# Patient Record
Sex: FEMALE | Race: WHITE | NOT HISPANIC OR LATINO | Employment: OTHER | URBAN - METROPOLITAN AREA
[De-identification: names, ages, dates, MRNs, and addresses within clinical notes are randomized per-mention and may not be internally consistent; named-entity substitution may affect disease eponyms.]

---

## 2017-04-03 ENCOUNTER — HOSPITAL ENCOUNTER (OUTPATIENT)
Dept: GENERAL RADIOLOGY | Facility: HOSPITAL | Age: 77
Discharge: HOME | End: 2017-04-03
Attending: INTERNAL MEDICINE | Admitting: INTERNAL MEDICINE

## 2017-05-19 ENCOUNTER — HOSPITAL ENCOUNTER (OUTPATIENT)
Dept: ORTHOPEDIC SURGERY | Facility: CLINIC | Age: 77
Discharge: HOME | End: 2017-05-19
Attending: ORTHOPAEDIC SURGERY | Admitting: ORTHOPAEDIC SURGERY

## 2017-06-29 ENCOUNTER — HOSPITAL ENCOUNTER (OUTPATIENT)
Dept: ORTHOPEDIC SURGERY | Facility: CLINIC | Age: 77
Discharge: HOME | End: 2017-06-29
Attending: PHYSICIAN ASSISTANT | Admitting: PHYSICIAN ASSISTANT

## 2017-07-10 ENCOUNTER — HOSPITAL ENCOUNTER (OUTPATIENT)
Dept: PHYSICAL THERAPY | Facility: HOSPITAL | Age: 77
Setting detail: RECURRING SERIES
Discharge: HOME | End: 2017-07-26
Attending: PHYSICIAN ASSISTANT | Admitting: PHYSICIAN ASSISTANT

## 2017-11-28 ENCOUNTER — HOSPITAL ENCOUNTER (OUTPATIENT)
Dept: INFUSION THERAPY | Facility: HOSPITAL | Age: 77
Discharge: HOME | End: 2017-11-28
Attending: FAMILY MEDICINE | Admitting: FAMILY MEDICINE

## 2017-11-28 LAB — CALCIUM SERPL-MCNC: 9 MG/DL (ref 8.9–10.3)

## 2017-12-19 ENCOUNTER — HOSPITAL ENCOUNTER (OUTPATIENT)
Dept: FAMILY MEDICINE CLINIC | Facility: CLINIC | Age: 77
Setting detail: SPECIMEN
Discharge: HOME | End: 2017-12-19
Attending: FAMILY MEDICINE | Admitting: FAMILY MEDICINE

## 2017-12-19 LAB
ALBUMIN SERPL-MCNC: 3.8 G/DL (ref 3.5–4.8)
ALBUMIN/GLOB SERPL: 1.6 {RATIO} (ref 1–1.7)
ALP SERPL-CCNC: 52 IU/L (ref 32–91)
ALT SERPL-CCNC: 22 IU/L (ref 14–54)
ANION GAP SERPL CALC-SCNC: 10.6 MMOL/L (ref 10–20)
AST SERPL-CCNC: 26 IU/L (ref 15–41)
BASOPHILS # BLD AUTO: 0 10*3/UL (ref 0–0.2)
BASOPHILS NFR BLD AUTO: 0 % (ref 0–2)
BILIRUB SERPL-MCNC: 0.5 MG/DL (ref 0.3–1.2)
BUN SERPL-MCNC: 10 MG/DL (ref 8–20)
BUN/CREAT SERPL: 14.3 (ref 5.4–26.2)
CALCIUM SERPL-MCNC: 8.7 MG/DL (ref 8.9–10.3)
CHLORIDE SERPL-SCNC: 107 MMOL/L (ref 101–111)
CHOLEST SERPL-MCNC: 159 MG/DL
CHOLEST/HDLC SERPL: 2.8 {RATIO}
CONV CO2: 26 MMOL/L (ref 22–32)
CONV LDL CHOLESTEROL DIRECT: 89 MG/DL (ref 0–100)
CONV TOTAL PROTEIN: 6.2 G/DL (ref 6.1–7.9)
CREAT UR-MCNC: 0.7 MG/DL (ref 0.4–1)
DIFFERENTIAL METHOD BLD: (no result)
EOSINOPHIL # BLD AUTO: 0 10*3/UL (ref 0–0.3)
EOSINOPHIL # BLD AUTO: 1 % (ref 0–3)
ERYTHROCYTE [DISTWIDTH] IN BLOOD BY AUTOMATED COUNT: 14.2 % (ref 11.5–14.5)
ERYTHROCYTE [SEDIMENTATION RATE] IN BLOOD BY WESTERGREN METHOD: 16 MM/HR (ref 0–30)
GLOBULIN UR ELPH-MCNC: 2.4 G/DL (ref 2.5–3.8)
GLUCOSE SERPL-MCNC: 101 MG/DL (ref 65–99)
HCT VFR BLD AUTO: 39.1 % (ref 35–49)
HDLC SERPL-MCNC: 57 MG/DL
HGB BLD-MCNC: 13.2 G/DL (ref 12–15)
LDLC/HDLC SERPL: 1.6 {RATIO}
LIPID INTERPRETATION: NORMAL
LYMPHOCYTES # BLD AUTO: 1.4 10*3/UL (ref 0.8–4.8)
LYMPHOCYTES NFR BLD AUTO: 29 % (ref 18–42)
MCH RBC QN AUTO: 31.3 PG (ref 26–32)
MCHC RBC AUTO-ENTMCNC: 33.7 G/DL (ref 32–36)
MCV RBC AUTO: 92.9 FL (ref 80–94)
MONOCYTES # BLD AUTO: 0.5 10*3/UL (ref 0.1–1.3)
MONOCYTES NFR BLD AUTO: 10 % (ref 2–11)
NEUTROPHILS # BLD AUTO: 2.7 10*3/UL (ref 2.3–8.6)
NEUTROPHILS NFR BLD AUTO: 60 % (ref 50–75)
NRBC BLD AUTO-RTO: 0 /100{WBCS}
NRBC/RBC NFR BLD MANUAL: 0 10*3/UL
PLATELET # BLD AUTO: 227 10*3/UL (ref 150–450)
PMV BLD AUTO: 7.9 FL (ref 7.4–10.4)
POTASSIUM SERPL-SCNC: 3.6 MMOL/L (ref 3.6–5.1)
RBC # BLD AUTO: 4.21 10*6/UL (ref 4–5.4)
SODIUM SERPL-SCNC: 140 MMOL/L (ref 136–144)
TRIGL SERPL-MCNC: 95 MG/DL
VLDLC SERPL CALC-MCNC: 13.1 MG/DL
WBC # BLD AUTO: 4.6 10*3/UL (ref 4.5–11.5)

## 2018-02-20 ENCOUNTER — HOSPITAL ENCOUNTER (OUTPATIENT)
Dept: MAMMOGRAPHY | Facility: HOSPITAL | Age: 78
Discharge: HOME | End: 2018-02-20
Attending: FAMILY MEDICINE | Admitting: FAMILY MEDICINE

## 2018-05-10 ENCOUNTER — HOSPITAL ENCOUNTER (OUTPATIENT)
Dept: FAMILY MEDICINE CLINIC | Facility: CLINIC | Age: 78
Setting detail: SPECIMEN
Discharge: HOME | End: 2018-05-10
Attending: FAMILY MEDICINE | Admitting: FAMILY MEDICINE

## 2018-05-10 LAB
ALBUMIN SERPL-MCNC: 4.1 G/DL (ref 3.5–4.8)
ALBUMIN/GLOB SERPL: 1.6 {RATIO} (ref 1–1.7)
ALP SERPL-CCNC: 53 IU/L (ref 32–91)
ALT SERPL-CCNC: 25 IU/L (ref 14–54)
ANION GAP SERPL CALC-SCNC: 10.8 MMOL/L (ref 10–20)
AST SERPL-CCNC: 27 IU/L (ref 15–41)
BASOPHILS # BLD AUTO: 0 10*3/UL (ref 0–0.2)
BASOPHILS NFR BLD AUTO: 0 % (ref 0–2)
BILIRUB SERPL-MCNC: 0.5 MG/DL (ref 0.3–1.2)
BUN SERPL-MCNC: 10 MG/DL (ref 8–20)
BUN/CREAT SERPL: 14.3 (ref 5.4–26.2)
CALCIUM SERPL-MCNC: 9.8 MG/DL (ref 8.9–10.3)
CHLORIDE SERPL-SCNC: 107 MMOL/L (ref 101–111)
CONV CO2: 26 MMOL/L (ref 22–32)
CONV TOTAL PROTEIN: 6.7 G/DL (ref 6.1–7.9)
CREAT UR-MCNC: 0.7 MG/DL (ref 0.4–1)
DIFFERENTIAL METHOD BLD: (no result)
EOSINOPHIL # BLD AUTO: 0 10*3/UL (ref 0–0.3)
EOSINOPHIL # BLD AUTO: 1 % (ref 0–3)
ERYTHROCYTE [DISTWIDTH] IN BLOOD BY AUTOMATED COUNT: 14 % (ref 11.5–14.5)
GLOBULIN UR ELPH-MCNC: 2.6 G/DL (ref 2.5–3.8)
GLUCOSE SERPL-MCNC: 101 MG/DL (ref 65–99)
HCT VFR BLD AUTO: 42 % (ref 35–49)
HGB BLD-MCNC: 13.9 G/DL (ref 12–15)
LYMPHOCYTES # BLD AUTO: 1.2 10*3/UL (ref 0.8–4.8)
LYMPHOCYTES NFR BLD AUTO: 18 % (ref 18–42)
MCH RBC QN AUTO: 30.8 PG (ref 26–32)
MCHC RBC AUTO-ENTMCNC: 33.1 G/DL (ref 32–36)
MCV RBC AUTO: 93.3 FL (ref 80–94)
MONOCYTES # BLD AUTO: 0.6 10*3/UL (ref 0.1–1.3)
MONOCYTES NFR BLD AUTO: 10 % (ref 2–11)
NEUTROPHILS # BLD AUTO: 4.6 10*3/UL (ref 2.3–8.6)
NEUTROPHILS NFR BLD AUTO: 71 % (ref 50–75)
NRBC BLD AUTO-RTO: 0 /100{WBCS}
NRBC/RBC NFR BLD MANUAL: 0 10*3/UL
PLATELET # BLD AUTO: 271 10*3/UL (ref 150–450)
PMV BLD AUTO: 8.7 FL (ref 7.4–10.4)
POTASSIUM SERPL-SCNC: 3.8 MMOL/L (ref 3.6–5.1)
RBC # BLD AUTO: 4.5 10*6/UL (ref 4–5.4)
SODIUM SERPL-SCNC: 140 MMOL/L (ref 136–144)
WBC # BLD AUTO: 6.4 10*3/UL (ref 4.5–11.5)

## 2018-05-29 ENCOUNTER — CONVERSION ENCOUNTER (OUTPATIENT)
Dept: SURGERY | Facility: CLINIC | Age: 78
End: 2018-05-29

## 2018-06-29 ENCOUNTER — HOSPITAL ENCOUNTER (OUTPATIENT)
Dept: CT IMAGING | Facility: HOSPITAL | Age: 78
Discharge: HOME | End: 2018-06-29
Attending: FAMILY MEDICINE | Admitting: FAMILY MEDICINE

## 2018-06-29 LAB — CREAT BLDA-MCNC: 0.6 MG/DL (ref 0.6–1.3)

## 2018-07-16 ENCOUNTER — HOSPITAL ENCOUNTER (OUTPATIENT)
Dept: OTHER | Facility: HOSPITAL | Age: 78
Discharge: HOME | End: 2018-07-16
Attending: THORACIC SURGERY (CARDIOTHORACIC VASCULAR SURGERY) | Admitting: THORACIC SURGERY (CARDIOTHORACIC VASCULAR SURGERY)

## 2018-07-17 ENCOUNTER — HOSPITAL ENCOUNTER (OUTPATIENT)
Dept: ONCOLOGY | Facility: HOSPITAL | Age: 78
Discharge: HOME | End: 2018-07-17
Attending: THORACIC SURGERY (CARDIOTHORACIC VASCULAR SURGERY) | Admitting: THORACIC SURGERY (CARDIOTHORACIC VASCULAR SURGERY)

## 2018-07-17 LAB — GLUCOSE BLD-MCNC: 87 MG/DL (ref 70–105)

## 2018-12-03 ENCOUNTER — CONVERSION ENCOUNTER (OUTPATIENT)
Dept: SURGERY | Facility: CLINIC | Age: 78
End: 2018-12-03

## 2018-12-07 ENCOUNTER — HOSPITAL ENCOUNTER (OUTPATIENT)
Dept: FAMILY MEDICINE CLINIC | Facility: CLINIC | Age: 78
Setting detail: SPECIMEN
Discharge: HOME | End: 2018-12-07
Attending: FAMILY MEDICINE | Admitting: FAMILY MEDICINE

## 2018-12-07 LAB
25(OH)D3 SERPL-MCNC: 61 NG/ML (ref 30–100)
ALBUMIN SERPL-MCNC: 3.9 G/DL (ref 3.5–4.8)
ALBUMIN/GLOB SERPL: 1.6 {RATIO} (ref 1–1.7)
ALP SERPL-CCNC: 57 IU/L (ref 32–91)
ALT SERPL-CCNC: 28 IU/L (ref 14–54)
ANION GAP SERPL CALC-SCNC: 11.8 MMOL/L (ref 10–20)
AST SERPL-CCNC: 27 IU/L (ref 15–41)
BASOPHILS # BLD AUTO: 0 10*3/UL (ref 0–0.2)
BASOPHILS NFR BLD AUTO: 0 % (ref 0–2)
BILIRUB SERPL-MCNC: 1 MG/DL (ref 0.3–1.2)
BUN SERPL-MCNC: 11 MG/DL (ref 8–20)
BUN/CREAT SERPL: 13.8 (ref 5.4–26.2)
CALCIUM SERPL-MCNC: 9.5 MG/DL (ref 8.9–10.3)
CHLORIDE SERPL-SCNC: 101 MMOL/L (ref 101–111)
CHOLEST SERPL-MCNC: 158 MG/DL
CHOLEST/HDLC SERPL: 2.7 {RATIO}
CONV CO2: 27 MMOL/L (ref 22–32)
CONV LDL CHOLESTEROL DIRECT: 91 MG/DL (ref 0–100)
CONV TOTAL PROTEIN: 6.4 G/DL (ref 6.1–7.9)
CREAT UR-MCNC: 0.8 MG/DL (ref 0.4–1)
DIFFERENTIAL METHOD BLD: (no result)
EOSINOPHIL # BLD AUTO: 0 10*3/UL (ref 0–0.3)
EOSINOPHIL # BLD AUTO: 1 % (ref 0–3)
ERYTHROCYTE [DISTWIDTH] IN BLOOD BY AUTOMATED COUNT: 13.9 % (ref 11.5–14.5)
ERYTHROCYTE [SEDIMENTATION RATE] IN BLOOD BY WESTERGREN METHOD: 25 MM/HR (ref 0–30)
GLOBULIN UR ELPH-MCNC: 2.5 G/DL (ref 2.5–3.8)
GLUCOSE SERPL-MCNC: 122 MG/DL (ref 65–99)
HCT VFR BLD AUTO: 40.8 % (ref 35–49)
HDLC SERPL-MCNC: 59 MG/DL
HGB BLD-MCNC: 13.8 G/DL (ref 12–15)
LDLC/HDLC SERPL: 1.5 {RATIO}
LIPID INTERPRETATION: NORMAL
LYMPHOCYTES # BLD AUTO: 1.2 10*3/UL (ref 0.8–4.8)
LYMPHOCYTES NFR BLD AUTO: 17 % (ref 18–42)
MCH RBC QN AUTO: 31.4 PG (ref 26–32)
MCHC RBC AUTO-ENTMCNC: 33.8 G/DL (ref 32–36)
MCV RBC AUTO: 92.9 FL (ref 80–94)
MONOCYTES # BLD AUTO: 0.6 10*3/UL (ref 0.1–1.3)
MONOCYTES NFR BLD AUTO: 8 % (ref 2–11)
NEUTROPHILS # BLD AUTO: 5.5 10*3/UL (ref 2.3–8.6)
NEUTROPHILS NFR BLD AUTO: 74 % (ref 50–75)
NRBC BLD AUTO-RTO: 0 /100{WBCS}
NRBC/RBC NFR BLD MANUAL: 0 10*3/UL
PLATELET # BLD AUTO: 262 10*3/UL (ref 150–450)
PMV BLD AUTO: 8 FL (ref 7.4–10.4)
POTASSIUM SERPL-SCNC: 3.8 MMOL/L (ref 3.6–5.1)
RBC # BLD AUTO: 4.39 10*6/UL (ref 4–5.4)
SODIUM SERPL-SCNC: 136 MMOL/L (ref 136–144)
T PALLIDUM IGG SER QL: NONREACTIVE
TRIGL SERPL-MCNC: 103 MG/DL
TSH SERPL-ACNC: 1.06 UIU/ML (ref 0.34–5.6)
VIT B12 SERPL-MCNC: 1436 PG/ML (ref 180–914)
VLDLC SERPL CALC-MCNC: 8.7 MG/DL
WBC # BLD AUTO: 7.4 10*3/UL (ref 4.5–11.5)

## 2018-12-10 LAB — ANA SER QL IA: NEGATIVE

## 2018-12-14 ENCOUNTER — HOSPITAL ENCOUNTER (OUTPATIENT)
Dept: MRI IMAGING | Facility: HOSPITAL | Age: 78
Discharge: HOME | End: 2018-12-14
Attending: FAMILY MEDICINE | Admitting: FAMILY MEDICINE

## 2019-01-04 ENCOUNTER — HOSPITAL ENCOUNTER (OUTPATIENT)
Dept: CT IMAGING | Facility: HOSPITAL | Age: 79
Discharge: HOME | End: 2019-01-04
Attending: THORACIC SURGERY (CARDIOTHORACIC VASCULAR SURGERY) | Admitting: THORACIC SURGERY (CARDIOTHORACIC VASCULAR SURGERY)

## 2019-01-04 LAB — CREAT BLDA-MCNC: 0.8 MG/DL (ref 0.6–1.3)

## 2019-05-06 ENCOUNTER — HOSPITAL ENCOUNTER (OUTPATIENT)
Dept: GENERAL RADIOLOGY | Facility: HOSPITAL | Age: 79
Discharge: HOME | End: 2019-05-06
Attending: FAMILY MEDICINE | Admitting: FAMILY MEDICINE

## 2019-05-06 ENCOUNTER — HOSPITAL ENCOUNTER (OUTPATIENT)
Dept: FAMILY MEDICINE CLINIC | Facility: CLINIC | Age: 79
Setting detail: SPECIMEN
Discharge: HOME | End: 2019-05-06
Attending: FAMILY MEDICINE | Admitting: FAMILY MEDICINE

## 2019-05-09 ENCOUNTER — HOSPITAL ENCOUNTER (OUTPATIENT)
Dept: CT IMAGING | Facility: HOSPITAL | Age: 79
Discharge: HOME | End: 2019-05-09
Attending: THORACIC SURGERY (CARDIOTHORACIC VASCULAR SURGERY) | Admitting: THORACIC SURGERY (CARDIOTHORACIC VASCULAR SURGERY)

## 2019-05-17 ENCOUNTER — HOSPITAL ENCOUNTER (OUTPATIENT)
Dept: ORTHOPEDIC SURGERY | Facility: CLINIC | Age: 79
Setting detail: SPECIMEN
Discharge: HOME | End: 2019-05-17
Attending: PHYSICIAN ASSISTANT | Admitting: PHYSICIAN ASSISTANT

## 2019-05-17 LAB — 25(OH)D3 SERPL-MCNC: 57 NG/ML (ref 30–100)

## 2019-05-22 ENCOUNTER — HOSPITAL ENCOUNTER (OUTPATIENT)
Dept: MAMMOGRAPHY | Facility: HOSPITAL | Age: 79
Discharge: HOME | End: 2019-05-22
Attending: PHYSICIAN ASSISTANT | Admitting: PHYSICIAN ASSISTANT

## 2019-06-01 ENCOUNTER — TRANSCRIBE ORDERS (OUTPATIENT)
Dept: SURGERY | Facility: CLINIC | Age: 79
End: 2019-06-01

## 2019-06-01 ENCOUNTER — TRANSCRIBE ORDERS (OUTPATIENT)
Dept: CT IMAGING | Facility: HOSPITAL | Age: 79
End: 2019-06-01

## 2019-06-01 DIAGNOSIS — R91.1 LUNG NODULE: Primary | ICD-10-CM

## 2019-06-04 VITALS
DIASTOLIC BLOOD PRESSURE: 78 MMHG | BODY MASS INDEX: 24.72 KG/M2 | WEIGHT: 148.4 LBS | SYSTOLIC BLOOD PRESSURE: 136 MMHG | DIASTOLIC BLOOD PRESSURE: 80 MMHG | HEART RATE: 70 BPM | SYSTOLIC BLOOD PRESSURE: 132 MMHG | HEART RATE: 80 BPM | HEIGHT: 65 IN

## 2019-06-06 ENCOUNTER — CONVERSION ENCOUNTER (OUTPATIENT)
Dept: FAMILY MEDICINE CLINIC | Facility: CLINIC | Age: 79
End: 2019-06-06

## 2019-06-11 ENCOUNTER — CONVERSION ENCOUNTER (OUTPATIENT)
Dept: ORTHOPEDIC SURGERY | Facility: CLINIC | Age: 79
End: 2019-06-11

## 2019-06-12 VITALS
HEART RATE: 64 BPM | DIASTOLIC BLOOD PRESSURE: 81 MMHG | BODY MASS INDEX: 27.46 KG/M2 | SYSTOLIC BLOOD PRESSURE: 134 MMHG | WEIGHT: 155 LBS | HEIGHT: 63 IN

## 2019-06-12 VITALS
HEIGHT: 63 IN | DIASTOLIC BLOOD PRESSURE: 73 MMHG | WEIGHT: 157 LBS | SYSTOLIC BLOOD PRESSURE: 116 MMHG | BODY MASS INDEX: 27.82 KG/M2 | HEART RATE: 69 BPM

## 2019-06-13 NOTE — PROGRESS NOTES
Visit Type:  Initial Consult  Referring Provider:  Lele Lucas MD  Primary Provider:  Shayy HERNANDEZ MD    CC:  left hip pain.    History of Present Illness:  Patient presents with main symptom of left hip pain.She states that she is getting progressively worse.  She is limping all the time. She states that she likes to walk a mi a day but is unable to do so because of the pain in her groin.  She denies any   history of trauma.  There are no risk factors for avascular necrosis.  Duration has been 3 months   And her symptoms are getting progressively worse.  She finds it difficult to turn over in bed at night.  She also states that he is unable to ambulate because of the hip pain and has a very significant limp.  Severity is described as 9/10   Also complains of left groin and leg pain   Which is related to internal and external rotation of the hip.  Denies surgeries or injury   To the hip.  Patient has tried steriod shot   To the hip along with anti-inflammatory medication.  Those modalities of treatment have really not helped her at all in managing her symptoms.  She states that she would like to proceed with hip replacement surgery ASAP.        Past Medical History:     Reviewed history from 2019 and no changes required:        Last prolia given 2017        Female Reproductive Hx: childbirth/conditions, ()        Health Maintenence: PAP (last done ); DEXA (last 2016)        Cardiovascular Hx: angina, (vasospastic)        Health Maintenence: stress myoview (normal)        Cardiovascular Hx: CAD        Musculoskeletal Hx: rheumatoid arthritis, osteoporosis; gets Prolia q6 months        GI Hx: irritable bowel syndrome        Musculoskeletal Hx: sciatica        Renal / Urinary Hx: constipation        Cardiovascular Hx: hyperlipidemia        Anxiety        Depression        Anal stenosis        Insomnia        Arrythmia        B12 deff        IBS        Osteoporosis with hx fragility fxs  (DEXA 2016)- fosamax [id number], on prolia(2016)        Microscopic hematuria, work-up (-)        Adenocarcinoma right lung         hyperglycemia        No Drug Allergies?         left hip pain    Past Surgical History:     Reviewed history from 2018 and no changes required:        tonsillectomy        appendectomy        cholecystectomy        hysterectomy        breast biopsy right        colonoscopy        cardiac catheterization()        Anal dilation        Bilateral wrist fractures        Cystoscopy        Right TKR 17 Dr. shaw        Right VATS wedge resection of Right middle lobe pulmonary nodule, completion right middle lobectomy, hilar and mediastinal lymph node dissection 18        RML lobectomy (Dr. Brantley)    Family History Summary:      Reviewed history Last on 2019 and no changes required:2019  Father - Has Family History of Alcoholism - Entered On: 2018  Other Family Member - Has FH Heart Disease - Entered On: 2017    General Comments - FH:  mother  age 86  Father  MI age 42- etoh,smoker  FH Heart Disease        Social History:     Reviewed history from 2019 and no changes required:        Patient is a former smoker.        Passive Smoke: N        Alcohol Use: Y        Drug Use: N        HIV/High Risk: N        Regular Exercise: Y        Hx Domestic Abuse: N        Adventist Affecting Care: N                Risk Factors:     Smoked Tobacco Use:  Former smoker  Smokeless Tobacco Use:  Never  Passive smoke exposure:  no  Drug use:  no  HIV high-risk behavior:  no  Caffeine use:  2 drinks per day  Alcohol use:  yes     Type:  1 -2 a month  Exercise:  yes     Times per week:  4     Type of Exercise:  swim therapy, walks  Seatbelt use:  100 %  Sun Exposure:  rarely    Dietary Counseling: yes    Previous Tobacco Use: Signed On - 2019  Smoked Tobacco Use:  Former smoker  Smokeless Tobacco Use:  Never  Passive smoke exposure:  no  Drug  use:  no  HIV high-risk behavior:  no  Caffeine use:  2 drinks per day    Previous Alcohol Use: Signed On - 05/16/2019  Alcohol use:  yes     Type:  1 -2 a month  Exercise:  yes     Times per week:  4     Type of Exercise:  swim therapy, walks  Seatbelt use:  100 %  Sun Exposure:  rarely    Dietary Counseling: yes    Colonoscopy History:     Date of Last Colonoscopy:  02/12/2018    Mammogram History:     Date of Last Mammogram:  05/07/2012    Active Medications (reviewed today):  NAMENDA 10 MG ORAL TABLET (MEMANTINE HCL) 1 po bid  CARTIA  MG ORAL CAPSULE EXTENDED RELEASE 24 HOUR (DILTIAZEM HCL COATED BEADS) take 1 tablet by mouth daily  SM VITAMIN D3 4000 UNIT ORAL CAPSULE (CHOLECALCIFEROL) take 2 capsule once daily  EFFEXOR  MG ORAL CAPSULE EXTENDED RELEASE 24 HOUR (VENLAFAXINE HCL) 1 po q am  PROLIA 60 MG/ML SUBCUTANEOUS SOLUTION (DENOSUMAB) take 60mg subq q 6 months  PREDNISONE 2.5 MG ORAL TABLET (PREDNISONE) 1 po q d  LEFLUNOMIDE 10 MG ORAL TABLET (LEFLUNOMIDE) take one tab po QD  METOPROLOL TARTRATE 25 MG ORAL TABLET (METOPROLOL TARTRATE) TAKE 1/2 TABLET TWICE DAILY  VITAMIN B-12 1000 MCG ORAL TABLET (CYANOCOBALAMIN) take 1 tablet by mouth once daily  CALCIUM 600+D PLUS MINERALS TABLET (CALCIUM CARBONATE-VIT D-MIN TABS) take 1 tablet by mouth twice daily  TRAZODONE HCL 50 MG ORAL TABLET (TRAZODONE HCL) 2 po q hs for insomnia  BENTYL 10 MG ORAL CAPSULE (DICYCLOMINE HCL) 1 po bid for irritable bowel  PRAVASTATIN SODIUM 40 MG ORAL TABLET (PRAVASTATIN SODIUM) take one daily at bedtime  PROTONIX 40 MG ORAL TABLET DELAYED RELEASE (PANTOPRAZOLE SODIUM) 1 po q d  POLYETHYLENE GLYCOL 3350 POWD (POLYETHYLENE GLYCOL 3350) DISSOLVE 17 GRAMS IN 8 OUNCES OF LIQUID AND DRINK TWICE A DAY    Current Allergies (reviewed today):  No known allergies    Current Medications (including medications started today):   NAMENDA 10 MG ORAL TABLET (MEMANTINE HCL) 1 po bid  CARTIA  MG ORAL CAPSULE EXTENDED RELEASE 24 HOUR  (DILTIAZEM HCL COATED BEADS) take 1 tablet by mouth daily  SM VITAMIN D3 4000 UNIT ORAL CAPSULE (CHOLECALCIFEROL) take 2 capsule once daily  EFFEXOR  MG ORAL CAPSULE EXTENDED RELEASE 24 HOUR (VENLAFAXINE HCL) 1 po q am  PROLIA 60 MG/ML SUBCUTANEOUS SOLUTION (DENOSUMAB) take 60mg subq q 6 months  PREDNISONE 2.5 MG ORAL TABLET (PREDNISONE) 1 po q d  LEFLUNOMIDE 10 MG ORAL TABLET (LEFLUNOMIDE) take one tab po QD  METOPROLOL TARTRATE 25 MG ORAL TABLET (METOPROLOL TARTRATE) TAKE 1/2 TABLET TWICE DAILY  VITAMIN B-12 1000 MCG ORAL TABLET (CYANOCOBALAMIN) take 1 tablet by mouth once daily  CALCIUM 600+D PLUS MINERALS TABLET (CALCIUM CARBONATE-VIT D-MIN TABS) take 1 tablet by mouth twice daily  TRAZODONE HCL 50 MG ORAL TABLET (TRAZODONE HCL) 2 po q hs for insomnia  BENTYL 10 MG ORAL CAPSULE (DICYCLOMINE HCL) 1 po bid for irritable bowel  PRAVASTATIN SODIUM 40 MG ORAL TABLET (PRAVASTATIN SODIUM) take one daily at bedtime  PROTONIX 40 MG ORAL TABLET DELAYED RELEASE (PANTOPRAZOLE SODIUM) 1 po q d  POLYETHYLENE GLYCOL 3350 POWD (POLYETHYLENE GLYCOL 3350) DISSOLVE 17 GRAMS IN 8 OUNCES OF LIQUID AND DRINK TWICE A DAY      Vital Signs:    Patient Profile:    78 Years Old Female  Height:     63 inches (165.10 cm)  Weight:     155 pounds  BMI:        27.45     Pulse rate: 64 / minute  BP Sittin / 81  (left arm)    Cuff size:  regular      Problems: Active problems were reviewed with the patient during this visit.  Medications: Medications were reviewed with the patient during this visit.  Allergies: Allergies were reviewed with the patient during this visit.  No Known Allergy.  No Known Drug Allergy.        Vitals Entered By: Ranjit Mcgee CMA (2019 10:24 AM)      Review of Systems     CV- Denies chest pain or discomfort, or palpitations.  Resp- Denies cough and shortness of breath.  GI- Denies indigestion, nausea and vomiting.  MS- See HPI.  Derm- Denies itching and rash.  Neuro- Denies headache or  numbness  All other symptoms reviewed and are normal.           Hip Exam    General:     Well-developed, well-nourished, in no acute distress; alert and oriented x 3.      Gait:     antalgic.      Skin:     Intact with no erythema; no scarring.      Inspection:     plantigrade foot with normal alignment of leg, ankle, hindfoot, and foot.     Palpation:       Patient has exquisite pain and discomfort on the lateral aspect of the left hip and over the anterior aspect of the groin.    Vascular:     dorsalis pedis and posterior tibial pulses 2+ and symmetric, capillary refill < 2 seconds, normal hair pattern, no evidence of ischemia.     Hip Exam:     Left:     Inspection/Palpation:    Left hip:  Positive Trendelenburg sign.  Positive Stinchfield test.  Straight leg raise exam is positive as well.  Internal and external rotation of the hip were associated with pain and discomfort.  She has got a very   significant limp on the left side.  There is 1/2 inch of shortening of the left lower extremity at the hip.          Impression & Recommendations:    Problem # 1:  HIP PAIN ON THE LEFT (ICD-719.45) (SJL81-L53.559)    Extensive discussion about different options for the patient.    She would like to proceed with hip replacement surgery.    Her granddaughter Brayden is in the office here today with the patient during the discussion.    Time spent in the office today with the patient was 45 minutes of which greater than 50% of my time was spent face to face going over the rationale for treatment options and the rationale for surgical intervention    Possibility of death, infection, myocardial infarction, DVT, pulmonary embolism, dislocation of the prosthesis, limb length discrepancy, possibility of revision surgery, possibility of iatrogenic fractures, and urinary tract infection etc all discussed   with the patient at length.    We will get a medical clearance for this patient and proceed with surgical intervention once she has  been cleared.  Plans are for a total hip arthroplasty with direct anterior approach.      ]      Electronically signed by Lenny Khan MD on 06/11/2019 at 11:25 AM  ________________________________________________________________________  Clinical Lists Changes    Orders:  Added new Service order of [facility] [address] ([id number]) - Signed                          Electronically signed by Ranjit Mcgee CMA on 06/11/2019 at 4:33 PM  ________________________________________________________________________       Disclaimer: Converted Note message may not contain all data elements that existed in the legacy source system. Please see [employer] [facility] for the original note details.

## 2019-06-18 ENCOUNTER — PREP FOR SURGERY (OUTPATIENT)
Dept: OTHER | Facility: HOSPITAL | Age: 79
End: 2019-06-18

## 2019-06-18 ENCOUNTER — TELEPHONE (OUTPATIENT)
Dept: ORTHOPEDIC SURGERY | Facility: CLINIC | Age: 79
End: 2019-06-18

## 2019-06-18 DIAGNOSIS — M16.12 PRIMARY OSTEOARTHRITIS OF LEFT HIP: Primary | ICD-10-CM

## 2019-06-18 RX ORDER — PREGABALIN 75 MG/1
150 CAPSULE ORAL ONCE
Status: CANCELLED | OUTPATIENT
Start: 2019-06-18 | End: 2019-06-18

## 2019-06-18 RX ORDER — ACETAMINOPHEN 500 MG
1000 TABLET ORAL ONCE
Status: CANCELLED | OUTPATIENT
Start: 2019-06-18 | End: 2019-06-18

## 2019-06-18 RX ORDER — MELOXICAM 15 MG/1
15 TABLET ORAL ONCE
Status: CANCELLED | OUTPATIENT
Start: 2019-06-18 | End: 2019-06-18

## 2019-06-20 ENCOUNTER — TELEPHONE (OUTPATIENT)
Dept: FAMILY MEDICINE CLINIC | Facility: CLINIC | Age: 79
End: 2019-06-20

## 2019-06-20 ENCOUNTER — TELEPHONE (OUTPATIENT)
Dept: CARDIOLOGY | Facility: CLINIC | Age: 79
End: 2019-06-20

## 2019-06-20 NOTE — TELEPHONE ENCOUNTER
PER SCK - Call this patient and tell her that she needs to see her cardiologist for clearance of surgery, if he gets clearance I will give her clearance     SPOKE WITH  PATIENT AND GAVE HER MESSAGE.  SHE WILL SEE HER CARDIOLOGIST AND NOTIFY US AFTERWARDS..

## 2019-07-05 ENCOUNTER — HOSPITAL ENCOUNTER (EMERGENCY)
Facility: HOSPITAL | Age: 79
Discharge: HOME | End: 2019-07-05
Attending: EMERGENCY MEDICINE | Admitting: EMERGENCY MEDICINE

## 2019-07-05 ENCOUNTER — APPOINTMENT (OUTPATIENT)
Dept: PREADMISSION TESTING | Facility: HOSPITAL | Age: 79
End: 2019-07-05

## 2019-07-05 ENCOUNTER — HOSPITAL ENCOUNTER (OUTPATIENT)
Dept: GENERAL RADIOLOGY | Facility: HOSPITAL | Age: 79
Discharge: HOME | End: 2019-07-05
Admitting: ORTHOPAEDIC SURGERY

## 2019-07-05 VITALS
TEMPERATURE: 97.3 F | HEART RATE: 73 BPM | HEIGHT: 64 IN | WEIGHT: 141.54 LBS | OXYGEN SATURATION: 99 % | DIASTOLIC BLOOD PRESSURE: 71 MMHG | SYSTOLIC BLOOD PRESSURE: 152 MMHG | RESPIRATION RATE: 18 BRPM | BODY MASS INDEX: 24.16 KG/M2

## 2019-07-05 VITALS
SYSTOLIC BLOOD PRESSURE: 149 MMHG | DIASTOLIC BLOOD PRESSURE: 81 MMHG | BODY MASS INDEX: 26.53 KG/M2 | OXYGEN SATURATION: 97 % | WEIGHT: 155.38 LBS | HEART RATE: 81 BPM | HEIGHT: 64 IN

## 2019-07-05 DIAGNOSIS — R20.2 PARESTHESIAS: ICD-10-CM

## 2019-07-05 DIAGNOSIS — E87.6 HYPOKALEMIA: ICD-10-CM

## 2019-07-05 DIAGNOSIS — F41.9 ACUTE ANXIETY: Primary | ICD-10-CM

## 2019-07-05 LAB
ABO GROUP BLD: NORMAL
ANION GAP SERPL CALCULATED.3IONS-SCNC: 14.2 MMOL/L (ref 10–20)
ANION GAP SERPL CALCULATED.3IONS-SCNC: 16.2 MMOL/L (ref 10–20)
APTT PPP: 24.9 SECONDS (ref 24–31)
BASOPHILS # BLD AUTO: 0 10*3/MM3 (ref 0–0.2)
BASOPHILS # BLD AUTO: 0 10*3/MM3 (ref 0–0.2)
BASOPHILS NFR BLD AUTO: 0.1 % (ref 0–1.5)
BASOPHILS NFR BLD AUTO: 0.2 % (ref 0–1.5)
BILIRUB UR QL STRIP: NEGATIVE
BLD GP AB SCN SERPL QL: NEGATIVE
BUN BLD-MCNC: 11 MG/DL (ref 8–20)
BUN BLD-MCNC: 15 MG/DL (ref 8–20)
BUN/CREAT SERPL: 11 (ref 5.4–26.2)
BUN/CREAT SERPL: 18.8 (ref 5.4–26.2)
CALCIUM SPEC-SCNC: 8.8 MG/DL (ref 8.9–10.3)
CALCIUM SPEC-SCNC: 9.1 MG/DL (ref 8.9–10.3)
CHLORIDE SERPL-SCNC: 105 MMOL/L (ref 101–111)
CHLORIDE SERPL-SCNC: 111 MMOL/L (ref 101–111)
CLARITY UR: CLEAR
CO2 SERPL-SCNC: 18 MMOL/L (ref 22–32)
CO2 SERPL-SCNC: 21 MMOL/L (ref 22–32)
COLOR UR: YELLOW
CREAT BLD-MCNC: 0.8 MG/DL (ref 0.4–1)
CREAT BLD-MCNC: 1 MG/DL (ref 0.4–1)
DEPRECATED RDW RBC AUTO: 45.1 FL (ref 37–54)
DEPRECATED RDW RBC AUTO: 47.7 FL (ref 37–54)
EOSINOPHIL # BLD AUTO: 0 10*3/MM3 (ref 0–0.4)
EOSINOPHIL # BLD AUTO: 0 10*3/MM3 (ref 0–0.4)
EOSINOPHIL NFR BLD AUTO: 0.3 % (ref 0.3–6.2)
EOSINOPHIL NFR BLD AUTO: 0.3 % (ref 0.3–6.2)
ERYTHROCYTE [DISTWIDTH] IN BLOOD BY AUTOMATED COUNT: 14 % (ref 12.3–15.4)
ERYTHROCYTE [DISTWIDTH] IN BLOOD BY AUTOMATED COUNT: 14.5 % (ref 12.3–15.4)
GFR SERPL CREATININE-BSD FRML MDRD: 53 ML/MIN/1.73
GFR SERPL CREATININE-BSD FRML MDRD: 69 ML/MIN/1.73
GLUCOSE BLD-MCNC: 105 MG/DL (ref 65–99)
GLUCOSE BLD-MCNC: 124 MG/DL (ref 65–99)
GLUCOSE BLDC GLUCOMTR-MCNC: 127 MG/DL (ref 70–105)
GLUCOSE UR STRIP-MCNC: NEGATIVE MG/DL
HCT VFR BLD AUTO: 41.9 % (ref 34–46.6)
HCT VFR BLD AUTO: 42.4 % (ref 34–46.6)
HGB BLD-MCNC: 14.1 G/DL (ref 12–15.9)
HGB BLD-MCNC: 14.3 G/DL (ref 12–15.9)
HGB UR QL STRIP.AUTO: NEGATIVE
HOLD SPECIMEN: NORMAL
HOLD SPECIMEN: NORMAL
INR PPP: 1.01 (ref 0.9–1.1)
KETONES UR QL STRIP: NEGATIVE
LEUKOCYTE ESTERASE UR QL STRIP.AUTO: NEGATIVE
LYMPHOCYTES # BLD AUTO: 1.2 10*3/MM3 (ref 0.7–3.1)
LYMPHOCYTES # BLD AUTO: 1.8 10*3/MM3 (ref 0.7–3.1)
LYMPHOCYTES NFR BLD AUTO: 18.7 % (ref 19.6–45.3)
LYMPHOCYTES NFR BLD AUTO: 23.7 % (ref 19.6–45.3)
MCH RBC QN AUTO: 31.1 PG (ref 26.6–33)
MCH RBC QN AUTO: 31.4 PG (ref 26.6–33)
MCHC RBC AUTO-ENTMCNC: 33.6 G/DL (ref 31.5–35.7)
MCHC RBC AUTO-ENTMCNC: 33.8 G/DL (ref 31.5–35.7)
MCV RBC AUTO: 92.5 FL (ref 79–97)
MCV RBC AUTO: 92.9 FL (ref 79–97)
MONOCYTES # BLD AUTO: 0.5 10*3/MM3 (ref 0.1–0.9)
MONOCYTES # BLD AUTO: 0.6 10*3/MM3 (ref 0.1–0.9)
MONOCYTES NFR BLD AUTO: 8.2 % (ref 5–12)
MONOCYTES NFR BLD AUTO: 8.7 % (ref 5–12)
NEUTROPHILS # BLD AUTO: 4.5 10*3/MM3 (ref 1.7–7)
NEUTROPHILS # BLD AUTO: 5 10*3/MM3 (ref 1.7–7)
NEUTROPHILS NFR BLD AUTO: 67.1 % (ref 42.7–76)
NEUTROPHILS NFR BLD AUTO: 72.7 % (ref 42.7–76)
NITRITE UR QL STRIP: NEGATIVE
NRBC BLD AUTO-RTO: 0 /100 WBC (ref 0–0.2)
NRBC BLD AUTO-RTO: 0.1 /100 WBC (ref 0–0.2)
PH UR STRIP.AUTO: 6 [PH] (ref 5–8)
PLATELET # BLD AUTO: 283 10*3/MM3 (ref 140–450)
PLATELET # BLD AUTO: 285 10*3/MM3 (ref 140–450)
PMV BLD AUTO: 8.2 FL (ref 6–12)
PMV BLD AUTO: 8.3 FL (ref 6–12)
POTASSIUM BLD-SCNC: 3.2 MMOL/L (ref 3.6–5.1)
POTASSIUM BLD-SCNC: 3.2 MMOL/L (ref 3.6–5.1)
PROT UR QL STRIP: NEGATIVE
PROTHROMBIN TIME: 10.4 SECONDS (ref 9.6–11.7)
RBC # BLD AUTO: 4.53 10*6/MM3 (ref 3.77–5.28)
RBC # BLD AUTO: 4.57 10*6/MM3 (ref 3.77–5.28)
RH BLD: POSITIVE
SODIUM BLD-SCNC: 139 MMOL/L (ref 136–144)
SODIUM BLD-SCNC: 140 MMOL/L (ref 136–144)
SP GR UR STRIP: 1.02 (ref 1–1.03)
T&S EXPIRATION DATE: NORMAL
UROBILINOGEN UR QL STRIP: NORMAL
WBC NRBC COR # BLD: 6.2 10*3/MM3 (ref 3.4–10.8)
WBC NRBC COR # BLD: 7.4 10*3/MM3 (ref 3.4–10.8)
WHOLE BLOOD HOLD SPECIMEN: NORMAL
WHOLE BLOOD HOLD SPECIMEN: NORMAL

## 2019-07-05 PROCEDURE — 36415 COLL VENOUS BLD VENIPUNCTURE: CPT

## 2019-07-05 PROCEDURE — 80048 BASIC METABOLIC PNL TOTAL CA: CPT | Performed by: EMERGENCY MEDICINE

## 2019-07-05 PROCEDURE — 25010000002 LORAZEPAM PER 2 MG: Performed by: EMERGENCY MEDICINE

## 2019-07-05 PROCEDURE — 86901 BLOOD TYPING SEROLOGIC RH(D): CPT | Performed by: ORTHOPAEDIC SURGERY

## 2019-07-05 PROCEDURE — 86900 BLOOD TYPING SEROLOGIC ABO: CPT

## 2019-07-05 PROCEDURE — 80048 BASIC METABOLIC PNL TOTAL CA: CPT | Performed by: ORTHOPAEDIC SURGERY

## 2019-07-05 PROCEDURE — 71046 X-RAY EXAM CHEST 2 VIEWS: CPT

## 2019-07-05 PROCEDURE — 85730 THROMBOPLASTIN TIME PARTIAL: CPT | Performed by: ORTHOPAEDIC SURGERY

## 2019-07-05 PROCEDURE — 86850 RBC ANTIBODY SCREEN: CPT | Performed by: ORTHOPAEDIC SURGERY

## 2019-07-05 PROCEDURE — 85025 COMPLETE CBC W/AUTO DIFF WBC: CPT | Performed by: EMERGENCY MEDICINE

## 2019-07-05 PROCEDURE — 85610 PROTHROMBIN TIME: CPT | Performed by: ORTHOPAEDIC SURGERY

## 2019-07-05 PROCEDURE — 82962 GLUCOSE BLOOD TEST: CPT

## 2019-07-05 PROCEDURE — 93005 ELECTROCARDIOGRAM TRACING: CPT | Performed by: EMERGENCY MEDICINE

## 2019-07-05 PROCEDURE — 96374 THER/PROPH/DIAG INJ IV PUSH: CPT

## 2019-07-05 PROCEDURE — 86900 BLOOD TYPING SEROLOGIC ABO: CPT | Performed by: ORTHOPAEDIC SURGERY

## 2019-07-05 PROCEDURE — 86901 BLOOD TYPING SEROLOGIC RH(D): CPT

## 2019-07-05 PROCEDURE — 81003 URINALYSIS AUTO W/O SCOPE: CPT | Performed by: ORTHOPAEDIC SURGERY

## 2019-07-05 PROCEDURE — 87081 CULTURE SCREEN ONLY: CPT | Performed by: ORTHOPAEDIC SURGERY

## 2019-07-05 PROCEDURE — 99284 EMERGENCY DEPT VISIT MOD MDM: CPT

## 2019-07-05 PROCEDURE — 93005 ELECTROCARDIOGRAM TRACING: CPT

## 2019-07-05 PROCEDURE — 83036 HEMOGLOBIN GLYCOSYLATED A1C: CPT | Performed by: ORTHOPAEDIC SURGERY

## 2019-07-05 PROCEDURE — 85025 COMPLETE CBC W/AUTO DIFF WBC: CPT | Performed by: ORTHOPAEDIC SURGERY

## 2019-07-05 RX ORDER — PRAVASTATIN SODIUM 40 MG
40 TABLET ORAL NIGHTLY
COMMUNITY
End: 2020-02-27

## 2019-07-05 RX ORDER — DILTIAZEM HYDROCHLORIDE 120 MG/1
120 CAPSULE, COATED, EXTENDED RELEASE ORAL DAILY
COMMUNITY
End: 2019-07-07 | Stop reason: SDUPTHER

## 2019-07-05 RX ORDER — PANTOPRAZOLE SODIUM 40 MG/1
40 TABLET, DELAYED RELEASE ORAL DAILY
COMMUNITY
End: 2019-12-13 | Stop reason: SDUPTHER

## 2019-07-05 RX ORDER — SODIUM CHLORIDE 0.9 % (FLUSH) 0.9 %
10 SYRINGE (ML) INJECTION AS NEEDED
Status: DISCONTINUED | OUTPATIENT
Start: 2019-07-05 | End: 2019-07-05 | Stop reason: HOSPADM

## 2019-07-05 RX ORDER — LORAZEPAM 2 MG/ML
0.25 INJECTION INTRAMUSCULAR ONCE
Status: COMPLETED | OUTPATIENT
Start: 2019-07-05 | End: 2019-07-05

## 2019-07-05 RX ORDER — LEFLUNOMIDE 10 MG/1
10 TABLET ORAL DAILY
COMMUNITY
End: 2019-10-15 | Stop reason: SDUPTHER

## 2019-07-05 RX ORDER — VENLAFAXINE HYDROCHLORIDE 150 MG/1
150 CAPSULE, EXTENDED RELEASE ORAL DAILY
COMMUNITY
End: 2019-08-22

## 2019-07-05 RX ORDER — IBUPROFEN 200 MG
600 CAPSULE ORAL DAILY
COMMUNITY

## 2019-07-05 RX ORDER — DICYCLOMINE HYDROCHLORIDE 10 MG/1
10 CAPSULE ORAL
COMMUNITY
End: 2019-11-22

## 2019-07-05 RX ORDER — TRAZODONE HYDROCHLORIDE 50 MG/1
100 TABLET ORAL NIGHTLY
COMMUNITY
End: 2019-12-13 | Stop reason: SDUPTHER

## 2019-07-05 RX ORDER — PREDNISONE 2.5 MG
2.5 TABLET ORAL DAILY
COMMUNITY
End: 2019-12-13 | Stop reason: SDUPTHER

## 2019-07-05 RX ORDER — POLYETHYLENE GLYCOL 3350 17 G/17G
17 POWDER, FOR SOLUTION ORAL DAILY
COMMUNITY
End: 2019-10-23 | Stop reason: SDUPTHER

## 2019-07-05 RX ORDER — MEMANTINE HYDROCHLORIDE 5 MG/1
5 TABLET ORAL 2 TIMES DAILY
COMMUNITY
End: 2019-08-14 | Stop reason: SDUPTHER

## 2019-07-05 RX ORDER — POTASSIUM CHLORIDE 750 MG/1
10 TABLET, FILM COATED, EXTENDED RELEASE ORAL 2 TIMES DAILY
Qty: 20 TABLET | Refills: 0 | Status: SHIPPED | OUTPATIENT
Start: 2019-07-05 | End: 2019-07-15

## 2019-07-05 RX ORDER — LANOLIN ALCOHOL/MO/W.PET/CERES
1000 CREAM (GRAM) TOPICAL DAILY
COMMUNITY

## 2019-07-05 RX ADMIN — LORAZEPAM 0.25 MG: 2 INJECTION, SOLUTION INTRAMUSCULAR; INTRAVENOUS at 17:45

## 2019-07-05 NOTE — DISCHARGE INSTRUCTIONS
Take potassium 2 times a day for the next 10 days.  Follow-up with Dr. Lucas if needed for medical problems.  Follow-up with your surgeon.

## 2019-07-05 NOTE — ED PROVIDER NOTES
Subjective   History of Present Illness  78-year-old female with a history of anxiety disorder as well as arthritis with chronic pain in her left hip.  The patient was at the hospital today to get preoperative lab for an upcoming left hip surgery and on the way home felt lightheaded.  She states that when she got home she was weak and felt an electric shock going throughout her body.  The patient then called EMS and when she arrived in the emergency department she complained of dizziness.  The patient denied to have any pain at the time that I saw her.  The family is present and states that this is not unusual for her to have these type of panic attacks.  Patient does have a history of depression and apparently had her medicine the increased steroid discontinued 2 days ago in anticipation of surgery.  Review of Systems   Unable to perform ROS: Psychiatric disorder       Past Medical History:   Diagnosis Date   • Arthritis    • Coronary artery disease    • Elevated cholesterol    • Lung cancer (CMS/HCC)    • Rheumatoid arthritis (CMS/HCC)        No Known Allergies    Past Surgical History:   Procedure Laterality Date   • APPENDECTOMY     • CATARACT EXTRACTION WITH INTRAOCULAR LENS IMPLANT Bilateral 2009   • CHOLECYSTECTOMY     • HYSTERECTOMY     • LUNG REMOVAL, PARTIAL Right 2018   • PARTIAL HYSTERECTOMY     • TOTAL KNEE ARTHROPLASTY Right 2017       No family history on file.    Social History     Socioeconomic History   • Marital status:      Spouse name: Not on file   • Number of children: Not on file   • Years of education: Not on file   • Highest education level: Not on file   Tobacco Use   • Smoking status: Former Smoker     Years: 15.00     Start date:      Last attempt to quit: 1980     Years since quittin.5   • Smokeless tobacco: Never Used   Substance and Sexual Activity   • Alcohol use: No     Frequency: Never   • Drug use: No           Objective   Physical Exam  The patient is awake and  alert she is oriented x3 the patient is lying on a stretcher and will only open her eyes on command.  The vital signs are stable the O2 sat was 100% the HEENT exam pupils equal round reactive to light EOMs are full ENT was unremarkable neck is supple chest is clear nontender cardiovascular exam reveals a regular rhythm without a gallop or murmur the abdomen was soft nontender the patient has tone in all extremities.  She does not have any slurred speech.  The patient has some pain in the left hip with range of motion but no other pain or signs are present.  Her neurologic exam does not demonstrate an acute focal deficit but she is very anxious.  Procedures           ED Course        Results for orders placed or performed during the hospital encounter of 07/05/19   Basic Metabolic Panel   Result Value Ref Range    Glucose 124 (H) 65 - 99 mg/dL    BUN 15 8 - 20 mg/dL    Creatinine 0.80 0.40 - 1.00 mg/dL    Sodium 140 136 - 144 mmol/L    Potassium 3.2 (L) 3.6 - 5.1 mmol/L    Chloride 111 101 - 111 mmol/L    CO2 18.0 (L) 22.0 - 32.0 mmol/L    Calcium 9.1 8.9 - 10.3 mg/dL    eGFR Non African Amer 69 >60 mL/min/1.73    BUN/Creatinine Ratio 18.8 5.4 - 26.2    Anion Gap 14.2 10.0 - 20.0 mmol/L   CBC Auto Differential   Result Value Ref Range    WBC 7.40 3.40 - 10.80 10*3/mm3    RBC 4.53 3.77 - 5.28 10*6/mm3    Hemoglobin 14.1 12.0 - 15.9 g/dL    Hematocrit 41.9 34.0 - 46.6 %    MCV 92.5 79.0 - 97.0 fL    MCH 31.1 26.6 - 33.0 pg    MCHC 33.6 31.5 - 35.7 g/dL    RDW 14.0 12.3 - 15.4 %    RDW-SD 45.1 37.0 - 54.0 fl    MPV 8.2 6.0 - 12.0 fL    Platelets 285 140 - 450 10*3/mm3    Neutrophil % 67.1 42.7 - 76.0 %    Lymphocyte % 23.7 19.6 - 45.3 %    Monocyte % 8.7 5.0 - 12.0 %    Eosinophil % 0.3 0.3 - 6.2 %    Basophil % 0.2 0.0 - 1.5 %    Neutrophils, Absolute 5.00 1.70 - 7.00 10*3/mm3    Lymphocytes, Absolute 1.80 0.70 - 3.10 10*3/mm3    Monocytes, Absolute 0.60 0.10 - 0.90 10*3/mm3    Eosinophils, Absolute 0.00 0.00 - 0.40  10*3/mm3    Basophils, Absolute 0.00 0.00 - 0.20 10*3/mm3    nRBC 0.0 0.0 - 0.2 /100 WBC   POC Glucose Once   Result Value Ref Range    Glucose 127 (H) 70 - 105 mg/dL   Light Blue Top   Result Value Ref Range    Extra Tube hold for add-on    Green Top (Gel)   Result Value Ref Range    Extra Tube Hold for add-ons.    Lavender Top   Result Value Ref Range    Extra Tube hold for add-on    Gold Top - SST   Result Value Ref Range    Extra Tube Hold for add-ons.      Medications   sodium chloride 0.9 % flush 10 mL (not administered)   LORazepam (ATIVAN) injection 0.25 mg (0.25 mg Intravenous Given 7/5/19 1745)     Xr Chest Pa & Lateral    Result Date: 7/5/2019  IMPRESSION : Mild right basilar scarring or atelectasis. Surgical changes of partial right lung resection.  Electronically Signed By-Dr. Diana Maldonado MD On:7/5/2019 3:41 PM This report was finalized on [id number] by Dr. Diana Maldonado MD.            MDM  Patient was given a low dose of Ativan in the emergency department and was doing much more comfortably.  The only abnormality is on the lab work with her potassium is slightly low at 3.2 and she will be given potassium supplementation.    Final diagnoses:   Acute anxiety   Hypokalemia   Paresthesias            Shola Canseco MD  07/05/19 1944

## 2019-07-07 LAB — MRSA SPEC QL CULT: NORMAL

## 2019-07-07 PROCEDURE — 93010 ELECTROCARDIOGRAM REPORT: CPT | Performed by: INTERNAL MEDICINE

## 2019-07-08 ENCOUNTER — TELEPHONE (OUTPATIENT)
Dept: FAMILY MEDICINE CLINIC | Facility: CLINIC | Age: 79
End: 2019-07-08

## 2019-07-08 LAB — HBA1C MFR BLD: 5.6 % (ref 3.5–5.6)

## 2019-07-08 RX ORDER — DILTIAZEM HYDROCHLORIDE 120 MG/1
CAPSULE, EXTENDED RELEASE ORAL
Qty: 30 CAPSULE | Refills: 4 | Status: SHIPPED | OUTPATIENT
Start: 2019-07-08 | End: 2021-03-03

## 2019-07-08 RX ORDER — LORAZEPAM 0.5 MG/1
TABLET ORAL
Qty: 30 TABLET | Refills: 0 | Status: SHIPPED | OUTPATIENT
Start: 2019-07-08 | End: 2021-08-31 | Stop reason: SDUPTHER

## 2019-07-09 ENCOUNTER — PATIENT OUTREACH (OUTPATIENT)
Dept: CASE MANAGEMENT | Facility: OTHER | Age: 79
End: 2019-07-09

## 2019-07-09 NOTE — OUTREACH NOTE
Patient Outreach Note    RN-CC outreach to patient.  Patient had ED visit at [facility] on 7/5/2019.  Patient presented with c/o anxiety. Discharge instructions reviewed.   Patient reports she has a history of anxiety.  She has contacted PCP Shayy since discharge and been prescribed LORazepam 0.5 MG Oral Tablet one tablet by mouth two times per day as needed for anxiety.  Patient stated her friend was coming to take her to the pharmacy to  the meds later today.  Patient stated she had taken this medication before but it had been some time.  Education provided, dizziness and falls risks reviewed.  Patient stated comfort in calling her PCP as needed and has a scheduled appointment on 8/22/2019.  Patient has a planned admission for total left hip arthroplasty on July 17, 2019.  Other needs/concerns denied.      Gaps In Care and Quality Measures Reviewed      Annual Wellness Visit: Patient is overdue for AWV but does not wish to schedule at this time.  MyChart: Patient has received information  Advance Care Planning: On file with [facility]    ACO-13 Falls Risk Screening: HM list as current, no screening tool noted.  ACO-14 Influenza Vaccine: Noted as current  ACO-17 Tobacco Use: Screened  ACO-18 PHQ: Not noted  ACO-19 Colonoscopy: Age exempt  ACO-20 Mammogram: Age exempt  ACO-27 Diabetic A1c: Within limits at 5.6 on 7/5/2019  ACO-28 Hypertension Controlled (<140/<90): Not controlled last visit 152/71  ACO-40 Depression Remission: Not noted  ACO-42 Statin Therapy: Patient is prescribed Pravachol 40mg             Crissy Cristobal RN    7/9/2019, 10:12 AM

## 2019-07-16 ENCOUNTER — ANESTHESIA EVENT (OUTPATIENT)
Dept: PERIOP | Facility: HOSPITAL | Age: 79
End: 2019-07-16

## 2019-07-17 ENCOUNTER — ANESTHESIA (OUTPATIENT)
Dept: PERIOP | Facility: HOSPITAL | Age: 79
End: 2019-07-17

## 2019-07-17 ENCOUNTER — APPOINTMENT (OUTPATIENT)
Dept: GENERAL RADIOLOGY | Facility: HOSPITAL | Age: 79
End: 2019-07-17

## 2019-07-17 ENCOUNTER — HOSPITAL ENCOUNTER (INPATIENT)
Facility: HOSPITAL | Age: 79
LOS: 1 days | Discharge: HOME HEALTH | End: 2019-07-18
Attending: ORTHOPAEDIC SURGERY | Admitting: ORTHOPAEDIC SURGERY

## 2019-07-17 DIAGNOSIS — M16.12 PRIMARY OSTEOARTHRITIS OF LEFT HIP: ICD-10-CM

## 2019-07-17 LAB — POTASSIUM BLD-SCNC: 3.5 MMOL/L (ref 3.6–5.1)

## 2019-07-17 PROCEDURE — 25010000002 FENTANYL CITRATE (PF) 100 MCG/2ML SOLUTION: Performed by: ANESTHESIOLOGY

## 2019-07-17 PROCEDURE — C1776 JOINT DEVICE (IMPLANTABLE): HCPCS | Performed by: ORTHOPAEDIC SURGERY

## 2019-07-17 PROCEDURE — C9290 INJ, BUPIVACAINE LIPOSOME: HCPCS | Performed by: ORTHOPAEDIC SURGERY

## 2019-07-17 PROCEDURE — 76000 FLUOROSCOPY <1 HR PHYS/QHP: CPT

## 2019-07-17 PROCEDURE — 84132 ASSAY OF SERUM POTASSIUM: CPT | Performed by: ORTHOPAEDIC SURGERY

## 2019-07-17 PROCEDURE — 25010000002 PROPOFOL 1000 MG/100ML EMULSION: Performed by: ANESTHESIOLOGY

## 2019-07-17 PROCEDURE — C1713 ANCHOR/SCREW BN/BN,TIS/BN: HCPCS | Performed by: ORTHOPAEDIC SURGERY

## 2019-07-17 PROCEDURE — 0SRB0JA REPLACEMENT OF LEFT HIP JOINT WITH SYNTHETIC SUBSTITUTE, UNCEMENTED, OPEN APPROACH: ICD-10-PCS | Performed by: ORTHOPAEDIC SURGERY

## 2019-07-17 PROCEDURE — 25010000002 ONDANSETRON PER 1 MG: Performed by: ANESTHESIOLOGY

## 2019-07-17 PROCEDURE — 25010000002 KETOROLAC TROMETHAMINE PER 15 MG: Performed by: ANESTHESIOLOGY

## 2019-07-17 PROCEDURE — S0260 H&P FOR SURGERY: HCPCS | Performed by: ORTHOPAEDIC SURGERY

## 2019-07-17 PROCEDURE — 99232 SBSQ HOSP IP/OBS MODERATE 35: CPT | Performed by: INTERNAL MEDICINE

## 2019-07-17 PROCEDURE — 97162 PT EVAL MOD COMPLEX 30 MIN: CPT

## 2019-07-17 PROCEDURE — 94799 UNLISTED PULMONARY SVC/PX: CPT

## 2019-07-17 PROCEDURE — 73501 X-RAY EXAM HIP UNI 1 VIEW: CPT

## 2019-07-17 PROCEDURE — 25010000003 BUPIVACAINE LIPOSOME 1.3 % SUSPENSION: Performed by: ORTHOPAEDIC SURGERY

## 2019-07-17 PROCEDURE — 25010000002 DEXAMETHASONE PER 1 MG

## 2019-07-17 PROCEDURE — 25010000002 PHENYLEPHRINE 10 MG/ML SOLUTION: Performed by: ANESTHESIOLOGY

## 2019-07-17 PROCEDURE — 72170 X-RAY EXAM OF PELVIS: CPT

## 2019-07-17 PROCEDURE — 25010000003 CEFAZOLIN PER 500 MG: Performed by: PHYSICIAN ASSISTANT

## 2019-07-17 PROCEDURE — 25010000002 ROPIVACAINE PER 1 MG: Performed by: ORTHOPAEDIC SURGERY

## 2019-07-17 PROCEDURE — 27130 TOTAL HIP ARTHROPLASTY: CPT | Performed by: ORTHOPAEDIC SURGERY

## 2019-07-17 PROCEDURE — 25010000002 PROPOFOL 200 MG/20ML EMULSION: Performed by: ANESTHESIOLOGY

## 2019-07-17 PROCEDURE — 25010000002 MORPHINE PER 10 MG: Performed by: ANESTHESIOLOGY

## 2019-07-17 PROCEDURE — 97116 GAIT TRAINING THERAPY: CPT

## 2019-07-17 DEVICE — IMPLANTABLE DEVICE: Type: IMPLANTABLE DEVICE | Status: FUNCTIONAL

## 2019-07-17 DEVICE — CAP HIP TM UPCHRG: Type: IMPLANTABLE DEVICE | Status: FUNCTIONAL

## 2019-07-17 DEVICE — TOTAL HIP PRIMARY: Type: IMPLANTABLE DEVICE | Status: FUNCTIONAL

## 2019-07-17 DEVICE — SCRW ACET CORT TRILOGY S/TAP 6.5X20: Type: IMPLANTABLE DEVICE | Site: HIP | Status: FUNCTIONAL

## 2019-07-17 DEVICE — BIOLOX® DELTA, CERAMIC FEMORAL HEAD, S, Ø 36/-3.5, TAPER 12/14
Type: IMPLANTABLE DEVICE | Site: HIP | Status: FUNCTIONAL
Brand: BIOLOX® DELTA

## 2019-07-17 DEVICE — CAP CERAM HD HIP UPCHRG: Type: IMPLANTABLE DEVICE | Status: FUNCTIONAL

## 2019-07-17 DEVICE — CAP HIP VE UPCHRG: Type: IMPLANTABLE DEVICE | Status: FUNCTIONAL

## 2019-07-17 DEVICE — STEM AVENIR MULLER STD SZ6: Type: IMPLANTABLE DEVICE | Site: HIP | Status: FUNCTIONAL

## 2019-07-17 DEVICE — SUT NONABS MAXBRAID/PE NMBR2 C7 38IN BLU 900335: Type: IMPLANTABLE DEVICE | Status: FUNCTIONAL

## 2019-07-17 RX ORDER — FLUMAZENIL 0.1 MG/ML
0.5 INJECTION INTRAVENOUS AS NEEDED
Status: DISCONTINUED | OUTPATIENT
Start: 2019-07-17 | End: 2019-07-17 | Stop reason: HOSPADM

## 2019-07-17 RX ORDER — LORAZEPAM 0.5 MG/1
0.5 TABLET ORAL 2 TIMES DAILY PRN
Status: CANCELLED | OUTPATIENT
Start: 2019-07-17

## 2019-07-17 RX ORDER — ONDANSETRON 2 MG/ML
4 INJECTION INTRAMUSCULAR; INTRAVENOUS EVERY 6 HOURS PRN
Status: DISCONTINUED | OUTPATIENT
Start: 2019-07-17 | End: 2019-07-18 | Stop reason: HOSPADM

## 2019-07-17 RX ORDER — VENLAFAXINE HYDROCHLORIDE 75 MG/1
150 CAPSULE, EXTENDED RELEASE ORAL DAILY
Status: DISCONTINUED | OUTPATIENT
Start: 2019-07-17 | End: 2019-07-18 | Stop reason: HOSPADM

## 2019-07-17 RX ORDER — OXYCODONE HYDROCHLORIDE 5 MG/1
10 TABLET ORAL EVERY 4 HOURS PRN
Status: DISCONTINUED | OUTPATIENT
Start: 2019-07-17 | End: 2019-07-18 | Stop reason: HOSPADM

## 2019-07-17 RX ORDER — PHENYLEPHRINE HYDROCHLORIDE 10 MG/ML
INJECTION INTRAVENOUS AS NEEDED
Status: DISCONTINUED | OUTPATIENT
Start: 2019-07-17 | End: 2019-07-17 | Stop reason: SURG

## 2019-07-17 RX ORDER — PREDNISONE 1 MG/1
2.5 TABLET ORAL DAILY
Status: DISCONTINUED | OUTPATIENT
Start: 2019-07-18 | End: 2019-07-18 | Stop reason: HOSPADM

## 2019-07-17 RX ORDER — MELOXICAM 15 MG/1
15 TABLET ORAL DAILY
Status: DISCONTINUED | OUTPATIENT
Start: 2019-07-18 | End: 2019-07-18 | Stop reason: HOSPADM

## 2019-07-17 RX ORDER — ATORVASTATIN CALCIUM 40 MG/1
40 TABLET, FILM COATED ORAL NIGHTLY
Status: CANCELLED | OUTPATIENT
Start: 2019-07-17

## 2019-07-17 RX ORDER — ONDANSETRON 2 MG/ML
INJECTION INTRAMUSCULAR; INTRAVENOUS AS NEEDED
Status: DISCONTINUED | OUTPATIENT
Start: 2019-07-17 | End: 2019-07-17 | Stop reason: SURG

## 2019-07-17 RX ORDER — GABAPENTIN 300 MG/1
300 CAPSULE ORAL
Status: COMPLETED | OUTPATIENT
Start: 2019-07-17 | End: 2019-07-17

## 2019-07-17 RX ORDER — PROPOFOL 10 MG/ML
INJECTION, EMULSION INTRAVENOUS AS NEEDED
Status: DISCONTINUED | OUTPATIENT
Start: 2019-07-17 | End: 2019-07-17 | Stop reason: SURG

## 2019-07-17 RX ORDER — MEPERIDINE HYDROCHLORIDE 25 MG/ML
12.5 INJECTION INTRAMUSCULAR; INTRAVENOUS; SUBCUTANEOUS
Status: DISCONTINUED | OUTPATIENT
Start: 2019-07-17 | End: 2019-07-17 | Stop reason: HOSPADM

## 2019-07-17 RX ORDER — DICYCLOMINE HYDROCHLORIDE 10 MG/1
10 CAPSULE ORAL 2 TIMES DAILY
Status: DISCONTINUED | OUTPATIENT
Start: 2019-07-17 | End: 2019-07-18 | Stop reason: HOSPADM

## 2019-07-17 RX ORDER — SODIUM CHLORIDE 9 MG/ML
9 INJECTION, SOLUTION INTRAVENOUS CONTINUOUS PRN
Status: DISCONTINUED | OUTPATIENT
Start: 2019-07-17 | End: 2019-07-17 | Stop reason: HOSPADM

## 2019-07-17 RX ORDER — HYDROCODONE BITARTRATE AND ACETAMINOPHEN 5; 325 MG/1; MG/1
1 TABLET ORAL ONCE AS NEEDED
Status: DISCONTINUED | OUTPATIENT
Start: 2019-07-17 | End: 2019-07-17 | Stop reason: HOSPADM

## 2019-07-17 RX ORDER — DOCUSATE SODIUM 100 MG/1
100 CAPSULE, LIQUID FILLED ORAL 2 TIMES DAILY
Status: DISCONTINUED | OUTPATIENT
Start: 2019-07-17 | End: 2019-07-18 | Stop reason: HOSPADM

## 2019-07-17 RX ORDER — LANOLIN ALCOHOL/MO/W.PET/CERES
1000 CREAM (GRAM) TOPICAL DAILY
Status: CANCELLED | OUTPATIENT
Start: 2019-07-17

## 2019-07-17 RX ORDER — ACETAMINOPHEN 500 MG
1000 TABLET ORAL ONCE
Status: COMPLETED | OUTPATIENT
Start: 2019-07-17 | End: 2019-07-17

## 2019-07-17 RX ORDER — LEFLUNOMIDE 20 MG/1
10 TABLET ORAL DAILY
Status: CANCELLED | OUTPATIENT
Start: 2019-07-17

## 2019-07-17 RX ORDER — PANTOPRAZOLE SODIUM 40 MG/1
40 TABLET, DELAYED RELEASE ORAL DAILY
Status: CANCELLED | OUTPATIENT
Start: 2019-07-17

## 2019-07-17 RX ORDER — ACETAMINOPHEN 650 MG/1
650 SUPPOSITORY RECTAL ONCE AS NEEDED
Status: DISCONTINUED | OUTPATIENT
Start: 2019-07-17 | End: 2019-07-17 | Stop reason: HOSPADM

## 2019-07-17 RX ORDER — LEFLUNOMIDE 20 MG/1
10 TABLET ORAL DAILY
Status: DISCONTINUED | OUTPATIENT
Start: 2019-07-18 | End: 2019-07-18 | Stop reason: HOSPADM

## 2019-07-17 RX ORDER — ROPIVACAINE HYDROCHLORIDE 5 MG/ML
INJECTION, SOLUTION EPIDURAL; INFILTRATION; PERINEURAL AS NEEDED
Status: DISCONTINUED | OUTPATIENT
Start: 2019-07-17 | End: 2019-07-17 | Stop reason: HOSPADM

## 2019-07-17 RX ORDER — ACETAMINOPHEN 500 MG
1000 TABLET ORAL EVERY 8 HOURS
Status: DISCONTINUED | OUTPATIENT
Start: 2019-07-17 | End: 2019-07-18 | Stop reason: HOSPADM

## 2019-07-17 RX ORDER — TRAZODONE HYDROCHLORIDE 100 MG/1
100 TABLET ORAL NIGHTLY
Status: DISCONTINUED | OUTPATIENT
Start: 2019-07-17 | End: 2019-07-18 | Stop reason: HOSPADM

## 2019-07-17 RX ORDER — ACETAMINOPHEN 650 MG
TABLET, EXTENDED RELEASE ORAL AS NEEDED
Status: DISCONTINUED | OUTPATIENT
Start: 2019-07-17 | End: 2019-07-17 | Stop reason: HOSPADM

## 2019-07-17 RX ORDER — POLYETHYLENE GLYCOL 3350 17 G/17G
17 POWDER, FOR SOLUTION ORAL DAILY
Status: CANCELLED | OUTPATIENT
Start: 2019-07-17

## 2019-07-17 RX ORDER — PROMETHAZINE HYDROCHLORIDE 25 MG/1
25 TABLET ORAL ONCE AS NEEDED
Status: DISCONTINUED | OUTPATIENT
Start: 2019-07-17 | End: 2019-07-17 | Stop reason: HOSPADM

## 2019-07-17 RX ORDER — LORAZEPAM 0.5 MG/1
0.5 TABLET ORAL 2 TIMES DAILY PRN
Status: DISCONTINUED | OUTPATIENT
Start: 2019-07-17 | End: 2019-07-18 | Stop reason: HOSPADM

## 2019-07-17 RX ORDER — NALOXONE HCL 0.4 MG/ML
0.4 VIAL (ML) INJECTION AS NEEDED
Status: DISCONTINUED | OUTPATIENT
Start: 2019-07-17 | End: 2019-07-17 | Stop reason: HOSPADM

## 2019-07-17 RX ORDER — TRAMADOL HYDROCHLORIDE 50 MG/1
50 TABLET ORAL EVERY 6 HOURS PRN
Status: DISCONTINUED | OUTPATIENT
Start: 2019-07-17 | End: 2019-07-18 | Stop reason: HOSPADM

## 2019-07-17 RX ORDER — SODIUM CHLORIDE 9 MG/ML
100 INJECTION, SOLUTION INTRAVENOUS CONTINUOUS
Status: DISCONTINUED | OUTPATIENT
Start: 2019-07-17 | End: 2019-07-18 | Stop reason: HOSPADM

## 2019-07-17 RX ORDER — SODIUM CHLORIDE, SODIUM LACTATE, POTASSIUM CHLORIDE, CALCIUM CHLORIDE 600; 310; 30; 20 MG/100ML; MG/100ML; MG/100ML; MG/100ML
INJECTION, SOLUTION INTRAVENOUS CONTINUOUS PRN
Status: DISCONTINUED | OUTPATIENT
Start: 2019-07-17 | End: 2019-07-17 | Stop reason: SURG

## 2019-07-17 RX ORDER — PROMETHAZINE HYDROCHLORIDE 25 MG/ML
12.5 INJECTION, SOLUTION INTRAMUSCULAR; INTRAVENOUS ONCE AS NEEDED
Status: DISCONTINUED | OUTPATIENT
Start: 2019-07-17 | End: 2019-07-17 | Stop reason: HOSPADM

## 2019-07-17 RX ORDER — MEMANTINE HYDROCHLORIDE 5 MG/1
5 TABLET ORAL EVERY 12 HOURS SCHEDULED
Status: CANCELLED | OUTPATIENT
Start: 2019-07-17

## 2019-07-17 RX ORDER — BISACODYL 10 MG
10 SUPPOSITORY, RECTAL RECTAL DAILY PRN
Status: DISCONTINUED | OUTPATIENT
Start: 2019-07-17 | End: 2019-07-18 | Stop reason: HOSPADM

## 2019-07-17 RX ORDER — PROMETHAZINE HYDROCHLORIDE 25 MG/1
25 SUPPOSITORY RECTAL ONCE AS NEEDED
Status: DISCONTINUED | OUTPATIENT
Start: 2019-07-17 | End: 2019-07-17 | Stop reason: HOSPADM

## 2019-07-17 RX ORDER — PREDNISONE 1 MG/1
2.5 TABLET ORAL DAILY
Status: CANCELLED | OUTPATIENT
Start: 2019-07-17

## 2019-07-17 RX ORDER — MORPHINE SULFATE 4 MG/ML
4 INJECTION, SOLUTION INTRAMUSCULAR; INTRAVENOUS
Status: DISCONTINUED | OUTPATIENT
Start: 2019-07-17 | End: 2019-07-18 | Stop reason: HOSPADM

## 2019-07-17 RX ORDER — ONDANSETRON 4 MG/1
4 TABLET, FILM COATED ORAL EVERY 6 HOURS PRN
Status: DISCONTINUED | OUTPATIENT
Start: 2019-07-17 | End: 2019-07-18 | Stop reason: HOSPADM

## 2019-07-17 RX ORDER — ATORVASTATIN CALCIUM 40 MG/1
40 TABLET, FILM COATED ORAL NIGHTLY
Status: DISCONTINUED | OUTPATIENT
Start: 2019-07-17 | End: 2019-07-18 | Stop reason: HOSPADM

## 2019-07-17 RX ORDER — SODIUM CHLORIDE 0.9 % (FLUSH) 0.9 %
3-10 SYRINGE (ML) INJECTION AS NEEDED
Status: DISCONTINUED | OUTPATIENT
Start: 2019-07-17 | End: 2019-07-17 | Stop reason: HOSPADM

## 2019-07-17 RX ORDER — DILTIAZEM HYDROCHLORIDE 120 MG/1
120 CAPSULE, COATED, EXTENDED RELEASE ORAL DAILY
Status: CANCELLED | OUTPATIENT
Start: 2019-07-17

## 2019-07-17 RX ORDER — ACETAMINOPHEN 325 MG/1
650 TABLET ORAL ONCE AS NEEDED
Status: DISCONTINUED | OUTPATIENT
Start: 2019-07-17 | End: 2019-07-17 | Stop reason: HOSPADM

## 2019-07-17 RX ORDER — OXYCODONE HYDROCHLORIDE 5 MG/1
5 TABLET ORAL EVERY 4 HOURS PRN
Status: DISCONTINUED | OUTPATIENT
Start: 2019-07-17 | End: 2019-07-18 | Stop reason: HOSPADM

## 2019-07-17 RX ORDER — KETOROLAC TROMETHAMINE 15 MG/ML
15 INJECTION, SOLUTION INTRAMUSCULAR; INTRAVENOUS EVERY 6 HOURS PRN
Status: COMPLETED | OUTPATIENT
Start: 2019-07-17 | End: 2019-07-17

## 2019-07-17 RX ORDER — FERROUS SULFATE TAB EC 324 MG (65 MG FE EQUIVALENT) 324 (65 FE) MG
324 TABLET DELAYED RESPONSE ORAL
Status: DISCONTINUED | OUTPATIENT
Start: 2019-07-18 | End: 2019-07-18 | Stop reason: HOSPADM

## 2019-07-17 RX ORDER — MORPHINE SULFATE 4 MG/ML
2 INJECTION, SOLUTION INTRAMUSCULAR; INTRAVENOUS
Status: DISCONTINUED | OUTPATIENT
Start: 2019-07-17 | End: 2019-07-17

## 2019-07-17 RX ORDER — PROPOFOL 10 MG/ML
INJECTION, EMULSION INTRAVENOUS CONTINUOUS PRN
Status: DISCONTINUED | OUTPATIENT
Start: 2019-07-17 | End: 2019-07-17 | Stop reason: SURG

## 2019-07-17 RX ORDER — PANTOPRAZOLE SODIUM 40 MG/1
40 TABLET, DELAYED RELEASE ORAL DAILY
Status: DISCONTINUED | OUTPATIENT
Start: 2019-07-18 | End: 2019-07-18 | Stop reason: HOSPADM

## 2019-07-17 RX ORDER — OXYCODONE HCL 10 MG/1
10 TABLET, FILM COATED, EXTENDED RELEASE ORAL EVERY 12 HOURS SCHEDULED
Status: DISCONTINUED | OUTPATIENT
Start: 2019-07-17 | End: 2019-07-17 | Stop reason: HOSPADM

## 2019-07-17 RX ORDER — FENTANYL CITRATE 50 UG/ML
INJECTION, SOLUTION INTRAMUSCULAR; INTRAVENOUS AS NEEDED
Status: DISCONTINUED | OUTPATIENT
Start: 2019-07-17 | End: 2019-07-17 | Stop reason: SURG

## 2019-07-17 RX ORDER — DILTIAZEM HYDROCHLORIDE 120 MG/1
120 CAPSULE, COATED, EXTENDED RELEASE ORAL DAILY
Status: DISCONTINUED | OUTPATIENT
Start: 2019-07-17 | End: 2019-07-18 | Stop reason: HOSPADM

## 2019-07-17 RX ORDER — DIPHENHYDRAMINE HYDROCHLORIDE 50 MG/ML
25 INJECTION INTRAMUSCULAR; INTRAVENOUS EVERY 6 HOURS PRN
Status: DISCONTINUED | OUTPATIENT
Start: 2019-07-17 | End: 2019-07-18 | Stop reason: HOSPADM

## 2019-07-17 RX ORDER — SODIUM CHLORIDE 0.9 % (FLUSH) 0.9 %
3 SYRINGE (ML) INJECTION EVERY 12 HOURS SCHEDULED
Status: DISCONTINUED | OUTPATIENT
Start: 2019-07-17 | End: 2019-07-17 | Stop reason: HOSPADM

## 2019-07-17 RX ORDER — DIPHENHYDRAMINE HCL 25 MG
25 TABLET ORAL EVERY 6 HOURS PRN
Status: DISCONTINUED | OUTPATIENT
Start: 2019-07-17 | End: 2019-07-18 | Stop reason: HOSPADM

## 2019-07-17 RX ORDER — VENLAFAXINE HYDROCHLORIDE 75 MG/1
150 CAPSULE, EXTENDED RELEASE ORAL DAILY
Status: CANCELLED | OUTPATIENT
Start: 2019-07-17

## 2019-07-17 RX ORDER — POLYETHYLENE GLYCOL 3350 17 G/17G
17 POWDER, FOR SOLUTION ORAL DAILY
Status: DISCONTINUED | OUTPATIENT
Start: 2019-07-17 | End: 2019-07-17 | Stop reason: SDUPTHER

## 2019-07-17 RX ORDER — TRAZODONE HYDROCHLORIDE 100 MG/1
100 TABLET ORAL NIGHTLY
Status: CANCELLED | OUTPATIENT
Start: 2019-07-17

## 2019-07-17 RX ORDER — LANOLIN ALCOHOL/MO/W.PET/CERES
1000 CREAM (GRAM) TOPICAL DAILY
Status: DISCONTINUED | OUTPATIENT
Start: 2019-07-18 | End: 2019-07-18 | Stop reason: HOSPADM

## 2019-07-17 RX ORDER — PROMETHAZINE HYDROCHLORIDE 25 MG/1
12.5 TABLET ORAL EVERY 6 HOURS PRN
Status: DISCONTINUED | OUTPATIENT
Start: 2019-07-17 | End: 2019-07-18 | Stop reason: HOSPADM

## 2019-07-17 RX ORDER — LORAZEPAM 2 MG/ML
0.5 INJECTION INTRAMUSCULAR
Status: DISCONTINUED | OUTPATIENT
Start: 2019-07-17 | End: 2019-07-17 | Stop reason: HOSPADM

## 2019-07-17 RX ORDER — ONDANSETRON 2 MG/ML
4 INJECTION INTRAMUSCULAR; INTRAVENOUS ONCE AS NEEDED
Status: DISCONTINUED | OUTPATIENT
Start: 2019-07-17 | End: 2019-07-17 | Stop reason: HOSPADM

## 2019-07-17 RX ORDER — DICYCLOMINE HYDROCHLORIDE 10 MG/1
10 CAPSULE ORAL
Status: CANCELLED | OUTPATIENT
Start: 2019-07-17

## 2019-07-17 RX ORDER — NALOXONE HCL 0.4 MG/ML
0.4 VIAL (ML) INJECTION
Status: DISCONTINUED | OUTPATIENT
Start: 2019-07-17 | End: 2019-07-18 | Stop reason: HOSPADM

## 2019-07-17 RX ORDER — MEMANTINE HYDROCHLORIDE 5 MG/1
5 TABLET ORAL EVERY 12 HOURS SCHEDULED
Status: DISCONTINUED | OUTPATIENT
Start: 2019-07-17 | End: 2019-07-18 | Stop reason: HOSPADM

## 2019-07-17 RX ORDER — MELOXICAM 15 MG/1
15 TABLET ORAL ONCE
Status: COMPLETED | OUTPATIENT
Start: 2019-07-17 | End: 2019-07-17

## 2019-07-17 RX ORDER — GABAPENTIN 100 MG/1
100 CAPSULE ORAL NIGHTLY
Status: DISCONTINUED | OUTPATIENT
Start: 2019-07-17 | End: 2019-07-18 | Stop reason: HOSPADM

## 2019-07-17 RX ADMIN — ONDANSETRON 4 MG: 2 INJECTION INTRAMUSCULAR; INTRAVENOUS at 08:30

## 2019-07-17 RX ADMIN — Medication 4 MG: at 09:15

## 2019-07-17 RX ADMIN — MEMANTINE 5 MG: 5 TABLET ORAL at 22:17

## 2019-07-17 RX ADMIN — POLYETHYLENE GLYCOL 3350 17 G: 17 POWDER, FOR SOLUTION ORAL at 18:04

## 2019-07-17 RX ADMIN — TRAZODONE HYDROCHLORIDE 100 MG: 100 TABLET ORAL at 22:17

## 2019-07-17 RX ADMIN — ACETAMINOPHEN 1000 MG: 500 TABLET, FILM COATED ORAL at 06:17

## 2019-07-17 RX ADMIN — FENTANYL CITRATE 100 MCG: 50 INJECTION, SOLUTION INTRAMUSCULAR; INTRAVENOUS at 07:28

## 2019-07-17 RX ADMIN — MELOXICAM 15 MG: 15 TABLET ORAL at 06:17

## 2019-07-17 RX ADMIN — SODIUM CHLORIDE, POTASSIUM CHLORIDE, SODIUM LACTATE AND CALCIUM CHLORIDE 500 ML: 600; 310; 30; 20 INJECTION, SOLUTION INTRAVENOUS at 06:18

## 2019-07-17 RX ADMIN — PROPOFOL 140 MCG/KG/MIN: 10 INJECTION, EMULSION INTRAVENOUS at 07:34

## 2019-07-17 RX ADMIN — PROPOFOL: 10 INJECTION, EMULSION INTRAVENOUS at 08:47

## 2019-07-17 RX ADMIN — GABAPENTIN 300 MG: 300 CAPSULE ORAL at 06:17

## 2019-07-17 RX ADMIN — DOCUSATE SODIUM 100 MG: 100 CAPSULE, LIQUID FILLED ORAL at 22:18

## 2019-07-17 RX ADMIN — SODIUM CHLORIDE 100 ML/HR: 900 INJECTION, SOLUTION INTRAVENOUS at 12:50

## 2019-07-17 RX ADMIN — DICYCLOMINE HYDROCHLORIDE 10 MG: 10 CAPSULE ORAL at 22:17

## 2019-07-17 RX ADMIN — MORPHINE SULFATE 2 MG: 4 INJECTION INTRAVENOUS at 09:58

## 2019-07-17 RX ADMIN — FENTANYL CITRATE 100 MCG: 50 INJECTION, SOLUTION INTRAMUSCULAR; INTRAVENOUS at 08:30

## 2019-07-17 RX ADMIN — ACETAMINOPHEN 1000 MG: 500 TABLET, FILM COATED ORAL at 13:04

## 2019-07-17 RX ADMIN — RIVAROXABAN 10 MG: 10 TABLET, FILM COATED ORAL at 22:18

## 2019-07-17 RX ADMIN — SODIUM CHLORIDE, SODIUM LACTATE, POTASSIUM CHLORIDE, AND CALCIUM CHLORIDE: .6; .31; .03; .02 INJECTION, SOLUTION INTRAVENOUS at 07:25

## 2019-07-17 RX ADMIN — MORPHINE SULFATE 2 MG: 4 INJECTION INTRAVENOUS at 10:06

## 2019-07-17 RX ADMIN — PHENYLEPHRINE HYDROCHLORIDE 100 MCG: 10 INJECTION INTRAVENOUS at 07:59

## 2019-07-17 RX ADMIN — OXYCODONE HYDROCHLORIDE 5 MG: 5 TABLET ORAL at 22:18

## 2019-07-17 RX ADMIN — METOPROLOL TARTRATE 12.5 MG: 25 TABLET, FILM COATED ORAL at 22:20

## 2019-07-17 RX ADMIN — CEFAZOLIN SODIUM 2 G: 1 INJECTION, POWDER, FOR SOLUTION INTRAMUSCULAR; INTRAVENOUS at 07:40

## 2019-07-17 RX ADMIN — KETOROLAC TROMETHAMINE 15 MG: 15 INJECTION, SOLUTION INTRAMUSCULAR; INTRAVENOUS at 10:21

## 2019-07-17 RX ADMIN — VENLAFAXINE HYDROCHLORIDE 150 MG: 75 CAPSULE, EXTENDED RELEASE ORAL at 13:04

## 2019-07-17 RX ADMIN — CEFAZOLIN 2 G: 1 INJECTION, POWDER, FOR SOLUTION INTRAMUSCULAR; INTRAVENOUS at 18:04

## 2019-07-17 RX ADMIN — SODIUM CHLORIDE 100 ML/HR: 900 INJECTION, SOLUTION INTRAVENOUS at 22:20

## 2019-07-17 RX ADMIN — ACETAMINOPHEN 1000 MG: 500 TABLET, FILM COATED ORAL at 22:18

## 2019-07-17 RX ADMIN — ATORVASTATIN CALCIUM 40 MG: 40 TABLET, FILM COATED ORAL at 22:17

## 2019-07-17 RX ADMIN — MEPERIDINE HYDROCHLORIDE 12.5 MG: 25 INJECTION INTRAMUSCULAR; INTRAVENOUS; SUBCUTANEOUS at 10:13

## 2019-07-17 RX ADMIN — TRANEXAMIC ACID 1000 MG: 1 INJECTION, SOLUTION INTRAVENOUS at 07:37

## 2019-07-17 RX ADMIN — GABAPENTIN 100 MG: 100 CAPSULE ORAL at 22:18

## 2019-07-17 RX ADMIN — SODIUM CHLORIDE, SODIUM LACTATE, POTASSIUM CHLORIDE, AND CALCIUM CHLORIDE: .6; .31; .03; .02 INJECTION, SOLUTION INTRAVENOUS at 08:46

## 2019-07-17 RX ADMIN — PHENYLEPHRINE HYDROCHLORIDE 100 MCG: 10 INJECTION INTRAVENOUS at 07:55

## 2019-07-17 RX ADMIN — OXYCODONE HYDROCHLORIDE 10 MG: 10 TABLET, FILM COATED, EXTENDED RELEASE ORAL at 06:18

## 2019-07-17 RX ADMIN — PROPOFOL 200 MG: 10 INJECTION, EMULSION INTRAVENOUS at 07:30

## 2019-07-17 NOTE — ANESTHESIA POSTPROCEDURE EVALUATION
Patient: Alia Cheney    Procedure Summary     Date:  07/17/19 Room / Location:  [facility] OR [address] [place] [address] MAIN OR    Anesthesia Start:  0725 Anesthesia Stop:  0947    Procedure:  TOTAL HIP ARTHROPLASTY ANTERIOR (Left Hip) Diagnosis:       Primary osteoarthritis of left hip      (Primary osteoarthritis of left hip [M16.12])    Surgeon:  Lenny Khan MD Provider:  Satnam Santos MD    Anesthesia Type:  general ASA Status:  3          Anesthesia Type: general  Last vitals  BP   119/61 (07/17/19 0946)   Temp   97.9 °F (36.6 °C) (07/17/19 0946)   Pulse   77 (07/17/19 0946)   Resp   11 (07/17/19 0946)     SpO2   100 % (07/17/19 0946)     Post Anesthesia Care and Evaluation    Patient location during evaluation: bedside  Patient participation: complete - patient participated  Level of consciousness: responsive to light touch  Pain score: 1  Pain management: adequate  Airway patency: patent  Anesthetic complications: No anesthetic complications  PONV Status: none  Cardiovascular status: acceptable  Respiratory status: acceptable  Hydration status: acceptable  Post Neuraxial Block status: Motor and sensory function returned to baseline

## 2019-07-17 NOTE — THERAPY EVALUATION
Acute Care - Physical Therapy Initial Evaluation   Neil     Patient Name: Alia Cheney  : 1940  MRN: [id number]  Today's Date: 2019                Admit Date: 2019    Visit Dx:     ICD-10-CM ICD-9-CM   1. Primary osteoarthritis of left hip M16.12 715.15     Patient Active Problem List   Diagnosis   • Primary osteoarthritis of left hip   • Osteoarthritis of left hip     Past Medical History:   Diagnosis Date   • Arthritis    • Coronary artery disease    • Elevated cholesterol    • Lung cancer (CMS/HCC)    • Rheumatoid arthritis (CMS/HCC)      Past Surgical History:   Procedure Laterality Date   • APPENDECTOMY     • CATARACT EXTRACTION WITH INTRAOCULAR LENS IMPLANT Bilateral    • CHOLECYSTECTOMY     • HYSTERECTOMY     • LUNG REMOVAL, PARTIAL Right 2018   • PARTIAL HYSTERECTOMY     • TOTAL KNEE ARTHROPLASTY Right 2017        PT ASSESSMENT (last 12 hours)      Physical Therapy Evaluation     Row Name 19 1449          PT Evaluation Time/Intention    Subjective Information  no complaints  -HD (r) AB (t) HD (c)     Document Type  evaluation  -HD (r) AB (t) HD (c)     Mode of Treatment  physical therapy  -HD (r) AB (t) HD (c)     Row Name 19 1448          General Information    Prior Level of Function  independent:  -HD (r) AB (t) HD (c)     Equipment Currently Used at Home  cane, straight;rollator  -HD (r) AB (t) HD (c)     Pertinent History of Current Functional Problem  79 yo female s/p L ATHA 19.   -HD (r) AB (t) HD (c)     Existing Precautions/Restrictions  hip, anterior  -HD (r) AB (t) HD (c)     Row Name 19 1448          Relationship/Environment    Primary Source of Support/Comfort  child(hair)  -HD (r) AB (t) HD (c)     Lives With  alone  -HD (r) AB (t) HD (c)     Row Name 19 1443          Resource/Environmental Concerns    Current Living Arrangements  home/apartment/condo  -HD (r) AB (t) HD (c)     Row Name 19 144          Cognitive  Assessment/Intervention- PT/OT    Orientation Status (Cognition)  oriented x 4  -HD (r) AB (t) HD (c)     Follows Commands (Cognition)  WNL  -HD (r) AB (t) HD (c)     Safety Deficit (Cognitive)  safety precautions awareness pt required multiple reminders of anterior precautions  -HD (r) AB (t) HD (c)     Row Name 07/17/19 1449          Safety Issues, Functional Mobility    Safety Issues Affecting Function (Mobility)  insight into deficits/self awareness;safety precaution awareness  -HD     Row Name 07/17/19 1449          Mobility Assessment/Treatment    Extremity Weight-bearing Status  left lower extremity  -HD (r) AB (t) HD (c)     Left Lower Extremity (Weight-bearing Status)  weight-bearing as tolerated (WBAT)  -HD (r) AB (t) HD (c)     Row Name 07/17/19 1446          Bed Mobility Assessment/Treatment    Bed Mobility Assessment/Treatment  rolling left;supine-sit;sit-supine  -HD (r) AB (t) HD (c)     Rolling Left [place] (Bed Mobility)  conditional independence  -HD (r) AB (t) HD (c)     Supine-Sit [place] (Bed Mobility)  conditional independence  -HD (r) AB (t) HD (c)     Sit-Supine [place] (Bed Mobility)  conditional independence  -HD (r) AB (t) HD (c)     Assistive Device (Bed Mobility)  bed rails  -HD (r) AB (t) HD (c)     Row Name 07/17/19 144          Transfer Assessment/Treatment    Transfer Assessment/Treatment  sit-stand transfer;stand-sit transfer  -HD (r) AB (t) HD (c)     Sit-Stand [place] (Transfers)  contact guard  -HD (r) AB (t) HD (c)     Stand-Sit [place] (Transfers)  contact guard  -HD (r) AB (t) HD (c)     Row Name 07/17/19 144          Sit-Stand Transfer    Assistive Device (Sit-Stand Transfers)  walker, front-wheeled  -HD (r) AB (t) HD (c)     Row Name 07/17/19 1449          Stand-Sit Transfer    Assistive Device (Stand-Sit Transfers)  walker, front-wheeled  -HD (r) AB (t) HD (c)     Row Name 07/17/19 1448          Gait/Stairs Assessment/Training    Gait/Stairs  Assessment/Training  gait/ambulation assistive device  -HD (r) AB (t) HD (c)     [place] (Gait)  contact guard  -HD (r) AB (t) HD (c)     Assistive Device (Gait)  walker, front-wheeled  -HD (r) AB (t) HD (c)     Distance in Feet (Gait)  100  -HD (r) AB (t) HD (c)     Pattern (Gait)  step-through  -HD (r) AB (t) HD (c)     Deviations/Abnormal Patterns (Gait)  susan decreased;gait speed decreased;stride length decreased  -HD (r) AB (t) HD (c)     Row Name 07/17/19 1449          General ROM    GENERAL ROM COMMENTS  BUE WFL, BLE WFL  -HD (r) AB (t) HD (c)     Row Name 07/17/19 1449          MMT (Manual Muscle Testing)    General MMT Comments  BUE 4-/5, BLE 4/5  -HD (r) AB (t) HD (c)     Row Name 07/17/19 1444          Motor Assessment/Intervention    Additional Documentation  Therapeutic Exercise (Group) DANIELA protocol exercises  -HD (r) AB (t) HD (c)     Row Name 07/17/19 1440          Pain Assessment    Additional Documentation  Pain Scale: Numbers Pre/Post-Treatment (Group)  -HD (r) AB (t) HD (c)     Row Name 07/17/19 1448          Pain Scale: Numbers Pre/Post-Treatment    Pain Scale: Numbers, Pretreatment  5/10  -HD (r) AB (t) HD (c)     Pain Scale: Numbers, Post-Treatment  5/10  -HD (r) AB (t) HD (c)     Pain Location - Side  Left  -HD (r) AB (t) HD (c)     Pain Location  hip  -HD (r) AB (t) HD (c)     Row Name             Wound 07/17/19 0827 Left hip incision    Wound - Properties Group Date first assessed: 07/17/19  -BS Time first assessed: 0827  -BS Side: Left  -BS Location: hip  -BS Type: incision  -BS    Row Name 07/17/19 1449          Plan of Care Review    Plan of Care Reviewed With  patient  -HD (r) AB (t) HD (c)     Row Name 07/17/19 1442          Physical Therapy Clinical Impression    Criteria for Skilled Interventions Met (PT Clinical Impression)  treatment indicated;yes  -HD (r) AB (t) HD (c)     Impairments Found (describe specific impairments)  ergonomics and body mechanics;gait,  locomotion, and balance;motor function;muscle performance;ROM  -HD (r) AB (t) HD (c)     Rehab Potential (PT Clinical Summary)  good, to achieve stated therapy goals  -HD (r) AB (t) HD (c)     Predicted Duration of Therapy (PT)  2 weeks  -HD (r) AB (t) HD (c)     Row Name 07/17/19 1449          Physical Therapy Goals    Transfer Goal Selection (PT)  transfer, PT goal 1  -HD (r) AB (t) HD (c)     Gait Training Goal Selection (PT)  gait training, PT goal 1  -HD (r) AB (t) HD (c)     Row Name 07/17/19 1449          Transfer Goal 1 (PT)    Activity/Assistive Device (Transfer Goal 1, PT)  sit-to-stand/stand-to-sit;bed-to-chair/chair-to-bed  -HD (r) AB (t) HD (c)     [place] Level/Cues Needed (Transfer Goal 1, PT)  conditional independence  -HD (r) AB (t) HD (c)     Time Frame (Transfer Goal 1, PT)  2 weeks  -HD (r) AB (t) HD (c)     Row Name 07/17/19 1449          Gait Training Goal 1 (PT)    Activity/Assistive Device (Gait Training Goal 1, PT)  gait (walking locomotion);assistive device use;walker, rolling  -HD (r) AB (t) HD (c)     [place] Level (Gait Training Goal 1, PT)  conditional independence  -HD (r) AB (t) HD (c)     Distance (Gait Goal 1, PT)  300  -HD (r) AB (t) HD (c)     Time Frame (Gait Training Goal 1, PT)  2 weeks  -HD (r) AB (t) HD (c)     Row Name 07/17/19 1449          Positioning and Restraints    Pre-Treatment Position  in bed pt just returned to bed after sitting in chair 2 hours  -HD (r) AB (t) HD (c)     Post Treatment Position  bed  -HD (r) AB (t) HD (c)     In Bed  notified nsg;supine;call light within reach;encouraged to call for assist;exit alarm on  -HD (r) AB (t) HD (c)       User Key  (r) = Recorded By, (t) = Taken By, (c) = Cosigned By    Initials Name Provider Type    Sarah Jovel, PT Physical Therapist    Kameron Cabrera, RN Registered Nurse    AB Cindy Khan, PT Student PT Student        Physical Therapy Education     Title: PT OT SLP Therapies (In Progress)      Topic: Physical Therapy (In Progress)     Point: Mobility training (Done)     Learning Progress Summary           Patient Acceptance, E, VU by AB at 7/17/2019  3:34 PM                   Point: Body mechanics (Done)     Learning Progress Summary           Patient Acceptance, E, VU by AB at 7/17/2019  3:34 PM                   Point: Precautions (Done)     Learning Progress Summary           Patient Acceptance, E, VU by AB at 7/17/2019  3:34 PM                               User Key     Initials Effective Dates Name Provider Type Discipline    AB 06/01/19 -  Cindy Khan, PT Student PT Student PT              PT Recommendation and Plan  Anticipated Discharge Disposition (PT): home with home health  Planned Therapy Interventions (PT Eval): balance training, gait training, home exercise program, patient/family education, ROM (range of motion), strengthening, transfer training  Therapy Frequency (PT Clinical Impression): 2 times/day  Outcome Summary/Treatment Plan (PT)  Anticipated Discharge Disposition (PT): home with home health  Plan of Care Reviewed With: patient  Outcome Summary: 79 yo female s/p L DANIELA 7/17/19. Pt ambulated 100' with RW and CGA with minimal increase in pain. Pt able to complete hip protocol exercises with minimal cueing and minimal pain. Pt had increase in pain with bed mobility but resovled with rest. PT recommends home with HHPT to increase mobility and strength following d/c.      Time Calculation:   PT Charges     Row Name 07/17/19 1536             Time Calculation    Start Time  1449  -HD (r) AB (t) HD (c)      Stop Time  1524  -HD (r) AB (t) HD (c)      Time Calculation (min)  35 min  -HD (r) AB (t)      PT Received On  07/17/19  -HD (r) AB (t) HD (c)      PT - Next Appointment  07/18/19  -HD (r) AB (t) HD (c)      PT Goal Re-Cert Due Date  07/31/19  -HD (r) AB (t) HD (c)         Time Calculation- PT    Total Timed Code Minutes- PT  25 minute(s)  -HD (r) AB (t) HD (c)        User Key  (r) =  Recorded By, (t) = Taken By, (c) = Cosigned By    Initials Name Provider Type    Sarah Jovel, PT Physical Therapist    AB Cindy Khan, PT Student PT Student        Therapy Charges for Today     Code Description Service Date Service Provider Modifiers Qty    2919387 HC PT EVAL MOD COMPLEXITY 4 7/17/2019 Cindy Khan, PT Student GP 1    [id number]  GAIT TRAINING EA 15 MIN 7/17/2019 Cindy Khan, PT Student GP 1                 Cindy Khan PT Student  7/17/2019

## 2019-07-17 NOTE — H&P
History & Physical       Patient: Alia Cheney    Date of Admission: 7/17/2019  5:33 AM    YOB: 1940    Medical Record Number: [id number]    Attending Physician: Lenny Khan MD        Chief Complaints: Primary osteoarthritis of left hip [M16.12]  Osteoarthritis of left hip [M16.12]      History of Present Illness: 78 y.o. female presents with Primary osteoarthritis of left hip [M16.12]  Osteoarthritis of left hip [M16.12]. Onset of symptoms was gradual and progressively worse over the past 2 years or so.  Symptoms are associated with pain in the left hip, groin and a limp.  Symptoms are aggravated by turning over in bed at night and going up and down the steps.   Symptoms improve with resting the hip and using a cane for assistance with ambulation. Patient is now being admitted to the services of Lenny Khan MD for further evaluation and treatment.      No Known Allergies      Home Medications:  Medications Prior to Admission   Medication Sig Dispense Refill Last Dose   • calcium carbonate (OS-DAHLIA) 1250 (500 Ca) MG tablet Take 600 mg by mouth Daily.      • calcium citrate-vitamin d (CITRACAL) 200-250 MG-UNIT tablet tablet Take  by mouth Daily.      • CARTIA  MG 24 hr capsule TAKE ONE CAPSULE BY MOUTH DAILY 30 capsule 4    • dicyclomine (BENTYL) 10 MG capsule Take 10 mg by mouth 2 (Two) Times a Day.      • leflunomide (ARAVA) 10 MG tablet Take 10 mg by mouth Daily.      • LORazepam (ATIVAN) 0.5 MG tablet 1 po bid prn anxiety 30 tablet 0    • memantine (NAMENDA) 5 MG tablet Take 5 mg by mouth 2 (Two) Times a Day.      • metoprolol tartrate (LOPRESSOR) 25 MG tablet Take 12.5 mg by mouth 2 (Two) Times a Day.      • pantoprazole (PROTONIX) 40 MG EC tablet Take 40 mg by mouth Daily.      • polyethylene glycol (MIRALAX) packet Take 17 g by mouth Daily.      • pravastatin (PRAVACHOL) 40 MG tablet Take 40 mg by mouth Every Night.      • predniSONE (DELTASONE) 2.5 MG tablet Take 2.5 mg by mouth  Daily.      • traZODone (DESYREL) 50 MG tablet Take 100 mg by mouth Every Night.      • venlafaxine XR (EFFEXOR-XR) 150 MG 24 hr capsule Take 150 mg by mouth Daily.      • vitamin B-12 (CYANOCOBALAMIN) 1000 MCG tablet Take 1,000 mcg by mouth Daily.          Current Medications:  Scheduled Meds:  ceFAZolin 2 g Intravenous Once   lactated ringers 500 mL Intravenous Once   oxyCODONE 10 mg Oral Q12H   sodium chloride 3 mL Intravenous Q12H     Continuous Infusions:  sodium chloride 9 mL/hr     PRN Meds:.sodium chloride  •  sodium chloride       Past Medical History:   Diagnosis Date   • Arthritis    • Coronary artery disease    • Elevated cholesterol    • Lung cancer (CMS/HCC)    • Rheumatoid arthritis (CMS/HCC)         Past Surgical History:   Procedure Laterality Date   • APPENDECTOMY     • CATARACT EXTRACTION WITH INTRAOCULAR LENS IMPLANT Bilateral    • CHOLECYSTECTOMY     • HYSTERECTOMY     • LUNG REMOVAL, PARTIAL Right 2018   • PARTIAL HYSTERECTOMY     • TOTAL KNEE ARTHROPLASTY Right 2017        Social History     Occupational History   • Not on file   Tobacco Use   • Smoking status: Former Smoker     Years: 15.00     Start date:      Last attempt to quit: 1980     Years since quittin.5   • Smokeless tobacco: Never Used   Substance and Sexual Activity   • Alcohol use: No     Frequency: Never   • Drug use: No   • Sexual activity: Not on file    Social History     Social History Narrative   • Not on file      No family history on file.      Review of Systems:   HEENT: Patient denies any headaches, vision changes, change in hearing, or tinnitus, Patient denies any rhinorrhea,epistaxis, sinus pain, mouth or dental problems, sore throat or hoarseness, or dysphagia  Pulmonary: Patient denies any cough, congestion, SOA, or wheezing  Cardiovascular: Patient denies any chest pain, dyspnea, palpitations, weakness, intolerance of exercise, varicosities, swelling of extremities, known murmur  Gastrointestinal:   "Patient denies nausea, vomiting, diarrhea, constipation, loss  of appetite, change in appetite, dysphagia, gas, heartburn, melena, change in bowel habits, use of laxatives or other drugs to alter the function of the gastrointestinal tract.  Genital/Urinary: Patient denies dysuria, change in color of urine, change in frequency of urination, pain with urgency, incontinence, retention, or nocturia.  Musculoskeletal: Patient denies increased warmth; redness; or swelling of joints; limitation of function; deformity; crepitation: pain in a joint or an extremity, the neck, or the back, especially with movement.  Neurological: Patient denies dizziness, tremor, ataxia, difficulty in speaking, change in speech, paresthesia, loss of sensation, seizures, syncope, changes in memory.  Endocrine system: Patient denies tremors, palpitations, intolerance of heat or cold, polyuria, polydipsia, polyphagia, diaphoresis, exophthalmos, or goiter.  Psychological: Patient denies thoughts/plans or harming self or other; depression,  insomnia, night terrors, jasiel, memory loss, disorientation.  Skin: Patient denies any bruising, rashes, discoloration, pruritus, wounds, ulcers, decubiti, changes in the hair or nails  Hematopoietic: Patient denies history of spontaneous or excessive bleeding, epistaxis, hematuria, melena, fatigue, enlarged or tender lymph nodes, pallor, history of anemia.    Physical Exam: 78 y.o. female  Vitals:    07/17/19 0604   BP: 129/56   Pulse: 61   Resp: 12   Temp: 97 °F (36.1 °C)   TempSrc: Tympanic   SpO2: 100%   Weight: 69.3 kg (152 lb 12.5 oz)   Height: 162.6 cm (64\")       General Appearance:          Alert, cooperative, in no acute distress                                                 Head:    Normocephalic, without obvious abnormality, atraumatic   Eyes:            Lids and lashes normal, conjunctivae and sclerae normal, no   icterus, no pallor, corneas clear, PERRLA   Ears:    Ears appear intact with no " abnormalities noted   Throat:   No oral lesions, no thrush, oral mucosa moist   Neck:   No adenopathy, supple, trachea midline, no thyromegaly, no   carotid bruit, no JVD   Back:     No kyphosis present, no scoliosis present, no skin lesions,      erythema or scars, no tenderness to percussion or                   palpation,   range of motion normal   Lungs:     Clear to auscultation,respirations regular, even and                  unlabored    Heart:    Regular rhythm and normal rate, normal S1 and S2, no            murmur, no gallop, no rub, no click   Chest Wall:    No abnormalities observed   Abdomen:     Normal bowel sounds, no masses, no organomegaly, soft        non-tender, non-distended, no guarding, no rebound                tenderness   Rectal:     Deferred   Extremities:   Tenderness over anterior and lateral aspect of the hip. Moves all extremities well, no edema,   no cyanosis, no redness   Pulses:   Pulses palpable and equal bilaterally   Skin:   No bleeding, bruising or rash   Lymph nodes:   No palpable adenopathy   Neurologic:   Cranial nerves 2 - 12 grossly intact, sensation intact, DTR       present and equal bilaterally      Left hip. Neurovascular status is intact. Patient has a distant limp on the affected side. Internal and external rotations are associated with pain and discomfort. Anterior joint line pain and tenderness is significant. Stinchfield sign is positive. Figure of 4 sign is positive. Patient is unable to perform an active straight leg raise exam. Greater trochanter is tender. Crossover adduction test is positive. Cross body adduction is limited and painful for the patient. Patient has very significant limp and joint line tenderness anteriorly, posteriorly and medially. Dorsalis pedis and posterior tibial artery pulses are palpable. Common peroneal nerve function is well preserved.     Diagnostic Tests:  No visits with results within 2 Day(s) from this visit.   Latest known visit  with results is:   Admission on 07/05/2019, Discharged on 07/05/2019   Component Date Value Ref Range Status   • Glucose 07/05/2019 127* 70 - 105 mg/dL Final    Serial Number: [id number]:  [id number]   • Extra Tube 07/05/2019 hold for add-on   Final    Auto resulted   • Extra Tube 07/05/2019 Hold for add-ons.   Final    Auto resulted.   • Extra Tube 07/05/2019 hold for add-on   Final    Auto resulted   • Extra Tube 07/05/2019 Hold for add-ons.   Final    Auto resulted.   • Glucose 07/05/2019 124* 65 - 99 mg/dL Final   • BUN 07/05/2019 15  8 - 20 mg/dL Final   • Creatinine 07/05/2019 0.80  0.40 - 1.00 mg/dL Final   • Sodium 07/05/2019 140  136 - 144 mmol/L Final   • Potassium 07/05/2019 3.2* 3.6 - 5.1 mmol/L Final   • Chloride 07/05/2019 111  101 - 111 mmol/L Final   • CO2 07/05/2019 18.0* 22.0 - 32.0 mmol/L Final   • Calcium 07/05/2019 9.1  8.9 - 10.3 mg/dL Final   • eGFR Non African Amer 07/05/2019 69  >60 mL/min/1.73 Final   • BUN/Creatinine Ratio 07/05/2019 18.8  5.4 - 26.2 Final   • Anion Gap 07/05/2019 14.2  10.0 - 20.0 mmol/L Final   • WBC 07/05/2019 7.40  3.40 - 10.80 10*3/mm3 Final   • RBC 07/05/2019 4.53  3.77 - 5.28 10*6/mm3 Final   • Hemoglobin 07/05/2019 14.1  12.0 - 15.9 g/dL Final   • Hematocrit 07/05/2019 41.9  34.0 - 46.6 % Final   • MCV 07/05/2019 92.5  79.0 - 97.0 fL Final   • MCH 07/05/2019 31.1  26.6 - 33.0 pg Final   • MCHC 07/05/2019 33.6  31.5 - 35.7 g/dL Final   • RDW 07/05/2019 14.0  12.3 - 15.4 % Final   • RDW-SD 07/05/2019 45.1  37.0 - 54.0 fl Final   • MPV 07/05/2019 8.2  6.0 - 12.0 fL Final   • Platelets 07/05/2019 285  140 - 450 10*3/mm3 Final   • Neutrophil % 07/05/2019 67.1  42.7 - 76.0 % Final   • Lymphocyte % 07/05/2019 23.7  19.6 - 45.3 % Final   • Monocyte % 07/05/2019 8.7  5.0 - 12.0 % Final   • Eosinophil % 07/05/2019 0.3  0.3 - 6.2 % Final   • Basophil % 07/05/2019 0.2  0.0 - 1.5 % Final   • Neutrophils, Absolute 07/05/2019 5.00  1.70 - 7.00 10*3/mm3 Final   •  Lymphocytes, Absolute 07/05/2019 1.80  0.70 - 3.10 10*3/mm3 Final   • Monocytes, Absolute 07/05/2019 0.60  0.10 - 0.90 10*3/mm3 Final   • Eosinophils, Absolute 07/05/2019 0.00  0.00 - 0.40 10*3/mm3 Final   • Basophils, Absolute 07/05/2019 0.00  0.00 - 0.20 10*3/mm3 Final   • nRBC 07/05/2019 0.0  0.0 - 0.2 /100 WBC Final     No results found.      Assessment:  Patient Active Problem List   Diagnosis   • Primary osteoarthritis of left hip   • Osteoarthritis of left hip         Plan:  The patient voiced understanding of the risks, benefits, and alternative forms of treatment that were discussed and the patient consents to proceed with left total hip arthroplasty.    The patient was seen today for preoperative discussion.  The patient has been tried on over-the-counter and prescription NSAID's despite the risks of anti-inflammatory bleeding, peptic ulcers and erosive gastritis with short term benefit only.  Braces have been prescribed for mechanical support.  Patient has been participating in an exercise program specifically targeting joint pain relief with limited benefit. Intraarticular injections have been used periodically with some but not complete relief of pain.  Ambulation aids have also been utilized.      The details of the surgical procedure were explained including the location of probable incisions and a description of the likely hardware/grafts to be used. The patient understands the likely convalescence after surgery as well as the rehabilitation required.  Also, we have thoroughly discussed with the patient the risks, benefits and alternatives to surgery.  Risks include but are not limited to the risk of infection, joint stiffness, limited range of motion, wound healing problems, scar tissue build up, myocardial infarction, stroke, blood clots (including DVT and/or pulmonary embolus along with the risk of death) neurologic and/or vascular injury, limb length discrepancy, fracture, dislocation, nonunion,  malunion, continued pain and need for further surgery including hardware failure requiring revision.        Discharge Plan: tomorrow to home and home health      Date: 7/17/2019    Lenny Khan MD      DICTATED UTILIZING DRAGON DICTATION

## 2019-07-17 NOTE — ANESTHESIA PREPROCEDURE EVALUATION
Anesthesia Evaluation     Patient summary reviewed and Nursing notes reviewed   NPO Solid Status: > 6 hours  NPO Liquid Status: > 6 hours           Airway   Mallampati: II  TM distance: >3 FB  Neck ROM: full  No difficulty expected  Dental - normal exam     Pulmonary - normal exam    breath sounds clear to auscultation  (+) lung cancer,   Cardiovascular - normal exam    ECG reviewed  Rhythm: regular  Rate: normal    (+) CAD, hyperlipidemia,       Neuro/Psych- negative ROS  GI/Hepatic/Renal/Endo - negative ROS     Musculoskeletal     Abdominal  - normal exam    Abdomen: soft.  Bowel sounds: normal.   Substance History - negative use     OB/GYN negative ob/gyn ROS         Other   (+) arthritis   history of cancer                    Anesthesia Plan    ASA 3     general     intravenous induction   Anesthetic plan, all risks, benefits, and alternatives have been provided, discussed and informed consent has been obtained with: patient.

## 2019-07-17 NOTE — OP NOTE
TOTAL HIP ARTHROPLASTY     PATIENT NAME:  Alia Cheney     YOB: 1940     ATTENDING PHYSICIAN: Lenny Khan MD     DATE OF PROCEDURE: 7/17/2019     PREOPERATIVE DIAGNOSIS: Primary osteoarthritis of left hip [M16.12]     Post-Op Diagnosis Codes:     * Primary osteoarthritis of left hip [M16.12]    PRINCIPAL DIAGNOSIS: Primary osteoarthritis left hip.    PROCEDURE: Left total hip arthroplasty, direct anterior.    SURGEON:  Lenny Khan M.D.    ASSISTANT: Adelso Vázquez, first assist.    ANESTHESIOLOGIST: Dr. Satnam Hernandez MD    ANESTHESIA: General with an LMA    POSITION: Supine on a Floyd table.    DRAINS: None    COMPLICATIONS: None    ESTIMATED BLOOD LOSS: 250 mL    SPECIMENS: * No orders in the log *    IMPLANT USED: Juan press-fit total hip replacement system, #6 Avenir standard neck length, standard offset, HA-coated femoral component, 36 mm diameter ceramic head, -3.5 mm neck length, 50 mm Juan G7, Ossoeti TM coated multi-hole cup with a single dome screw in the posterosuperior quadrant with a highly cross-linked, Vitamin E impregnated, polyethylene liner neutral.    INDICATION: Severe pain and discomfort in the left hip with a distinct limp.  All risks and benefits, potential complications, possibility of death, infection, myocardial infarction, DVT, pulmonary embolism, wound and soft tissue breakdown, dislocation of the prosthesis, limb length discrepancy, etc. were all discussed with the patient and an informed consent was signed.     DETAILS OF PROCEDURE: Surgical timeout was called. Operative extremity was correctly identified in the operating room suite. Patient was given antibiotics per the SCIP protocol.  Patient was placed under appropriate anesthetic. C-arm was used through the entire procedure to accurately evaluate the limb lengths and the stability of the components as well as appropriate positioning of the components in the proximal femur and the acetabulum.  Patient  was placed on the [place] table, prepped and draped in a standard fashion.  A direct anterior approach was carried out.      The fascia over the TFL was incised. The TFL was retracted laterally. A Cobra retractor was placed on the superior aspect of the femoral neck. A second Cobra was placed on the inferior aspect of the femoral neck. The leg was manipulated and the anterior capsule was carefully identified. The rectus femoris was retracted medially. Distally, the vastus lateralis was mobilized with a Chavez elevator and L-shaped capsulotomy was carried out and the flaps of the capsule were developed. FiberWire sutures were placed in the flaps of the capsule to allow for mobilization and subsequent closure of the soft tissue envelope. The Cobra retractors were placed subperiosteally, one inferior to the femoral neck and one superior to the femoral neck. The dissection was carried out up to the saddle of the greater trochanter laterally. The femoral osteotomy was carried out along the line of the broach. A napkin ring segment of femoral neck was removed to allow easy removal of the femoral head. A motorized corkscrew was used and the femoral head was removed from the acetabulum.    Retractors were placed around the acetabulum at the 4 o’clock to 7 o’clock and the 11 o’clock positions. Acetabular labrum was resected. The ligamentum teres was resected. Reaming was commenced under direct C-arm control.  Appropriate amounts of flexion, abduction and anteversion were built into the reaming process. Smaller sized reamer was used and the smaller sized reamer was used to medialize the acetabulum up to the teardrop, which was visualized on the C-arm monitors. The reaming size went up progressively up until a good bed of bleeding subchondral bone was exposed. The acetabulum was then lavaged with Bacitracin irrigating solution. Diluted Betadine was used in antibiotic solution and the 50 mm, G7 multi-hole cup was then impacted and  locked into position on the native acetabulum. Appropriate amounts of flexion, abduction and anteversion were built into the positioning of the cup. A screw was placed in the posterosuperior quadrant to augment the stability of the cup. The neutral polyethylene liner, Vitamin E impregnated, highly cross-linked was impacted and locked into position as well.    Attention was then directed towards the proximal femur.  The proximal femur was delivered into the wound with a combination of adduction, extension and external rotation.  The pigtail device was placed subperiosteally under the proximal femur and attached to the [place] table to stabilize the femur for instrumentation.  The piriformis fossa was cleared of all soft tissue.  The cancellus bone on the internal aspect of the lateral femur was removed with a curette to minimize the malpositioning of the broaches into varus.  The rat tailed device was then used to enter into the femoral canal.  We then commenced broaching with the smallest broach.  The broach was leaned up against the lateral cortex to minimize varus-valgus malposition.  We broached up to the point that the proximal metaphysis was well filled and there was good torsional stability to the broach.  A trial head and neck was placed and the femur was reduced into the acetabulum.  The limb lengths were checked and found to be anatomically accurate.  Intraoperative C-arm images were obtained to determine the limb lengths and the positioning of the components.  These were found to be anatomically accurate.  The femoral canal was then lavaged with bacitracin irrigating solution and dried.  The #6, standard neck length, standard offset Avenir femoral component was impacted into position and a good fit was obtained.  There was good torsional stability.  Some bone graft was packed around the femoral component to promote ingrowth.  The trunnion of the femoral component was dried and the 36 mm diameter, -3.5 mm neck  length, ceramic Biolox head ball was impacted and locked onto the femoral component.  Final reduction was carried out.  The stability and limb length were checked one more time.  There was no tendency towards dislocation of the components in any position of the lower extremity.  Limb lengths were checked on C-arm images found to be anatomical.  The capsule and the sub-periosteal structures were infiltrated with a analgesic for postoperative analgesia.  The anterior capsule was repaired with interrupted sutures.  3 g of Ramakrishna were dusted into the wound for postoperative hemostasis.  The fascia over the TFL was repaired with interrupted suture.  Subcuticular sutures applied.  Occlusive dressing was applied.  Sponge gauze and needle count was correct.  No complications were encountered.  Patient was reversed from the anesthetic and taken from the operating room to the recovery room in stable condition.  No complications encountered.  I discussed the satisfactory performance is procedure with the patient's family and answered all questions for them.     Lenny Khan MD  7/17/2019  9:47 AM

## 2019-07-17 NOTE — PLAN OF CARE
Problem: Patient Care Overview  Goal: Plan of Care Review  Outcome: Ongoing (interventions implemented as appropriate)   07/17/19 7708   Coping/Psychosocial   Plan of Care Reviewed With patient   OTHER   Outcome Summary 79 yo female s/p L ATHA 7/17/19. Pt ambulated 100' with RW and CGA with minimal increase in pain. Pt able to complete hip protocol exercises with minimal cueing and minimal pain. Pt had increase in pain with bed mobility but resovled with rest. PT recommends home with HHPT to increase mobility and strength following d/c.

## 2019-07-18 VITALS
TEMPERATURE: 97.6 F | HEART RATE: 65 BPM | RESPIRATION RATE: 13 BRPM | HEIGHT: 64 IN | SYSTOLIC BLOOD PRESSURE: 132 MMHG | OXYGEN SATURATION: 97 % | DIASTOLIC BLOOD PRESSURE: 73 MMHG | BODY MASS INDEX: 26.08 KG/M2 | WEIGHT: 152.78 LBS

## 2019-07-18 PROBLEM — M16.12 OSTEOARTHRITIS OF LEFT HIP: Status: RESOLVED | Noted: 2019-07-17 | Resolved: 2019-07-18

## 2019-07-18 PROBLEM — M16.12 PRIMARY OSTEOARTHRITIS OF LEFT HIP: Status: RESOLVED | Noted: 2019-06-18 | Resolved: 2019-07-18

## 2019-07-18 LAB
ANION GAP SERPL CALCULATED.3IONS-SCNC: 11.9 MMOL/L (ref 5–15)
BUN BLD-MCNC: 9 MG/DL (ref 8–20)
BUN/CREAT SERPL: 18 (ref 5.4–26.2)
CALCIUM SPEC-SCNC: 7.5 MG/DL (ref 8.9–10.3)
CHLORIDE SERPL-SCNC: 110 MMOL/L (ref 101–111)
CO2 SERPL-SCNC: 20 MMOL/L (ref 22–32)
CREAT BLD-MCNC: 0.5 MG/DL (ref 0.4–1)
GFR SERPL CREATININE-BSD FRML MDRD: 119 ML/MIN/1.73
GLUCOSE BLD-MCNC: 150 MG/DL (ref 65–99)
HCT VFR BLD AUTO: 34.7 % (ref 34–46.6)
HGB BLD-MCNC: 11.1 G/DL (ref 12–15.9)
POTASSIUM BLD-SCNC: 3.9 MMOL/L (ref 3.6–5.1)
SODIUM BLD-SCNC: 138 MMOL/L (ref 136–144)

## 2019-07-18 PROCEDURE — 85018 HEMOGLOBIN: CPT | Performed by: PHYSICIAN ASSISTANT

## 2019-07-18 PROCEDURE — 80048 BASIC METABOLIC PNL TOTAL CA: CPT | Performed by: PHYSICIAN ASSISTANT

## 2019-07-18 PROCEDURE — 97530 THERAPEUTIC ACTIVITIES: CPT

## 2019-07-18 PROCEDURE — 97116 GAIT TRAINING THERAPY: CPT

## 2019-07-18 PROCEDURE — 85014 HEMATOCRIT: CPT | Performed by: PHYSICIAN ASSISTANT

## 2019-07-18 PROCEDURE — 97150 GROUP THERAPEUTIC PROCEDURES: CPT

## 2019-07-18 PROCEDURE — 63710000001 PREDNISONE PER 5 MG: Performed by: PHYSICIAN ASSISTANT

## 2019-07-18 PROCEDURE — 25010000003 CEFAZOLIN PER 500 MG: Performed by: PHYSICIAN ASSISTANT

## 2019-07-18 PROCEDURE — 99024 POSTOP FOLLOW-UP VISIT: CPT | Performed by: ORTHOPAEDIC SURGERY

## 2019-07-18 RX ORDER — OXYCODONE HYDROCHLORIDE AND ACETAMINOPHEN 5; 325 MG/1; MG/1
TABLET ORAL
Qty: 40 TABLET | Refills: 0 | Status: SHIPPED | OUTPATIENT
Start: 2019-07-18 | End: 2019-08-22

## 2019-07-18 RX ORDER — GABAPENTIN 100 MG/1
100 CAPSULE ORAL NIGHTLY
Qty: 30 CAPSULE | Refills: 0 | Status: SHIPPED | OUTPATIENT
Start: 2019-07-18 | End: 2019-08-17

## 2019-07-18 RX ORDER — MELOXICAM 7.5 MG/1
7.5 TABLET ORAL DAILY
Qty: 30 TABLET | Refills: 0 | Status: SHIPPED | OUTPATIENT
Start: 2019-07-19 | End: 2019-08-18

## 2019-07-18 RX ADMIN — MELOXICAM 15 MG: 15 TABLET ORAL at 08:43

## 2019-07-18 RX ADMIN — CYANOCOBALAMIN TAB 1000 MCG 1000 MCG: 1000 TAB at 08:43

## 2019-07-18 RX ADMIN — OYSTER SHELL CALCIUM WITH VITAMIN D 1 TABLET: 500; 200 TABLET, FILM COATED ORAL at 08:44

## 2019-07-18 RX ADMIN — ACETAMINOPHEN 1000 MG: 500 TABLET, FILM COATED ORAL at 14:43

## 2019-07-18 RX ADMIN — MEMANTINE 5 MG: 5 TABLET ORAL at 08:50

## 2019-07-18 RX ADMIN — OXYCODONE HYDROCHLORIDE 10 MG: 5 TABLET ORAL at 16:01

## 2019-07-18 RX ADMIN — PANTOPRAZOLE SODIUM 40 MG: 40 TABLET, DELAYED RELEASE ORAL at 08:43

## 2019-07-18 RX ADMIN — LEFLUNOMIDE 10 MG: 20 TABLET ORAL at 08:45

## 2019-07-18 RX ADMIN — DILTIAZEM HYDROCHLORIDE 120 MG: 120 CAPSULE, COATED, EXTENDED RELEASE ORAL at 08:43

## 2019-07-18 RX ADMIN — DICYCLOMINE HYDROCHLORIDE 10 MG: 10 CAPSULE ORAL at 08:44

## 2019-07-18 RX ADMIN — OXYCODONE HYDROCHLORIDE 10 MG: 5 TABLET ORAL at 06:33

## 2019-07-18 RX ADMIN — VENLAFAXINE HYDROCHLORIDE 150 MG: 75 CAPSULE, EXTENDED RELEASE ORAL at 08:43

## 2019-07-18 RX ADMIN — DOCUSATE SODIUM 100 MG: 100 CAPSULE, LIQUID FILLED ORAL at 08:44

## 2019-07-18 RX ADMIN — OXYCODONE HYDROCHLORIDE 10 MG: 5 TABLET ORAL at 10:45

## 2019-07-18 RX ADMIN — FERROUS SULFATE TAB EC 324 MG (65 MG FE EQUIVALENT) 324 MG: 324 (65 FE) TABLET DELAYED RESPONSE at 08:43

## 2019-07-18 RX ADMIN — POLYETHYLENE GLYCOL 3350 17 G: 17 POWDER, FOR SOLUTION ORAL at 08:42

## 2019-07-18 RX ADMIN — ACETAMINOPHEN 1000 MG: 500 TABLET, FILM COATED ORAL at 05:35

## 2019-07-18 RX ADMIN — METOPROLOL TARTRATE 12.5 MG: 25 TABLET, FILM COATED ORAL at 08:45

## 2019-07-18 RX ADMIN — PREDNISONE 2.5 MG: 5 TABLET ORAL at 08:44

## 2019-07-18 RX ADMIN — CEFAZOLIN 2 G: 1 INJECTION, POWDER, FOR SOLUTION INTRAMUSCULAR; INTRAVENOUS at 00:08

## 2019-07-18 NOTE — PLAN OF CARE
Problem: Patient Care Overview  Goal: Interprofessional Rounds/Family Conf  Outcome: Outcome(s) achieved Date Met: 07/18/19

## 2019-07-18 NOTE — PLAN OF CARE
Problem: Patient Care Overview  Goal: Individualization and Mutuality  Outcome: Outcome(s) achieved Date Met: 07/18/19

## 2019-07-18 NOTE — PLAN OF CARE
Problem: Hip Arthroplasty (Total, Partial) (Adult)  Goal: Anesthesia/Sedation Recovery  Outcome: Outcome(s) achieved Date Met: 07/18/19      Problem: Fall Risk (Adult)  Goal: Identify Related Risk Factors and Signs and Symptoms  Outcome: Outcome(s) achieved Date Met: 07/18/19    Goal: Absence of Fall  Outcome: Outcome(s) achieved Date Met: 07/18/19

## 2019-07-18 NOTE — PLAN OF CARE
Problem: Patient Care Overview  Goal: Plan of Care Review  Outcome: Ongoing (interventions implemented as appropriate)   07/18/19 0520   Coping/Psychosocial   Plan of Care Reviewed With patient;grandchild(hair)   OTHER   Outcome Summary pt transfering and amb very well. no loss of balance noted while amb with walker. recommend home with family and HHPT at d/c

## 2019-07-18 NOTE — THERAPY TREATMENT NOTE
Acute Care - Physical Therapy Treatment Note   Neil     Patient Name: Alia Cheney  : 1940  MRN: [id number]  Today's Date: 2019             Admit Date: 2019    Visit Dx:    ICD-10-CM ICD-9-CM   1. Primary osteoarthritis of left hip M16.12 715.15     Patient Active Problem List   Diagnosis   (none) - all problems resolved or deleted       Therapy Treatment    Rehabilitation Treatment Summary     Row Name 19 0940             Treatment Time/Intention    Discipline  physical therapy assistant  -SC      Document Type  evaluation  -SC      Subjective Information  -- pt stated she is going to stay with her son at d/c  -SC      Mode of Treatment  physical therapy  -SC      Therapy Frequency (PT Clinical Impression)  2 times/day  -SC      Patient Effort  excellent  -SC      Existing Precautions/Restrictions  hip, anterior hip extension  -SC      Recorded by [SC] Wendy Mckeon, [facility] 19 1329      Row Name 19 0940             Cognitive Assessment/Intervention- PT/OT    Orientation Status (Cognition)  oriented x 4  -SC      Follows Commands (Cognition)  WNL  -SC      Safety Deficit (Cognitive)  safety precautions follow-through/compliance  -SC      Recorded by [SC] Wendy Mckeon, [facility] 19 1329      Row Name 19 0940             Mobility Assessment/Intervention    Extremity Weight-bearing Status  left lower extremity  -SC      Left Lower Extremity (Weight-bearing Status)  weight-bearing as tolerated (WBAT)  -SC      Recorded by [SC] Wendy Mckeon, [facility] 19 1329      Row Name 19 0940             Transfer Assessment/Treatment    Transfer Assessment/Treatment  sit-stand transfer;stand-sit transfer  -SC      Recorded by [SC] Wendy Mckeon, [facility] 19 1329      Row Name 19 0940             Sit-Stand Transfer    Sit-Stand [place] (Transfers)  supervision;verbal cues  -SC      Assistive Device (Sit-Stand Transfers)  walker, front-wheeled  -SC       Recorded by [SC] Wendy Mckeon, [facility] 07/18/19 1329      Row Name 07/18/19 0940             Stand-Sit Transfer    Stand-Sit [place] (Transfers)  contact guard;verbal cues  -SC      Assistive Device (Stand-Sit Transfers)  walker, front-wheeled  -SC      Recorded by [SC] Wendy Mckeon, [facility] 07/18/19 1329      Row Name 07/18/19 0940             Gait/Stairs Assessment/Training    Gait/Stairs Assessment/Training  gait/ambulation assistive device  -SC      [place] Level (Gait)  supervision  -SC      Assistive Device (Gait)  walker, front-wheeled  -SC      Pattern (Gait)  step-through  -SC      Deviations/Abnormal Patterns (Gait)  stride length decreased;gait speed decreased;antalgic  -SC      Recorded by [SC] Wendy Mckeon, [facility] 07/18/19 1329      Row Name 07/18/19 0940             Motor Skills Assessment/Interventions    Additional Documentation  Therapeutic Exercise (Group)  -SC      Recorded by [SC] Wendy Mckeon, [facility] 07/18/19 1329      Row Name 07/18/19 0940             Therapeutic Exercise    Core Strength (Therapeutic Exercise)  -- heel raises, marches, hip abduction and laq's  -SC      Position (Therapeutic Exercise)  seated;standing  -SC      Sets/Reps (Therapeutic Exercise)  1/10  -SC      Comment (Therapeutic Exercise)  standing with walker  -SC      Recorded by [SC] Wendy Mckeon, [facility] 07/18/19 1329      Row Name 07/18/19 0940             Positioning and Restraints    Pre-Treatment Position  sitting in chair/recliner  -SC      Post Treatment Position  chair  -SC      In Chair  notified nsg;reclined;exit alarm on;with family/caregiver;call light within reach  -SC      Recorded by [SC] Wendy Mckeon, [facility] 07/18/19 1329      Row Name 07/18/19 0940             Pain Scale: Numbers Pre/Post-Treatment    Pain Scale: Numbers, Pretreatment  6/10  -SC      Pain Scale: Numbers, Post-Treatment  5/10  -SC      Pain Location - Side  Left  -SC      Pain Location  hip anterior (surgery site)  -SC       Recorded by [SC] Wendy Mckeon, PTA 07/18/19 1329      Row Name 07/18/19 0940             Respiratory WDL    Respiratory WDL  --  -SC      Recorded by [SC] Saima Mckeonah DAVID, PTA 07/18/19 1329      Row Name 07/18/19 0940             Skin WDL    Skin WDL  --  -SC      Skin Integrity  --  -SC      Recorded by [SC] Wendy Mckeon, PTA 07/18/19 1329      Row Name 07/18/19 0940             Wound 07/17/19 0827 Left hip incision    Wound - Properties Group Date first assessed: 07/17/19 [BS] Time first assessed: 0827 [BS] Side: Left [BS] Location: hip [BS] Type: incision [BS] Recorded by:  [BS] Kameron Hamilton, RN 07/17/19 0827    Dressing Appearance  --  -SC      Closure  --  -SC      Drainage Amount  --  -SC      Recorded by [SC] Wendy Mckeon, PTA 07/18/19 1329      Row Name 07/18/19 0940             Outcome Summary/Treatment Plan (PT)    Anticipated Discharge Disposition (PT)  home with home health planning to stay with her son and daughter in law at d/c  -SC      Recorded by [SC] Saima Mckeonah DAVID, PTA 07/18/19 1329        User Key  (r) = Recorded By, (t) = Taken By, (c) = Cosigned By    Initials Name Effective Dates Discipline    SC Wendy Mckeon, PTA 03/01/19 -  PT    BS Kameron Hamilton RN 03/01/19 -  Nurse          Wound 07/17/19 0827 Left hip incision (Active)   Dressing Appearance dry;intact;no drainage 7/18/2019 10:45 AM   Closure NEMESIO 7/18/2019 10:45 AM   Drainage Amount none 7/18/2019 10:45 AM   Dressing Care, Wound other (see comments) 7/17/2019  7:55 PM           Physical Therapy Education     Title: PT OT SLP Therapies (Done)     Topic: Physical Therapy (Done)     Point: Mobility training (Done)     Learning Progress Summary           Patient Acceptance, E, VU by SC at 7/18/2019  1:31 PM    Acceptance, E, VU by AB at 7/17/2019  3:34 PM                   Point: Home exercise program (Done)     Learning Progress Summary           Patient Acceptance, E, VU by SC at 7/18/2019  1:31 PM                    Point: Body mechanics (Done)     Learning Progress Summary           Patient Acceptance, E, VU by SC at 7/18/2019  1:31 PM    Acceptance, E, VU by AB at 7/17/2019  3:34 PM                   Point: Precautions (Done)     Learning Progress Summary           Patient Acceptance, E, VU by SC at 7/18/2019  1:31 PM    Acceptance, E, VU by AB at 7/17/2019  3:34 PM                               User Key     Initials Effective Dates Name Provider Type Discipline    SC 03/01/19 -  Wendy Mckeon PTA Physical Therapy Assistant PT    AB 06/01/19 -  Cindy Khan, PT Student PT Student PT                PT Recommendation and Plan  Anticipated Discharge Disposition (PT): home with home health(planning to stay with her son and daughter in law at d/c)  Therapy Frequency (PT Clinical Impression): 2 times/day  Outcome Summary/Treatment Plan (PT)  Anticipated Discharge Disposition (PT): home with home health(planning to stay with her son and daughter in law at d/c)  Plan of Care Reviewed With: patient, grandchild(hair)  Outcome Summary: pt transfering and amb very well.  no loss of balance noted while amb with walker.  recommend home with family and HHPT at     Time Calculation:   PT Charges     Row Name 07/18/19 1332             Time Calculation    Start Time  0915  -SC      Stop Time  0940  -SC      Time Calculation (min)  25 min  -SC         Timed Charges    80885 - PT Therapeutic Exercise Minutes  10  -SC      84456 - Gait Training Minutes   10  -SC      25206 - PT Therapeutic Activity Minutes  10  -SC        User Key  (r) = Recorded By, (t) = Taken By, (c) = Cosigned By    Initials Name Provider Type    SC Wendy Mckeon PTA Physical Therapy Assistant        Therapy Charges for Today     Code Description Service Date Service Provider Modifiers Qty    71616948514 HC PT THERAPEUTIC ACT EA 15 MIN 7/18/2019 Wendy Mckeon PTA GP 1    96004939195 HC PT THER PROC GROUP 7/18/2019 Wendy Mckeon PTA GP 1     [id number]  GAIT TRAINING EA 15 MIN 7/18/2019 Wendy Mckeon, PTA GP 1               Wendy Mckeon, PTA  7/18/2019

## 2019-07-18 NOTE — THERAPY TREATMENT NOTE
Acute Care - Physical Therapy Treatment Note   Neil     Patient Name: Alia Cheney  : 1940  MRN: [id number]  Today's Date: 2019             Admit Date: 2019    Visit Dx:    ICD-10-CM ICD-9-CM   1. Primary osteoarthritis of left hip M16.12 715.15     Patient Active Problem List   Diagnosis   (none) - all problems resolved or deleted       Therapy Treatment    Rehabilitation Treatment Summary     Row Name 19 1525 19 0940          Treatment Time/Intention    Discipline  physical therapy assistant  -SC  physical therapy assistant  -SC     Document Type  therapy note (daily note)  -SC  therapy note (daily note)  (Pended)   -SC2     Subjective Information  --  -- pt stated she is going to stay with her son at d/c  -SC     Mode of Treatment  physical therapy  -SC  physical therapy  -SC     Therapy Frequency (PT Clinical Impression)  2 times/day  -SC  2 times/day  -SC     Patient Effort  excellent  -SC  excellent  -SC     Existing Precautions/Restrictions  hip, anterior hip extension  -SC  hip, anterior hip extension  -SC     Recorded by [SC] Wendy Mckeon, [facility] 19 1733 [SC] Wendy Mckeon, [facility] 19 1329  [SC2] Wendy Mckeon, [facility] 19 1730     Row Name 19 1525 19 0940          Cognitive Assessment/Intervention- PT/OT    Orientation Status (Cognition)  oriented x 4  -SC  oriented x 4  -SC     Follows Commands (Cognition)  WNL  -SC  WNL  -SC     Safety Deficit (Cognitive)  --  safety precautions follow-through/compliance  -SC     Recorded by [SC] Wendy Mckeon, [facility] 19 1733 [SC] Wnedy Mckeon, [facility] 19 1329     Row Name 19 1525 19 0940          Mobility Assessment/Intervention    Extremity Weight-bearing Status  left lower extremity  -SC  left lower extremity  -SC     Left Lower Extremity (Weight-bearing Status)  weight-bearing as tolerated (WBAT)  -SC  weight-bearing as tolerated (WBAT)  -SC     Recorded by [SC] Linda  Wendy HERNANDEZ, [facility] 07/18/19 1733 [SC] Wendy Mckeon, [facility] 07/18/19 1329     Row Name 07/18/19 1525             Bed Mobility Assessment/Treatment    Bed Mobility Assessment/Treatment  bed mobility (all) activities  -SC      [place] Level (Bed Mobility)  conditional independence  -SC      Comment (Bed Mobility)  no bedrail used and bed flat  -SC      Recorded by [SC] Wendy Mckeon, [facility] 07/18/19 1733      Row Name 07/18/19 1525 07/18/19 0940          Transfer Assessment/Treatment    Transfer Assessment/Treatment  sit-stand transfer;stand-sit transfer  -SC  sit-stand transfer;stand-sit transfer  -SC     Recorded by [SC] Wendy Mckeon, [facility] 07/18/19 1733 [SC] Wendy Mckeon, [facility] 07/18/19 1329     Row Name 07/18/19 1525 07/18/19 0940          Sit-Stand Transfer    Sit-Stand [place] (Transfers)  supervision;verbal cues  -SC  supervision;verbal cues  -SC     Assistive Device (Sit-Stand Transfers)  walker, front-wheeled  -SC  walker, front-wheeled  -SC     Recorded by [SC] Wendy Mckeon, [facility] 07/18/19 1733 [SC] Wendy Mckeon, [facility] 07/18/19 1329     Row Name 07/18/19 1525 07/18/19 0940          Stand-Sit Transfer    Stand-Sit [place] (Transfers)  supervision  -SC  contact guard;verbal cues  -SC     Assistive Device (Stand-Sit Transfers)  walker, front-wheeled  -SC  walker, front-wheeled  -SC     Recorded by [SC] Wendy Mckeon, [facility] 07/18/19 1733 [SC] Wendy Mckeon, [facility] 07/18/19 1329     Row Name 07/18/19 1525 07/18/19 0940          Gait/Stairs Assessment/Training    Gait/Stairs Assessment/Training  gait/ambulation assistive device  -SC  gait/ambulation assistive device  -SC     [place] Level (Gait)  supervision  -SC  supervision  -SC     Assistive Device (Gait)  walker, front-wheeled  -SC  walker, front-wheeled  -SC     Distance in Feet (Gait)  200'  -SC  --     Pattern (Gait)  step-through  -SC  step-through  -SC     Deviations/Abnormal Patterns (Gait)  antalgic  -SC  stride length  decreased;gait speed decreased;antalgic  -SC     Negotiation (Stairs)  stairs independence  -SC  --     [place] Level (Stairs)  supervision  -SC  --     Handrail Location (Stairs)  right side (ascending)  -SC  --     Number of Steps (Stairs)  4  -SC  --     Ascending Technique (Stairs)  step-to-step  -SC  --     Descending Technique (Stairs)  step-to-step  -SC  --     Comment (Gait/Stairs)  improved gait speed  -SC  --     Recorded by [SC] Wendy Mckeon, [facility] 07/18/19 1733 [SC] Wendy Mckeon, [facility] 07/18/19 1329     Row Name 07/18/19 0940             Motor Skills Assessment/Interventions    Additional Documentation  Therapeutic Exercise (Group)  -SC      Recorded by [SC] Wendy Mckeon, [facility] 07/18/19 1329      Row Name 07/18/19 0940             Therapeutic Exercise    Core Strength (Therapeutic Exercise)  -- heel raises, marches, hip abduction and laq's  -SC      Position (Therapeutic Exercise)  seated;standing  -SC      Sets/Reps (Therapeutic Exercise)  1/10  -SC      Comment (Therapeutic Exercise)  standing with walker  -SC      Recorded by [SC] Wendy Mckeon, [facility] 07/18/19 1329      Row Name 07/18/19 0940             Positioning and Restraints    Pre-Treatment Position  sitting in chair/recliner  -SC      Post Treatment Position  chair  -SC      In Chair  notified nsg;reclined;exit alarm on;with family/caregiver;call light within reach  -SC      Recorded by [SC] Wendy Mckeon, [facility] 07/18/19 1329      Row Name 07/18/19 1525 07/18/19 0940          Pain Scale: Numbers Pre/Post-Treatment    Pain Scale: Numbers, Pretreatment  3/10  -SC  6/10  -SC     Pain Scale: Numbers, Post-Treatment  5/10  -SC  5/10  -SC     Pain Location - Side  Left  -SC  Left  -SC     Pain Location  hip anterior (surgery site)  -SC  hip anterior (surgery site)  -SC     Recorded by [SC] Wendy Mckeon, [facility] 07/18/19 1733 [SC] Wendy Mckeon, [facility] 07/18/19 1329     Row Name 07/18/19 0940             Respiratory WDL     Respiratory WDL  --  -SC      Recorded by [SC] Wendy Mcekon, PTA 07/18/19 1329      Row Name 07/18/19 0940             Skin WDL    Skin WDL  --  -SC      Skin Integrity  --  -SC      Recorded by [SC] Wendy Mckeon, PTA 07/18/19 1329      Row Name 07/18/19 0940             Wound 07/17/19 0827 Left hip incision    Wound - Properties Group Date first assessed: 07/17/19 [BS] Time first assessed: 0827 [BS] Side: Left [BS] Location: hip [BS] Type: incision [BS] Recorded by:  [BS] Kameron Hamilton, RN 07/17/19 0827    Dressing Appearance  --  -SC      Closure  --  -SC      Drainage Amount  --  -SC      Recorded by [SC] Wendy Mckeon, PTA 07/18/19 1329      Row Name 07/18/19 1525 07/18/19 0940          Outcome Summary/Treatment Plan (PT)    Anticipated Discharge Disposition (PT)  home with home health planning to stay with her son and daughter in law at d/c  -SC  home with home health planning to stay with her son and daughter in law at d/c  -SC     Recorded by [SC] Wendy Mckeon, PTA 07/18/19 1733 [SC] Wendy Mckeon, [facility] 07/18/19 1329       User Key  (r) = Recorded By, (t) = Taken By, (c) = Cosigned By    Initials Name Effective Dates Discipline    SC LindaWendy DAVID, PTA 03/01/19 -  PT    BS Kameron Hamilton, RN 03/01/19 -  Nurse          Wound 07/17/19 0827 Left hip incision (Active)   Dressing Appearance dry;intact;no drainage 7/18/2019 10:45 AM   Closure NEMESIO 7/18/2019 10:45 AM   Drainage Amount none 7/18/2019 10:45 AM   Dressing Care, Wound other (see comments) 7/17/2019  7:55 PM           Physical Therapy Education     Title: PT OT SLP Therapies (Resolved)     Topic: Physical Therapy (Resolved)     Point: Mobility training (Resolved)     Learning Progress Summary           Patient Acceptance, E, VU,DU by SC at 7/18/2019  5:35 PM    Acceptance, E, VU by SC at 7/18/2019  1:31 PM    Acceptance, E, VU by AB at 7/17/2019  3:34 PM                   Point: Home exercise program (Resolved)     Learning  Progress Summary           Patient Acceptance, E, VU,DU by SC at 7/18/2019  5:35 PM    Acceptance, E, VU by SC at 7/18/2019  1:31 PM                   Point: Body mechanics (Resolved)     Learning Progress Summary           Patient Acceptance, E, VU,DU by SC at 7/18/2019  5:35 PM    Acceptance, E, VU by SC at 7/18/2019  1:31 PM    Acceptance, E, VU by AB at 7/17/2019  3:34 PM                   Point: Precautions (Resolved)     Learning Progress Summary           Patient Acceptance, E, VU,DU by SC at 7/18/2019  5:35 PM    Acceptance, E, VU by SC at 7/18/2019  1:31 PM    Acceptance, E, VU by AB at 7/17/2019  3:34 PM                               User Key     Initials Effective Dates Name Provider Type Discipline    SC 03/01/19 -  Wendy Mckeon, PTA Physical Therapy Assistant PT    AB 06/01/19 -  Cindy Khan, MAUREEN Student PT Student PT                PT Recommendation and Plan  Anticipated Discharge Disposition (PT): home with home health(planning to stay with her son and daughter in law at d/c)  Therapy Frequency (PT Clinical Impression): 2 times/day  Outcome Summary/Treatment Plan (PT)  Anticipated Discharge Disposition (PT): home with home health(planning to stay with her son and daughter in law at d/c)  Plan of Care Reviewed With: patient  Progress: improving  Outcome Summary: pt leonidas tx session very well.  increased gait distance improved and amb up and down 4 steps easily.  recommend home with family and HHPT to follow     Time Calculation:   PT Charges     Row Name 07/18/19 1736 07/18/19 1332          Time Calculation    Start Time  1510  -SC  0915  -SC     Stop Time  1530  -SC  0940  -SC     Time Calculation (min)  20 min  -SC  25 min  -SC        Timed Charges    52705 - PT Therapeutic Exercise Minutes  --  10  -SC     21264 - Gait Training Minutes   10  -SC  10  -SC     38543 - PT Therapeutic Activity Minutes  10  -SC  10  -SC       User Key  (r) = Recorded By, (t) = Taken By, (c) = Cosigned By    Initials  Name Provider Type    SC Wendy Mckeon, DANI Physical Therapy Assistant        Therapy Charges for Today     Code Description Service Date Service Provider Modifiers Qty    58651259094 HC PT THERAPEUTIC ACT EA 15 MIN 7/18/2019 Wendy Mckeon, PTA GP 1    67124261780 HC PT THER PROC GROUP 7/18/2019 Wendy Mckeon, PTA GP 1    51205080117 HC GAIT TRAINING EA 15 MIN 7/18/2019 Wendy Mckeon, PTA GP 1    67262714560 HC GAIT TRAINING EA 15 MIN 7/18/2019 Wendy Mckeon, PTA GP 1    95308849036 HC PT THERAPEUTIC ACT EA 15 MIN 7/18/2019 Wendy Mckeon, PTA GP 1               Wendy Mckeon PTA  7/18/2019

## 2019-07-18 NOTE — DISCHARGE SUMMARY
Date of Discharge:  7/18/2019    Discharge Diagnosis: Left hip DJD    Presenting Problem/History of Present Illness  Active Hospital Problems   No active problems to display.      Resolved Hospital Problems    Diagnosis Date Resolved POA   • **Primary osteoarthritis of left hip [M16.12] 07/18/2019 Yes   • Osteoarthritis of left hip [M16.12] 07/18/2019 Yes          Hospital Course  Alia Cheney is a 78 y.o. female who has a long-standing history of left hip pain.  Preoperative imaging did reveal changes of DJD in the area of the left hip.  After exhausting conservative measures and reviewing the risks and benefits of surgery, she elected to proceed with a left anterior total hip replacement.  She underwent that procedure on 7/17/2019 and was delivered to the recovery room in stable condition.  Upon meeting their transfer criteria, she was transferred to surgical inpatient floor in stable condition.  She did receive perioperative antibiotics and DVT prophylaxis.  She was evaluated by physical therapy team and did receive their services throughout her stay.  Her hospital course was unremarkable.  Upon discharge, she was ambulating with the assistance of a walker, tolerating an oral diet, and her pain was controlled with oral medication.  Prior to discharge, all her questions and concerns were addressed.    Procedures Performed    Procedure(s):  TOTAL HIP ARTHROPLASTY ANTERIOR  -------------------       Consults:   Consults     Date and Time Order Name Status Description    7/17/2019 1142 Inpatient Consult to Hospitalist            Pertinent Test Results: labs: Reviewed and radiology: X-Ray: Reviewed    Condition on Discharge: To home with home care in stable condition    Vital Signs  Temp:  [97.4 °F (36.3 °C)-97.7 °F (36.5 °C)] 97.4 °F (36.3 °C)  Heart Rate:  [59-80] 59  Resp:  [16] 16  BP: (109-133)/(64-72) 133/64    Physical Exam:   General Appearance: alert, pleasant, appears stated age, cooperative and Sitting  up in chair  Extremities: Left lower extremity is grossly well aligned and perfused, sensation intact, dressing clean, dry, and intact, plantar and dorsiflexion intact  Pulses: Pulses palpable and equal bilaterally  Skin: no bleeding, bruising or rash  Neurologic: Mental Status orientated to person, place, time and situation    Discharge Medications     Discharge Medications      New Medications      Instructions Start Date   gabapentin 100 MG capsule  Commonly known as:  NEURONTIN   100 mg, Oral, Nightly      meloxicam 7.5 MG tablet  Commonly known as:  MOBIC   7.5 mg, Oral, Daily   Start Date:  7/19/2019     oxyCODONE-acetaminophen 5-325 MG per tablet  Commonly known as:  PERCOCET   1-2 po q4 hours prn pain      rivaroxaban 10 MG tablet  Commonly known as:  XARELTO   10 mg, Oral, Daily With Dinner         Continue These Medications      Instructions Start Date   calcium carbonate 1250 (500 Ca) MG tablet  Commonly known as:  OS-DAHLIA   600 mg, Oral, Daily      calcium citrate-vitamin d 200-250 MG-UNIT tablet tablet  Commonly known as:  CITRACAL   Oral, Daily      CARTIA  MG 24 hr capsule  Generic drug:  diltiaZEM CD   TAKE ONE CAPSULE BY MOUTH DAILY      dicyclomine 10 MG capsule  Commonly known as:  BENTYL   10 mg, Oral, 2 Times Daily - RT      leflunomide 10 MG tablet  Commonly known as:  ARAVA   10 mg, Oral, Daily      LORazepam 0.5 MG tablet  Commonly known as:  ATIVAN   1 po bid prn anxiety      memantine 5 MG tablet  Commonly known as:  NAMENDA   5 mg, Oral, 2 Times Daily      metoprolol tartrate 25 MG tablet  Commonly known as:  LOPRESSOR   12.5 mg, Oral, 2 Times Daily      pantoprazole 40 MG EC tablet  Commonly known as:  PROTONIX   40 mg, Oral, Daily      polyethylene glycol packet  Commonly known as:  MIRALAX   17 g, Oral, Daily      pravastatin 40 MG tablet  Commonly known as:  PRAVACHOL   40 mg, Oral, Nightly      predniSONE 2.5 MG tablet  Commonly known as:  DELTASONE   2.5 mg, Oral, Daily       traZODone 50 MG tablet  Commonly known as:  DESYREL   100 mg, Oral, Nightly      venlafaxine  MG 24 hr capsule  Commonly known as:  EFFEXOR-XR   150 mg, Oral, Daily      vitamin B-12 1000 MCG tablet  Commonly known as:  CYANOCOBALAMIN   1,000 mcg, Oral, Daily             Activity at Discharge:   Activity Instructions     Discharge Activity      Anterior Total Hip Replacement Discharge Instructions:    I. ACTIVITIES:  1. Exercises:  Complete exercise program as taught by the hospital physical therapist 2 times per day  Exercise program will be advanced by the physical therapist  During the day be up ambulating every 2 hours (while awake) for short distances  Complete the ankle pump exercises at least 10 times per hour (while awake)  Elevate legs when in bed and for at least 30 minutes during the day.Use cold packs 20-30 minutes approximately 5 times per day. This should be done before and after completing your exercises and at any time you are experiencing pain/ stiffness in your operative extremity.      2. Activities of Daily Living:  No tub baths, hot tubs, or swimming pools for 4 weeks  May shower with waterproof dressing in place, as long as it is nice and tight around the edges.  Do not scrub or rub the incision. Let water run over dressing and pat dry.     II. Restrictions  Continue anterior hip precautions as taught at the hospital  Your surgeon will discuss with you when you will be able to drive again, typically when you are off pain medication and have enough strength in your leg to step hard on the brake.  Weight bearing is as tolerated  Do not lift more than 10 lbs for the first 2 weeks.  First week stay inside on even terrain. May go up and down stairs one stair at a time utilizing the hand rail once cleared by physical therapy to do so.  After one week, you may venture outside (if cleared to do so by physical therapist).    III. Precautions:  Everyone that comes near you should wash their  hands  No elective dental, genital-urinary, or colon procedures or surgical procedures for 12 weeks after surgery unless absolutely necessary.   If dental work or surgical procedure is deemed absolutely necessary, you will need to contact your surgeon as you will need to take antibiotics 1 hour prior to any dental work (including teeth cleanings).  Please discuss with your surgeon prophylactic antibiotics as the length of time this intervention will be necessary for you varies with each patient's health history and situation.  Avoid sick people. If you must be around someone who is ill, they should wear a mask.  Avoid visits to the Emergency Room or Urgent Care unless you are having a life threatening event.   If ordered stockings are to be placed on in the morning and removed at night. Monitor the stockings to ensure that any swelling is not causing the stockings to become too tight. In this case, remove stockings immediately.    IV. INCISION CARE:  Wash your hands often.  Notify office if dressing becomes dislodged or drainage noted. Change the dressing as directed by your physician.   No creams or ointments to the incision, unless instructed.  Do not touch or pick at the incision  Check dressing every day and notify surgeon immediately if any of the following signs or symptoms are noted:  Increase in redness  Increase in swelling around the incision and of the entire extremity  Increase in pain  Drainage oozing from the incision  Increase in overall body temperature (greater than 101 degrees)  Your surgeon will instruct you regarding suture or staple removal    V. Medications:   1. Anticoagulants: You will be discharged on an anticoagulant. This is a prophylactic medication that helps prevent blood clots during your post-operative period. The type and length of dosage varies based on your individual needs, procedure performed, and surgeon's preference.  While taking the anticoagulant, you should avoid taking any  additional aspirin, ibuprofen (Advil or Motrin), Aleve (Naprosyn) or other non-steroidal anti-inflammatory medications, unless prescribed.   Notify surgeon immediately if any asif bleeding is noted in the urine, stool, emesis, or from the nose or the incision. Blood in the stool will often appear as black rather than red. Blood in urine may appear as pink. Blood in emesis may appear as brown/black like coffee grounds.  You will need to apply pressure for longer periods of time to any cuts or abrasions to stop bleeding  Avoid alcohol while taking anticoagulants    2. Stool Softeners: You will be at greater risk of constipation after surgery due to being less mobile and the pain medications.   Take stool softeners as instructed by your surgeon while on pain medications. Over the counter Colace 100 mg 1-2 capsules twice daily.   If stools become too loose or too frequent, please decreases the dosage or stop the stool softener.  If constipation occurs despite use of stool softeners, you are to continue the stool softeners and add a laxative (Milk of Magnesia 1 ounce daily as needed)  Drink plenty of fluids, and eat fruits and vegetables during your recovery time    3. Pain Medications utilized after surgery are narcotics and the law requires that the following information be given to all patients that are prescribed narcotics:  CLASSIFICATION: Pain medications are called Opioids and are narcotics  LEGALITIES: It is illegal to share narcotics with others and to drive within 24 hours of taking narcotics  POTENTIAL SIDE EFFECTS: Potential side effects of opioids include: nausea, vomiting, itching, dizziness, drowsiness, dry mouth, constipation, and difficulty urinating.  POTENTIAL ADVERSE EFFECTS:   Opioid tolerance can develop with use of pain medications and this simply means that it requires more and more of the medication to control pain; however, this is seen more in patients that use opioids for longer periods of  time.  Opioid dependence can develop with use of Opioids and this simply means that to stop the medication can cause withdrawal symptoms; however, this is seen with patients that use Opioids for longer periods of time.  Opioid addiction can develop with use of Opioids and the incidence of this is very unlikely in patients who take the medications as ordered and stop the medications as instructed.  Opioid overdose can be dangerous, but is unlikely when the medication is taken as ordered and stopped when ordered. It is important not to mix opioids with alcohol or with and type of sedative such as Benadryl as this can lead to over sedation and respiratory difficulty.  DOSAGE:   Pain medications will need to be taken consistently for the first week to decrease pain and promote adequate pain relief and participation in physical therapy.  After the initial surgical pain begins to resolve, you may begin to decrease the pain medication. By the end of 6 weeks, you should be off of pain medications.  Refills will not be given by the office during evening hours, on weekends, or after 6 weeks post-op.  To seek refills on pain medications during the initial 6 week post-operative period, you must call the office 48 hours in advance to request the refill. The office will then notify you when to  the prescription. DO NOT wait until you are out of the medication to request a refill.    V. FOLLOW-UP VISITS:  You will need to follow up in the office with Dr. Lenny Khan/ Aletha Guaadlupe in 2 weeks. Please call this number [phone] to schedule this appointment.  You will need to follow up with your primary care physician in 4 weeks.  If you have any concerns or suspected complications prior to your follow up visit, please call your surgeons office. Do not wait until your appointment time if you suspect complications. These will need to be addressed in the office promptly.          Follow-up Appointments  Future Appointments    Date Time Provider Department [place]   8/1/2019  8:45 AM Bryce Romo PA [facility] None   8/22/2019  9:30 AM Lele Lucas MD [facility] None   9/5/2019  9:00 AM SURJIT CT 2 BH SURJIT CT SURJIT   9/12/2019  9:40 AM Garett Brantely MD [facility] None     Additional Instructions for the Follow-ups that You Need to Schedule     Ambulatory Referral to Home Health   As directed      Patient staying at son's house from hospital discharge.  Son is Guero Cheney.  Address is [address] [place] [id number].  [phone].    Face to Face Visit Date:  7/18/2019    Follow-up Provider for Plan of Care?:  I will be treating the patient on an ongoing basis.  Please send me the Plan of Care for signature.    Follow-up Provider:  BRYCE ROMO [[id number]]    Reason/Clinical Findings:  Left total hip replacement    Describe mobility limitations that make leaving home difficult:  left total hip replacement    Nursing/Therapeutic Services Requested:  Physical Therapy    PT orders:  Total joint pathway    Frequency:  1 Week 1         Discharge Follow-up with Specialty: Orthopedics; 2 Weeks   As directed      Specialty:  Orthopedics    Follow Up:  2 Weeks    Follow Up Details:  Return to the office to see Dr. Lenny Khan/ Bryce Romo               Test Results Pending at Discharge       MATI Crouch  07/18/19  12:36 PM    Lenny Khan MD  I certify that this patient is under my care and that I had a face to face encounter that meets the physician face to face encounter requirements.    The encounter occurred on: 7/18/2019 12:36 PM    Based on the findings of the above encounter, I certify that this patient is confined to the home and needs intermittent skilled nursing care, physical therapy and/or speech therapy.  The patient is under my care, and I have initiated the establishment of the plan of care.    This patient will be followed by a physician who will, periodically, review the plan of care. The findings  from this face to face encounter have been communicated with the patient's community based physician who will be assuming this patient's home health plan of care.    I also have provided the agency additional information to support the patient's homebound status and need for skilled care. (Examples of this information could include physician progress notes, discharge summaries, history and physical forms, operative reports, referral order, etc.)

## 2019-07-18 NOTE — PLAN OF CARE
Problem: Hip Arthroplasty (Total, Partial) (Adult)  Goal: Signs and Symptoms of Listed Potential Problems Will be Absent, Minimized or Managed (Hip Arthroplasty)  Outcome: Outcome(s) achieved Date Met: 07/18/19

## 2019-07-18 NOTE — PLAN OF CARE
Problem: Patient Care Overview  Goal: Discharge Needs Assessment  Outcome: Outcome(s) achieved Date Met: 07/18/19

## 2019-07-18 NOTE — CONSULTS
Referring Provider: Bill  Reason for Consultation: Medical management    Patient Care Team:  Lele Lucas MD as PCP - General  Lele Lucas MD as PCP - Claims Attributed  Lele Lucas MD as PCP - Family Medicine    Chief complaint:   S/p left hip replacement  Subjective     History of present illness:   The patient is an 78-year-old white female who has a history of rheumatoid arthritis.  The patient has been having difficulty with her left hip and presented for surgery on 19.  The patient is seen postoperatively.  The patient has good pain control at present.  She has already been doing exercises with physical therapy.  She seems to be tolerating everything fairly well.  Review of Systems:  Review of Systems   Her review of systems is positive for myalgias and arthralgias.  The patient otherwise has negative review of systems.    History:  Past Medical History:   Diagnosis Date   • Arthritis    • Coronary artery disease    • Elevated cholesterol    • Lung cancer (CMS/HCC)    • Rheumatoid arthritis (CMS/HCC)       Past Surgical History:   Procedure Laterality Date   • APPENDECTOMY     • CATARACT EXTRACTION WITH INTRAOCULAR LENS IMPLANT Bilateral    • CHOLECYSTECTOMY     • HYSTERECTOMY     • LUNG REMOVAL, PARTIAL Right 2018   • PARTIAL HYSTERECTOMY     • TOTAL KNEE ARTHROPLASTY Right 2017     History reviewed. No pertinent family history.  Social History     Tobacco Use   • Smoking status: Former Smoker     Years: 15.00     Start date:      Last attempt to quit: 1980     Years since quittin.5   • Smokeless tobacco: Never Used   Substance Use Topics   • Alcohol use: No     Frequency: Never   • Drug use: No     Medications Prior to Admission   Medication Sig Dispense Refill Last Dose   • calcium carbonate (OS-DAHLIA) 1250 (500 Ca) MG tablet Take 600 mg by mouth Daily.   2019   • calcium citrate-vitamin d (CITRACAL) 200-250 MG-UNIT tablet tablet Take  by mouth Daily.   2019   •  CARTIA  MG 24 hr capsule TAKE ONE CAPSULE BY MOUTH DAILY 30 capsule 4 7/17/2019   • dicyclomine (BENTYL) 10 MG capsule Take 10 mg by mouth 2 (Two) Times a Day.   Past Week at Unknown time   • leflunomide (ARAVA) 10 MG tablet Take 10 mg by mouth Daily.   7/10/2019   • LORazepam (ATIVAN) 0.5 MG tablet 1 po bid prn anxiety 30 tablet 0 7/13/2019   • memantine (NAMENDA) 5 MG tablet Take 5 mg by mouth 2 (Two) Times a Day.   7/16/2019   • metoprolol tartrate (LOPRESSOR) 25 MG tablet Take 12.5 mg by mouth 2 (Two) Times a Day.   7/17/2019   • pantoprazole (PROTONIX) 40 MG EC tablet Take 40 mg by mouth Daily.   7/17/2019   • polyethylene glycol (MIRALAX) packet Take 17 g by mouth Daily.   7/16/2019   • pravastatin (PRAVACHOL) 40 MG tablet Take 40 mg by mouth Every Night.   7/16/2019   • predniSONE (DELTASONE) 2.5 MG tablet Take 2.5 mg by mouth Daily.   7/17/2019   • traZODone (DESYREL) 50 MG tablet Take 100 mg by mouth Every Night.   7/16/2019   • venlafaxine XR (EFFEXOR-XR) 150 MG 24 hr capsule Take 150 mg by mouth Daily.   7/10/2019   • vitamin B-12 (CYANOCOBALAMIN) 1000 MCG tablet Take 1,000 mcg by mouth Daily.   7/10/2019     Allergies:  Patient has no known allergies.      Objective      Vital Signs:  Temp:  [97 °F (36.1 °C)-98.1 °F (36.7 °C)] 97.9 °F (36.6 °C)  Heart Rate:  [55-77] 60  Resp:  [11-23] 16  BP: (105-135)/(45-63) 105/63      Med Review:  Scheduled Meds:  acetaminophen 1,000 mg Oral Q8H   atorvastatin 40 mg Oral Nightly   [START ON 7/18/2019] calcium-vitamin D 1 tablet Oral Daily   ceFAZolin 2 g Intravenous Q8H   dicyclomine 10 mg Oral BID   diltiaZEM  mg Oral Daily   docusate sodium 100 mg Oral BID   [START ON 7/18/2019] ferrous sulfate 324 mg Oral Daily With Breakfast   gabapentin 100 mg Oral Nightly   [START ON 7/18/2019] leflunomide 10 mg Oral Daily   [START ON 7/18/2019] meloxicam 15 mg Oral Daily   memantine 5 mg Oral Q12H   metoprolol tartrate 12.5 mg Oral Q12H   [START ON 7/18/2019]  pantoprazole 40 mg Oral Daily   polyethylene glycol 17 g Oral Daily   [START ON 7/18/2019] predniSONE 2.5 mg Oral Daily   rivaroxaban 10 mg Oral Daily With Dinner   traZODone 100 mg Oral Nightly   venlafaxine  mg Oral Daily   [START ON 7/18/2019] vitamin B-12 1,000 mcg Oral Daily     Continuous Infusions:  sodium chloride 100 mL/hr Last Rate: 100 mL/hr (07/17/19 1250)     PRN Meds:.bisacodyl  •  diphenhydrAMINE **OR** diphenhydrAMINE  •  LORazepam  •  magnesium hydroxide  •  Morphine **AND** naloxone  •  ondansetron **OR** ondansetron  •  oxyCODONE  •  oxyCODONE  •  promethazine  •  traMADol      Physical Exam:  Physical Exam    General: NAD, resting in bed  Eyes: PERRL, nonicteric  HENT: normocephalic, atraumatic, MMM, pharynx clear without erythema or exudate  Card: S1, S2, no murmurs  Resp: CTA b/l, no r/r/w  GI: soft, nt, nd, +bs  Skin: warm, dry, intact, no rashes  Extremities: no c/c/e  Neuro: CN II-XII grossly intact, no focal deficits  Psych: appropriate mood and affect      Labs:  Lab Results (last 24 hours)     Procedure Component Value Units Date/Time    Potassium [[id number]]  (Abnormal) Collected:  07/17/19 0623    Specimen:  Blood Updated:  07/17/19 0704     Potassium 3.5 mmol/L           Imaging Results (last 24 hours)     Procedure Component Value Units Date/Time    FL C Arm During Surgery [[id number]] Resulted:  07/17/19 1222     Updated:  07/17/19 1222    Narrative:       This procedure was auto-finalized with no dictation required.    XR Pelvis 1 or 2 View [[id number]] Collected:  07/17/19 1048     Updated:  07/17/19 1053    Narrative:       DATE OF EXAM:  7/17/2019 10:28 AM     PROCEDURE:  XR PELVIS 1 OR 2 VW-     INDICATIONS:  ARTHRITIS, HIP     COMPARISON:  05/06/2019     TECHNIQUE:   An AP radiologic view of the pelvis was obtained.        FINDINGS:  There has been interval surgery with placement of a left hip prosthesis.  No acute bony abnormality. Anatomic alignment is present on this  single  view. There are postsurgical changes with soft tissue air around the  left hip as expected.        Impression:       Left hip prosthesis is intact. No acute bony abnormality. There are  expected postsurgical changes in the soft tissues.     Electronically Signed By-DR. Darwin Chavez MD On:7/17/2019 10:50 AM  This report was finalized on [id number] by DR. Darwin Chavez MD.    XR Hip 1 View Without Pelvis Left (Surgery Only) [[id number]] Updated:  07/17/19 0949                                   ECG/EMG Results (most recent)     None              Results Review:  I reviewed the patient's new clinical results.  I reviewed the patient's new imaging results and agree with the interpretation.        Assessment and Plan:  1.  Status post left hip total hip replacement-patient is doing well postoperatively.  She is Vikram working with physical therapy rather successfully.  2.  Rheumatoid arthritis-we will restart the patient's prednisone at 2.5 mg daily.  We will continue to follow.  3.  Hypertension-the patient has been restarted on her outpatient medic issue.  4.  Hyperlipidemia-continue statins  5.  Coronary artery disease-the patient did well and has not had any problems from this issue postoperatively.        I discussed the patients findings and my recommendations with patient and primary care team    Lisbet Alvarez MD  07/17/19  8:23 PM      Time: More than 50% of time spent in counseling and coordination of care:  Total face-to-face/floor time 25 min.  Time spent in counseling 10 min. Counseling included the following topics: steroid usage and rheumatoid and cardiac issues.

## 2019-07-18 NOTE — PLAN OF CARE
Problem: Patient Care Overview  Goal: Plan of Care Review   07/18/19 9314   Coping/Psychosocial   Plan of Care Reviewed With patient   OTHER   Outcome Summary pt leonidas tx session very well. increased gait distance improved and amb up and down 4 steps easily. recommend home with family and HHPT to follow   Plan of Care Review   Progress improving

## 2019-07-19 ENCOUNTER — READMISSION MANAGEMENT (OUTPATIENT)
Dept: CALL CENTER | Facility: HOSPITAL | Age: 79
End: 2019-07-19

## 2019-07-19 NOTE — PROGRESS NOTES
Case Management Discharge Note    Final Note: home with [facility]              Final Discharge Disposition Code: 06 - home with home health care

## 2019-07-20 NOTE — OUTREACH NOTE
Prep Survey      Responses   Facility patient discharged from?  [facility]   Is patient eligible?  No   What are the reasons patient is not eligible?  Other [scheduled hip replacement]   Does the patient have one of the following disease processes/diagnoses(primary or secondary)?  Total Joint Replacement   Prep survey completed?  Yes          Luiza Hamilton RN

## 2019-07-22 ENCOUNTER — EPISODE CHANGES (OUTPATIENT)
Dept: CASE MANAGEMENT | Facility: OTHER | Age: 79
End: 2019-07-22

## 2019-07-23 ENCOUNTER — TELEPHONE (OUTPATIENT)
Dept: SURGERY | Facility: HOSPITAL | Age: 79
End: 2019-07-23

## 2019-07-23 NOTE — TELEPHONE ENCOUNTER
7/23 Left message for Alia on son's voicemail.    Spoke with granddaughter on 7/22 and she said patient is doing very well.  Getting around well and feeling good.

## 2019-07-25 ENCOUNTER — EPISODE CHANGES (OUTPATIENT)
Dept: CASE MANAGEMENT | Facility: OTHER | Age: 79
End: 2019-07-25

## 2019-08-01 ENCOUNTER — OFFICE VISIT (OUTPATIENT)
Dept: ORTHOPEDIC SURGERY | Facility: CLINIC | Age: 79
End: 2019-08-01

## 2019-08-01 VITALS
WEIGHT: 155.8 LBS | BODY MASS INDEX: 26.6 KG/M2 | HEART RATE: 60 BPM | DIASTOLIC BLOOD PRESSURE: 79 MMHG | HEIGHT: 64 IN | SYSTOLIC BLOOD PRESSURE: 131 MMHG

## 2019-08-01 DIAGNOSIS — Z96.642 HISTORY OF TOTAL HIP REPLACEMENT, LEFT: Primary | ICD-10-CM

## 2019-08-01 DIAGNOSIS — E66.3 OVERWEIGHT (BMI 25.0-29.9): ICD-10-CM

## 2019-08-01 PROBLEM — Z51.81 ENCOUNTER FOR THERAPEUTIC DRUG MONITORING: Status: ACTIVE | Noted: 2019-05-17

## 2019-08-01 PROBLEM — M70.62 TROCHANTERIC BURSITIS OF LEFT HIP: Status: ACTIVE | Noted: 2019-05-06

## 2019-08-01 PROBLEM — E55.9 VITAMIN D DEFICIENCY: Status: ACTIVE | Noted: 2017-11-20

## 2019-08-01 PROBLEM — I25.10 CORONARY ARTERY DISEASE: Status: ACTIVE | Noted: 2018-08-28

## 2019-08-01 PROBLEM — M17.9 OSTEOARTHRITIS OF KNEE: Status: ACTIVE | Noted: 2017-05-19

## 2019-08-01 PROBLEM — R41.3 MEMORY LOSS: Status: ACTIVE | Noted: 2018-12-07

## 2019-08-01 PROBLEM — G44.209 TENSION-TYPE HEADACHE, NOT INTRACTABLE: Status: ACTIVE | Noted: 2017-12-26

## 2019-08-01 PROBLEM — Z87.311 HX OF PATHOLOGICAL FRACTURE: Status: ACTIVE | Noted: 2018-05-22

## 2019-08-01 PROBLEM — G56.03 CARPAL TUNNEL SYNDROME, BILATERAL: Status: ACTIVE | Noted: 2017-02-13

## 2019-08-01 PROBLEM — C34.2 PRIMARY MALIGNANT NEOPLASM OF RIGHT MIDDLE LOBE OF LUNG (HCC): Status: ACTIVE | Noted: 2018-08-21

## 2019-08-01 PROBLEM — Z96.651 HX OF TOTAL KNEE REPLACEMENT, RIGHT: Status: ACTIVE | Noted: 2017-06-29

## 2019-08-01 PROBLEM — M25.559 PAIN IN HIP: Status: ACTIVE | Noted: 2019-05-06

## 2019-08-01 PROCEDURE — 99024 POSTOP FOLLOW-UP VISIT: CPT | Performed by: PHYSICIAN ASSISTANT

## 2019-08-01 NOTE — PATIENT INSTRUCTIONS
Preventing Health Risks of Being Overweight  Maintaining a healthy body weight is an important part of your overall health. Your healthy body weight depends on your age, gender, and height. Being overweight puts you at risk for many health problems, including:  · Heart disease.  · Diabetes.  · Problems sleeping.  · Joint problems.  You can make changes to your diet and lifestyle to prevent these risks. Consider working with a health care provider or a dietitian to make these changes.  What nutrition changes can be made?    · Eat only as much as your body needs. In most cases, this is about 2,000 calories a day, but the amount varies depending on your height, gender, and activity level. Ask your health care provider how many calories you should have each day. Eating more than your body needs on a regular basis can cause you to become overweight or obese.  · Eat slowly, and stop eating when you feel full.  · Choose healthy foods, including:  ? Fruits and vegetables.  ? Lean meats.  ? Low-fat dairy products.  ? High-fiber foods, such as whole grains and beans.  ? Healthy snacks like vegetable sticks, a piece of fruit, or a small amount of yogurt or cheese.  · Avoid foods and drinks that are high in sugar, salt (sodium), saturated fat, or trans fat. This includes:  ? Many desserts such as candy, cookies, and ice cream.  ? Soda.  ? Fried foods.  ? Processed meats such as hot dogs or lunch meats.  ? Prepackaged snack foods.  What lifestyle changes can be made?    · Exercise for at least 150 minutes a week to prevent weight gain, or as often as recommended by your health care provider. Do moderate-intensity exercise, such as brisk walking.  ? Spread it out by exercising for 30 minutes 5 days a week, or in short 10-minute bursts several times a day.  · Find other ways to stay active and burn calories, such as yard work or a hobby that involves physical activity.  · Get at least 8 hours of sleep each night. When you are  well-rested, you are more likely to be active and make healthy choices during the day. To sleep better:  ? Try to go to bed and wake up at about the same time every day.  ? Keep your bedroom dark, quiet, and cool.  ? Make sure that your bed is comfortable.  ? Avoid stimulating activities, such as watching television or exercising, for at least one hour before bedtime.  Why are these changes important?  Eating healthy and being active helps you lose weight and prevent health problems caused by being overweight. Making these changes can also help you manage stress, feel better mentally, and connect with friends and family.  What can happen if changes are not made?  Being overweight can affect you for your entire life. You may develop joint or bone problems that make it painful or difficult for you to play sports or do activities you enjoy. Being overweight puts stress on your heart and lungs and can lead to medical problems like diabetes, heart disease, and sleeping problems.  Where to find support  You can get support for preventing health risks of being overweight from:  · Your health care provider or a dietitian. They can provide guidance about healthy eating and healthy lifestyle choices.  · Weight loss support groups, online or in-person.  Where to find more information  · MyPlate: www.choosemyplate.gov  ? This an online tool that provides personalized recommendations about foods to eat each day.  · The Centers for Disease Control and Prevention: www.cdc.gov/healthyweight  ? This resource gives tips for managing weight and having an active lifestyle.  Summary  · To prevent unhealthy weight gain, it is important to maintain a healthy diet high in vegetables and whole grains, exercise regularly, and get at least 8 hours of sleep each night.  · Making these changes helps prevent many long-term (chronic) health conditions that can shorten your life, such as diabetes, heart disease, and stroke.  This information is  not intended to replace advice given to you by your health care provider. Make sure you discuss any questions you have with your health care provider.  Document Released: 11/14/2018 Document Revised: 11/14/2018 Document Reviewed: 11/14/2018  [employer] Interactive Patient Education © 2019 [employer]              Total Hip Replacement, Anterior, Care After  This sheet gives you information about how to care for yourself after your procedure. Your doctor may also give you more specific instructions. If you have problems or questions, contact your doctor.  What can I expect after the procedure?  After the procedure, it is common to have:  · Pain.  · Stiffness.  · Discomfort.  Follow these instructions at home:  Medicines  · Take over-the-counter and prescription medicines only as told by your doctor.  · If you were prescribed a medicine to thin your blood (anticoagulant), take it as told by your doctor.  Surgery cut care    · Follow instructions from your doctor about how to take care of your cut (incision) from surgery. Make sure you:  ? Wash your hands with soap and water before you change your bandage (dressing). If you cannot use soap and water, use alcohol-based hand .  ? Change your bandage as told by your doctor.  ? Leave stitches (sutures), skin glue, or skin tape (adhesive) strips in place. They may need to stay in place for 2 weeks or longer. If tape strips get loose and curl up, you may trim the loose edges. Do not remove tape strips completely unless your doctor tells you to do that.  · Check your surgical cut every day for signs of infection. Check for:  ? Redness, swelling, or pain.  ? Fluid or blood.  ? Warmth.  ? Pus or a bad smell.  Bathing  · Do not take baths, swim, or use a hot tub until your doctor says it is okay.  · Keep the bandage dry until your doctor says it can be removed.  Managing pain, stiffness, and swelling    · If directed, put ice on the hip area.  ? Put ice in a plastic  bag.  ? Place a towel between your skin and the bag.  ? Leave the ice on for 20 minutes, 2-3 times a day.  · Move your toes often to avoid stiffness and to lessen swelling.  · Raise (elevate) your leg above the level of your heart while you are sitting or lying down.  Activity  · Rest as told by your doctor.  · Do not sit for a long time without moving. Get up to take short walks every 1-2 hours. This is important. Ask for help if you feel weak or unsteady.  · Do exercises as told by your doctor or physical therapist.  · Use a walker, crutches, or a cane as told by your doctor.  ? You may use your legs to support (bear) your body weight as told by your doctor. Follow instructions about how much weight you may safely support on your affected leg (weight-bearing restrictions).  ? A physical therapist may show you how to get out of a bed and chair and how to go up and down stairs. You will first do this with a walker, crutches, or a cane. Then you will do it without any of these devices.  ? Once you are able to walk without a limp, you may stop using a walker, crutches, or cane.  · Return to your normal activities as told by your doctor. Ask your doctor what activities are safe for you.  Safety  · To help prevent falls:  ? Keep floors clear of objects you may trip over.  ? Place items that you may need within easy reach.  · Wear an apron or tool belt with pockets for carrying objects. This leaves your hands free to help with your balance.  Driving  · Do not drive or use heavy machinery while taking prescription pain medicine.  · Ask your doctor when it is safe to drive.  General instructions  · Wear compression stockings as told by your doctor. These help to prevent blood clots and reduce swelling in your legs.  · Keep doing breathing exercises as told by your doctor. This helps prevent lung infection.  · If you are taking prescription pain medicine, take actions to prevent or treat constipation. Your doctor may  suggest that you:  ? Drink enough fluid to keep your pee (urine) pale yellow.  ? Eat foods that are high in fiber. These include fresh fruits and vegetables, whole grains, and beans.  ? Limit foods that are high in fat and sugar. These include fried or sweet foods.  ? Take an over-the-counter or prescription medicine for constipation.  · Do not use any products that have nicotine or tobacco in them, such as cigarettes and e-cigarettes. These can delay bone healing. If you need help quitting, ask your doctor.  · Keep all follow-up visits as told by your doctor. This is important.  Contact a doctor if:  · You have a fever or chills.  · You have a cough.  · You feel short of breath.  · Your medicine is not helping your pain.  · You have any of these at or near your cut from surgery:  ? Redness, swelling, or pain.  ? Fluid or blood.  ? An area that feels warm when you touch it.  ? Pus or a bad smell.  Get help right away if:  · You have very bad pain.  · You have trouble breathing.  · You have chest pain.  · You have redness, swelling, pain, and warmth in your calf or leg.  Summary  · Follow instructions from your doctor about how to take care of your surgery cut (incision).  · Do not take baths, swim, or use a hot tub until your doctor says it is okay.  · Use crutches, a walker, or a cane as told by your doctor.  · If you were prescribed a medicine to thin your blood (anticoagulant), take it as told by your doctor.  This information is not intended to replace advice given to you by your health care provider. Make sure you discuss any questions you have with your health care provider.  Document Released: 04/03/2019 Document Revised: 04/03/2019 Document Reviewed: 04/03/2019  [employer] Interactive Patient Education © 2019 [employer]    Total Hip Replacement, Anterior, Care After  This sheet gives you information about how to care for yourself after your procedure. Your doctor may also give you more specific  instructions. If you have problems or questions, contact your doctor.  What can I expect after the procedure?  After the procedure, it is common to have:  · Pain.  · Stiffness.  · Discomfort.  Follow these instructions at home:  Medicines  · Take over-the-counter and prescription medicines only as told by your doctor.  · If you were prescribed a medicine to thin your blood (anticoagulant), take it as told by your doctor.  Surgery cut care    · Follow instructions from your doctor about how to take care of your cut (incision) from surgery. Make sure you:  ? Wash your hands with soap and water before you change your bandage (dressing). If you cannot use soap and water, use alcohol-based hand .  ? Change your bandage as told by your doctor.  ? Leave stitches (sutures), skin glue, or skin tape (adhesive) strips in place. They may need to stay in place for 2 weeks or longer. If tape strips get loose and curl up, you may trim the loose edges. Do not remove tape strips completely unless your doctor tells you to do that.  · Check your surgical cut every day for signs of infection. Check for:  ? Redness, swelling, or pain.  ? Fluid or blood.  ? Warmth.  ? Pus or a bad smell.  Bathing  · Do not take baths, swim, or use a hot tub until your doctor says it is okay.  · Keep the bandage dry until your doctor says it can be removed.  Managing pain, stiffness, and swelling    · If directed, put ice on the hip area.  ? Put ice in a plastic bag.  ? Place a towel between your skin and the bag.  ? Leave the ice on for 20 minutes, 2-3 times a day.  · Move your toes often to avoid stiffness and to lessen swelling.  · Raise (elevate) your leg above the level of your heart while you are sitting or lying down.  Activity  · Rest as told by your doctor.  · Do not sit for a long time without moving. Get up to take short walks every 1-2 hours. This is important. Ask for help if you feel weak or unsteady.  · Do exercises as told by your  doctor or physical therapist.  · Use a walker, crutches, or a cane as told by your doctor.  ? You may use your legs to support (bear) your body weight as told by your doctor. Follow instructions about how much weight you may safely support on your affected leg (weight-bearing restrictions).  ? A physical therapist may show you how to get out of a bed and chair and how to go up and down stairs. You will first do this with a walker, crutches, or a cane. Then you will do it without any of these devices.  ? Once you are able to walk without a limp, you may stop using a walker, crutches, or cane.  · Return to your normal activities as told by your doctor. Ask your doctor what activities are safe for you.  Safety  · To help prevent falls:  ? Keep floors clear of objects you may trip over.  ? Place items that you may need within easy reach.  · Wear an apron or tool belt with pockets for carrying objects. This leaves your hands free to help with your balance.  Driving  · Do not drive or use heavy machinery while taking prescription pain medicine.  · Ask your doctor when it is safe to drive.  General instructions  · Wear compression stockings as told by your doctor. These help to prevent blood clots and reduce swelling in your legs.  · Keep doing breathing exercises as told by your doctor. This helps prevent lung infection.  · If you are taking prescription pain medicine, take actions to prevent or treat constipation. Your doctor may suggest that you:  ? Drink enough fluid to keep your pee (urine) pale yellow.  ? Eat foods that are high in fiber. These include fresh fruits and vegetables, whole grains, and beans.  ? Limit foods that are high in fat and sugar. These include fried or sweet foods.  ? Take an over-the-counter or prescription medicine for constipation.  · Do not use any products that have nicotine or tobacco in them, such as cigarettes and e-cigarettes. These can delay bone healing. If you need help quitting, ask  your doctor.  · Keep all follow-up visits as told by your doctor. This is important.  Contact a doctor if:  · You have a fever or chills.  · You have a cough.  · You feel short of breath.  · Your medicine is not helping your pain.  · You have any of these at or near your cut from surgery:  ? Redness, swelling, or pain.  ? Fluid or blood.  ? An area that feels warm when you touch it.  ? Pus or a bad smell.  Get help right away if:  · You have very bad pain.  · You have trouble breathing.  · You have chest pain.  · You have redness, swelling, pain, and warmth in your calf or leg.  Summary  · Follow instructions from your doctor about how to take care of your surgery cut (incision).  · Do not take baths, swim, or use a hot tub until your doctor says it is okay.  · Use crutches, a walker, or a cane as told by your doctor.  · If you were prescribed a medicine to thin your blood (anticoagulant), take it as told by your doctor.  This information is not intended to replace advice given to you by your health care provider. Make sure you discuss any questions you have with your health care provider.  Document Released: 04/03/2019 Document Revised: 04/03/2019 Document Reviewed: 04/03/2019  [employer] Interactive Patient Education © 2019 [employer]

## 2019-08-01 NOTE — PROGRESS NOTES
"     Subjective:    HPI:  Alia Cheney is a 79 y.o. female who presents about 2 weeks out from having a left anterior total hip replacement.  She reports that she is doing very well with mild intermittent discomfort, mainly at night.  She is very pleased with her results thus far.       Objective   Objective:    /79 (BP Location: Left arm, Patient Position: Sitting, Cuff Size: Adult)   Pulse 60   Ht 162.6 cm (64\")   Wt 70.7 kg (155 lb 12.8 oz)   BMI 26.74 kg/m²     Physical Examination:  Alert, oriented, well-nourished, well-developed individual in no acute distress, ambulating with the assistance of a walker  Left lower extremity shows a well-healing surgical incision with no erythema, drainage, or open skin lesions. There is a mild amount of swelling in the thigh area. It is grossly well aligned, and the patient is neurovascularly intact distally. Plantar and dorsiflexion is 5/5. There is no tenderness to palpation or with range of motion, which is smooth.         Imaging:  Imaging done today shows The prosthesis appears in good alignment with no bony or implant failure.            Assessment:  1. History of total hip replacement, left    2. Overweight (BMI 25.0-29.9)       About 2 weeks out from surgery          Plan:  At this time, we will plan to see the patient back in about 6 weeks. The patient should continue PT, WBAT, and call with any problems.               MATI Crouch  08/01/19  9:44 AM                      "

## 2019-08-02 ENCOUNTER — EPISODE CHANGES (OUTPATIENT)
Dept: CASE MANAGEMENT | Facility: OTHER | Age: 79
End: 2019-08-02

## 2019-08-07 ENCOUNTER — EPISODE CHANGES (OUTPATIENT)
Dept: CASE MANAGEMENT | Facility: OTHER | Age: 79
End: 2019-08-07

## 2019-08-14 RX ORDER — MEMANTINE HYDROCHLORIDE 5 MG/1
TABLET ORAL
Qty: 60 TABLET | Refills: 4 | Status: SHIPPED | OUTPATIENT
Start: 2019-08-14 | End: 2019-08-22

## 2019-08-22 ENCOUNTER — OFFICE VISIT (OUTPATIENT)
Dept: FAMILY MEDICINE CLINIC | Facility: CLINIC | Age: 79
End: 2019-08-22

## 2019-08-22 VITALS
RESPIRATION RATE: 12 BRPM | BODY MASS INDEX: 26.85 KG/M2 | HEART RATE: 68 BPM | SYSTOLIC BLOOD PRESSURE: 133 MMHG | WEIGHT: 156.4 LBS | DIASTOLIC BLOOD PRESSURE: 78 MMHG

## 2019-08-22 DIAGNOSIS — M05.79 RHEUMATOID ARTHRITIS INVOLVING MULTIPLE SITES WITH POSITIVE RHEUMATOID FACTOR (HCC): ICD-10-CM

## 2019-08-22 DIAGNOSIS — Z00.00 PHYSICAL EXAM: Primary | ICD-10-CM

## 2019-08-22 DIAGNOSIS — Z23 VACCINE FOR STREPTOCOCCUS PNEUMONIAE AND INFLUENZA: ICD-10-CM

## 2019-08-22 PROBLEM — J30.9 ALLERGIC RHINITIS: Status: ACTIVE | Noted: 2017-12-26

## 2019-08-22 PROBLEM — R91.1 LUNG NODULE: Status: ACTIVE | Noted: 2018-06-28

## 2019-08-22 PROBLEM — Z01.89 OTHER SPECIFIED EXAMINATION: Status: ACTIVE | Noted: 2018-02-09

## 2019-08-22 LAB
ALBUMIN SERPL-MCNC: 3.9 G/DL (ref 3.5–4.8)
ALBUMIN/GLOB SERPL: 1.4 G/DL (ref 1–1.7)
ALP SERPL-CCNC: 72 U/L (ref 32–91)
ALT SERPL W P-5'-P-CCNC: 22 U/L (ref 14–54)
ANION GAP SERPL CALCULATED.3IONS-SCNC: 16.3 MMOL/L (ref 5–15)
AST SERPL-CCNC: 27 U/L (ref 15–41)
BASOPHILS # BLD AUTO: 0 10*3/MM3 (ref 0–0.2)
BASOPHILS NFR BLD AUTO: 0.3 % (ref 0–1.5)
BILIRUB SERPL-MCNC: 0.6 MG/DL (ref 0.3–1.2)
BUN BLD-MCNC: 13 MG/DL (ref 8–20)
BUN/CREAT SERPL: 16.3 (ref 5.4–26.2)
CALCIUM SPEC-SCNC: 9.7 MG/DL (ref 8.9–10.3)
CHLORIDE SERPL-SCNC: 105 MMOL/L (ref 101–111)
CO2 SERPL-SCNC: 24 MMOL/L (ref 22–32)
CREAT BLD-MCNC: 0.8 MG/DL (ref 0.4–1)
DEPRECATED RDW RBC AUTO: 45.9 FL (ref 37–54)
EOSINOPHIL # BLD AUTO: 0.1 10*3/MM3 (ref 0–0.4)
EOSINOPHIL NFR BLD AUTO: 1.1 % (ref 0.3–6.2)
ERYTHROCYTE [DISTWIDTH] IN BLOOD BY AUTOMATED COUNT: 14 % (ref 12.3–15.4)
GFR SERPL CREATININE-BSD FRML MDRD: 69 ML/MIN/1.73
GLOBULIN UR ELPH-MCNC: 2.7 GM/DL (ref 2.5–3.8)
GLUCOSE BLD-MCNC: 117 MG/DL (ref 65–99)
HCT VFR BLD AUTO: 40.5 % (ref 34–46.6)
HGB BLD-MCNC: 13.4 G/DL (ref 12–15.9)
LYMPHOCYTES # BLD AUTO: 1.4 10*3/MM3 (ref 0.7–3.1)
LYMPHOCYTES NFR BLD AUTO: 14.7 % (ref 19.6–45.3)
MCH RBC QN AUTO: 31 PG (ref 26.6–33)
MCHC RBC AUTO-ENTMCNC: 33.2 G/DL (ref 31.5–35.7)
MCV RBC AUTO: 93.6 FL (ref 79–97)
MONOCYTES # BLD AUTO: 0.9 10*3/MM3 (ref 0.1–0.9)
MONOCYTES NFR BLD AUTO: 9.2 % (ref 5–12)
NEUTROPHILS # BLD AUTO: 6.9 10*3/MM3 (ref 1.7–7)
NEUTROPHILS NFR BLD AUTO: 74.7 % (ref 42.7–76)
NRBC BLD AUTO-RTO: 0.1 /100 WBC (ref 0–0.2)
PLATELET # BLD AUTO: 266 10*3/MM3 (ref 140–450)
PMV BLD AUTO: 8.3 FL (ref 6–12)
POTASSIUM BLD-SCNC: 4.3 MMOL/L (ref 3.6–5.1)
PROT SERPL-MCNC: 6.6 G/DL (ref 6.1–7.9)
RBC # BLD AUTO: 4.33 10*6/MM3 (ref 3.77–5.28)
SODIUM BLD-SCNC: 141 MMOL/L (ref 136–144)
WBC NRBC COR # BLD: 9.3 10*3/MM3 (ref 3.4–10.8)

## 2019-08-22 PROCEDURE — 85025 COMPLETE CBC W/AUTO DIFF WBC: CPT | Performed by: FAMILY MEDICINE

## 2019-08-22 PROCEDURE — 80053 COMPREHEN METABOLIC PANEL: CPT | Performed by: FAMILY MEDICINE

## 2019-08-22 PROCEDURE — G0439 PPPS, SUBSEQ VISIT: HCPCS | Performed by: FAMILY MEDICINE

## 2019-08-22 RX ORDER — VENLAFAXINE HYDROCHLORIDE 150 MG/1
150 CAPSULE, EXTENDED RELEASE ORAL DAILY
Qty: 30 CAPSULE | Refills: 11 | COMMUNITY
Start: 2019-08-22 | End: 2019-12-13 | Stop reason: SDUPTHER

## 2019-08-22 RX ORDER — MEMANTINE HYDROCHLORIDE 10 MG/1
10 TABLET ORAL 2 TIMES DAILY
Qty: 60 TABLET | Refills: 11 | COMMUNITY
Start: 2019-08-22 | End: 2020-01-14 | Stop reason: ALTCHOICE

## 2019-08-22 RX ORDER — VENLAFAXINE HYDROCHLORIDE 150 MG/1
150 CAPSULE, EXTENDED RELEASE ORAL DAILY
Qty: 30 CAPSULE | Refills: 11 | COMMUNITY
Start: 2019-08-22 | End: 2019-08-22

## 2019-08-22 RX ORDER — MEMANTINE HYDROCHLORIDE 5 MG/1
10 TABLET ORAL 2 TIMES DAILY
Qty: 60 TABLET | Refills: 11 | COMMUNITY
Start: 2019-08-22 | End: 2019-08-22

## 2019-08-22 NOTE — PROGRESS NOTES
The ABCs of the Annual Wellness Visit  Subsequent Medicare Wellness Visit    Chief Complaint   Patient presents with   • Medicare Wellness-subsequent       Subjective   History of Present Illness:  Alia Cheney is a 79 y.o. female who presents for a Subsequent Medicare Wellness Visit.  Patient is here for for her exam, she is not been doing a lot of exercise.  She is active however.  She denies any chest pain dizziness or syncope.  She denies any issues with her bowel or bladder function.  She eats nutritionally are the best she can and does not eat a lot of fast food.  She requires family members for transportation she does not drive on her own however she takes care of all of her own ADLs.  Her memory is okay and she is able to care for herself with the assistance of her family.  She has no complaints today and is feeling rather well.    HEALTH RISK ASSESSMENT    Recent Hospitalizations:  Recently treated at the following:  [facility]     Current Medical Providers:  Patient Care Team:  Lele Lucas MD as PCP - General  Lele Lucas MD as PCP - Claims Attributed  Lele Lucas MD as PCP - Family Medicine    Smoking Status:  Social History     Tobacco Use   Smoking Status Former Smoker   • Years: 15.00   • Start date:    • Last attempt to quit:    • Years since quittin.6   Smokeless Tobacco Never Used       Alcohol Consumption:  Social History     Substance and Sexual Activity   Alcohol Use No   • Frequency: Never       Depression Screen:   PHQ-2/PHQ-9 Depression Screening 2019   Little interest or pleasure in doing things 0   Feeling down, depressed, or hopeless 0   Total Score 0       Fall Risk Screen:  STEADI Fall Risk Assessment was completed, and patient is at LOW risk for falls.Assessment completed on:2019    Health Habits and Functional and Cognitive Screening:  Functional & Cognitive Status 2019   Do you have difficulty preparing food and eating? No   Do  you have difficulty bathing yourself, getting dressed or grooming yourself? No   Do you have difficulty using the toilet? No   Do you have difficulty moving around from place to place? No   Do you have trouble with steps or getting out of a bed or a chair? No   Current Diet Well Balanced Diet   Dental Exam Up to date   Eye Exam Up to date   Exercise (times per week) 7 times per week   Current Exercise Activities Include Walking   Do you need help using the phone?  No   Are you deaf or do you have serious difficulty hearing?  No   Do you need help with transportation? No   Do you need help shopping? No   Do you need help preparing meals?  No   Do you need help with housework?  No   Do you need help with laundry? No   Do you need help taking your medications? No   Do you need help managing money? No   Do you ever drive or ride in a car without wearing a seat belt? No   Have you felt unusual stress, anger or loneliness in the last month? No   Who do you live with? Alone   If you need help, do you have trouble finding someone available to you? No   Have you been bothered in the last four weeks by sexual problems? No   Do you have difficulty concentrating, remembering or making decisions? No         Does the patient have evidence of cognitive impairment? No    Asprin use counseling:Does not need ASA (and currently is not on it)    Age-appropriate Screening Schedule:  Refer to the list below for future screening recommendations based on patient's age, sex and/or medical conditions. Orders for these recommended tests are listed in the plan section. The patient has been provided with a written plan.    Health Maintenance   Topic Date Due   • TDAP/TD VACCINES (1 - Tdap) 07/25/1959   • PNEUMOCOCCAL VACCINES (65+ LOW/MEDIUM RISK) (2 of 2 - PPSV23) 03/08/2017   • INFLUENZA VACCINE  08/01/2019   • ZOSTER VACCINE (1 of 2) 08/22/2020 (Originally 7/25/1988)   • LIPID PANEL  12/07/2019   • DXA SCAN  05/25/2021          The following  portions of the patient's history were reviewed and updated as appropriate: allergies, current medications, past family history, past medical history, past social history, past surgical history and problem list.    Outpatient Medications Prior to Visit   Medication Sig Dispense Refill   • calcium carbonate (OS-DAHLIA) 1250 (500 Ca) MG tablet Take 600 mg by mouth Daily.     • calcium citrate-vitamin d (CITRACAL) 200-250 MG-UNIT tablet tablet Take  by mouth Daily.     • CARTIA  MG 24 hr capsule TAKE ONE CAPSULE BY MOUTH DAILY 30 capsule 4   • dicyclomine (BENTYL) 10 MG capsule Take 10 mg by mouth 2 (Two) Times a Day.     • leflunomide (ARAVA) 10 MG tablet Take 10 mg by mouth Daily.     • LORazepam (ATIVAN) 0.5 MG tablet 1 po bid prn anxiety 30 tablet 0   • metoprolol tartrate (LOPRESSOR) 25 MG tablet Take 12.5 mg by mouth 2 (Two) Times a Day.     • pantoprazole (PROTONIX) 40 MG EC tablet Take 40 mg by mouth Daily.     • polyethylene glycol (MIRALAX) packet Take 17 g by mouth Daily.     • pravastatin (PRAVACHOL) 40 MG tablet Take 40 mg by mouth Every Night.     • predniSONE (DELTASONE) 2.5 MG tablet Take 2.5 mg by mouth Daily.     • traZODone (DESYREL) 50 MG tablet Take 100 mg by mouth Every Night.     • vitamin B-12 (CYANOCOBALAMIN) 1000 MCG tablet Take 1,000 mcg by mouth Daily.     • memantine (NAMENDA) 5 MG tablet TAKE ONE TABLET BY MOUTH TWICE A DAY 60 tablet 4   • oxyCODONE-acetaminophen (PERCOCET) 5-325 MG per tablet 1-2 po q4 hours prn pain 40 tablet 0   • venlafaxine XR (EFFEXOR-XR) 150 MG 24 hr capsule Take 150 mg by mouth Daily.       No facility-administered medications prior to visit.        Patient Active Problem List   Diagnosis   • Absolute anemia   • Anal stenosis   • Anxiety   • Arrhythmia, ventricular   • Ataxia   • Carpal tunnel syndrome, bilateral   • Colles' fracture   • Coronary artery disease   • Depression   • Physical exam   • Gastroenteritis, acute   • Gastroesophageal reflux disease   • Hx  of total knee replacement, right   • Hx of pathological fracture   • Hyperlipidemia   • Insomnia   • Irritable bowel syndrome   • Keratoconjunctivitis sicca, in Sjogren's syndrome (CMS/HCC)   • Memory loss   • Osteoarthritis of hand   • Osteoarthritis of knee   • Osteoporosis   • Pain in hip   • Paroxysmal atrial fibrillation (CMS/HCC)   • 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    Respiratory: Negative for shortness of breath.    Cardiovascular: Negative for chest pain and palpitations.   Gastrointestinal: Negative for blood in stool.   Genitourinary: Negative for hematuria.   Neurological: Negative for syncope and light-headedness.   Psychiatric/Behavioral: Negative for confusion.       Compared to one year ago, the patient feels her physical health is better.  Compared to one year ago, the patient feels her mental health is the same.    Reviewed chart for potential of high risk medication in the elderly: yes  Reviewed chart for potential of harmful drug interactions in the elderly:yes    Objective         Vitals:    08/22/19 0958   BP: 133/78   BP Location: Right arm   Patient Position: Sitting   Cuff Size: Adult   Pulse: 68   Resp: 12   Weight: 70.9 kg (156 lb 6.4 oz)   PainSc: 0-No pain       Body mass index is 26.85 kg/m².  Discussed the patient's BMI with her. The BMI is in the acceptable range.    Physical Exam    Constitutional: She is oriented to person, place, and time. She appears well-developed and well-nourished. No distress.   HENT:   Head: Normocephalic and atraumatic.   Nose: Nose normal.   Mouth/Throat: Oropharynx is clear and moist.   Eyes: Conjunctivae, EOM and lids are normal. Pupils are equal, round, and reactive to light.   Neck: Normal range of motion. Neck supple. No JVD present. Carotid bruit is not present. No thyroid mass and no thyromegaly present.   Cardiovascular: Normal rate, regular rhythm, normal heart sounds and intact distal pulses.   Pulmonary/Chest: Effort normal and breath sounds normal.   Abdominal: Soft. Bowel sounds are normal. She exhibits no distension. There is no guarding.   Musculoskeletal: Normal range of motion.   No clubbing cyanosis or edema   Neurological: She is alert and oriented to person, place, and time. No cranial nerve deficit.   Skin: Skin is warm and dry.   Psychiatric: She has a normal mood and affect. Her speech is normal and behavior is normal. Judgment and thought content normal. She is attentive.   Nursing note and vitals reviewed.      Lab Results   Component Value Date    HGBA1C 5.6 07/05/2019        Assessment/Plan   Medicare Risks and Personalized Health Plan  CMS Preventative Services Quick Reference  Advance Directive Discussion  Breast Cancer/Mammogram Screening  Cardiovascular risk  Colon Cancer Screening  Dementia/Memory   Fall Risk  Glaucoma Risk  Immunizations Discussed/Encouraged (specific immunizations; adacel Tdap, Influenza, Pneumococcal 23, Prevnar and Shingrix )  Lung Cancer Risk  Osteoprorosis Risk    The above risks/problems have been discussed with the patient.  Pertinent information has been shared with the patient in the After Visit Summary.  Follow up plans and orders are seen below in the Assessment/Plan Section.    Diagnoses and all orders for this visit:    1. Physical exam (Primary)  Assessment & Plan:  Exercise more, needs regular  exercise  Nutritious diet discussed   Immunizations   Weight Loss      2. Vaccine for streptococcus pneumoniae and influenza  -     Discontinue: pneumococcal polysaccharide 23-valent (PNEUMOVAX-23) vaccine 0.5 mL    3. Rheumatoid arthritis involving multiple sites with positive rheumatoid factor (CMS/HCC)  -     CBC & Differential  -     Comprehensive Metabolic Panel  -     CBC Auto Differential    Other orders  -     Discontinue: memantine (NAMENDA) 5 MG tablet; Take 2 tablets by mouth 2 (Two) Times a Day.  Dispense: 60 tablet; Refill: 11  -     Discontinue: venlafaxine XR (EFFEXOR-XR) 150 MG 24 hr capsule; Take 1 capsule by mouth Daily.  Dispense: 30 capsule; Refill: 11  -     memantine (NAMENDA) 10 MG tablet; Take 1 tablet by mouth 2 (Two) Times a Day.  Dispense: 60 tablet; Refill: 11  -     venlafaxine XR (EFFEXOR-XR) 150 MG 24 hr capsule; Take 1 capsule by mouth Daily.  Dispense: 30 capsule; Refill: 11  -     Cancel: Cryotherapy, Skin Lesion  -     Cancel: Arthrocentesis  -     Cancel: Arthrocentesis  -     Cancel: Arthrocentesis  -     Cancel: ECG 12 Lead  -     Cancel: ECG 12 Lead  -     Cancel: Ear Cerumen Removal  -     Cancel: Nail Removal  -     Cancel: Incision & Drainage  -     Cancel: Suture Removal  -     Cancel: Cryotherapy, Skin Lesion  -     Cancel: Nail Removal    Follow Up:  Return in about 3 months (around 11/22/2019) for Recheck.     An After Visit Summary and PPPS were given to the patient.

## 2019-08-22 NOTE — PATIENT INSTRUCTIONS
Exercising to Stay Healthy  To become healthy and stay healthy, it is recommended that you do moderate-intensity and vigorous-intensity exercise. You can tell that you are exercising at a moderate intensity if your heart starts beating faster and you start breathing faster but can still hold a conversation. You can tell that you are exercising at a vigorous intensity if you are breathing much harder and faster and cannot hold a conversation while exercising.  Exercising regularly is important. It has many health benefits, such as:  · Improving overall fitness, flexibility, and endurance.  · Increasing bone density.  · Helping with weight control.  · Decreasing body fat.  · Increasing muscle strength.  · Reducing stress and tension.  · Improving overall health.  How often should I exercise?  Choose an activity that you enjoy, and set realistic goals. Your health care provider can help you make an activity plan that works for you.  Exercise regularly as told by your health care provider. This may include:  · Doing strength training two times a week, such as:  ? Lifting weights.  ? Using resistance bands.  ? Push-ups.  ? Sit-ups.  ? Yoga.  · Doing a certain intensity of exercise for a given amount of time. Choose from these options:  ? A total of 150 minutes of moderate-intensity exercise every week.  ? A total of 75 minutes of vigorous-intensity exercise every week.  ? A mix of moderate-intensity and vigorous-intensity exercise every week.  Children, pregnant women, people who have not exercised regularly, people who are overweight, and older adults may need to talk with a health care provider about what activities are safe to do. If you have a medical condition, be sure to talk with your health care provider before you start a new exercise program.  What are some exercise ideas?  Moderate-intensity exercise ideas include:  · Walking 1 mile (1.6 km) in about 15  minutes.  · Biking.  · Hiking.  · Golfing.  · Dancing.  · Water aerobics.  Vigorous-intensity exercise ideas include:  · Walking 4.5 miles (7.2 km) or more in about 1 hour.  · Jogging or running 5 miles (8 km) in about 1 hour.  · Biking 10 miles (16.1 km) or more in about 1 hour.  · Lap swimming.  · Roller-skating or in-line skating.  · Cross-country skiing.  · Vigorous competitive sports, such as football, basketball, and soccer.  · Jumping rope.  · Aerobic dancing.  What are some everyday activities that can help me to get exercise?  · Yard work, such as:  ? Pushing a .  ? Raking and bagging leaves.  · Washing your car.  · Pushing a stroller.  · Shoveling snow.  · Gardening.  · Washing windows or floors.  How can I be more active in my day-to-day activities?  · Use stairs instead of an elevator.  · Take a walk during your lunch break.  · If you drive, park your car farther away from your work or school.  · If you take public transportation, get off one stop early and walk the rest of the way.  · Stand up or walk around during all of your indoor phone calls.  · Get up, stretch, and walk around every 30 minutes throughout the day.  · Enjoy exercise with a friend. Support to continue exercising will help you keep a regular routine of activity.  What guidelines can I follow while exercising?  · Before you start a new exercise program, talk with your health care provider.  · Do not exercise so much that you hurt yourself, feel dizzy, or get very short of breath.  · Wear comfortable clothes and wear shoes with good support.  · Drink plenty of water while you exercise to prevent dehydration or heat stroke.  · Work out until your breathing and your heartbeat get faster.  Where to find more information  · [place] Department of Health and Human Services: www.hhs.gov  · Centers for Disease Control and Prevention (CDC): www.cdc.gov  Summary  · Exercising regularly is important. It will improve your overall fitness,  flexibility, and endurance.  · Regular exercise also will improve your overall health. It can help you control your weight, reduce stress, and improve your bone density.  · Do not exercise so much that you hurt yourself, feel dizzy, or get very short of breath.  · Before you start a new exercise program, talk with your health care provider.  This information is not intended to replace advice given to you by your health care provider. Make sure you discuss any questions you have with your health care provider.  Document Released: 01/20/2012 Document Revised: 11/08/2018 Document Reviewed: 11/08/2018  [employer] Patient Education © 2019 [employer]      Calorie Counting for Weight Loss  Calories are units of energy. Your body needs a certain amount of calories from food to keep you going throughout the day. When you eat more calories than your body needs, your body stores the extra calories as fat. When you eat fewer calories than your body needs, your body burns fat to get the energy it needs.  Calorie counting means keeping track of how many calories you eat and drink each day. Calorie counting can be helpful if you need to lose weight. If you make sure to eat fewer calories than your body needs, you should lose weight. Ask your health care provider what a healthy weight is for you.  For calorie counting to work, you will need to eat the right number of calories in a day in order to lose a healthy amount of weight per week. A dietitian can help you determine how many calories you need in a day and will give you suggestions on how to reach your calorie goal.  · A healthy amount of weight to lose per week is usually 1-2 lb (0.5-0.9 kg). This usually means that your daily calorie intake should be reduced by 500-750 calories.  · Eating 1,200 - 1,500 calories per day can help most women lose weight.  · Eating 1,500 - 1,800 calories per day can help most men lose weight.  What is my plan?  My goal is to have  __________ calories per day.  If I have this many calories per day, I should lose around __________ pounds per week.  What do I need to know about calorie counting?  In order to meet your daily calorie goal, you will need to:  · Find out how many calories are in each food you would like to eat. Try to do this before you eat.  · Decide how much of the food you plan to eat.  · Write down what you ate and how many calories it had. Doing this is called keeping a food log.  To successfully lose weight, it is important to balance calorie counting with a healthy lifestyle that includes regular activity. Aim for 150 minutes of moderate exercise (such as walking) or 75 minutes of vigorous exercise (such as running) each week.  Where do I find calorie information?    The number of calories in a food can be found on a Nutrition Facts label. If a food does not have a Nutrition Facts label, try to look up the calories online or ask your dietitian for help.  Remember that calories are listed per serving. If you choose to have more than one serving of a food, you will have to multiply the calories per serving by the amount of servings you plan to eat. For example, the label on a package of bread might say that a serving size is 1 slice and that there are 90 calories in a serving. If you eat 1 slice, you will have eaten 90 calories. If you eat 2 slices, you will have eaten 180 calories.  How do I keep a food log?  Immediately after each meal, record the following information in your food log:  · What you ate. Don't forget to include toppings, sauces, and other extras on the food.  · How much you ate. This can be measured in cups, ounces, or number of items.  · How many calories each food and drink had.  · The total number of calories in the meal.  Keep your food log near you, such as in a small notebook in your pocket, or use a mobile bhavna or website. Some programs will calculate calories for you and show you how many calories you  "have left for the day to meet your goal.  What are some calorie counting tips?    · Use your calories on foods and drinks that will fill you up and not leave you hungry:  ? Some examples of foods that fill you up are nuts and nut butters, vegetables, lean proteins, and high-fiber foods like whole grains. High-fiber foods are foods with more than 5 g fiber per serving.  ? Drinks such as sodas, specialty coffee drinks, alcohol, and juices have a lot of calories, yet do not fill you up.  · Eat nutritious foods and avoid empty calories. Empty calories are calories you get from foods or beverages that do not have many vitamins or protein, such as candy, sweets, and soda. It is better to have a nutritious high-calorie food (such as an avocado) than a food with few nutrients (such as a bag of chips).  · Know how many calories are in the foods you eat most often. This will help you calculate calorie counts faster.  · Pay attention to calories in drinks. Low-calorie drinks include water and unsweetened drinks.  · Pay attention to nutrition labels for \"low fat\" or \"fat free\" foods. These foods sometimes have the same amount of calories or more calories than the full fat versions. They also often have added sugar, starch, or salt, to make up for flavor that was removed with the fat.  · Find a way of tracking calories that works for you. Get creative. Try different apps or programs if writing down calories does not work for you.  What are some portion control tips?  · Know how many calories are in a serving. This will help you know how many servings of a certain food you can have.  · Use a measuring cup to measure serving sizes. You could also try weighing out portions on a kitchen scale. With time, you will be able to estimate serving sizes for some foods.  · Take some time to put servings of different foods on your favorite plates, bowls, and cups so you know what a serving looks like.  · Try not to eat straight from a bag or " box. Doing this can lead to overeating. Put the amount you would like to eat in a cup or on a plate to make sure you are eating the right portion.  · Use smaller plates, glasses, and bowls to prevent overeating.  · Try not to multitask (for example, watch TV or use your computer) while eating. If it is time to eat, sit down at a table and enjoy your food. This will help you to know when you are full. It will also help you to be aware of what you are eating and how much you are eating.  What are tips for following this plan?  Reading food labels  · Check the calorie count compared to the serving size. The serving size may be smaller than what you are used to eating.  · Check the source of the calories. Make sure the food you are eating is high in vitamins and protein and low in saturated and trans fats.  Shopping  · Read nutrition labels while you shop. This will help you make healthy decisions before you decide to purchase your food.  · Make a grocery list and stick to it.  Cooking  · Try to cook your favorite foods in a healthier way. For example, try baking instead of frying.  · Use low-fat dairy products.  Meal planning  · Use more fruits and vegetables. Half of your plate should be fruits and vegetables.  · Include lean proteins like poultry and fish.  How do I count calories when eating out?  · Ask for smaller portion sizes.  · Consider sharing an entree and sides instead of getting your own entree.  · If you get your own entree, eat only half. Ask for a box at the beginning of your meal and put the rest of your entree in it so you are not tempted to eat it.  · If calories are listed on the menu, choose the lower calorie options.  · Choose dishes that include vegetables, fruits, whole grains, low-fat dairy products, and lean protein.  · Choose items that are boiled, broiled, grilled, or steamed. Stay away from items that are buttered, battered, fried, or served with cream sauce. Items labeled “crispy” are  usually fried, unless stated otherwise.  · Choose water, low-fat milk, unsweetened iced tea, or other drinks without added sugar. If you want an alcoholic beverage, choose a lower calorie option such as a glass of wine or light beer.  · Ask for dressings, sauces, and syrups on the side. These are usually high in calories, so you should limit the amount you eat.  · If you want a salad, choose a garden salad and ask for grilled meats. Avoid extra toppings like schwarz, cheese, or fried items. Ask for the dressing on the side, or ask for olive oil and vinegar or lemon to use as dressing.  · Estimate how many servings of a food you are given. For example, a serving of cooked rice is ½ cup or about the size of half a baseball. Knowing serving sizes will help you be aware of how much food you are eating at restaurants. The list below tells you how big or small some common portion sizes are based on everyday objects:  ? 1 oz--4 stacked dice.  ? 3 oz--1 deck of cards.  ? 1 tsp--1 die.  ? 1 Tbsp--½ a ping-pong ball.  ? 2 Tbsp--1 ping-pong ball.  ? ½ cup--½ baseball.  ? 1 cup--1 baseball.  Summary  · Calorie counting means keeping track of how many calories you eat and drink each day. If you eat fewer calories than your body needs, you should lose weight.  · A healthy amount of weight to lose per week is usually 1-2 lb (0.5-0.9 kg). This usually means reducing your daily calorie intake by 500-750 calories.  · The number of calories in a food can be found on a Nutrition Facts label. If a food does not have a Nutrition Facts label, try to look up the calories online or ask your dietitian for help.  · Use your calories on foods and drinks that will fill you up, and not on foods and drinks that will leave you hungry.  · Use smaller plates, glasses, and bowls to prevent overeating.  This information is not intended to replace advice given to you by your health care provider. Make sure you discuss any questions you have with your  health care provider.  Document Released: 12/18/2006 Document Revised: 11/17/2017 Document Reviewed: 11/17/2017  [employer] Interactive Patient Education © 2019 [employer]    Please check with your insurance and get the new shingrix vaccine series to prevent shingles.

## 2019-09-05 ENCOUNTER — HOSPITAL ENCOUNTER (OUTPATIENT)
Dept: CT IMAGING | Facility: HOSPITAL | Age: 79
Discharge: HOME | End: 2019-09-05
Admitting: THORACIC SURGERY (CARDIOTHORACIC VASCULAR SURGERY)

## 2019-09-05 DIAGNOSIS — R91.1 LUNG NODULE: ICD-10-CM

## 2019-09-05 PROCEDURE — 0 IOPAMIDOL PER 1 ML: Performed by: THORACIC SURGERY (CARDIOTHORACIC VASCULAR SURGERY)

## 2019-09-05 PROCEDURE — 71260 CT THORAX DX C+: CPT

## 2019-09-05 RX ADMIN — IOPAMIDOL 100 ML: 755 INJECTION, SOLUTION INTRAVENOUS at 10:00

## 2019-09-12 ENCOUNTER — OFFICE VISIT (OUTPATIENT)
Dept: ORTHOPEDIC SURGERY | Facility: CLINIC | Age: 79
End: 2019-09-12

## 2019-09-12 ENCOUNTER — OFFICE VISIT (OUTPATIENT)
Dept: SURGERY | Facility: CLINIC | Age: 79
End: 2019-09-12

## 2019-09-12 VITALS
SYSTOLIC BLOOD PRESSURE: 129 MMHG | BODY MASS INDEX: 26.6 KG/M2 | HEIGHT: 64 IN | DIASTOLIC BLOOD PRESSURE: 81 MMHG | WEIGHT: 155.8 LBS | HEART RATE: 65 BPM

## 2019-09-12 VITALS
OXYGEN SATURATION: 98 % | TEMPERATURE: 97.2 F | HEART RATE: 65 BPM | SYSTOLIC BLOOD PRESSURE: 139 MMHG | WEIGHT: 155 LBS | DIASTOLIC BLOOD PRESSURE: 73 MMHG | BODY MASS INDEX: 26.61 KG/M2

## 2019-09-12 DIAGNOSIS — Z96.642 HISTORY OF TOTAL HIP REPLACEMENT, LEFT: ICD-10-CM

## 2019-09-12 DIAGNOSIS — Z96.642 HISTORY OF TOTAL HIP REPLACEMENT, LEFT: Primary | ICD-10-CM

## 2019-09-12 DIAGNOSIS — C34.2 PRIMARY MALIGNANT NEOPLASM OF RIGHT MIDDLE LOBE OF LUNG (HCC): Primary | ICD-10-CM

## 2019-09-12 PROCEDURE — 99024 POSTOP FOLLOW-UP VISIT: CPT | Performed by: PHYSICIAN ASSISTANT

## 2019-09-12 PROCEDURE — 99214 OFFICE O/P EST MOD 30 MIN: CPT | Performed by: THORACIC SURGERY (CARDIOTHORACIC VASCULAR SURGERY)

## 2019-09-12 NOTE — PROGRESS NOTES
Thoracic surgery progress note  Chief complaint follow-up 1 year right middle lobectomy lung cancer stage Ia  Patient returns with a follow-up CT scan dated September 5 that I personally examined.  She has changes of right middle lobectomy without any evidence of recurrent cancer.  She has a couple of stable subcentimeter pulmonary nodules.  Radiology report confirms this.  The patient does not describe any new concerning symptoms.  No fevers chills night sweats cough hemoptysis sputum production headaches or long bone pain.  Her appetite is good she is gained a little bit of weight.  She remains smoke-free.  On review of systems positive for easy bruising otherwise all systems reported are negative.  She does have rheumatoid arthritis and recently 1 underwent a left hip replacement from which she recovered well.    Physical exam sclera anicteric conjunctiva pink neck supple no adenopathy chest expansion symmetrical ribs nontender.  Cardiac regular rate and rhythm    Impression #1 history of lung cancer no evidence of recurrence #2 rheumatoid arthritis recent left hip replacement uneventful recovery #3 history of atrial fibrillation now in sinus rhythm no longer anticoagulated  Plan follow-up CT scan in 4 months.

## 2019-09-12 NOTE — PROGRESS NOTES
"     Subjective:    HPI:  Alia Cheney is a 79 y.o. female who presents about 8 weeks out from having a left anterior total hip replacement.  She reports that she is doing very well with mild intermittent discomfort, mainly because she is stopped doing her home exercises.  She reports that since she has resumed her home exercises the discomfort has improved.  She is very pleased with her results thus far.       Objective   Objective:    /81   Pulse 65   Ht 162.6 cm (64\")   Wt 70.7 kg (155 lb 12.8 oz)   BMI 26.74 kg/m²     Physical Examination:  Alert, oriented, well-nourished, well-developed individual in no acute distress, ambulating unassisted  Left lower extremity shows a well-healed surgical incision with no erythema, drainage, or open skin lesions. There is no appreciable swelling in the thigh area. It is grossly well aligned, and the patient is neurovascularly intact distally. Plantar and dorsiflexion is 5/5. There is no tenderness to palpation or with range of motion, which is smooth and WNL.         Imaging:              Assessment:  1. History of total hip replacement, left       About 8 weeks out from surgery          Plan:  She is doing well, and we will plan to see her back in about 10 months.  She should call with any questions or concerns.               MATI Crouch  09/12/19  10:32 AM                      "

## 2019-10-15 RX ORDER — LEFLUNOMIDE 10 MG/1
TABLET ORAL
Qty: 90 TABLET | Refills: 3 | Status: SHIPPED | OUTPATIENT
Start: 2019-10-15 | End: 2020-12-03

## 2019-10-24 RX ORDER — POLYETHYLENE GLYCOL 3350 17 G/17G
POWDER, FOR SOLUTION ORAL
Qty: 1020 G | Refills: 8 | Status: SHIPPED | OUTPATIENT
Start: 2019-10-24 | End: 2020-10-27

## 2019-11-22 ENCOUNTER — TELEPHONE (OUTPATIENT)
Dept: FAMILY MEDICINE CLINIC | Facility: CLINIC | Age: 79
End: 2019-11-22

## 2019-11-22 ENCOUNTER — OFFICE VISIT (OUTPATIENT)
Dept: FAMILY MEDICINE CLINIC | Facility: CLINIC | Age: 79
End: 2019-11-22

## 2019-11-22 VITALS
WEIGHT: 156 LBS | DIASTOLIC BLOOD PRESSURE: 68 MMHG | HEART RATE: 84 BPM | SYSTOLIC BLOOD PRESSURE: 140 MMHG | RESPIRATION RATE: 8 BRPM | BODY MASS INDEX: 26.78 KG/M2 | TEMPERATURE: 99.1 F

## 2019-11-22 DIAGNOSIS — K58.1 IRRITABLE BOWEL SYNDROME WITH CONSTIPATION: ICD-10-CM

## 2019-11-22 DIAGNOSIS — M05.79 RHEUMATOID ARTHRITIS INVOLVING MULTIPLE SITES WITH POSITIVE RHEUMATOID FACTOR (HCC): Primary | ICD-10-CM

## 2019-11-22 LAB
ALBUMIN SERPL-MCNC: 4.4 G/DL (ref 3.5–5.2)
ALBUMIN/GLOB SERPL: 1.7 G/DL
ALP SERPL-CCNC: 73 U/L (ref 39–117)
ALT SERPL W P-5'-P-CCNC: 19 U/L (ref 1–33)
ANION GAP SERPL CALCULATED.3IONS-SCNC: 11.2 MMOL/L (ref 5–15)
AST SERPL-CCNC: 20 U/L (ref 1–32)
BASOPHILS # BLD AUTO: 0 10*3/MM3 (ref 0–0.2)
BASOPHILS NFR BLD AUTO: 0 % (ref 0–1.5)
BILIRUB SERPL-MCNC: 0.2 MG/DL (ref 0.2–1.2)
BUN BLD-MCNC: 12 MG/DL (ref 8–23)
BUN/CREAT SERPL: 17.6 (ref 7–25)
CALCIUM SPEC-SCNC: 9.8 MG/DL (ref 8.6–10.5)
CHLORIDE SERPL-SCNC: 106 MMOL/L (ref 98–107)
CO2 SERPL-SCNC: 27.8 MMOL/L (ref 22–29)
CREAT BLD-MCNC: 0.68 MG/DL (ref 0.57–1)
DEPRECATED RDW RBC AUTO: 45.7 FL (ref 37–54)
EOSINOPHIL # BLD AUTO: 0 10*3/MM3 (ref 0–0.4)
EOSINOPHIL NFR BLD AUTO: 0 % (ref 0.3–6.2)
ERYTHROCYTE [DISTWIDTH] IN BLOOD BY AUTOMATED COUNT: 13.4 % (ref 12.3–15.4)
GFR SERPL CREATININE-BSD FRML MDRD: 83 ML/MIN/1.73
GLOBULIN UR ELPH-MCNC: 2.6 GM/DL
GLUCOSE BLD-MCNC: 101 MG/DL (ref 65–99)
HCT VFR BLD AUTO: 41.4 % (ref 34–46.6)
HGB BLD-MCNC: 13 G/DL (ref 12–15.9)
IMM GRANULOCYTES # BLD AUTO: 0.01 10*3/MM3 (ref 0–0.05)
IMM GRANULOCYTES NFR BLD AUTO: 0.2 % (ref 0–0.5)
LYMPHOCYTES # BLD AUTO: 1.2 10*3/MM3 (ref 0.7–3.1)
LYMPHOCYTES NFR BLD AUTO: 20 % (ref 19.6–45.3)
MCH RBC QN AUTO: 28.8 PG (ref 26.6–33)
MCHC RBC AUTO-ENTMCNC: 31.4 G/DL (ref 31.5–35.7)
MCV RBC AUTO: 91.6 FL (ref 79–97)
MONOCYTES # BLD AUTO: 0.52 10*3/MM3 (ref 0.1–0.9)
MONOCYTES NFR BLD AUTO: 8.7 % (ref 5–12)
NEUTROPHILS # BLD AUTO: 4.26 10*3/MM3 (ref 1.7–7)
NEUTROPHILS NFR BLD AUTO: 71.1 % (ref 42.7–76)
NRBC BLD AUTO-RTO: 0 /100 WBC (ref 0–0.2)
PLATELET # BLD AUTO: 279 10*3/MM3 (ref 140–450)
PMV BLD AUTO: 11.1 FL (ref 6–12)
POTASSIUM BLD-SCNC: 4.3 MMOL/L (ref 3.5–5.2)
PROT SERPL-MCNC: 7 G/DL (ref 6–8.5)
RBC # BLD AUTO: 4.52 10*6/MM3 (ref 3.77–5.28)
SODIUM BLD-SCNC: 145 MMOL/L (ref 136–145)
WBC NRBC COR # BLD: 5.99 10*3/MM3 (ref 3.4–10.8)

## 2019-11-22 PROCEDURE — 99213 OFFICE O/P EST LOW 20 MIN: CPT | Performed by: FAMILY MEDICINE

## 2019-11-22 PROCEDURE — 80053 COMPREHEN METABOLIC PANEL: CPT | Performed by: FAMILY MEDICINE

## 2019-11-22 PROCEDURE — 85025 COMPLETE CBC W/AUTO DIFF WBC: CPT | Performed by: FAMILY MEDICINE

## 2019-11-22 NOTE — ASSESSMENT & PLAN NOTE
Add senokot s  2 po q d    Dulcolax supp prn  Call if persist   We will find one her most recent colonoscopy was

## 2019-11-22 NOTE — PROGRESS NOTES
Rooming Tab(CC,VS,Pt Hx,Fall Screen)  Chief Complaint   Patient presents with   • Follow-up     med check   • Constipation       Subjective 79-year-old here for follow-up of her rheumatoid arthritis.  She is doing relatively well with her arthritis and has no worsening of her symptoms.  Tolerating medications and has no complaints.  Patient does complain of constipation that started a week ago.  She actually had to did get out her own stool manually.  She drinks a lot of water and takes MiraLAX daily.  She is unsure when her last colonoscopy was and I do not have any records of this.    I have reviewed and updated her medications, medical history and problem list during today's office visit.     Patient Care Team:  Lele Lucas MD as PCP - General  Lele Lucas MD as PCP - Claims Attributed  Lele Lucas MD as PCP - Family Medicine    Problem List Tab  Medications Tab  Synopsis Tab  Chart Review Tab  Care Everywhere Tab  Immunizations Tab  Patient History Tab    Social History     Tobacco Use   • Smoking status: Former Smoker     Years: 15.00     Start date:      Last attempt to quit:      Years since quittin.9   • Smokeless tobacco: Never Used   Substance Use Topics   • Alcohol use: No     Frequency: Never       Review of Systems   Constitutional: Negative for chills, fatigue and fever.   HENT: Negative for nosebleeds.    Eyes: Negative for double vision.   Respiratory: Negative for chest tightness and shortness of breath.    Cardiovascular: Negative for chest pain and palpitations.   Gastrointestinal: Positive for constipation. Negative for blood in stool.   Genitourinary: Negative for dysuria and hematuria.   Neurological: Negative for dizziness and syncope.   Psychiatric/Behavioral: Negative for depressed mood.       Objective     Rooming Tab(CC,VS,Pt Hx,Fall Screen)  /68 (BP Location: Left arm, Patient Position: Sitting, Cuff Size: Large Adult)   Pulse 84   Temp 99.1 °F (37.3  °C) (Oral)   Resp 8   Wt 70.8 kg (156 lb)   BMI 26.78 kg/m²     Body mass index is 26.78 kg/m².    Physical Exam   Constitutional: She is oriented to person, place, and time. She appears well-developed and well-nourished. No distress.   HENT:   Head: Normocephalic and atraumatic.   Nose: Nose normal.   Mouth/Throat: Oropharynx is clear and moist.   Eyes: Conjunctivae, EOM and lids are normal. Pupils are equal, round, and reactive to light.   Neck: Trachea normal and normal range of motion. Neck supple. No JVD present. Carotid bruit is not present. No thyroid mass and no thyromegaly present.   No carotid bruits   Cardiovascular: Normal rate, regular rhythm, normal heart sounds and intact distal pulses.   Pulmonary/Chest: Effort normal and breath sounds normal.   Abdominal: Soft. Bowel sounds are normal. She exhibits no distension and no mass. There is no tenderness.   Musculoskeletal:   No c/c/e   Neurological: She is alert and oriented to person, place, and time. No cranial nerve deficit.   Skin: Skin is warm and dry.   Psychiatric: She has a normal mood and affect. Her speech is normal and behavior is normal. She is attentive.   Nursing note and vitals reviewed.       Statin Choice Calculator  Data Reviewed:               Lab Results   Component Value Date    BUN 12 11/22/2019    CREATININE 0.68 11/22/2019    EGFRIFNONA 83 11/22/2019     11/22/2019    K 4.3 11/22/2019     11/22/2019    CALCIUM 9.8 11/22/2019    ALBUMIN 4.40 11/22/2019    BILITOT 0.2 11/22/2019    ALKPHOS 73 11/22/2019    AST 20 11/22/2019    ALT 19 11/22/2019    WBC 5.99 11/22/2019    RBC 4.52 11/22/2019    HCT 41.4 11/22/2019    MCV 91.6 11/22/2019    MCH 28.8 11/22/2019      Assessment/Plan   Order Review Tab  Health Maintenance Tab  Patient Plan/Order Tab  Diagnoses and all orders for this visit:    1. Rheumatoid arthritis involving multiple sites with positive rheumatoid factor (CMS/HCC) (Primary)  Assessment & Plan:  Stable,  continue current medications    Orders:  -     CBC & Differential  -     Comprehensive Metabolic Panel  -     CBC Auto Differential    2. Irritable bowel syndrome with constipation  Assessment & Plan:  Add senokot s  2 po q d    Dulcolax supp prn  Call if persist   We will find one her most recent colonoscopy was      Other orders  -     Cholecalciferol (VITAMIN D3 SUPER STRENGTH) 50 MCG (2000 UT) tablet; 2 po q d  Dispense: 60 each; Refill: 11      Wrapup Tab  Return in about 3 months (around 2/22/2020) for Annual physical, Medicare Wellness.

## 2019-12-10 ENCOUNTER — OFFICE VISIT (OUTPATIENT)
Dept: ORTHOPEDIC SURGERY | Facility: CLINIC | Age: 79
End: 2019-12-10

## 2019-12-10 DIAGNOSIS — M81.0 AGE-RELATED OSTEOPOROSIS WITHOUT CURRENT PATHOLOGICAL FRACTURE: Primary | ICD-10-CM

## 2019-12-10 PROCEDURE — 96372 THER/PROPH/DIAG INJ SC/IM: CPT | Performed by: PHYSICIAN ASSISTANT

## 2019-12-16 RX ORDER — PREDNISONE 2.5 MG
TABLET ORAL
Qty: 90 TABLET | Refills: 0 | Status: SHIPPED | OUTPATIENT
Start: 2019-12-16 | End: 2020-02-19

## 2019-12-16 RX ORDER — PANTOPRAZOLE SODIUM 40 MG/1
TABLET, DELAYED RELEASE ORAL
Qty: 90 TABLET | Refills: 0 | Status: SHIPPED | OUTPATIENT
Start: 2019-12-16 | End: 2020-02-19

## 2019-12-16 RX ORDER — TRAZODONE HYDROCHLORIDE 50 MG/1
TABLET ORAL
Qty: 180 TABLET | Refills: 0 | Status: SHIPPED | OUTPATIENT
Start: 2019-12-16 | End: 2020-02-19

## 2019-12-16 RX ORDER — VENLAFAXINE HYDROCHLORIDE 150 MG/1
CAPSULE, EXTENDED RELEASE ORAL
Qty: 90 CAPSULE | Refills: 0 | Status: SHIPPED | OUTPATIENT
Start: 2019-12-16 | End: 2020-02-19

## 2020-01-07 ENCOUNTER — HOSPITAL ENCOUNTER (OUTPATIENT)
Dept: CT IMAGING | Facility: HOSPITAL | Age: 80
Discharge: HOME | End: 2020-01-07
Admitting: THORACIC SURGERY (CARDIOTHORACIC VASCULAR SURGERY)

## 2020-01-07 DIAGNOSIS — C34.2 PRIMARY MALIGNANT NEOPLASM OF RIGHT MIDDLE LOBE OF LUNG (HCC): ICD-10-CM

## 2020-01-07 LAB — CREAT BLDA-MCNC: 0.7 MG/DL (ref 0.6–1.3)

## 2020-01-07 PROCEDURE — 71260 CT THORAX DX C+: CPT

## 2020-01-07 PROCEDURE — 0 IOPAMIDOL PER 1 ML: Performed by: THORACIC SURGERY (CARDIOTHORACIC VASCULAR SURGERY)

## 2020-01-07 PROCEDURE — 82565 ASSAY OF CREATININE: CPT

## 2020-01-07 RX ADMIN — IOPAMIDOL 100 ML: 755 INJECTION, SOLUTION INTRAVENOUS at 10:30

## 2020-01-14 ENCOUNTER — OFFICE VISIT (OUTPATIENT)
Dept: SURGERY | Facility: CLINIC | Age: 80
End: 2020-01-14

## 2020-01-14 VITALS
DIASTOLIC BLOOD PRESSURE: 76 MMHG | OXYGEN SATURATION: 99 % | BODY MASS INDEX: 26.26 KG/M2 | SYSTOLIC BLOOD PRESSURE: 120 MMHG | TEMPERATURE: 97 F | WEIGHT: 153 LBS | HEART RATE: 56 BPM

## 2020-01-14 DIAGNOSIS — Z96.642 HISTORY OF TOTAL HIP REPLACEMENT, LEFT: ICD-10-CM

## 2020-01-14 DIAGNOSIS — M05.79 RHEUMATOID ARTHRITIS INVOLVING MULTIPLE SITES WITH POSITIVE RHEUMATOID FACTOR (HCC): ICD-10-CM

## 2020-01-14 DIAGNOSIS — I48.0 PAROXYSMAL ATRIAL FIBRILLATION (HCC): ICD-10-CM

## 2020-01-14 DIAGNOSIS — C34.2 PRIMARY MALIGNANT NEOPLASM OF RIGHT MIDDLE LOBE OF LUNG (HCC): Primary | ICD-10-CM

## 2020-01-14 PROCEDURE — 99214 OFFICE O/P EST MOD 30 MIN: CPT | Performed by: THORACIC SURGERY (CARDIOTHORACIC VASCULAR SURGERY)

## 2020-01-14 RX ORDER — MEMANTINE HYDROCHLORIDE 5 MG/1
TABLET ORAL
COMMUNITY
Start: 2019-12-19 | End: 2020-01-20

## 2020-01-14 NOTE — PROGRESS NOTES
Thoracic surgery progress note    Chief complaint follow-up of right middle lobe lung cancer    The patient is 1 year and 5 months out from right middle lobectomy for stage Ia lung cancer.  She presents with a follow-up CT scan that I personally examined.  There is no evidence of recurrent cancer.  The lung fields are stable and clear.  There is no adenopathy.  I have confirmed the radiology report.    Interval history the patient has no fevers chills night sweats no cough of any significant production she does have a dry occasional cough no hemoptysis.  No headaches no long bone pain.  The patient does note some left hip discomfort.  Is present at rest is not present with walking.  She has had a left hip replacement.  She does not have any other long bone pain no rib pain no sternal pain no pelvic pain.    All systems have been reviewed.  She reports no system positive complaints.  No history of smoking in the past 45 years  His past medical history is also notable for history of atrial fibrillation currently she is in sinus rhythm.  She is not on anticoagulation.    On physical examination she is well-appearing in no distress no supraclavicular adenopathy trachea is midline chest expansion symmetrical ribs nontender.  Patient's gait is normal.    Impression #1 history of lung cancer no evidence of recurrence  2.  Left hip pain I do not think this is metastatic disease I think is more likely related to her hip place replacement  3.  History of atrial fibrillation currently in sinus rhythm.  Not anticoagulated.  Will need to keep an eye on this should she require repeat surgery for recurrent lung cancer or develop new nodule.

## 2020-01-20 RX ORDER — MEMANTINE HYDROCHLORIDE 5 MG/1
TABLET ORAL
Qty: 60 TABLET | Refills: 3 | Status: SHIPPED | OUTPATIENT
Start: 2020-01-20 | End: 2020-03-02

## 2020-02-19 RX ORDER — PREDNISONE 2.5 MG
TABLET ORAL
Qty: 90 TABLET | Refills: 0 | Status: SHIPPED | OUTPATIENT
Start: 2020-02-19 | End: 2020-04-27

## 2020-02-19 RX ORDER — VENLAFAXINE HYDROCHLORIDE 150 MG/1
CAPSULE, EXTENDED RELEASE ORAL
Qty: 90 CAPSULE | Refills: 0 | Status: SHIPPED | OUTPATIENT
Start: 2020-02-19 | End: 2020-04-27

## 2020-02-19 RX ORDER — TRAZODONE HYDROCHLORIDE 50 MG/1
TABLET ORAL
Qty: 180 TABLET | Refills: 0 | Status: SHIPPED | OUTPATIENT
Start: 2020-02-19 | End: 2020-04-27

## 2020-02-19 RX ORDER — PANTOPRAZOLE SODIUM 40 MG/1
TABLET, DELAYED RELEASE ORAL
Qty: 90 TABLET | Refills: 0 | Status: SHIPPED | OUTPATIENT
Start: 2020-02-19 | End: 2020-04-27

## 2020-02-27 RX ORDER — PRAVASTATIN SODIUM 40 MG
TABLET ORAL
Qty: 90 TABLET | Refills: 1 | Status: SHIPPED | OUTPATIENT
Start: 2020-02-27 | End: 2020-07-02

## 2020-03-02 ENCOUNTER — OFFICE VISIT (OUTPATIENT)
Dept: FAMILY MEDICINE CLINIC | Facility: CLINIC | Age: 80
End: 2020-03-02

## 2020-03-02 VITALS
DIASTOLIC BLOOD PRESSURE: 90 MMHG | SYSTOLIC BLOOD PRESSURE: 143 MMHG | BODY MASS INDEX: 26.67 KG/M2 | WEIGHT: 155.4 LBS | RESPIRATION RATE: 12 BRPM | HEART RATE: 74 BPM

## 2020-03-02 DIAGNOSIS — M81.0 AGE-RELATED OSTEOPOROSIS WITHOUT CURRENT PATHOLOGICAL FRACTURE: ICD-10-CM

## 2020-03-02 DIAGNOSIS — L84 FOOT CALLUS: ICD-10-CM

## 2020-03-02 DIAGNOSIS — R23.3 PETECHIAE: ICD-10-CM

## 2020-03-02 DIAGNOSIS — E78.00 PURE HYPERCHOLESTEROLEMIA: ICD-10-CM

## 2020-03-02 DIAGNOSIS — R41.3 MEMORY LOSS: ICD-10-CM

## 2020-03-02 DIAGNOSIS — E53.8 VITAMIN B12 DEFICIENCY: ICD-10-CM

## 2020-03-02 DIAGNOSIS — M05.79 RHEUMATOID ARTHRITIS INVOLVING MULTIPLE SITES WITH POSITIVE RHEUMATOID FACTOR (HCC): Primary | ICD-10-CM

## 2020-03-02 DIAGNOSIS — F33.42 RECURRENT MAJOR DEPRESSIVE DISORDER, IN FULL REMISSION (HCC): ICD-10-CM

## 2020-03-02 LAB
ALBUMIN SERPL-MCNC: 4.2 G/DL (ref 3.5–5.2)
ALBUMIN/GLOB SERPL: 1.8 G/DL
ALP SERPL-CCNC: 58 U/L (ref 39–117)
ALT SERPL W P-5'-P-CCNC: 22 U/L (ref 1–33)
ANION GAP SERPL CALCULATED.3IONS-SCNC: 12.9 MMOL/L (ref 5–15)
AST SERPL-CCNC: 24 U/L (ref 1–32)
BASOPHILS # BLD AUTO: 0.01 10*3/MM3 (ref 0–0.2)
BASOPHILS NFR BLD AUTO: 0.1 % (ref 0–1.5)
BILIRUB SERPL-MCNC: 0.4 MG/DL (ref 0.2–1.2)
BUN BLD-MCNC: 9 MG/DL (ref 8–23)
BUN/CREAT SERPL: 12.7 (ref 7–25)
CALCIUM SPEC-SCNC: 8.9 MG/DL (ref 8.6–10.5)
CHLORIDE SERPL-SCNC: 103 MMOL/L (ref 98–107)
CHOLEST SERPL-MCNC: 148 MG/DL (ref 0–200)
CO2 SERPL-SCNC: 26.1 MMOL/L (ref 22–29)
CREAT BLD-MCNC: 0.71 MG/DL (ref 0.57–1)
DEPRECATED RDW RBC AUTO: 41.3 FL (ref 37–54)
EOSINOPHIL # BLD AUTO: 0.01 10*3/MM3 (ref 0–0.4)
EOSINOPHIL NFR BLD AUTO: 0.1 % (ref 0.3–6.2)
ERYTHROCYTE [DISTWIDTH] IN BLOOD BY AUTOMATED COUNT: 12.8 % (ref 12.3–15.4)
GFR SERPL CREATININE-BSD FRML MDRD: 79 ML/MIN/1.73
GLOBULIN UR ELPH-MCNC: 2.4 GM/DL
GLUCOSE BLD-MCNC: 108 MG/DL (ref 65–99)
HCT VFR BLD AUTO: 40.4 % (ref 34–46.6)
HDLC SERPL-MCNC: 49 MG/DL (ref 40–60)
HGB BLD-MCNC: 13.4 G/DL (ref 12–15.9)
IMM GRANULOCYTES # BLD AUTO: 0.02 10*3/MM3 (ref 0–0.05)
IMM GRANULOCYTES NFR BLD AUTO: 0.3 % (ref 0–0.5)
LDLC SERPL CALC-MCNC: 78 MG/DL (ref 0–100)
LDLC/HDLC SERPL: 1.58 {RATIO}
LYMPHOCYTES # BLD AUTO: 1.33 10*3/MM3 (ref 0.7–3.1)
LYMPHOCYTES NFR BLD AUTO: 18.3 % (ref 19.6–45.3)
MCH RBC QN AUTO: 30.1 PG (ref 26.6–33)
MCHC RBC AUTO-ENTMCNC: 33.2 G/DL (ref 31.5–35.7)
MCV RBC AUTO: 90.8 FL (ref 79–97)
MONOCYTES # BLD AUTO: 0.75 10*3/MM3 (ref 0.1–0.9)
MONOCYTES NFR BLD AUTO: 10.3 % (ref 5–12)
NEUTROPHILS # BLD AUTO: 5.15 10*3/MM3 (ref 1.7–7)
NEUTROPHILS NFR BLD AUTO: 70.9 % (ref 42.7–76)
NRBC BLD AUTO-RTO: 0 /100 WBC (ref 0–0.2)
PLATELET # BLD AUTO: 267 10*3/MM3 (ref 140–450)
PMV BLD AUTO: 10.9 FL (ref 6–12)
POTASSIUM BLD-SCNC: 3.9 MMOL/L (ref 3.5–5.2)
PROT SERPL-MCNC: 6.6 G/DL (ref 6–8.5)
RBC # BLD AUTO: 4.45 10*6/MM3 (ref 3.77–5.28)
SODIUM BLD-SCNC: 142 MMOL/L (ref 136–145)
TRIGL SERPL-MCNC: 107 MG/DL (ref 0–150)
VIT B12 BLD-MCNC: >2000 PG/ML (ref 211–946)
VLDLC SERPL-MCNC: 21.4 MG/DL (ref 5–40)
WBC NRBC COR # BLD: 7.27 10*3/MM3 (ref 3.4–10.8)

## 2020-03-02 PROCEDURE — 85025 COMPLETE CBC W/AUTO DIFF WBC: CPT | Performed by: FAMILY MEDICINE

## 2020-03-02 PROCEDURE — 82607 VITAMIN B-12: CPT | Performed by: FAMILY MEDICINE

## 2020-03-02 PROCEDURE — 36415 COLL VENOUS BLD VENIPUNCTURE: CPT | Performed by: FAMILY MEDICINE

## 2020-03-02 PROCEDURE — 99214 OFFICE O/P EST MOD 30 MIN: CPT | Performed by: FAMILY MEDICINE

## 2020-03-02 PROCEDURE — 80061 LIPID PANEL: CPT | Performed by: FAMILY MEDICINE

## 2020-03-02 PROCEDURE — 80053 COMPREHEN METABOLIC PANEL: CPT | Performed by: FAMILY MEDICINE

## 2020-03-02 RX ORDER — MEMANTINE HYDROCHLORIDE 10 MG/1
10 TABLET ORAL 2 TIMES DAILY
Qty: 60 TABLET | Refills: 11 | Status: SHIPPED | OUTPATIENT
Start: 2020-03-02 | End: 2021-03-11 | Stop reason: SDUPTHER

## 2020-03-02 NOTE — PROGRESS NOTES
Rooming Tab(CC,VS,Pt Hx,Fall Screen)  Chief Complaint   Patient presents with   • Hyperlipidemia   • Arthritis       Subjective 79-year-old here for follow-up of her rheumatoid arthritis.  Her arthritis is stable, she stays on prednisone 2.5 mg daily as well as Arava 10 mg daily.  Her joints are not bothering her too bad, some days are better than others.  The patient has osteoporosis and was placed on Prolia 60 mg every 6 months.  She also takes calcium with vitamin D.  Patient has a history of depression and anxiety and is doing very well.  She is happy and has no stress and does not feel anxious and no suicidal ideation.  Medication is working without side effect.  The patient has hyperlipidemia and is on pravastatin and is due for cholesterol check.  Patient has some memory issues and feels like her memory is stable, is on Namenda 5 mg twice daily and tolerating without difficulty.  Patient has calluses on her feet and a rash on her feet although the rash does not itch it does not cause any type of symptoms she just wants it wonders why it is there and what to do about it      I have reviewed and updated her medications, medical history and problem list during today's office visit.     Patient Care Team:  Lele Lucas MD as PCP - General  Lele Lucas MD as PCP - Family Medicine  [facility] - [facility] Attributed    Problem List Tab  Medications Tab  Synopsis Tab  Chart Review Tab  Care Everywhere Tab  Immunizations Tab  Patient History Tab    Social History     Tobacco Use   • Smoking status: Former Smoker     Years: 15.00     Start date:      Last attempt to quit:      Years since quittin.1   • Smokeless tobacco: Never Used   Substance Use Topics   • Alcohol use: No     Frequency: Never       Review of Systems   Constitutional: Negative for chills, fatigue and fever.   HENT: Negative for nosebleeds.    Eyes: Negative for double vision.   Respiratory: Negative for chest  tightness and shortness of breath.    Cardiovascular: Negative for chest pain and palpitations.   Gastrointestinal: Negative for blood in stool.   Genitourinary: Negative for dysuria and hematuria.   Musculoskeletal: Positive for arthralgias.   Skin: Positive for rash.   Neurological: Negative for dizziness and syncope.   Psychiatric/Behavioral: Negative for suicidal ideas and depressed mood. The patient is not nervous/anxious.        Objective     Rooming Tab(CC,VS,Pt Hx,Fall Screen)  /90 (BP Location: Left arm, Patient Position: Sitting, Cuff Size: Adult)   Pulse 74   Resp 12   Wt 70.5 kg (155 lb 6.4 oz)   BMI 26.67 kg/m²     Body mass index is 26.67 kg/m².    Physical Exam   Constitutional: She is oriented to person, place, and time. She appears well-developed and well-nourished. No distress.   Pleasant female who is in no acute distress, she looks younger than her stated age   HENT:   Head: Normocephalic and atraumatic.   Nose: Nose normal.   Mouth/Throat: Oropharynx is clear and moist.   Eyes: Pupils are equal, round, and reactive to light. Conjunctivae, EOM and lids are normal.   Neck: Trachea normal and normal range of motion. Neck supple. No JVD present. Carotid bruit is not present. No thyroid mass and no thyromegaly present.   No carotid bruits   Cardiovascular: Normal rate, regular rhythm, normal heart sounds and intact distal pulses.   Pulmonary/Chest: Effort normal and breath sounds normal.   Musculoskeletal:   No c/c/e  No active synovitis.  Calluses on her feet, the outer great toes bilaterally with small blanching petechiae noted on her dorsum of her feet  DP pulses palpable   Neurological: She is alert and oriented to person, place, and time. No cranial nerve deficit.   Skin: Skin is warm and dry.   Psychiatric: She has a normal mood and affect. Her speech is normal and behavior is normal. She is attentive.   Nursing note and vitals reviewed.       Statin Choice Calculator  Data  Reviewed:               Lab Results   Component Value Date    BUN 9 03/02/2020    CREATININE 0.71 03/02/2020    CREATININE 0.70 01/07/2020    EGFRIFNONA 79 03/02/2020     03/02/2020    K 3.9 03/02/2020     03/02/2020    CALCIUM 8.9 03/02/2020    ALBUMIN 4.20 03/02/2020    BILITOT 0.4 03/02/2020    ALKPHOS 58 03/02/2020    AST 24 03/02/2020    ALT 22 03/02/2020    TRIG 107 03/02/2020    HDL 49 03/02/2020    VLDL 21.4 03/02/2020    LDL 78 03/02/2020    LDLHDL 1.58 03/02/2020    WBC 7.27 03/02/2020    RBC 4.45 03/02/2020    HCT 40.4 03/02/2020    MCV 90.8 03/02/2020    MCH 30.1 03/02/2020      Assessment/Plan   Order Review Tab  Health Maintenance Tab  Patient Plan/Order Tab  Diagnoses and all orders for this visit:    1. Rheumatoid arthritis involving multiple sites with positive rheumatoid factor (CMS/[facility]) (Primary)  Assessment & Plan:  Stable, continue medications and follow-up in 3 months    Orders:  -     CBC & Differential  -     Comprehensive Metabolic Panel  -     CBC Auto Differential    2. Pure hypercholesterolemia  Assessment & Plan:  Lipid abnormalities are improving with treatment.  Nutritional counseling was provided. and Pharmacotherapy as ordered.  Lipids will be reassessed in 3 months.    Orders:  -     Lipid Panel    3. Vitamin B12 deficiency  Assessment & Plan:  12 level is high.  She can decrease her B12 to twice weekly    Orders:  -     Vitamin B12    4. Petechiae  Assessment & Plan:  I am uncertain to the etiology of this.  We will check a CBC.  It seems very limited and not very impressive.  I offered her dermatology consult as I am uncertain as what else to do about it at this point.  We will watch it for now      5. Memory loss  Assessment & Plan:  Patient is stable continue medications and follow-up at next scheduled appointment      6. Recurrent major depressive disorder, in full remission (CMS/[facility])  Assessment & Plan:  Psychological condition is improving with  treatment.  Continue current treatment regimen.  Regular aerobic exercise.  Psychological condition  will be reassessed in 3 months.      7. Age-related osteoporosis without current pathological fracture  Assessment & Plan:  Weightbearing exercise.  Citracal with D6 100 twice daily.  Continue Prolia      8. Foot callus  Assessment & Plan:  New onset, recommend she get a pumice stone and gently remove the callus over time.  She can also consider seeing podiatry if it is persistent or if she is uncomfortable doing this.      Other orders  -     memantine (NAMENDA) 10 MG tablet; Take 1 tablet by mouth 2 (Two) Times a Day.  Dispense: 60 tablet; Refill: 11      Wrapup Tab  Return in about 3 months (around 6/2/2020) for Recheck.       Advance Care Planning   ACP discussion was held with the patient during this visit.

## 2020-03-03 NOTE — ASSESSMENT & PLAN NOTE
I am uncertain to the etiology of this.  We will check a CBC.  It seems very limited and not very impressive.  I offered her dermatology consult as I am uncertain as what else to do about it at this point.  We will watch it for now

## 2020-03-03 NOTE — ASSESSMENT & PLAN NOTE
New onset, recommend she get a pumice stone and gently remove the callus over time.  She can also consider seeing podiatry if it is persistent or if she is uncomfortable doing this.

## 2020-04-27 RX ORDER — PREDNISONE 2.5 MG
TABLET ORAL
Qty: 90 TABLET | Refills: 0 | Status: SHIPPED | OUTPATIENT
Start: 2020-04-27 | End: 2020-06-18

## 2020-04-27 RX ORDER — VENLAFAXINE HYDROCHLORIDE 150 MG/1
CAPSULE, EXTENDED RELEASE ORAL
Qty: 90 CAPSULE | Refills: 0 | Status: SHIPPED | OUTPATIENT
Start: 2020-04-27 | End: 2020-06-18

## 2020-04-27 RX ORDER — TRAZODONE HYDROCHLORIDE 50 MG/1
TABLET ORAL
Qty: 180 TABLET | Refills: 0 | Status: SHIPPED | OUTPATIENT
Start: 2020-04-27 | End: 2020-06-18

## 2020-04-27 RX ORDER — PANTOPRAZOLE SODIUM 40 MG/1
TABLET, DELAYED RELEASE ORAL
Qty: 90 TABLET | Refills: 0 | Status: SHIPPED | OUTPATIENT
Start: 2020-04-27 | End: 2020-06-18

## 2020-05-04 ENCOUNTER — HOSPITAL ENCOUNTER (OUTPATIENT)
Dept: CT IMAGING | Facility: HOSPITAL | Age: 80
Discharge: HOME | End: 2020-05-04
Admitting: THORACIC SURGERY (CARDIOTHORACIC VASCULAR SURGERY)

## 2020-05-04 DIAGNOSIS — C34.2 PRIMARY MALIGNANT NEOPLASM OF RIGHT MIDDLE LOBE OF LUNG (HCC): ICD-10-CM

## 2020-05-04 LAB — CREAT BLDA-MCNC: 0.7 MG/DL (ref 0.6–1.3)

## 2020-05-04 PROCEDURE — 71260 CT THORAX DX C+: CPT

## 2020-05-04 PROCEDURE — 82565 ASSAY OF CREATININE: CPT

## 2020-05-04 PROCEDURE — 0 IOPAMIDOL PER 1 ML: Performed by: THORACIC SURGERY (CARDIOTHORACIC VASCULAR SURGERY)

## 2020-05-04 RX ADMIN — IOPAMIDOL 75 ML: 755 INJECTION, SOLUTION INTRAVENOUS at 09:30

## 2020-05-19 ENCOUNTER — OFFICE VISIT (OUTPATIENT)
Dept: SURGERY | Facility: CLINIC | Age: 80
End: 2020-05-19

## 2020-05-19 VITALS
BODY MASS INDEX: 26.63 KG/M2 | TEMPERATURE: 98.4 F | OXYGEN SATURATION: 100 % | HEART RATE: 70 BPM | WEIGHT: 156 LBS | DIASTOLIC BLOOD PRESSURE: 78 MMHG | SYSTOLIC BLOOD PRESSURE: 139 MMHG | HEIGHT: 64 IN

## 2020-05-19 DIAGNOSIS — C34.2 PRIMARY MALIGNANT NEOPLASM OF RIGHT MIDDLE LOBE OF LUNG (HCC): Primary | ICD-10-CM

## 2020-05-19 PROCEDURE — 99214 OFFICE O/P EST MOD 30 MIN: CPT | Performed by: THORACIC SURGERY (CARDIOTHORACIC VASCULAR SURGERY)

## 2020-05-19 NOTE — PROGRESS NOTES
Thoracic surgery progress note    Chief complaint follow-up of lung cancer right middle lobectomy nearly 2 years ago    I personally examined the patient's chest CT scan dated May 4 and reviewed the radiology report.  I agree with the radiologist there is no evidence of recurrent cancer.  Patient has multiple small pulmonary nodules.  All 5 mm or less.  No evidence of recurrent cancer.    The patient is symptom-free no fevers chills night sweats cough or hemoptysis.  She is avoiding COVID-19.  No headaches no anosmia her taste is normal.  Her appetite is good.  No long bone pain no headaches no loss of appetite.    All systems reviewed all systems otherwise negative results scanned into the chart    Physical examination symmetrical chest expansion good peripheral perfusion    Impression #1 history of lung cancer no evidence of recurrence  2.  History of smoking staying smoke-free encouraged to remain so.    Plan follow-up 6 months with a repeat CT scan

## 2020-05-28 ENCOUNTER — TELEPHONE (OUTPATIENT)
Dept: ORTHOPEDIC SURGERY | Facility: CLINIC | Age: 80
End: 2020-05-28

## 2020-05-28 NOTE — TELEPHONE ENCOUNTER
[employer] SOB received, No PA required, No OOP cost for Prolia.    Last labs 3/11/2020 Calcium 8.9mg/dL. Pt Scheduled for Prolia 6/11/2020.

## 2020-06-11 ENCOUNTER — CLINICAL SUPPORT (OUTPATIENT)
Dept: ORTHOPEDIC SURGERY | Facility: CLINIC | Age: 80
End: 2020-06-11

## 2020-06-11 VITALS
HEART RATE: 70 BPM | BODY MASS INDEX: 26.56 KG/M2 | WEIGHT: 155.6 LBS | HEIGHT: 64 IN | DIASTOLIC BLOOD PRESSURE: 81 MMHG | SYSTOLIC BLOOD PRESSURE: 128 MMHG

## 2020-06-11 DIAGNOSIS — M81.0 AGE-RELATED OSTEOPOROSIS WITHOUT CURRENT PATHOLOGICAL FRACTURE: Primary | ICD-10-CM

## 2020-06-11 PROCEDURE — 96372 THER/PROPH/DIAG INJ SC/IM: CPT | Performed by: PHYSICIAN ASSISTANT

## 2020-06-11 NOTE — PROGRESS NOTES
Patient presents for Prolia injection. Patient had no issues with the last Prolia injection. Patient last Calcium was 8.9mg/dL. Injection administered and patient tolerated without complication.

## 2020-06-18 RX ORDER — VENLAFAXINE HYDROCHLORIDE 150 MG/1
CAPSULE, EXTENDED RELEASE ORAL
Qty: 90 CAPSULE | Refills: 0 | Status: SHIPPED | OUTPATIENT
Start: 2020-06-18 | End: 2020-10-08

## 2020-06-18 RX ORDER — PANTOPRAZOLE SODIUM 40 MG/1
TABLET, DELAYED RELEASE ORAL
Qty: 90 TABLET | Refills: 0 | Status: SHIPPED | OUTPATIENT
Start: 2020-06-18 | End: 2020-10-08

## 2020-06-18 RX ORDER — PREDNISONE 2.5 MG
TABLET ORAL
Qty: 90 TABLET | Refills: 0 | Status: SHIPPED | OUTPATIENT
Start: 2020-06-18 | End: 2020-10-08

## 2020-06-18 RX ORDER — TRAZODONE HYDROCHLORIDE 50 MG/1
TABLET ORAL
Qty: 180 TABLET | Refills: 0 | Status: SHIPPED | OUTPATIENT
Start: 2020-06-18 | End: 2020-10-08

## 2020-07-02 RX ORDER — PRAVASTATIN SODIUM 40 MG
TABLET ORAL
Qty: 90 TABLET | Refills: 1 | Status: SHIPPED | OUTPATIENT
Start: 2020-07-02 | End: 2020-12-17

## 2020-07-14 ENCOUNTER — OFFICE VISIT (OUTPATIENT)
Dept: ORTHOPEDIC SURGERY | Facility: CLINIC | Age: 80
End: 2020-07-14

## 2020-07-14 VITALS — WEIGHT: 158.4 LBS | HEIGHT: 64 IN | BODY MASS INDEX: 27.04 KG/M2

## 2020-07-14 DIAGNOSIS — Z96.642 HISTORY OF TOTAL HIP REPLACEMENT, LEFT: Primary | ICD-10-CM

## 2020-07-14 PROCEDURE — 99212 OFFICE O/P EST SF 10 MIN: CPT | Performed by: ORTHOPAEDIC SURGERY

## 2020-07-14 NOTE — PROGRESS NOTES
POST OP TOTAL hip replacement follow-up.      NAME: Alia Cheney           : 1940    MRN: [id number]    Chief Complaint   Patient presents with   • Left Hip - Follow-up     Total Left Hip Arthroplasty SX 19       Date of Surgery: 2019.  ?  HPI:   Patient returns today for 1 year follow up of left total hip arthroplasty Incision(s) healed nicely with no signs of infection. Patient reports doing well with no unusual complaints. No fevers, rigors or chills. Appears to be progressing appropriately. Patient is on appropriate anticoagulation.  She has no limb length discrepancy.  She is neurovascularly intact.  She is able to walk for exercise.  She states that she does not take any pain medication for control of her hip symptoms.  She states that she is doing absolutely great that her hip is been a great bone to her medical condition since she is able to walk for exercise.  She is very pleased with her postsurgical outcome.  She does have a history of rheumatoid disease and that is being managed medically at this point without any issues.      Review of Systems   Constitutional: Negative.    HENT: Negative.    Eyes: Negative.    Respiratory: Negative.    Cardiovascular: Negative.    Gastrointestinal: Negative.    Endocrine: Negative.    Genitourinary: Negative.    Musculoskeletal: Positive for arthralgias and gait problem.   Skin: Negative.    Allergic/Immunologic: Negative.    Hematological: Negative.    Psychiatric/Behavioral: Negative.    Physical exam:  Constitutional: Patient is oriented to person, place, and time. Appears well-developed and well-nourished.   HENT:   Head: Normocephalic and atraumatic.   Eyes: Conjunctivae and EOM are normal. Pupils are equal, round, and reactive to light.   Cardiovascular: Normal rate, regular rhythm, normal heart sounds and intact distal pulses.   Pulmonary/Chest: Effort normal and breath sounds normal.   Musculoskeletal:   See detailed exam below    Neurological: Alert and oriented to person, place, and time. No sensory deficit. Coordination normal.   Skin: Skin is warm and dry. Capillary refill takes less than 2 seconds. No rash noted. No erythema.   Psychiatric: Patient has a normal mood and affect. Her behavior is normal. Judgment and thought content normal.   Nursing note and vitals reviewed.    Ortho Exam:   Left hip. Patient is post op 1 year(s) from total hip arthoplasty, direct anterior. Incision is clean and healing well. Calf is soft and nontender. Homans sign is negative. Skin and soft tissues are appropriate for postoperative status of the patient. Hip flexion is 0-90 degrees, hip abduction is 0-30 degrees. No limb lenth discrepancy. Neurovascular status is intact. Patients gait is significantly improved. The pain relief is extremely dramatic for the patient. Dorsalis pedis and posterior tibial artery pulses palpable. Common peroneal function is well preserved. There is no evidence of cellulitis or erythema or deep seated joint infection.      Diagnostic Studies:  no diagnostic testing performed this visit        Assessment:  Alia was seen today for follow-up.    Diagnoses and all orders for this visit:    History of total hip replacement, left            Plan   · Incision care  · To use ACE wrap/MIRZA stocking  · Continue ice to joint   · Stretching and strengthening exercises  · Aggressive ROM  · Falls precautions and dislocation precautions.  · You may now resume any prescription medications you were taking prior to surgery  · Continue with lifelong antibiotic prophylaxis with dental procedures following total joint replacement.  · Follow up in 1 year(s)    Date of encounter: 07/14/2020   Lenny Khan MD

## 2020-09-15 ENCOUNTER — LAB (OUTPATIENT)
Dept: FAMILY MEDICINE CLINIC | Facility: CLINIC | Age: 80
End: 2020-09-15

## 2020-09-15 ENCOUNTER — OFFICE VISIT (OUTPATIENT)
Dept: FAMILY MEDICINE CLINIC | Facility: CLINIC | Age: 80
End: 2020-09-15

## 2020-09-15 VITALS
SYSTOLIC BLOOD PRESSURE: 125 MMHG | RESPIRATION RATE: 10 BRPM | BODY MASS INDEX: 27.53 KG/M2 | TEMPERATURE: 97.5 F | HEART RATE: 71 BPM | WEIGHT: 160.4 LBS | DIASTOLIC BLOOD PRESSURE: 71 MMHG

## 2020-09-15 DIAGNOSIS — Z00.00 MEDICARE ANNUAL WELLNESS VISIT, SUBSEQUENT: Primary | ICD-10-CM

## 2020-09-15 DIAGNOSIS — E53.8 VITAMIN B12 DEFICIENCY: ICD-10-CM

## 2020-09-15 DIAGNOSIS — M05.79 RHEUMATOID ARTHRITIS INVOLVING MULTIPLE SITES WITH POSITIVE RHEUMATOID FACTOR (HCC): ICD-10-CM

## 2020-09-15 DIAGNOSIS — E55.9 VITAMIN D DEFICIENCY: ICD-10-CM

## 2020-09-15 DIAGNOSIS — E78.00 PURE HYPERCHOLESTEROLEMIA: ICD-10-CM

## 2020-09-15 LAB
ALBUMIN SERPL-MCNC: 4.3 G/DL (ref 3.5–5.2)
ALBUMIN/GLOB SERPL: 1.9 G/DL
ALP SERPL-CCNC: 62 U/L (ref 39–117)
ALT SERPL W P-5'-P-CCNC: 22 U/L (ref 1–33)
ANION GAP SERPL CALCULATED.3IONS-SCNC: 8 MMOL/L (ref 5–15)
AST SERPL-CCNC: 22 U/L (ref 1–32)
BASOPHILS # BLD AUTO: 0.01 10*3/MM3 (ref 0–0.2)
BASOPHILS NFR BLD AUTO: 0.2 % (ref 0–1.5)
BILIRUB SERPL-MCNC: 0.3 MG/DL (ref 0–1.2)
BILIRUB UR QL STRIP: NEGATIVE
BUN SERPL-MCNC: 11 MG/DL (ref 8–23)
BUN/CREAT SERPL: 15.5 (ref 7–25)
CALCIUM SPEC-SCNC: 9.2 MG/DL (ref 8.6–10.5)
CHLORIDE SERPL-SCNC: 104 MMOL/L (ref 98–107)
CHOLEST SERPL-MCNC: 147 MG/DL (ref 0–200)
CLARITY UR: CLEAR
CO2 SERPL-SCNC: 28 MMOL/L (ref 22–29)
COLOR UR: YELLOW
CREAT SERPL-MCNC: 0.71 MG/DL (ref 0.57–1)
DEPRECATED RDW RBC AUTO: 41.8 FL (ref 37–54)
EOSINOPHIL # BLD AUTO: 0.03 10*3/MM3 (ref 0–0.4)
EOSINOPHIL NFR BLD AUTO: 0.5 % (ref 0.3–6.2)
ERYTHROCYTE [DISTWIDTH] IN BLOOD BY AUTOMATED COUNT: 12.3 % (ref 12.3–15.4)
ERYTHROCYTE [SEDIMENTATION RATE] IN BLOOD: 10 MM/HR (ref 0–30)
GFR SERPL CREATININE-BSD FRML MDRD: 79 ML/MIN/1.73
GLOBULIN UR ELPH-MCNC: 2.3 GM/DL
GLUCOSE SERPL-MCNC: 95 MG/DL (ref 65–99)
GLUCOSE UR STRIP-MCNC: NEGATIVE MG/DL
HCT VFR BLD AUTO: 40.8 % (ref 34–46.6)
HDLC SERPL-MCNC: 48 MG/DL (ref 40–60)
HGB BLD-MCNC: 13.4 G/DL (ref 12–15.9)
HGB UR QL STRIP.AUTO: NEGATIVE
IMM GRANULOCYTES # BLD AUTO: 0.01 10*3/MM3 (ref 0–0.05)
IMM GRANULOCYTES NFR BLD AUTO: 0.2 % (ref 0–0.5)
KETONES UR QL STRIP: NEGATIVE
LDLC SERPL CALC-MCNC: 77 MG/DL (ref 0–100)
LDLC/HDLC SERPL: 1.6 {RATIO}
LEUKOCYTE ESTERASE UR QL STRIP.AUTO: NEGATIVE
LYMPHOCYTES # BLD AUTO: 1.2 10*3/MM3 (ref 0.7–3.1)
LYMPHOCYTES NFR BLD AUTO: 20.6 % (ref 19.6–45.3)
MCH RBC QN AUTO: 30.6 PG (ref 26.6–33)
MCHC RBC AUTO-ENTMCNC: 32.8 G/DL (ref 31.5–35.7)
MCV RBC AUTO: 93.2 FL (ref 79–97)
MONOCYTES # BLD AUTO: 0.51 10*3/MM3 (ref 0.1–0.9)
MONOCYTES NFR BLD AUTO: 8.8 % (ref 5–12)
NEUTROPHILS NFR BLD AUTO: 4.06 10*3/MM3 (ref 1.7–7)
NEUTROPHILS NFR BLD AUTO: 69.7 % (ref 42.7–76)
NITRITE UR QL STRIP: NEGATIVE
NRBC BLD AUTO-RTO: 0 /100 WBC (ref 0–0.2)
PH UR STRIP.AUTO: 7.5 [PH] (ref 5–8)
PLATELET # BLD AUTO: 244 10*3/MM3 (ref 140–450)
PMV BLD AUTO: 10.7 FL (ref 6–12)
POTASSIUM SERPL-SCNC: 4.3 MMOL/L (ref 3.5–5.2)
PROT SERPL-MCNC: 6.6 G/DL (ref 6–8.5)
PROT UR QL STRIP: NEGATIVE
RBC # BLD AUTO: 4.38 10*6/MM3 (ref 3.77–5.28)
SODIUM SERPL-SCNC: 140 MMOL/L (ref 136–145)
SP GR UR STRIP: 1.01 (ref 1–1.03)
TRIGL SERPL-MCNC: 110 MG/DL (ref 0–150)
UROBILINOGEN UR QL STRIP: NORMAL
VLDLC SERPL-MCNC: 22 MG/DL (ref 5–40)
WBC # BLD AUTO: 5.82 10*3/MM3 (ref 3.4–10.8)

## 2020-09-15 PROCEDURE — 80061 LIPID PANEL: CPT | Performed by: FAMILY MEDICINE

## 2020-09-15 PROCEDURE — 85652 RBC SED RATE AUTOMATED: CPT | Performed by: FAMILY MEDICINE

## 2020-09-15 PROCEDURE — 90694 VACC AIIV4 NO PRSRV 0.5ML IM: CPT | Performed by: FAMILY MEDICINE

## 2020-09-15 PROCEDURE — G0008 ADMIN INFLUENZA VIRUS VAC: HCPCS | Performed by: FAMILY MEDICINE

## 2020-09-15 PROCEDURE — 80053 COMPREHEN METABOLIC PANEL: CPT | Performed by: FAMILY MEDICINE

## 2020-09-15 PROCEDURE — G0439 PPPS, SUBSEQ VISIT: HCPCS | Performed by: FAMILY MEDICINE

## 2020-09-15 PROCEDURE — 85025 COMPLETE CBC W/AUTO DIFF WBC: CPT | Performed by: FAMILY MEDICINE

## 2020-09-15 PROCEDURE — 81003 URINALYSIS AUTO W/O SCOPE: CPT | Performed by: FAMILY MEDICINE

## 2020-09-15 NOTE — ASSESSMENT & PLAN NOTE
Recommend increased aerobic exercise for cardiovascular health.  Weightbearing exercise would be beneficial for her bones.  She would be good or better off joining a place that has a scheduled and structured exercise program for elderly.  Nutritious diet high in vegetable and fiber, low in carbohydrate discussed with the patient as well, eating leaner cuts of meat.  Immunizations were discussed with the patient as well.

## 2020-09-15 NOTE — PROGRESS NOTES
The ABCs of the Annual Wellness Visit  Subsequent Medicare Wellness Visit    Chief Complaint   Patient presents with   • Medicare Wellness-subsequent       Subjective   History of Present Illness:  Alia Cheney is a 80 y.o. female who presents for a Subsequent Medicare Wellness Visit.  The patient has been doing some exercises but not as much as she wants with a COVID-19 virus.  Denies any chest pain or dizziness or syncope with exertion.  She states she has no issues with her bowel or bladder function.  She has no blood in her stool or urine.  Her memory is stable and is not gotten any worse.  She does not drive but she takes care of a lot of her own ADLs and her daughter helps her with financials.  She is very happy and has a home service that helps her when needed and takes her where she needs to go.  Patient has rheumatoid arthritis and is doing well with prednisone 2.5 mg daily as well as Arava 10 mg daily her arthritis is relatively stable overall.  She also has history of osteopenia noted on her last DEXA scan in 2019 and she is getting Prolia every 6 months because of her steroid use that is chronic.    HEALTH RISK ASSESSMENT    Recent Hospitalizations:  No hospitalization(s) within the last year.    Current Medical Providers:  Patient Care Team:  Lele Lucas MD as PCP - General  Lele Lucas MD as PCP - Family Medicine  Lele Lucas MD as PCP - Claims Attributed    Smoking Status:  Social History     Tobacco Use   Smoking Status Former Smoker   • Packs/day: 1.00   • Years: 15.00   • Pack years: 15.00   • Types: Cigarettes   • Start date:    • Quit date:    • Years since quittin.7   Smokeless Tobacco Never Used       Alcohol Consumption:  Social History     Substance and Sexual Activity   Alcohol Use Yes   • Frequency: Monthly or less   • Drinks per session: 1 or 2       Depression Screen:   PHQ-2/PHQ-9 Depression Screening 9/15/2020   Little interest or pleasure in doing  things 0   Feeling down, depressed, or hopeless 0   Total Score 0       Fall Risk Screen:  KRISS Fall Risk Assessment was completed, and patient is at LOW risk for falls.Assessment completed on:9/15/2020    Health Habits and Functional and Cognitive Screening:  Functional & Cognitive Status 9/15/2020   Do you have difficulty preparing food and eating? No   Do you have difficulty bathing yourself, getting dressed or grooming yourself? No   Do you have difficulty using the toilet? No   Do you have difficulty moving around from place to place? No   Do you have trouble with steps or getting out of a bed or a chair? No   Current Diet Well Balanced Diet   Dental Exam Up to date   Eye Exam Up to date   Exercise (times per week) 0 times per week   Current Exercise Activities Include None   Do you need help using the phone?  No   Are you deaf or do you have serious difficulty hearing?  No   Do you need help with transportation? No   Do you need help shopping? No   Do you need help preparing meals?  No   Do you need help with housework?  No   Do you need help with laundry? No   Do you need help taking your medications? No   Do you need help managing money? No   Do you ever drive or ride in a car without wearing a seat belt? No   Have you felt unusual stress, anger or loneliness in the last month? No   Who do you live with? Alone   If you need help, do you have trouble finding someone available to you? No   Have you been bothered in the last four weeks by sexual problems? -   Do you have difficulty concentrating, remembering or making decisions? Yes         Does the patient have evidence of cognitive impairment? No    Asprin use counseling:Does not need ASA (and currently is not on it)    Age-appropriate Screening Schedule:  Refer to the list below for future screening recommendations based on patient's age, sex and/or medical conditions. Orders for these recommended tests are listed in the plan section. The patient has been  provided with a written plan.    Health Maintenance   Topic Date Due   • TDAP/TD VACCINES (1 - Tdap) 03/10/2021 (Originally 7/25/1959)   • ZOSTER VACCINE (1 of 2) 09/24/2021 (Originally 7/25/1990)   • LIPID PANEL  03/02/2021   • DXA SCAN  05/25/2021   • INFLUENZA VACCINE  Completed          The following portions of the patient's history were reviewed and updated as appropriate: allergies, current medications, past family history, past medical history, past social history, past surgical history and problem list.    Outpatient Medications Prior to Visit   Medication Sig Dispense Refill   • calcium carbonate (OS-DAHLIA) 1250 (500 Ca) MG tablet Take 600 mg by mouth Daily.     • calcium citrate-vitamin d (CITRACAL) 200-250 MG-UNIT tablet tablet Take  by mouth Daily.     • CARTIA  MG 24 hr capsule TAKE ONE CAPSULE BY MOUTH DAILY 30 capsule 4   • Cholecalciferol (VITAMIN D3 SUPER STRENGTH) 50 MCG (2000 UT) tablet 2 po q d 60 each 11   • leflunomide (ARAVA) 10 MG tablet TAKE 1 TABLET EVERY DAY 90 tablet 3   • LORazepam (ATIVAN) 0.5 MG tablet 1 po bid prn anxiety 30 tablet 0   • memantine (NAMENDA) 10 MG tablet Take 1 tablet by mouth 2 (Two) Times a Day. 60 tablet 11   • metoprolol tartrate (LOPRESSOR) 25 MG tablet TAKE 1/2 TABLET TWICE DAILY 90 tablet 0   • pantoprazole (PROTONIX) 40 MG EC tablet TAKE 1 TABLET EVERY DAY 90 tablet 0   • polyethylene glycol (MIRALAX) powder DISSOLVE 17 GRAMS IN 8 OUNCES OF LIQUID AND DRINK TWICE A DAY 1020 g 8   • pravastatin (PRAVACHOL) 40 MG tablet TAKE 1 TABLET AT BEDTIME 90 tablet 1   • predniSONE (DELTASONE) 2.5 MG tablet TAKE 1 TABLET EVERY DAY 90 tablet 0   • traZODone (DESYREL) 50 MG tablet TAKE 2 TABLETS AT BEDTIME FOR INSOMNIA 180 tablet 0   • venlafaxine XR (EFFEXOR-XR) 150 MG 24 hr capsule TAKE 1 CAPSULE EVERY MORNING 90 capsule 0   • vitamin B-12 (CYANOCOBALAMIN) 1000 MCG tablet Take 1,000 mcg by mouth Daily.       Facility-Administered Medications Prior to Visit   Medication  Dose Route Frequency Provider Last Rate Last Dose   • denosumab (PROLIA) syringe 60 mg  60 mg Subcutaneous Q6 Months Aletha Guadalupe PA   60 mg at 06/11/20 0934       Patient Active Problem List   Diagnosis   • Absolute anemia   • Anal stenosis   • Anxiety   • Arrhythmia, ventricular   • Ataxia   • Carpal tunnel syndrome, bilateral   • Colles' fracture   • Coronary artery disease   • Depression   • Physical exam   • Gastroesophageal reflux disease   • Hx of total knee replacement, right   • Hyperlipidemia   • Insomnia   • Irritable bowel syndrome   • Keratoconjunctivitis sicca, in Sjogren's syndrome (CMS/HCC)   • Memory loss   • Osteoarthritis of knee   • Osteoporosis   • Paroxysmal atrial fibrillation (CMS/HCC)   • Primary malignant neoplasm of right middle lobe of lung (CMS/HCC)   • Rheumatoid arthritis (CMS/HCC)   • Trochanteric bursitis of left hip   • Vitamin B12 deficiency   • Vitamin D deficiency   • History of total hip replacement, left   • Overweight (BMI 25.0-29.9)   • Allergic rhinitis   • Lung nodule   • Other specified dorsopathies, site unspecified   • Other specified examination   • Primary osteoarthritis of both knees   • Foot callus   • Petechiae   • Medicare annual wellness visit, subsequent       Advanced Care Planning:  ACP discussion was held with the patient during this visit. Patient has an advance directive in EMR which is still valid.     Review of Systems   Constitutional: Negative for chills, fatigue and fever.   HENT: Negative for congestion and nosebleeds.    Cardiovascular: Negative for chest pain and palpitations.   Gastrointestinal: Negative for abdominal pain and diarrhea.   Genitourinary: Negative for dysuria and hematuria.   Musculoskeletal: Negative for arthralgias and myalgias.   Skin: Negative for rash.   Neurological: Negative for dizziness and syncope.   Psychiatric/Behavioral: Negative for confusion, dysphoric mood, hallucinations, self-injury and suicidal ideas.        Compared to one year ago, the patient feels her physical health is the same.  Compared to one year ago, the patient feels her mental health is the same.    Reviewed chart for potential of high risk medication in the elderly: yes  Reviewed chart for potential of harmful drug interactions in the elderly:yes    Objective         Vitals:    09/15/20 1332   BP: 125/71   Pulse: 71   Resp: 10   Temp: 97.5 °F (36.4 °C)   Weight: 72.8 kg (160 lb 6.4 oz)       Body mass index is 27.53 kg/m².  Discussed the patient's BMI with her. The BMI is above average; BMI management plan is completed.    Physical Exam  Vitals signs and nursing note reviewed.   Constitutional:       General: She is not in acute distress.     Appearance: Normal appearance. She is well-developed and normal weight.   HENT:      Head: Normocephalic and atraumatic.      Right Ear: Tympanic membrane normal.      Left Ear: Tympanic membrane normal.      Nose: Nose normal.      Mouth/Throat:      Pharynx: Oropharynx is clear.   Eyes:      General: Lids are normal.      Extraocular Movements: Extraocular movements intact.      Conjunctiva/sclera: Conjunctivae normal.      Pupils: Pupils are equal, round, and reactive to light.   Neck:      Musculoskeletal: Normal range of motion and neck supple.      Thyroid: No thyroid mass or thyromegaly.      Vascular: No carotid bruit or JVD.      Trachea: Trachea normal.      Comments: No carotid bruits  Cardiovascular:      Rate and Rhythm: Normal rate and regular rhythm.      Pulses: Normal pulses.      Heart sounds: Normal heart sounds.   Pulmonary:      Effort: Pulmonary effort is normal. No respiratory distress.      Breath sounds: Normal breath sounds.   Abdominal:      General: Abdomen is flat. Bowel sounds are normal. There is no distension.      Palpations: Abdomen is soft.      Comments: Overweight   Musculoskeletal:      Comments: No c/c/e   Skin:     General: Skin is warm and dry.   Neurological:       General: No focal deficit present.      Mental Status: She is alert and oriented to person, place, and time.      Cranial Nerves: No cranial nerve deficit.   Psychiatric:         Attention and Perception: She is attentive.         Mood and Affect: Mood normal.         Speech: Speech normal.         Behavior: Behavior normal.               Assessment/Plan   Medicare Risks and Personalized Health Plan  CMS Preventative Services Quick Reference  Advance Directive Discussion  Breast Cancer/Mammogram Screening  Cardiovascular risk  Chronic Pain   Colon Cancer Screening  Dementia/Memory   Depression/Dysphoria  Diabetic Lab Screening   Fall Risk  Glaucoma Risk  Hearing Problem  Immunizations Discussed/Encouraged (specific immunizations; adacel Tdap, Influenza, Pneumococcal 23, Prevnar and Shingrix )  Inadequate Social Support, Isolation, Loneliness, Lack of Transportation, Financial Difficulties, or Caregiver Stress   Inactivity/Sedentary  Osteoprorosis Risk    The above risks/problems have been discussed with the patient.  Pertinent information has been shared with the patient in the After Visit Summary.  Follow up plans and orders are seen below in the Assessment/Plan Section.    Diagnoses and all orders for this visit:    1. Medicare annual wellness visit, subsequent (Primary)  Assessment & Plan:  Recommend increased aerobic exercise for cardiovascular health.  Weightbearing exercise would be beneficial for her bones.  She would be good or better off joining a place that has a scheduled and structured exercise program for elderly.  Nutritious diet high in vegetable and fiber, low in carbohydrate discussed with the patient as well, eating leaner cuts of meat.  Immunizations were discussed with the patient as well.      2. Pure hypercholesterolemia  -     Comprehensive Metabolic Panel  -     Lipid Panel  -     Urinalysis With Culture If Indicated - Urine, Clean Catch    3. Vitamin D deficiency    4. Rheumatoid arthritis  involving multiple sites with positive rheumatoid factor (CMS/HCC)  -     Sedimentation Rate    5. Vitamin B12 deficiency  -     CBC & Differential  -     CBC Auto Differential    Other orders  -     Fluad Quad 65+ yrs ([id number])    Follow Up:  Return in about 3 months (around 12/15/2020), or if symptoms worsen or fail to improve, for Recheck.     An After Visit Summary and PPPS were given to the patient.

## 2020-10-08 RX ORDER — PREDNISONE 2.5 MG
TABLET ORAL
Qty: 90 TABLET | Refills: 0 | Status: SHIPPED | OUTPATIENT
Start: 2020-10-08 | End: 2020-12-07

## 2020-10-08 RX ORDER — VENLAFAXINE HYDROCHLORIDE 150 MG/1
CAPSULE, EXTENDED RELEASE ORAL
Qty: 90 CAPSULE | Refills: 0 | Status: SHIPPED | OUTPATIENT
Start: 2020-10-08 | End: 2020-12-07

## 2020-10-08 RX ORDER — TRAZODONE HYDROCHLORIDE 50 MG/1
TABLET ORAL
Qty: 180 TABLET | Refills: 0 | Status: SHIPPED | OUTPATIENT
Start: 2020-10-08 | End: 2020-12-07

## 2020-10-08 RX ORDER — PANTOPRAZOLE SODIUM 40 MG/1
TABLET, DELAYED RELEASE ORAL
Qty: 90 TABLET | Refills: 0 | Status: SHIPPED | OUTPATIENT
Start: 2020-10-08 | End: 2020-12-07

## 2020-10-27 RX ORDER — POLYETHYLENE GLYCOL 3350 17 G/17G
POWDER, FOR SOLUTION ORAL
Qty: 507 G | Refills: 7 | Status: SHIPPED | OUTPATIENT
Start: 2020-10-27 | End: 2021-02-16

## 2020-11-13 ENCOUNTER — HOSPITAL ENCOUNTER (OUTPATIENT)
Dept: CT IMAGING | Facility: HOSPITAL | Age: 80
Discharge: HOME | End: 2020-11-13
Admitting: THORACIC SURGERY (CARDIOTHORACIC VASCULAR SURGERY)

## 2020-11-13 DIAGNOSIS — C34.2 PRIMARY MALIGNANT NEOPLASM OF RIGHT MIDDLE LOBE OF LUNG (HCC): ICD-10-CM

## 2020-11-13 PROCEDURE — 71250 CT THORAX DX C-: CPT

## 2020-12-01 ENCOUNTER — OFFICE VISIT (OUTPATIENT)
Dept: SURGERY | Facility: CLINIC | Age: 80
End: 2020-12-01

## 2020-12-01 VITALS
TEMPERATURE: 97.3 F | SYSTOLIC BLOOD PRESSURE: 144 MMHG | DIASTOLIC BLOOD PRESSURE: 82 MMHG | HEIGHT: 64 IN | HEART RATE: 67 BPM | WEIGHT: 165 LBS | BODY MASS INDEX: 28.17 KG/M2 | OXYGEN SATURATION: 96 %

## 2020-12-01 DIAGNOSIS — M05.79 RHEUMATOID ARTHRITIS INVOLVING MULTIPLE SITES WITH POSITIVE RHEUMATOID FACTOR (HCC): ICD-10-CM

## 2020-12-01 DIAGNOSIS — C34.2 PRIMARY MALIGNANT NEOPLASM OF RIGHT MIDDLE LOBE OF LUNG (HCC): Primary | ICD-10-CM

## 2020-12-01 PROCEDURE — 99214 OFFICE O/P EST MOD 30 MIN: CPT | Performed by: THORACIC SURGERY (CARDIOTHORACIC VASCULAR SURGERY)

## 2020-12-01 NOTE — PROGRESS NOTES
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  The patient has no new symptoms.  No headaches no long bone pain no weight loss no fever chills or night sweats.  She continues to be smoke-free having only smoked 45 years ago.    I personally examined the CT scan reviewed the radiology report and I concur with it.  No evidence of recurrent cancer.    Review of systems positive only for frequent urination easy bruising in her hands and arms.  She has hypertension.  All other systems reviewed and negative results scanned in chart    Physical examination well-appearing no distress COVID-19 precautions practice.  Mask in place.  Sclera anicteric conjunctiva pink symmetrical chest expansion abdomen nondistended nontender no cyanosis clubbing or edema.    1.  Lung cancer no evidence of recurrence repeat CT scan in 6 months #2 elderly somewhat frail status underlying rheumatoid arthritis clinically stable #3 hypertension elevated blood pressure noted.  Repeat blood pressure testing with feet on the floor reduce blood pressure to 158/62 recommend routine follow-up with primary care.

## 2020-12-03 RX ORDER — LEFLUNOMIDE 10 MG/1
TABLET ORAL
Qty: 90 TABLET | Refills: 3 | Status: SHIPPED | OUTPATIENT
Start: 2020-12-03 | End: 2022-05-13

## 2020-12-07 RX ORDER — TRAZODONE HYDROCHLORIDE 50 MG/1
TABLET ORAL
Qty: 180 TABLET | Refills: 0 | Status: SHIPPED | OUTPATIENT
Start: 2020-12-07 | End: 2021-02-17

## 2020-12-07 RX ORDER — VENLAFAXINE HYDROCHLORIDE 150 MG/1
CAPSULE, EXTENDED RELEASE ORAL
Qty: 90 CAPSULE | Refills: 0 | Status: SHIPPED | OUTPATIENT
Start: 2020-12-07 | End: 2021-02-17

## 2020-12-07 RX ORDER — PANTOPRAZOLE SODIUM 40 MG/1
TABLET, DELAYED RELEASE ORAL
Qty: 90 TABLET | Refills: 0 | Status: SHIPPED | OUTPATIENT
Start: 2020-12-07 | End: 2021-02-17

## 2020-12-07 RX ORDER — PREDNISONE 2.5 MG
TABLET ORAL
Qty: 90 TABLET | Refills: 0 | Status: SHIPPED | OUTPATIENT
Start: 2020-12-07 | End: 2021-02-17

## 2020-12-14 ENCOUNTER — LAB (OUTPATIENT)
Dept: LAB | Facility: HOSPITAL | Age: 80
End: 2020-12-14

## 2020-12-14 ENCOUNTER — OFFICE VISIT (OUTPATIENT)
Dept: ORTHOPEDIC SURGERY | Facility: CLINIC | Age: 80
End: 2020-12-14

## 2020-12-14 VITALS
BODY MASS INDEX: 27.66 KG/M2 | WEIGHT: 162 LBS | SYSTOLIC BLOOD PRESSURE: 125 MMHG | HEART RATE: 67 BPM | DIASTOLIC BLOOD PRESSURE: 73 MMHG | HEIGHT: 64 IN

## 2020-12-14 DIAGNOSIS — Z51.81 MEDICATION MONITORING ENCOUNTER: ICD-10-CM

## 2020-12-14 DIAGNOSIS — M81.0 AGE-RELATED OSTEOPOROSIS WITHOUT CURRENT PATHOLOGICAL FRACTURE: Primary | ICD-10-CM

## 2020-12-14 DIAGNOSIS — Z87.310 HISTORY OF OSTEOPOROTIC PATHOLOGICAL FRACTURE: ICD-10-CM

## 2020-12-14 DIAGNOSIS — R41.3 MEMORY LOSS: ICD-10-CM

## 2020-12-14 DIAGNOSIS — E55.9 VITAMIN D DEFICIENCY: ICD-10-CM

## 2020-12-14 DIAGNOSIS — M05.79 RHEUMATOID ARTHRITIS INVOLVING MULTIPLE SITES WITH POSITIVE RHEUMATOID FACTOR (HCC): ICD-10-CM

## 2020-12-14 DIAGNOSIS — I48.0 PAROXYSMAL ATRIAL FIBRILLATION (HCC): ICD-10-CM

## 2020-12-14 DIAGNOSIS — M81.0 AGE-RELATED OSTEOPOROSIS WITHOUT CURRENT PATHOLOGICAL FRACTURE: ICD-10-CM

## 2020-12-14 DIAGNOSIS — F33.42 RECURRENT MAJOR DEPRESSIVE DISORDER, IN FULL REMISSION (HCC): ICD-10-CM

## 2020-12-14 DIAGNOSIS — M35.01 KERATOCONJUNCTIVITIS SICCA, IN SJOGREN'S SYNDROME (HCC): ICD-10-CM

## 2020-12-14 DIAGNOSIS — C34.2 PRIMARY MALIGNANT NEOPLASM OF RIGHT MIDDLE LOBE OF LUNG (HCC): ICD-10-CM

## 2020-12-14 LAB
25(OH)D3 SERPL-MCNC: 54.4 NG/ML (ref 30–100)
ALBUMIN SERPL-MCNC: 4 G/DL (ref 3.5–5.2)
ALBUMIN/GLOB SERPL: 1.8 G/DL
ALP SERPL-CCNC: 67 U/L (ref 39–117)
ALT SERPL W P-5'-P-CCNC: 24 U/L (ref 1–33)
ANION GAP SERPL CALCULATED.3IONS-SCNC: 8 MMOL/L (ref 5–15)
AST SERPL-CCNC: 22 U/L (ref 1–32)
BILIRUB SERPL-MCNC: 0.3 MG/DL (ref 0–1.2)
BUN SERPL-MCNC: 9 MG/DL (ref 8–23)
BUN/CREAT SERPL: 13 (ref 7–25)
CALCIUM SPEC-SCNC: 9.3 MG/DL (ref 8.6–10.5)
CHLORIDE SERPL-SCNC: 107 MMOL/L (ref 98–107)
CO2 SERPL-SCNC: 27 MMOL/L (ref 22–29)
CREAT SERPL-MCNC: 0.69 MG/DL (ref 0.57–1)
GFR SERPL CREATININE-BSD FRML MDRD: 81 ML/MIN/1.73
GLOBULIN UR ELPH-MCNC: 2.2 GM/DL
GLUCOSE SERPL-MCNC: 72 MG/DL (ref 65–99)
POTASSIUM SERPL-SCNC: 4 MMOL/L (ref 3.5–5.2)
PROT SERPL-MCNC: 6.2 G/DL (ref 6–8.5)
SODIUM SERPL-SCNC: 142 MMOL/L (ref 136–145)

## 2020-12-14 PROCEDURE — 99214 OFFICE O/P EST MOD 30 MIN: CPT | Performed by: PHYSICIAN ASSISTANT

## 2020-12-14 PROCEDURE — 82306 VITAMIN D 25 HYDROXY: CPT

## 2020-12-14 PROCEDURE — 96372 THER/PROPH/DIAG INJ SC/IM: CPT | Performed by: PHYSICIAN ASSISTANT

## 2020-12-14 PROCEDURE — 36415 COLL VENOUS BLD VENIPUNCTURE: CPT

## 2020-12-14 PROCEDURE — 80053 COMPREHEN METABOLIC PANEL: CPT

## 2020-12-14 NOTE — PATIENT INSTRUCTIONS
Osteoporosis    Osteoporosis happens when your bones get thin and weak. This can cause your bones to break (fracture) more easily. You can do things at home to make your bones stronger.  Follow these instructions at home:    Activity  · Exercise as told by your doctor. Ask your doctor what activities are safe for you. You should do:  ? Exercises that make your muscles work to hold your body weight up (weight-bearing exercises). These include jordyn chi, yoga, and walking.  ? Exercises to make your muscles stronger. One example is lifting weights.  Lifestyle  · Limit alcohol intake to no more than 1 drink a day for nonpregnant women and 2 drinks a day for men. One drink equals 12 oz of beer, 5 oz of wine, or 1½ oz of hard liquor.  · Do not use any products that have nicotine or tobacco in them. These include cigarettes and e-cigarettes. If you need help quitting, ask your doctor.  Preventing falls  · Use tools to help you move around (mobility aids) as needed. These include canes, walkers, scooters, and crutches.  · Keep rooms well-lit and free of clutter.  · Put away things that could make you trip. These include cords and rugs.  · Install safety rails on stairs. Install grab bars in bathrooms.  · Use rubber mats in slippery areas, like bathrooms.  · Wear shoes that:  ? Fit you well.  ? Support your feet.  ? Have closed toes.  ? Have rubber soles or low heels.  · Tell your doctor about all of the medicines you are taking. Some medicines can make you more likely to fall.  General instructions  · Eat plenty of calcium and vitamin D. These nutrients are good for your bones. Good sources of calcium and vitamin D include:  ? Some fatty fish, such as salmon and tuna.  ? Foods that have calcium and vitamin D added to them (fortified foods). For example, some breakfast cereals are fortified with calcium and vitamin D.  ? Egg yolks.  ? Cheese.  ? Liver.  · Take over-the-counter and prescription medicines only as told by your  doctor.  · Keep all follow-up visits as told by your doctor. This is important.  Contact a doctor if:  · You have not been tested (screened) for osteoporosis and you are:  ? A woman who is age 65 or older.  ? A man who is age 70 or older.  Get help right away if:  · You fall.  · You get hurt.  Summary  · Osteoporosis happens when your bones get thin and weak.  · Weak bones can break (fracture) more easily.  · Eat plenty of calcium and vitamin D. These nutrients are good for your bones.  · Tell your doctor about all of the medicines that you take.  This information is not intended to replace advice given to you by your health care provider. Make sure you discuss any questions you have with your health care provider.  Document Revised: 11/30/2018 Document Reviewed: 10/12/2018  Elsevier Patient Education © 2020 [employer]      Fall Prevention in the Home, Adult  Falls can cause injuries. They can happen to people of all ages. There are many things you can do to make your home safe and to help prevent falls. Ask for help when making these changes, if needed.  What actions can I take to prevent falls?  General Instructions  · Use good lighting in all rooms. Replace any light bulbs that burn out.  · Turn on the lights when you go into a dark area. Use night-lights.  · Keep items that you use often in easy-to-reach places. Lower the shelves around your home if necessary.  · Set up your furniture so you have a clear path. Avoid moving your furniture around.  · Do not have throw rugs and other things on the floor that can make you trip.  · Avoid walking on wet floors.  · If any of your floors are uneven, fix them.  · Add color or contrast paint or tape to clearly vi and help you see:  ? Any grab bars or handrails.  ? First and last steps of stairways.  ? Where the edge of each step is.  · If you use a stepladder:  ? Make sure that it is fully opened. Do not climb a closed stepladder.  ? Make sure that both sides of the  stepladder are locked into place.  ? Ask someone to hold the stepladder for you while you use it.  · If there are any pets around you, be aware of where they are.  What can I do in the bathroom?         · Keep the floor dry. Clean up any water that spills onto the floor as soon as it happens.  · Remove soap buildup in the tub or shower regularly.  · Use non-skid mats or decals on the floor of the tub or shower.  · Attach bath mats securely with double-sided, non-slip rug tape.  · If you need to sit down in the shower, use a plastic, non-slip stool.  · Install grab bars by the toilet and in the tub and shower. Do not use towel bars as grab bars.  What can I do in the bedroom?  · Make sure that you have a light by your bed that is easy to reach.  · Do not use any sheets or blankets that are too big for your bed. They should not hang down onto the floor.  · Have a firm chair that has side arms. You can use this for support while you get dressed.  What can I do in the kitchen?  · Clean up any spills right away.  · If you need to reach something above you, use a strong step stool that has a grab bar.  · Keep electrical cords out of the way.  · Do not use floor polish or wax that makes floors slippery. If you must use wax, use non-skid floor wax.  What can I do with my stairs?  · Do not leave any items on the stairs.  · Make sure that you have a light switch at the top of the stairs and the bottom of the stairs. If you do not have them, ask someone to add them for you.  · Make sure that there are handrails on both sides of the stairs, and use them. Fix handrails that are broken or loose. Make sure that handrails are as long as the stairways.  · Install non-slip stair treads on all stairs in your home.  · Avoid having throw rugs at the top or bottom of the stairs. If you do have throw rugs, attach them to the floor with carpet tape.  · Choose a carpet that does not hide the edge of the steps on the stairway.  · Check any  carpeting to make sure that it is firmly attached to the stairs. Fix any carpet that is loose or worn.  What can I do on the outside of my home?  · Use bright outdoor lighting.  · Regularly fix the edges of walkways and driveways and fix any cracks.  · Remove anything that might make you trip as you walk through a door, such as a raised step or threshold.  · Trim any bushes or trees on the path to your home.  · Regularly check to see if handrails are loose or broken. Make sure that both sides of any steps have handrails.  · Install guardrails along the edges of any raised decks and porches.  · Clear walking paths of anything that might make someone trip, such as tools or rocks.  · Have any leaves, snow, or ice cleared regularly.  · Use sand or salt on walking paths during winter.  · Clean up any spills in your garage right away. This includes grease or oil spills.  What other actions can I take?  · Wear shoes that:  ? Have a low heel. Do not wear high heels.  ? Have rubber bottoms.  ? Are comfortable and fit you well.  ? Are closed at the toe. Do not wear open-toe sandals.  · Use tools that help you move around (mobility aids) if they are needed. These include:  ? Canes.  ? Walkers.  ? Scooters.  ? Crutches.  · Review your medicines with your doctor. Some medicines can make you feel dizzy. This can increase your chance of falling.  Ask your doctor what other things you can do to help prevent falls.  Where to find more information  · Centers for Disease Control and Prevention, STEADI: https://cdc.gov  · National [place] on Aging: https://uh9oqpk.diann.nih.gov  Contact a doctor if:  · You are afraid of falling at home.  · You feel weak, drowsy, or dizzy at home.  · You fall at home.  Summary  · There are many simple things that you can do to make your home safe and to help prevent falls.  · Ways to make your home safe include removing tripping hazards and installing grab bars in the bathroom.  · Ask for help when  making these changes in your home.  This information is not intended to replace advice given to you by your health care provider. Make sure you discuss any questions you have with your health care provider.  Document Revised: 04/09/2020 Document Reviewed: 08/02/2018  Elsevier Patient Education © 2020 Elsevier Inc.      Sit-to-Stand Exercise    The sit-to-stand exercise (also known as the chair stand or chair rise exercise) strengthens your lower body and helps you maintain or improve your mobility and independence. The goal is to do the sit-to-stand exercise without using your hands. This will be easier as you become stronger. You should always talk with your health care provider before starting any exercise program, especially if you have had recent surgery.  Do the exercise exactly as told by your health care provider and adjust it as directed. It is normal to feel mild stretching, pulling, tightness, or discomfort as you do this exercise, but you should stop right away if you feel sudden pain or your pain gets worse. Do not begin doing this exercise until told by your health care provider.  What the sit-to-stand exercise does  The sit-to-stand exercise helps to strengthen the muscles in your thighs and the muscles in the center of your body that give you stability (core muscles). This exercise is especially helpful if:  · You have had knee or hip surgery.  · You have trouble getting up from a chair, out of a car, or off the toilet.  How to do the sit-to-stand exercise  1. Sit toward the front edge of a sturdy chair without armrests. Your knees should be bent and your feet should be flat on the floor and shoulder-width apart.  2. Place your hands lightly on each side of the seat. Keep your back and neck as straight as possible, with your chest slightly forward.  3. Breathe in slowly. Lean forward and slightly shift your weight to the front of your feet.  4. Breathe out as you slowly stand up. Use your hands as  little as possible.  5. Stand and pause for a full breath in and out.  6. Breathe in as you sit down slowly. Tighten your core and abdominal muscles to control your lowering as much as possible.  7. Breathe out slowly.  8. Do this exercise 10-15 times. If needed, do it fewer times until you build up strength.  9. Rest for 1 minute, then do another set of 10-15 repetitions.  To change the difficulty of the sit-to-stand exercise  · If the exercise is too difficult, use a chair with sturdy armrests, and push off the armrests to help you come to the standing position. You can also use the armrests to help slowly lower yourself back to sitting. As this gets easier, try to use your arms less. You can also place a firm cushion or pillow on the chair to make the surface higher.  · If this exercise is too easy, do not use your arms to help raise or lower yourself. You can also wear a weighted vest, use hand weights, increase your repetitions, or try a lower chair.  General tips  · You may feel tired when starting an exercise routine. This is normal.  · You may have muscle soreness that lasts a few days. This is normal. As you get stronger, you may not feel muscle soreness.  · Use smooth, steady movements.  · Do not  hold your breath during strength exercises. This can cause unsafe changes in your blood pressure.  · Breathe in slowly through your nose, and breathe out slowly through your mouth.  Summary  · Strengthening your lower body is an important step to help you move safely and independently.  · The sit-to-stand exercise helps strengthen the muscles in your thighs and core.  · You should always talk with your health care provider before starting any exercise program, especially if you have had recent surgery.  This information is not intended to replace advice given to you by your health care provider. Make sure you discuss any questions you have with your health care provider.  Document Revised: 10/16/2019 Document  Reviewed: 02/08/2018  Elsevier Patient Education © 2020 Elsevier Inc.

## 2020-12-14 NOTE — PROGRESS NOTES
ORTHO FOLLOW UP       Subjective:    HPI:   Alia Cheney is a 80 y.o. female who presents in follow-up for osteoporosis.  She is currently on Prolia reports that she is doing well with no noticeable side effects.  Her last injection was on 6/11/2020.  She also continues 1200 mg of calcium and 2000 international units of D3 per day.  She denies any fractures since last office visit.  She is walking for physical activity.  She does not drive anymore.  She denies any falls in the last year, however she does at times feel unsteady with walking.  She denies any new pain or change in her existing pain.  Recent labs were reviewed.  Her DEXA scan is currently up-to-date.    STEADI Fall Risk Assessment was completed, and patient is at LOW risk for falls.Assessment completed on:12/14/2020      Past Medical History:   Diagnosis Date   • Anal stenosis 1/3/2012   • Anxiety 11/25/2013   • Arrhythmia, ventricular 4/20/2012   • Arthritis    • Ataxia 8/6/2013   • Carpal tunnel syndrome, bilateral 2/13/2017   • Depression 1/3/2012   • Elevated cholesterol    • Gastroesophageal reflux disease 6/29/2012   • History of osteoporotic pathological fracture     Bilateral wrist-unknown date   • Hx of total knee replacement, right 6/29/2017   • Hyperlipidemia 5/2/2016   • Insomnia 8/6/2013   • Irritable bowel syndrome 8/6/2013   • Keratoconjunctivitis sicca, in Sjogren's syndrome (CMS/HCC) 9/21/2016   • Memory loss 12/7/2018   • Osteoarthritis of knee 5/19/2017   • Osteoporosis     fosamax [id number], on prolia(2016)   • Paroxysmal atrial fibrillation (CMS/HCC) 11/9/2011   • Primary malignant neoplasm of right middle lobe of lung (CMS/HCC) 8/21/2018   • Rheumatoid arthritis (CMS/[facility])    • Tension-type headache, not intractable 12/26/2017   • Vitamin B12 deficiency 5/2/2016   • Vitamin D deficiency 11/20/2017       Past Surgical History:   Procedure Laterality Date   • ABDOMINAL SURGERY     • ANAL DILATION     • APPENDECTOMY     • BREAST  BIOPSY Right    • CARDIAC CATHETERIZATION     • CATARACT EXTRACTION WITH INTRAOCULAR LENS IMPLANT Bilateral    • CHOLECYSTECTOMY     • COLONOSCOPY     • CYSTOSCOPY     • HYSTERECTOMY     • JOINT REPLACEMENT     • LUNG REMOVAL, PARTIAL Right 2018    RIGHT VATS WEDGE RESECTION OF RML PN RIGHT MIDDLE LOBECTOMY    • SUBTOTAL HYSTERECTOMY     • TONSILLECTOMY     • TOTAL HIP ARTHROPLASTY Left 2019    Procedure: TOTAL HIP ARTHROPLASTY ANTERIOR;  Surgeon: Lenny Khan MD;  Location: [facility] MAIN OR;  Service: Orthopedics   • TOTAL KNEE ARTHROPLASTY Right 2017   • WRIST FRACTURE SURGERY Bilateral        Social History     Occupational History   • Not on file   Tobacco Use   • Smoking status: Former Smoker     Packs/day: 1.00     Years: 15.00     Pack years: 15.00     Types: Cigarettes     Start date:      Quit date:      Years since quittin.9   • Smokeless tobacco: Never Used   Substance and Sexual Activity   • Alcohol use: Yes     Frequency: Monthly or less     Drinks per session: 1 or 2   • Drug use: No   • Sexual activity: Not Currently      The following portions of the patient's history were reviewed and updated as appropriate: allergies, current medications, past family history, past medical history, past social history, past surgical history and problem list.    Medications:    Current Outpatient Medications:   •  calcium carbonate (OS-DAHLIA) 1250 (500 Ca) MG tablet, Take 600 mg by mouth Daily., Disp: , Rfl:   •  calcium citrate-vitamin d (CITRACAL) 200-250 MG-UNIT tablet tablet, Take  by mouth Daily., Disp: , Rfl:   •  CARTIA  MG 24 hr capsule, TAKE ONE CAPSULE BY MOUTH DAILY, Disp: 30 capsule, Rfl: 4  •  Cholecalciferol (VITAMIN D3 SUPER STRENGTH) 50 MCG (2000 UT) tablet, 2 po q d, Disp: 60 each, Rfl: 11  •  leflunomide (ARAVA) 10 MG tablet, TAKE 1 TABLET EVERY DAY, Disp: 90 tablet, Rfl: 3  •  LORazepam (ATIVAN) 0.5 MG tablet, 1 po bid prn anxiety, Disp: 30 tablet, Rfl: 0  •   "memantine (NAMENDA) 10 MG tablet, Take 1 tablet by mouth 2 (Two) Times a Day., Disp: 60 tablet, Rfl: 11  •  metoprolol tartrate (LOPRESSOR) 25 MG tablet, TAKE 1/2 TABLET TWICE DAILY, Disp: 90 tablet, Rfl: 0  •  pantoprazole (PROTONIX) 40 MG EC tablet, TAKE 1 TABLET EVERY DAY, Disp: 90 tablet, Rfl: 0  •  polyethylene glycol (MIRALAX) 17 GM/SCOOP powder, DISSOLVE 17 GRAMS IN 8 OUNCES OF LIQUID AND DRINK TWICE A DAY, Disp: 507 g, Rfl: 7  •  pravastatin (PRAVACHOL) 40 MG tablet, TAKE 1 TABLET AT BEDTIME, Disp: 90 tablet, Rfl: 1  •  predniSONE (DELTASONE) 2.5 MG tablet, TAKE 1 TABLET EVERY DAY, Disp: 90 tablet, Rfl: 0  •  traZODone (DESYREL) 50 MG tablet, TAKE 2 TABLETS AT BEDTIME FOR INSOMNIA, Disp: 180 tablet, Rfl: 0  •  venlafaxine XR (EFFEXOR-XR) 150 MG 24 hr capsule, TAKE 1 CAPSULE EVERY MORNING, Disp: 90 capsule, Rfl: 0  •  vitamin B-12 (CYANOCOBALAMIN) 1000 MCG tablet, Take 1,000 mcg by mouth Daily., Disp: , Rfl:     Current Facility-Administered Medications:   •  denosumab (PROLIA) syringe 60 mg, 60 mg, Subcutaneous, Q6 Months, Aletha Guadalupe PA, 60 mg at 06/11/20 0934    Allergies:  No Known Allergies    Review of Systems:  Gen -no fever, chills , sweats, headache   Eyes - no irritation or discharge   ENT -  no ear pain , runny nose , sore throat , difficulty swallowing   Resp - no cough , congestion , excessive expectoration   CVS - no chest pain , palpitations.   Abd - no pain , nausea , vomiting , diarrhea   Skin - no rash , lesions.   Neuro - no dizziness    Please see HPI for any other pertinent positives.  All other systems were reviewed and are negative.       Objective   Objective:    /73   Pulse 67   Ht 162.6 cm (64\")   Wt 73.5 kg (162 lb)   BMI 27.81 kg/m²     Physical Examination:  Overweight individual in no acute distress, patient is alert and cooperative with the exam, appears to have normal mood and affect with a normal attention span and concentration, ambulating " unassisted  normocephalic, atraumatic, extraocular movements intact, conjunctiva and sclera are clear without nystagmus, normal canals with grossly normal hearing, no nasal deformity, discharge, inflammation, or lesions  no lymphadenopathy or thyromegaly  normal respiratory effort  abdomen is nontender, without guarding or rebound and nondistended  no gross joint abnormalities  pulses normal in all 4 extremities, no clubbing, cyanosis, or edema noted  cranial nerves II-XII grossly intact with no focal defects  skin intact without lesions or rashes visible        Imaging:  no diagnostic testing performed this visit   Unknown date-fragility fractures of bilateral wrists  6/14/2016-DEXA scan at [facility], revealed a T score of -2.4 in the right total hip, with FRAX scores of 14.9% and 4.6%.  2016-patient started on Prolia  5/22/2019-DEXA scan at [facility], revealed a T score of -2.1 in the right total hip.  When compared to 2016, this did show an increase in bone density of 6.4% in the spine and 3.6% in the hips.            Assessment:  1. Age-related osteoporosis without current pathological fracture    2. History of osteoporotic pathological fracture    3. Vitamin D deficiency    4. Medication monitoring encounter    5. Paroxysmal atrial fibrillation (CMS/HCC)    6. Primary malignant neoplasm of right middle lobe of lung (CMS/HCC)    7. Rheumatoid arthritis involving multiple sites with positive rheumatoid factor (CMS/HCC)    8. Keratoconjunctivitis sicca, in Sjogren's syndrome (CMS/HCC)    9. Recurrent major depressive disorder, in full remission (CMS/HCC)    10. Memory loss         Currently on Prolia since 2016        Plan:  Today, I would like to check a CMP and vitamin D.  A new DEXA scan will be ordered to be done in May 2021 at [facility].  Prolia injection today.  With her history of fragility fractures and multiple risk factors, she continues to be a very high risk for future  fractures.  At this time, I am recommending that she continue her Prolia injections every 6 months, as well as her calcium and vitamin D daily.  She has deferred physical therapy at this time.  We did review weightbearing exercises and fall prevention today.  I will plan to see her back in 1 year and as needed, and she will be scheduled for her next Prolia injection in 6 months.  She should call with any questions or concerns.             MATI Crouch  12/14/20  11:39 EST

## 2020-12-16 ENCOUNTER — OFFICE VISIT (OUTPATIENT)
Dept: FAMILY MEDICINE CLINIC | Facility: CLINIC | Age: 80
End: 2020-12-16

## 2020-12-16 ENCOUNTER — LAB (OUTPATIENT)
Dept: FAMILY MEDICINE CLINIC | Facility: CLINIC | Age: 80
End: 2020-12-16

## 2020-12-16 ENCOUNTER — TELEPHONE (OUTPATIENT)
Dept: ORTHOPEDIC SURGERY | Facility: CLINIC | Age: 80
End: 2020-12-16

## 2020-12-16 DIAGNOSIS — M05.79 RHEUMATOID ARTHRITIS INVOLVING MULTIPLE SITES WITH POSITIVE RHEUMATOID FACTOR (HCC): Primary | ICD-10-CM

## 2020-12-16 DIAGNOSIS — F33.42 RECURRENT MAJOR DEPRESSIVE DISORDER, IN FULL REMISSION (HCC): ICD-10-CM

## 2020-12-16 DIAGNOSIS — R41.3 MEMORY LOSS: ICD-10-CM

## 2020-12-16 LAB
BASOPHILS # BLD AUTO: 0.01 10*3/MM3 (ref 0–0.2)
BASOPHILS NFR BLD AUTO: 0.2 % (ref 0–1.5)
DEPRECATED RDW RBC AUTO: 41 FL (ref 37–54)
EOSINOPHIL # BLD AUTO: 0.01 10*3/MM3 (ref 0–0.4)
EOSINOPHIL NFR BLD AUTO: 0.2 % (ref 0.3–6.2)
ERYTHROCYTE [DISTWIDTH] IN BLOOD BY AUTOMATED COUNT: 12.1 % (ref 12.3–15.4)
HCT VFR BLD AUTO: 40.1 % (ref 34–46.6)
HGB BLD-MCNC: 13.2 G/DL (ref 12–15.9)
IMM GRANULOCYTES # BLD AUTO: 0.02 10*3/MM3 (ref 0–0.05)
IMM GRANULOCYTES NFR BLD AUTO: 0.3 % (ref 0–0.5)
LYMPHOCYTES # BLD AUTO: 1.53 10*3/MM3 (ref 0.7–3.1)
LYMPHOCYTES NFR BLD AUTO: 23.8 % (ref 19.6–45.3)
MCH RBC QN AUTO: 30.6 PG (ref 26.6–33)
MCHC RBC AUTO-ENTMCNC: 32.9 G/DL (ref 31.5–35.7)
MCV RBC AUTO: 92.8 FL (ref 79–97)
MONOCYTES # BLD AUTO: 0.61 10*3/MM3 (ref 0.1–0.9)
MONOCYTES NFR BLD AUTO: 9.5 % (ref 5–12)
NEUTROPHILS NFR BLD AUTO: 4.24 10*3/MM3 (ref 1.7–7)
NEUTROPHILS NFR BLD AUTO: 66 % (ref 42.7–76)
NRBC BLD AUTO-RTO: 0 /100 WBC (ref 0–0.2)
PLATELET # BLD AUTO: 261 10*3/MM3 (ref 140–450)
PMV BLD AUTO: 10.5 FL (ref 6–12)
RBC # BLD AUTO: 4.32 10*6/MM3 (ref 3.77–5.28)
WBC # BLD AUTO: 6.42 10*3/MM3 (ref 3.4–10.8)

## 2020-12-16 PROCEDURE — 99213 OFFICE O/P EST LOW 20 MIN: CPT | Performed by: FAMILY MEDICINE

## 2020-12-16 PROCEDURE — 85025 COMPLETE CBC W/AUTO DIFF WBC: CPT | Performed by: FAMILY MEDICINE

## 2020-12-16 NOTE — PROGRESS NOTES
Rooming Tab(CC,VS,Pt Hx,Fall Screen)  Chief Complaint   Patient presents with   • Rheumatoid Arthritis   • Hyperlipidemia       Subjective Patient is an 80-year-old female who is here for follow-up of her rheumatoid arthritis.  She feels like she is doing well with her current medications.  Her joints are stable, she has some days that are better than others though.  She has no issues with her memory and it seems to be stabilized.  She does not drive but is able to take care of her own ADLs.  She has a service that is called [employer] that gives her transportation.  She has assistance from her children as well.  She denies having any chest pain or dizziness or syncope.  She gets around fairly well and has had no falls.    I have reviewed and updated her medications, medical history and problem list during today's office visit.     Patient Care Team:  Lele Lucas MD as PCP - General  Lele Lucas MD as PCP - Family Medicine    Problem List Tab  Medications Tab  Synopsis Tab  Chart Review Tab  Care Everywhere Tab  Immunizations Tab  Patient History Tab    Social History     Tobacco Use   • Smoking status: Former Smoker     Packs/day: 1.00     Years: 15.00     Pack years: 15.00     Types: Cigarettes     Start date:      Quit date:      Years since quittin.9   • Smokeless tobacco: Never Used   Substance Use Topics   • Alcohol use: Yes     Frequency: Monthly or less     Drinks per session: 1 or 2       Review of Systems   Constitutional: Negative for chills, fatigue and fever.   HENT: Negative for congestion and nosebleeds.    Eyes: Negative for double vision.   Respiratory: Negative for chest tightness and shortness of breath.    Cardiovascular: Negative for chest pain and palpitations.   Gastrointestinal: Negative for blood in stool.   Genitourinary: Negative for dysuria and hematuria.   Musculoskeletal: Positive for arthralgias and gait problem.   Neurological: Positive for memory problem.  Negative for dizziness, syncope and confusion.   Psychiatric/Behavioral: Negative for depressed mood.       Objective     Rooming Tab(CC,VS,Pt Hx,Fall Screen)  /84   Pulse 73   Temp 97.1 °F (36.2 °C)   Resp 10   Wt 76.5 kg (168 lb 9.6 oz)   BMI 28.94 kg/m²     Body mass index is 28.94 kg/m².    Physical Exam  Vitals signs and nursing note reviewed.   Constitutional:       General: She is not in acute distress.     Appearance: Normal appearance. She is well-developed and normal weight.      Comments: Pleasant elderly female who is in no acute distress   HENT:      Head: Normocephalic and atraumatic.      Right Ear: Tympanic membrane and ear canal normal.      Left Ear: Tympanic membrane and ear canal normal.      Nose: Nose normal.      Mouth/Throat:      Mouth: Mucous membranes are moist.      Pharynx: Oropharynx is clear.   Eyes:      General: Lids are normal.      Extraocular Movements: Extraocular movements intact.      Conjunctiva/sclera: Conjunctivae normal.      Pupils: Pupils are equal, round, and reactive to light.   Neck:      Musculoskeletal: Normal range of motion and neck supple.      Thyroid: No thyroid mass or thyromegaly.      Vascular: No carotid bruit or JVD.      Trachea: Trachea normal.      Comments: No carotid bruits  Cardiovascular:      Rate and Rhythm: Normal rate and regular rhythm.      Heart sounds: Normal heart sounds.   Pulmonary:      Effort: Pulmonary effort is normal.      Breath sounds: Normal breath sounds.   Abdominal:      General: Abdomen is flat. Bowel sounds are normal. There is no distension.      Palpations: Abdomen is soft.      Tenderness: There is no abdominal tenderness.   Musculoskeletal:      Comments: No c/c/e  She has no active synovitis in her hands at this time.  Her joints actually look really good.   Skin:     General: Skin is warm and dry.   Neurological:      General: No focal deficit present.      Mental Status: She is alert and oriented to person,  place, and time.      Cranial Nerves: No cranial nerve deficit.   Psychiatric:         Attention and Perception: She is attentive.         Mood and Affect: Mood normal.         Speech: Speech normal.         Behavior: Behavior normal.         Thought Content: Thought content normal.         Judgment: Judgment normal.          Statin Choice Calculator  Data Reviewed:               Lab Results   Component Value Date    BUN 9 12/14/2020    CREATININE 0.69 12/14/2020    EGFRIFNONA 81 12/14/2020     12/14/2020    K 4.0 12/14/2020     12/14/2020    CALCIUM 9.3 12/14/2020    ALBUMIN 4.00 12/14/2020    BILITOT 0.3 12/14/2020    ALKPHOS 67 12/14/2020    AST 22 12/14/2020    ALT 24 12/14/2020    WBC 6.42 12/16/2020    RBC 4.32 12/16/2020    HCT 40.1 12/16/2020    MCV 92.8 12/16/2020    MCH 30.6 12/16/2020    AAFL63TD 54.4 12/14/2020      Assessment/Plan   Order Review Tab  Health Maintenance Tab  Patient Plan/Order Tab  Diagnoses and all orders for this visit:    1. Rheumatoid arthritis involving multiple sites with positive rheumatoid factor (CMS/[facility]) (Primary)  Assessment & Plan:  We will continue her current medications.  She had a normal CMP not long ago so we will just do a CBC.    Orders:  -     CBC & Differential  -     CBC Auto Differential    2. Memory loss  Assessment & Plan:  This seems very stable, she will continue her current medications.  She does not seem to be having much of a deterioration in her symptoms at this time and have had no calls from any family members.  She seems to do quite well.      3. Recurrent major depressive disorder, in full remission (CMS/HCC)  Assessment & Plan:  Psychological condition is Stable.  Continue current treatment regimen.  Regular aerobic exercise.  Psychological condition  will be reassessed at the next regular appointment.        Wrapup Tab  Return in about 3 months (around 3/16/2021) for Recheck.

## 2020-12-16 NOTE — TELEPHONE ENCOUNTER
Call placed to patient, left message on machine advising of lab results. Advised to call with questions.

## 2020-12-16 NOTE — TELEPHONE ENCOUNTER
----- Message from MATI Crouch sent at 12/15/2020 10:01 AM EST -----  Please let her know that her labs look good

## 2020-12-17 VITALS
RESPIRATION RATE: 10 BRPM | HEART RATE: 73 BPM | BODY MASS INDEX: 28.94 KG/M2 | TEMPERATURE: 97.1 F | SYSTOLIC BLOOD PRESSURE: 120 MMHG | DIASTOLIC BLOOD PRESSURE: 84 MMHG | WEIGHT: 168.6 LBS

## 2020-12-17 RX ORDER — PRAVASTATIN SODIUM 40 MG
TABLET ORAL
Qty: 90 TABLET | Refills: 1 | Status: SHIPPED | OUTPATIENT
Start: 2020-12-17 | End: 2021-06-04 | Stop reason: SDUPTHER

## 2020-12-17 NOTE — ASSESSMENT & PLAN NOTE
This seems very stable, she will continue her current medications.  She does not seem to be having much of a deterioration in her symptoms at this time and have had no calls from any family members.  She seems to do quite well.

## 2020-12-17 NOTE — ASSESSMENT & PLAN NOTE
We will continue her current medications.  She had a normal CMP not long ago so we will just do a CBC.

## 2020-12-17 NOTE — ASSESSMENT & PLAN NOTE
Psychological condition is Stable.  Continue current treatment regimen.  Regular aerobic exercise.  Psychological condition  will be reassessed at the next regular appointment.

## 2021-02-17 RX ORDER — TRAZODONE HYDROCHLORIDE 50 MG/1
TABLET ORAL
Qty: 60 TABLET | Refills: 0 | Status: SHIPPED | OUTPATIENT
Start: 2021-02-17 | End: 2021-03-15

## 2021-02-17 RX ORDER — VENLAFAXINE HYDROCHLORIDE 150 MG/1
CAPSULE, EXTENDED RELEASE ORAL
Qty: 30 CAPSULE | Refills: 0 | Status: SHIPPED | OUTPATIENT
Start: 2021-02-17 | End: 2021-03-15

## 2021-02-17 RX ORDER — PANTOPRAZOLE SODIUM 40 MG/1
TABLET, DELAYED RELEASE ORAL
Qty: 30 TABLET | Refills: 0 | Status: SHIPPED | OUTPATIENT
Start: 2021-02-17 | End: 2021-03-15

## 2021-02-17 RX ORDER — PREDNISONE 2.5 MG
TABLET ORAL
Qty: 30 TABLET | Refills: 0 | Status: SHIPPED | OUTPATIENT
Start: 2021-02-17 | End: 2021-03-15

## 2021-02-18 RX ORDER — POLYETHYLENE GLYCOL 3350 17 G/17G
POWDER, FOR SOLUTION ORAL
Qty: 1020 G | Refills: 0 | Status: SHIPPED | OUTPATIENT
Start: 2021-02-18 | End: 2021-04-08 | Stop reason: SDUPTHER

## 2021-03-03 ENCOUNTER — LAB (OUTPATIENT)
Dept: FAMILY MEDICINE CLINIC | Facility: CLINIC | Age: 81
End: 2021-03-03

## 2021-03-03 ENCOUNTER — OFFICE VISIT (OUTPATIENT)
Dept: FAMILY MEDICINE CLINIC | Facility: CLINIC | Age: 81
End: 2021-03-03

## 2021-03-03 VITALS
OXYGEN SATURATION: 98 % | TEMPERATURE: 96.9 F | SYSTOLIC BLOOD PRESSURE: 132 MMHG | WEIGHT: 168 LBS | BODY MASS INDEX: 28.84 KG/M2 | HEART RATE: 73 BPM | DIASTOLIC BLOOD PRESSURE: 82 MMHG

## 2021-03-03 DIAGNOSIS — E78.00 PURE HYPERCHOLESTEROLEMIA: ICD-10-CM

## 2021-03-03 DIAGNOSIS — F33.42 RECURRENT MAJOR DEPRESSIVE DISORDER, IN FULL REMISSION (HCC): ICD-10-CM

## 2021-03-03 DIAGNOSIS — R41.3 MEMORY LOSS: ICD-10-CM

## 2021-03-03 DIAGNOSIS — M05.79 RHEUMATOID ARTHRITIS INVOLVING MULTIPLE SITES WITH POSITIVE RHEUMATOID FACTOR (HCC): Primary | ICD-10-CM

## 2021-03-03 DIAGNOSIS — M05.79 RHEUMATOID ARTHRITIS INVOLVING MULTIPLE SITES WITH POSITIVE RHEUMATOID FACTOR (HCC): ICD-10-CM

## 2021-03-03 LAB
ALBUMIN SERPL-MCNC: 4.2 G/DL (ref 3.5–5.2)
ALBUMIN/GLOB SERPL: 1.6 G/DL
ALP SERPL-CCNC: 75 U/L (ref 39–117)
ALT SERPL W P-5'-P-CCNC: 22 U/L (ref 1–33)
ANION GAP SERPL CALCULATED.3IONS-SCNC: 11.6 MMOL/L (ref 5–15)
AST SERPL-CCNC: 22 U/L (ref 1–32)
BILIRUB SERPL-MCNC: 0.4 MG/DL (ref 0–1.2)
BUN SERPL-MCNC: 13 MG/DL (ref 8–23)
BUN/CREAT SERPL: 17.1 (ref 7–25)
CALCIUM SPEC-SCNC: 9.6 MG/DL (ref 8.6–10.5)
CHLORIDE SERPL-SCNC: 104 MMOL/L (ref 98–107)
CHOLEST SERPL-MCNC: 142 MG/DL (ref 0–200)
CO2 SERPL-SCNC: 27.4 MMOL/L (ref 22–29)
CREAT SERPL-MCNC: 0.76 MG/DL (ref 0.57–1)
GFR SERPL CREATININE-BSD FRML MDRD: 73 ML/MIN/1.73
GLOBULIN UR ELPH-MCNC: 2.6 GM/DL
GLUCOSE SERPL-MCNC: 82 MG/DL (ref 65–99)
HDLC SERPL-MCNC: 45 MG/DL (ref 40–60)
LDLC SERPL CALC-MCNC: 74 MG/DL (ref 0–100)
LDLC/HDLC SERPL: 1.59 {RATIO}
POTASSIUM SERPL-SCNC: 4.2 MMOL/L (ref 3.5–5.2)
PROT SERPL-MCNC: 6.8 G/DL (ref 6–8.5)
SODIUM SERPL-SCNC: 143 MMOL/L (ref 136–145)
TRIGL SERPL-MCNC: 127 MG/DL (ref 0–150)
VLDLC SERPL-MCNC: 23 MG/DL (ref 5–40)

## 2021-03-03 PROCEDURE — 36415 COLL VENOUS BLD VENIPUNCTURE: CPT

## 2021-03-03 PROCEDURE — 80053 COMPREHEN METABOLIC PANEL: CPT | Performed by: NURSE PRACTITIONER

## 2021-03-03 PROCEDURE — 80061 LIPID PANEL: CPT | Performed by: NURSE PRACTITIONER

## 2021-03-03 PROCEDURE — 99214 OFFICE O/P EST MOD 30 MIN: CPT | Performed by: NURSE PRACTITIONER

## 2021-03-03 NOTE — PROGRESS NOTES
Subjective   Alia Cheney is a 80 y.o. female.     Patient is here today to establish care.  She lives alone.  She is a previous patient of Dr. Lucas.  She is from this area.  She has a history of lung cancer.  She last saw Dr. Brantley in December 2020.  She had a right middle lobectomy completed previously.  She is due to have a repeat CT scan in June 2021    Rheumatoid arthritis-patient is currently on Arava 10 mg daily and prednisone 2.5 mg daily. She is doing well on the medications. She has been on them for years and was previously managed by Dr. Lucas.     Anxiety/depression-patient is currently on Effexor  mg daily and Ativan 0.5 mg twice daily as needed. She does not take the ativan often.  She is doing well. Denies any SI or HI.    Hypertension/afib-patient is currently on metoprolol 12.5 mg twice daily.  She does not see cardiology, but did in the past.  States she had a bought of a fib in the hospital but has not since.  She has cartia on her med list but she does not believe she is on this medication.     Insomnia-patient is currently on trazodone 100 mg nightly. She is doing well on the medication.     Hyperlipidemia-patient is currently on pravastatin 40 mg daily. Doing well on med.  She hasn't been eating a well balanced diet recently.     Memory loss-patient is currently on Namenda 10 mg BID.  She states that her memory is stable     Osteoporosis-patient is currently on Prolia.  She sees Zoila Guadalupe.    Labs- due  DEXA- UTD  Colonoscopy-             The following portions of the patient's history were reviewed and updated as appropriate: allergies, current medications, past family history, past medical history, past social history, past surgical history and problem list.    Review of Systems   Constitutional: Negative for chills, fatigue and fever.   HENT: Negative for congestion, ear pain and sore throat.    Respiratory: Positive for cough (comes and goes). Negative for chest tightness and  shortness of breath.    Cardiovascular: Negative for chest pain and palpitations.   Gastrointestinal: Negative for abdominal pain, constipation, diarrhea, nausea and vomiting.   Genitourinary: Negative for dysuria, frequency and urgency.   Musculoskeletal: Negative for arthralgias.   Skin: Negative for rash.   Neurological: Negative for dizziness and headache.   Psychiatric/Behavioral: Negative for sleep disturbance, depressed mood and stress. The patient is not nervous/anxious.        Objective   /82 (BP Location: Left arm, Patient Position: Sitting, Cuff Size: Adult)   Pulse 73   Temp 96.9 °F (36.1 °C) (Tympanic)   Wt 76.2 kg (168 lb)   SpO2 98%   BMI 28.84 kg/m²   Physical Exam  Vitals signs reviewed.   Constitutional:       General: She is not in acute distress.     Appearance: Normal appearance. She is well-developed. She is not diaphoretic.   HENT:      Head: Normocephalic and atraumatic.   Eyes:      General:         Right eye: No discharge.         Left eye: No discharge.      Conjunctiva/sclera: Conjunctivae normal.      Pupils: Pupils are equal, round, and reactive to light.   Neck:      Musculoskeletal: Normal range of motion and neck supple.   Cardiovascular:      Rate and Rhythm: Normal rate and regular rhythm.      Heart sounds: No murmur.   Pulmonary:      Effort: Pulmonary effort is normal. No respiratory distress.      Breath sounds: Normal breath sounds. No wheezing or rales.   Abdominal:      General: Bowel sounds are normal. There is no distension.      Palpations: Abdomen is soft.      Tenderness: There is no abdominal tenderness.   Musculoskeletal: Normal range of motion.   Skin:     General: Skin is warm and dry.   Neurological:      General: No focal deficit present.      Mental Status: She is alert and oriented to person, place, and time.   Psychiatric:         Mood and Affect: Mood normal.         Behavior: Behavior normal.         Thought Content: Thought content normal.          Judgment: Judgment normal.           Assessment/Plan     Diagnoses and all orders for this visit:    1. Rheumatoid arthritis involving multiple sites with positive rheumatoid factor (CMS/HCC) (Primary)  Comments:  stable  cont prednisone and ARAVA  referral to rhuematology  Orders:  -     Comprehensive Metabolic Panel; Future  -     Ambulatory Referral to Rheumatology    2. Memory loss  Comments:  stable  continue namenda    3. Recurrent major depressive disorder, in full remission (CMS/[facility])  Comments:  stable  cont current meds    4. Pure hypercholesterolemia  Comments:  stable  cont to work on diet and exercise  cont meds  Orders:  -     Lipid Panel; Future

## 2021-03-03 NOTE — PATIENT INSTRUCTIONS
Referral to rheumatology  Continue current meds  Work on diet and exercise  Complete blood work  Call for any issues or concerns

## 2021-03-11 RX ORDER — MEMANTINE HYDROCHLORIDE 10 MG/1
10 TABLET ORAL 2 TIMES DAILY
Qty: 60 TABLET | Refills: 11 | Status: SHIPPED | OUTPATIENT
Start: 2021-03-11 | End: 2021-10-01 | Stop reason: SDUPTHER

## 2021-03-15 RX ORDER — TRAZODONE HYDROCHLORIDE 50 MG/1
TABLET ORAL
Qty: 60 TABLET | Refills: 0 | Status: SHIPPED | OUTPATIENT
Start: 2021-03-15 | End: 2021-10-01 | Stop reason: SDUPTHER

## 2021-03-15 RX ORDER — PANTOPRAZOLE SODIUM 40 MG/1
TABLET, DELAYED RELEASE ORAL
Qty: 30 TABLET | Refills: 0 | Status: SHIPPED | OUTPATIENT
Start: 2021-03-15 | End: 2021-10-01 | Stop reason: SDUPTHER

## 2021-03-15 RX ORDER — VENLAFAXINE HYDROCHLORIDE 150 MG/1
CAPSULE, EXTENDED RELEASE ORAL
Qty: 30 CAPSULE | Refills: 0 | Status: SHIPPED | OUTPATIENT
Start: 2021-03-15 | End: 2021-10-01 | Stop reason: SDUPTHER

## 2021-03-15 RX ORDER — PREDNISONE 2.5 MG
TABLET ORAL
Qty: 30 TABLET | Refills: 0 | Status: SHIPPED | OUTPATIENT
Start: 2021-03-15 | End: 2021-10-01

## 2021-04-08 ENCOUNTER — TELEPHONE (OUTPATIENT)
Dept: FAMILY MEDICINE CLINIC | Facility: CLINIC | Age: 81
End: 2021-04-08

## 2021-04-08 RX ORDER — POLYETHYLENE GLYCOL 3350 17 G/17G
17 POWDER, FOR SOLUTION ORAL 2 TIMES DAILY
Qty: 1020 G | Refills: 2 | Status: SHIPPED | OUTPATIENT
Start: 2021-04-08

## 2021-04-08 NOTE — TELEPHONE ENCOUNTER
PT STATES THAT SHE WOULD LIKE SHAWNA COOPER TO CONTACT PHARMACY TO LET THEM KNOW THAT SHE IS PT'S PCP. PT STATED THAT SHE ONLY RECEIVED 1 BOTTLE OF polyethylene glycol (MIRALAX) 17 GM/SCOOP powder AND NORMALLY RECEIVES 2.    PHARMACY [facility] - [place], [place] - [address] [address] - [phone]  - [phone]   [phone]    CALL BACK [phone]

## 2021-05-24 ENCOUNTER — HOSPITAL ENCOUNTER (OUTPATIENT)
Dept: BONE DENSITY | Facility: HOSPITAL | Age: 81
Discharge: HOME | End: 2021-05-24
Admitting: PHYSICIAN ASSISTANT

## 2021-05-24 DIAGNOSIS — M81.0 AGE-RELATED OSTEOPOROSIS WITHOUT CURRENT PATHOLOGICAL FRACTURE: ICD-10-CM

## 2021-05-24 DIAGNOSIS — E55.9 VITAMIN D DEFICIENCY: ICD-10-CM

## 2021-05-24 DIAGNOSIS — Z87.310 HISTORY OF OSTEOPOROTIC PATHOLOGICAL FRACTURE: ICD-10-CM

## 2021-05-24 DIAGNOSIS — Z51.81 MEDICATION MONITORING ENCOUNTER: ICD-10-CM

## 2021-05-24 PROCEDURE — 77080 DXA BONE DENSITY AXIAL: CPT

## 2021-05-26 ENCOUNTER — OFFICE VISIT (OUTPATIENT)
Dept: FAMILY MEDICINE CLINIC | Facility: CLINIC | Age: 81
End: 2021-05-26

## 2021-05-26 ENCOUNTER — HOSPITAL ENCOUNTER (OUTPATIENT)
Dept: CT IMAGING | Facility: HOSPITAL | Age: 81
Discharge: HOME | End: 2021-05-26
Admitting: THORACIC SURGERY (CARDIOTHORACIC VASCULAR SURGERY)

## 2021-05-26 VITALS
DIASTOLIC BLOOD PRESSURE: 75 MMHG | BODY MASS INDEX: 29.01 KG/M2 | HEART RATE: 75 BPM | WEIGHT: 169 LBS | SYSTOLIC BLOOD PRESSURE: 116 MMHG | TEMPERATURE: 97.8 F | OXYGEN SATURATION: 98 %

## 2021-05-26 DIAGNOSIS — M05.79 RHEUMATOID ARTHRITIS INVOLVING MULTIPLE SITES WITH POSITIVE RHEUMATOID FACTOR (HCC): ICD-10-CM

## 2021-05-26 DIAGNOSIS — Z02.2 ENCOUNTER FOR EXAMINATION FOR ADMISSION TO ASSISTED LIVING FACILITY: Primary | ICD-10-CM

## 2021-05-26 DIAGNOSIS — C34.2 PRIMARY MALIGNANT NEOPLASM OF RIGHT MIDDLE LOBE OF LUNG (HCC): ICD-10-CM

## 2021-05-26 PROCEDURE — 99213 OFFICE O/P EST LOW 20 MIN: CPT | Performed by: NURSE PRACTITIONER

## 2021-05-26 PROCEDURE — 71250 CT THORAX DX C-: CPT

## 2021-05-26 NOTE — PROGRESS NOTES
Subjective   Alia Cheney is a 80 y.o. female.     Pt is here today to have paperwork completed for assisted living placement.  She is going to be moving into [facility] [address] in June.  She needs a physical paper completed and a letter to give her landlord stating she would benefit from assisted living.  She needs a CXR- had a CT scan today to follow up on lung cancer which should be beneficial.   She takes her medication well on her own.  Able to ambulate on own.  She does not drive at this time.   She is on namenda for memory  Doing well on all other medications.   Denies CP, SOA, dizziness, HA.       The following portions of the patient's history were reviewed and updated as appropriate: allergies, current medications, past family history, past medical history, past social history, past surgical history and problem list.    Review of Systems   Constitutional: Negative for chills, fatigue and fever.   HENT: Negative for congestion, sinus pressure and sore throat.    Eyes: Negative for blurred vision and double vision.   Respiratory: Negative for cough, chest tightness and shortness of breath.    Cardiovascular: Negative for chest pain and palpitations.   Gastrointestinal: Negative for abdominal pain, constipation, diarrhea, nausea and vomiting.   Genitourinary: Negative for frequency and urgency.   Musculoskeletal: Negative for arthralgias.   Neurological: Positive for memory problem. Negative for dizziness and headache.   Psychiatric/Behavioral: Negative for self-injury, suicidal ideas, depressed mood and stress. The patient is not nervous/anxious.        Objective   /75 (BP Location: Left arm, Patient Position: Sitting, Cuff Size: Adult)   Pulse 75   Temp 97.8 °F (36.6 °C) (Tympanic)   Wt 76.7 kg (169 lb)   SpO2 98%   BMI 29.01 kg/m²   Physical Exam  Vitals reviewed.   Constitutional:       General: She is not in acute distress.     Appearance: Normal appearance. She is well-developed. She is  not diaphoretic.   HENT:      Head: Normocephalic and atraumatic.   Eyes:      General:         Right eye: No discharge.         Left eye: No discharge.      Conjunctiva/sclera: Conjunctivae normal.      Pupils: Pupils are equal, round, and reactive to light.   Cardiovascular:      Rate and Rhythm: Normal rate and regular rhythm.   Pulmonary:      Effort: Pulmonary effort is normal. No respiratory distress.      Breath sounds: Normal breath sounds. No wheezing or rales.   Abdominal:      General: Bowel sounds are normal. There is no distension.      Palpations: Abdomen is soft.      Tenderness: There is no abdominal tenderness.   Musculoskeletal:         General: Normal range of motion.      Cervical back: Normal range of motion and neck supple.   Skin:     General: Skin is warm and dry.   Neurological:      Mental Status: She is alert and oriented to person, place, and time.   Psychiatric:         Mood and Affect: Mood normal.         Behavior: Behavior normal.         Thought Content: Thought content normal.         Judgment: Judgment normal.           Assessment/Plan     Diagnoses and all orders for this visit:    1. Encounter for examination for admission to assisted living facility (Primary)  Comments:  exam and paperwork completed  awaiting results of CT chest (will not need CXR)  ok to live in assisted living.  Orders:  -     Cancel: XR Chest PA & Lateral; Future

## 2021-05-28 ENCOUNTER — TELEPHONE (OUTPATIENT)
Dept: ORTHOPEDIC SURGERY | Facility: CLINIC | Age: 81
End: 2021-05-28

## 2021-05-28 NOTE — TELEPHONE ENCOUNTER
----- Message from MATI Crouch sent at 5/27/2021  4:58 PM EDT -----  Please let her know that her new DEXA scan shows a T score of -2.0 in the right total hip.  When compared to 2019, this does not show any significant change in the right hip, but does show a decrease in bone density of about 4.2% in the spine, however her total bone density in the spine currently is normal.  Continue Prolia injections at this time.

## 2021-06-02 NOTE — PROGRESS NOTES
"Chief Complaint  Follow-up, pulmonary cancer    Subjective          Alia Cheney presents to [facility] in follow-up for continued pulmonary cancer surveillance     History of Present Illness    Ms. Cheney is a pleasant 80-year-old lady who presents today s/p right middle lobe wedge resection on 8/6/2018 secondary to a T1BN0 adenocarcinoma. She is a former smoker with a 15 pack year history.  She denies dyspnea, hemoptysis, pleuritic chest pain, cough, fever/chills, hoarseness and unintentional weight loss. She presents today in her usual state of health without any new complaints    Objective   Vital Signs:   /80 (BP Location: Right arm, Patient Position: Sitting, Cuff Size: Adult)   Pulse 65   Temp 97.3 °F (36.3 °C) (Infrared)   Ht 162.6 cm (64\")   Wt 76.7 kg (169 lb)   SpO2 99%   BMI 29.01 kg/m²     Physical Exam  Constitutional:       Appearance: Normal appearance.   HENT:      Head: Normocephalic and atraumatic.   Cardiovascular:      Rate and Rhythm: Normal rate and regular rhythm.   Pulmonary:      Effort: Pulmonary effort is normal.      Breath sounds: Normal breath sounds.   Musculoskeletal:         General: Normal range of motion.      Cervical back: Normal range of motion and neck supple.   Skin:     General: Skin is warm and dry.   Neurological:      General: No focal deficit present.      Mental Status: She is alert and oriented to person, place, and time.   Psychiatric:         Mood and Affect: Mood normal.         Behavior: Behavior normal.         Thought Content: Thought content normal.        Result Review :       Data reviewed: Radiologic studies         I have independently reviewed the chest CT performed 5/26/2021, which demonstrates a stable 4 mm pulmonary nodule to the left lower lobe (stable since 8/13/2018).  Calcified nodule to the posterior left upper lobe adjacent to the left major fissure. Postoperative changes consistent with a right " middle lobe wedge resection.  Stable interstitial thickening suggestive of interstitial lung disease.  No new infiltrates or masses are seen.  No suspicious mediastinal lymphadenopathy and no pleural effusion.         Assessment and Plan    Diagnoses and all orders for this visit:    1. 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 Cheney is s/p right middle lobe wedge resection on 8/6/2018 secondary to a T1BN0 adenocarcinoma.  Her most recent chest CT was reviewed which shows a stable 4 mm nodule to the left lower lobe.  No new concerning nodules or masses.  She remains clinically stable without any new pulmonary symptoms.  We will continue with her pulmonary cancer surveillance with a repeat chest CT in 6 months followed by an office visit to review the findings.      I spent 31 minutes caring for Alia on this date of service. This time includes time spent by me in the following activities:preparing for the visit, reviewing tests, obtaining and/or reviewing a separately obtained history, performing a medically appropriate examination and/or evaluation , counseling and educating the patient/family/caregiver, ordering medications, tests, or procedures, documenting information in the medical record and independently interpreting results and communicating that information with the patient/family/caregiver       Follow Up   Return in about 6 months (around 12/3/2021).  Patient was given instructions and counseling regarding her condition or for health maintenance advice. Please see specific information pulled into the AVS if appropriate.

## 2021-06-03 ENCOUNTER — OFFICE VISIT (OUTPATIENT)
Dept: SURGERY | Facility: CLINIC | Age: 81
End: 2021-06-03

## 2021-06-03 VITALS
BODY MASS INDEX: 28.85 KG/M2 | OXYGEN SATURATION: 99 % | WEIGHT: 169 LBS | TEMPERATURE: 97.3 F | DIASTOLIC BLOOD PRESSURE: 80 MMHG | HEART RATE: 65 BPM | HEIGHT: 64 IN | SYSTOLIC BLOOD PRESSURE: 145 MMHG

## 2021-06-03 DIAGNOSIS — C34.2 PRIMARY MALIGNANT NEOPLASM OF RIGHT MIDDLE LOBE OF LUNG (HCC): Primary | ICD-10-CM

## 2021-06-03 PROCEDURE — 99214 OFFICE O/P EST MOD 30 MIN: CPT | Performed by: NURSE PRACTITIONER

## 2021-06-03 RX ORDER — VIT A/VIT C/VIT E/ZINC/COPPER 4296-226
CAPSULE ORAL
Status: ON HOLD | COMMUNITY
End: 2022-08-04

## 2021-06-04 RX ORDER — PRAVASTATIN SODIUM 40 MG
40 TABLET ORAL
Qty: 90 TABLET | Refills: 1 | Status: SHIPPED | OUTPATIENT
Start: 2021-06-04 | End: 2021-10-01 | Stop reason: SDUPTHER

## 2021-06-04 NOTE — TELEPHONE ENCOUNTER
Caller: Alia Cheney    Relationship: Self    Best call back number: [phone]    Medication needed:   Requested Prescriptions     Pending Prescriptions Disp Refills   • pravastatin (PRAVACHOL) 40 MG tablet 90 tablet 1     Sig: Take 1 tablet by mouth every night at bedtime.       When do you need the refill by: TODAY    What additional details did the patient provide when requesting the medication: HAS A FEW DAYS OF MEDS LEFT, NO REFILLS ON THE BOTTLE.    Does the patient have less than a 3 day supply:  [] Yes  [x] No    What is the patient's preferred pharmacy: [facility] - [facility] [address] - [phone]  - [phone] FX

## 2021-06-14 ENCOUNTER — CLINICAL SUPPORT (OUTPATIENT)
Dept: ORTHOPEDIC SURGERY | Facility: CLINIC | Age: 81
End: 2021-06-14

## 2021-06-14 VITALS
BODY MASS INDEX: 28.17 KG/M2 | DIASTOLIC BLOOD PRESSURE: 80 MMHG | WEIGHT: 165 LBS | HEIGHT: 64 IN | HEART RATE: 78 BPM | SYSTOLIC BLOOD PRESSURE: 130 MMHG

## 2021-06-14 DIAGNOSIS — M81.0 AGE-RELATED OSTEOPOROSIS WITHOUT CURRENT PATHOLOGICAL FRACTURE: Primary | ICD-10-CM

## 2021-06-14 PROCEDURE — 96372 THER/PROPH/DIAG INJ SC/IM: CPT | Performed by: PHYSICIAN ASSISTANT

## 2021-08-31 ENCOUNTER — TELEPHONE (OUTPATIENT)
Dept: FAMILY MEDICINE CLINIC | Facility: CLINIC | Age: 81
End: 2021-08-31

## 2021-08-31 DIAGNOSIS — F41.9 ANXIETY: Primary | ICD-10-CM

## 2021-08-31 RX ORDER — VENLAFAXINE HYDROCHLORIDE 75 MG/1
75 CAPSULE, EXTENDED RELEASE ORAL DAILY
Qty: 30 CAPSULE | Refills: 0 | Status: SHIPPED | OUTPATIENT
Start: 2021-08-31 | End: 2021-10-01 | Stop reason: SDUPTHER

## 2021-08-31 RX ORDER — LORAZEPAM 0.5 MG/1
TABLET ORAL
Qty: 30 TABLET | Refills: 0 | Status: ON HOLD | OUTPATIENT
Start: 2021-08-31 | End: 2022-08-04

## 2021-08-31 NOTE — TELEPHONE ENCOUNTER
Patient's daughter called stating that patient has been very anxious and tearful lately.  She is on Effexor  daily.  She also has Ativan as needed.  She is wondering if the Effexor should be increased.  Also requesting a refill of the Ativan.  I informed her that we will increase the Effexor XR to 225 daily and I will send in a refill for the Ativan.  Patient is to follow-up in 1 month

## 2021-10-01 ENCOUNTER — OFFICE VISIT (OUTPATIENT)
Dept: FAMILY MEDICINE CLINIC | Facility: CLINIC | Age: 81
End: 2021-10-01

## 2021-10-01 VITALS
WEIGHT: 171 LBS | HEART RATE: 63 BPM | OXYGEN SATURATION: 99 % | DIASTOLIC BLOOD PRESSURE: 78 MMHG | TEMPERATURE: 98 F | BODY MASS INDEX: 29.35 KG/M2 | SYSTOLIC BLOOD PRESSURE: 148 MMHG

## 2021-10-01 DIAGNOSIS — F41.9 ANXIETY: ICD-10-CM

## 2021-10-01 PROCEDURE — 99213 OFFICE O/P EST LOW 20 MIN: CPT | Performed by: NURSE PRACTITIONER

## 2021-10-01 RX ORDER — PANTOPRAZOLE SODIUM 40 MG/1
40 TABLET, DELAYED RELEASE ORAL DAILY
Qty: 30 TABLET | Refills: 0 | Status: SHIPPED | OUTPATIENT
Start: 2021-10-01 | End: 2022-04-27

## 2021-10-01 RX ORDER — VENLAFAXINE HYDROCHLORIDE 150 MG/1
150 CAPSULE, EXTENDED RELEASE ORAL EVERY MORNING
Qty: 30 CAPSULE | Refills: 0 | Status: SHIPPED | OUTPATIENT
Start: 2021-10-01 | End: 2022-04-27

## 2021-10-01 RX ORDER — TRAZODONE HYDROCHLORIDE 50 MG/1
100 TABLET ORAL NIGHTLY
Qty: 60 TABLET | Refills: 0 | Status: SHIPPED | OUTPATIENT
Start: 2021-10-01 | End: 2022-04-27

## 2021-10-01 RX ORDER — VENLAFAXINE HYDROCHLORIDE 150 MG/1
150 CAPSULE, EXTENDED RELEASE ORAL EVERY MORNING
Qty: 90 CAPSULE | Refills: 1 | Status: SHIPPED | OUTPATIENT
Start: 2021-10-01 | End: 2021-10-01 | Stop reason: SDUPTHER

## 2021-10-01 RX ORDER — TRAZODONE HYDROCHLORIDE 50 MG/1
100 TABLET ORAL NIGHTLY
Qty: 180 TABLET | Refills: 1 | Status: SHIPPED | OUTPATIENT
Start: 2021-10-01 | End: 2021-10-01 | Stop reason: SDUPTHER

## 2021-10-01 RX ORDER — PRAVASTATIN SODIUM 40 MG
40 TABLET ORAL
Qty: 30 TABLET | Refills: 0 | Status: SHIPPED | OUTPATIENT
Start: 2021-10-01 | End: 2022-04-27

## 2021-10-01 RX ORDER — PANTOPRAZOLE SODIUM 40 MG/1
40 TABLET, DELAYED RELEASE ORAL DAILY
Qty: 90 TABLET | Refills: 1 | Status: SHIPPED | OUTPATIENT
Start: 2021-10-01 | End: 2021-10-01 | Stop reason: SDUPTHER

## 2021-10-01 RX ORDER — VENLAFAXINE HYDROCHLORIDE 75 MG/1
75 CAPSULE, EXTENDED RELEASE ORAL DAILY
Qty: 30 CAPSULE | Refills: 0 | Status: SHIPPED | OUTPATIENT
Start: 2021-10-01 | End: 2022-04-27

## 2021-10-01 RX ORDER — MEMANTINE HYDROCHLORIDE 10 MG/1
10 TABLET ORAL 2 TIMES DAILY
Qty: 60 TABLET | Refills: 11 | Status: SHIPPED | OUTPATIENT
Start: 2021-10-01 | End: 2021-10-01 | Stop reason: SDUPTHER

## 2021-10-01 RX ORDER — VENLAFAXINE HYDROCHLORIDE 75 MG/1
75 CAPSULE, EXTENDED RELEASE ORAL DAILY
Qty: 90 CAPSULE | Refills: 1 | Status: SHIPPED | OUTPATIENT
Start: 2021-10-01 | End: 2021-10-01 | Stop reason: SDUPTHER

## 2021-10-01 RX ORDER — PRAVASTATIN SODIUM 40 MG
40 TABLET ORAL
Qty: 90 TABLET | Refills: 1 | Status: SHIPPED | OUTPATIENT
Start: 2021-10-01 | End: 2021-10-01 | Stop reason: SDUPTHER

## 2021-10-01 RX ORDER — MEMANTINE HYDROCHLORIDE 10 MG/1
10 TABLET ORAL 2 TIMES DAILY
Qty: 30 TABLET | Refills: 0 | Status: SHIPPED | OUTPATIENT
Start: 2021-10-01 | End: 2022-05-13

## 2021-10-01 NOTE — PROGRESS NOTES
Subjective     Alia Cheney is a 81 y.o. female.     Pt is here today to follow up on anxiety and medication changes.   Her effexor was increased to 225mg last month.   Patient reports that her mood has improved quite a bit with the increase.  She is only needing her ativan about 4 days a week.   Denies any SI or HI.  Her mind is resting easier now.  She states that the trazodone does help her sleep.  Denies CP, SOA, dizziness, HA.  Occasionally gets short of air with increased activity.          The following portions of the patient's history were reviewed and updated as appropriate: allergies, current medications, past family history, past medical history, past social history, past surgical history and problem list.    Review of Systems   Constitutional: Negative for chills, fatigue and fever.   Respiratory: Positive for shortness of breath (occasionally with activity). Negative for chest tightness.    Cardiovascular: Negative for chest pain and palpitations.   Neurological: Negative for dizziness and headache.   Psychiatric/Behavioral: Negative for depressed mood and stress. The patient is not nervous/anxious.        Objective     /78 (BP Location: Left arm, Patient Position: Sitting, Cuff Size: Adult)   Pulse 63   Temp 98 °F (36.7 °C) (Tympanic)   Wt 77.6 kg (171 lb)   SpO2 99%   BMI 29.35 kg/m²     Current Outpatient Medications on File Prior to Visit   Medication Sig Dispense Refill   • calcium carbonate (OS-DAHLIA) 1250 (500 Ca) MG tablet Take 600 mg by mouth Daily.     • calcium citrate-vitamin d (CITRACAL) 200-250 MG-UNIT tablet tablet Take  by mouth Daily.     • Cholecalciferol (VITAMIN D3 SUPER STRENGTH) 50 MCG (2000 UT) tablet 2 po q d 60 each 11   • leflunomide (ARAVA) 10 MG tablet TAKE 1 TABLET EVERY DAY 90 tablet 3   • LORazepam (Ativan) 0.5 MG tablet 1 po bid prn anxiety 30 tablet 0   • Multiple Vitamins-Minerals (PreserVision AREDS) capsule Take  by mouth.     • polyethylene glycol  (MIRALAX) 17 GM/SCOOP powder Take 17 g by mouth 2 (Two) Times a Day. 1020 g 2   • vitamin B-12 (CYANOCOBALAMIN) 1000 MCG tablet Take 1,000 mcg by mouth Daily.     • [DISCONTINUED] memantine (Namenda) 10 MG tablet Take 1 tablet by mouth 2 (Two) Times a Day. 60 tablet 11   • [DISCONTINUED] metoprolol tartrate (LOPRESSOR) 25 MG tablet TAKE 1/2 TABLET TWICE DAILY 30 tablet 0   • [DISCONTINUED] pantoprazole (PROTONIX) 40 MG EC tablet TAKE 1 TABLET EVERY DAY 30 tablet 0   • [DISCONTINUED] pravastatin (PRAVACHOL) 40 MG tablet Take 1 tablet by mouth every night at bedtime. 90 tablet 1   • [DISCONTINUED] predniSONE (DELTASONE) 2.5 MG tablet TAKE 1 TABLET EVERY DAY 30 tablet 0   • [DISCONTINUED] traZODone (DESYREL) 50 MG tablet TAKE 2 TABLETS AT BEDTIME  FOR  INSOMNIA 60 tablet 0   • [DISCONTINUED] venlafaxine XR (Effexor XR) 75 MG 24 hr capsule Take 1 capsule by mouth Daily. Take with 150mg cap for total 225. 30 capsule 0   • [DISCONTINUED] venlafaxine XR (EFFEXOR-XR) 150 MG 24 hr capsule TAKE 1 CAPSULE EVERY MORNING 30 capsule 0     Current Facility-Administered Medications on File Prior to Visit   Medication Dose Route Frequency Provider Last Rate Last Admin   • denosumab (PROLIA) syringe 60 mg  60 mg Subcutaneous Q6 Months Aletha Guadalupe PA   60 mg at 06/11/20 0934        Physical Exam  Vitals reviewed.   Constitutional:       General: She is not in acute distress.     Appearance: Normal appearance. She is well-developed. She is not diaphoretic.   HENT:      Head: Normocephalic and atraumatic.   Eyes:      General:         Right eye: No discharge.         Left eye: No discharge.      Conjunctiva/sclera: Conjunctivae normal.   Cardiovascular:      Rate and Rhythm: Normal rate and regular rhythm.      Heart sounds: No murmur heard.     Pulmonary:      Effort: Pulmonary effort is normal. No respiratory distress.      Breath sounds: Normal breath sounds. No wheezing or rales.   Abdominal:      General: Bowel sounds are  normal. There is no distension.      Palpations: Abdomen is soft.      Tenderness: There is no abdominal tenderness.   Musculoskeletal:         General: Normal range of motion.      Cervical back: Normal range of motion and neck supple.   Skin:     General: Skin is warm and dry.   Neurological:      General: No focal deficit present.      Mental Status: She is alert and oriented to person, place, and time.   Psychiatric:         Mood and Affect: Mood normal.         Behavior: Behavior normal.         Thought Content: Thought content normal.         Judgment: Judgment normal.           Assessment/Plan     Diagnoses and all orders for this visit:    1. Anxiety  Comments:  improved   cont effexor and ativan prn  call for issues or concerns   denies SI or HI  Orders:  -     Discontinue: venlafaxine XR (Effexor XR) 75 MG 24 hr capsule; Take 1 capsule by mouth Daily. Take with 150mg cap for total 225.  Dispense: 90 capsule; Refill: 1  -     venlafaxine XR (Effexor XR) 75 MG 24 hr capsule; Take 1 capsule by mouth Daily. Take with 150mg cap for total 225.  Dispense: 30 capsule; Refill: 0    Other orders  -     Discontinue: venlafaxine XR (EFFEXOR-XR) 150 MG 24 hr capsule; Take 1 capsule by mouth Every Morning. Take with 75mg tab for a total of 225mg  Dispense: 90 capsule; Refill: 1  -     Discontinue: pravastatin (PRAVACHOL) 40 MG tablet; Take 1 tablet by mouth every night at bedtime.  Dispense: 90 tablet; Refill: 1  -     Discontinue: memantine (Namenda) 10 MG tablet; Take 1 tablet by mouth 2 (Two) Times a Day.  Dispense: 60 tablet; Refill: 11  -     Discontinue: metoprolol tartrate (LOPRESSOR) 25 MG tablet; Take 0.5 tablets by mouth 2 (Two) Times a Day.  Dispense: 90 tablet; Refill: 1  -     Discontinue: traZODone (DESYREL) 50 MG tablet; Take 2 tablets by mouth Every Night.  Dispense: 180 tablet; Refill: 1  -     Discontinue: pantoprazole (PROTONIX) 40 MG EC tablet; Take 1 tablet by mouth Daily.  Dispense: 90 tablet;  Refill: 1  -     venlafaxine XR (EFFEXOR-XR) 150 MG 24 hr capsule; Take 1 capsule by mouth Every Morning. Take with 75mg tab for a total of 225mg  Dispense: 30 capsule; Refill: 0  -     pravastatin (PRAVACHOL) 40 MG tablet; Take 1 tablet by mouth every night at bedtime.  Dispense: 30 tablet; Refill: 0  -     pantoprazole (PROTONIX) 40 MG EC tablet; Take 1 tablet by mouth Daily.  Dispense: 30 tablet; Refill: 0  -     metoprolol tartrate (LOPRESSOR) 25 MG tablet; Take 0.5 tablets by mouth 2 (Two) Times a Day.  Dispense: 30 tablet; Refill: 0  -     traZODone (DESYREL) 50 MG tablet; Take 2 tablets by mouth Every Night.  Dispense: 60 tablet; Refill: 0  -     memantine (Namenda) 10 MG tablet; Take 1 tablet by mouth 2 (Two) Times a Day.  Dispense: 30 tablet; Refill: 0

## 2021-11-30 ENCOUNTER — TELEPHONE (OUTPATIENT)
Dept: ORTHOPEDIC SURGERY | Facility: CLINIC | Age: 81
End: 2021-11-30

## 2021-11-30 DIAGNOSIS — E55.9 VITAMIN D DEFICIENCY: ICD-10-CM

## 2021-11-30 DIAGNOSIS — Z51.81 MEDICATION MONITORING ENCOUNTER: Primary | ICD-10-CM

## 2021-11-30 DIAGNOSIS — M81.0 AGE-RELATED OSTEOPOROSIS WITHOUT CURRENT PATHOLOGICAL FRACTURE: ICD-10-CM

## 2021-11-30 NOTE — TELEPHONE ENCOUNTER
Patient is scheduled for next Prolia injection 12/15/2021, updated labs needed prior to injection.     Call placed to patient, spoke with patient's daughter Naomy, advised of need for updated labs for Prolia injection coming up. Advised need to go to [facility] anytime before December 10, 2021. Okay per Naomy.

## 2021-12-06 ENCOUNTER — TELEPHONE (OUTPATIENT)
Dept: SURGERY | Facility: CLINIC | Age: 81
End: 2021-12-06

## 2021-12-06 NOTE — TELEPHONE ENCOUNTER
I called patient x 2. She declines an appt and testing. She stated she was waiting on her daughter to make the appt. I informed her we are happy to see her whenever she is ready but we  Would want a ct chest before appt and I can help with those arrangements when she is ready.

## 2021-12-09 ENCOUNTER — LAB (OUTPATIENT)
Dept: LAB | Facility: HOSPITAL | Age: 81
End: 2021-12-09

## 2021-12-09 DIAGNOSIS — M81.0 AGE-RELATED OSTEOPOROSIS WITHOUT CURRENT PATHOLOGICAL FRACTURE: ICD-10-CM

## 2021-12-09 DIAGNOSIS — Z51.81 MEDICATION MONITORING ENCOUNTER: ICD-10-CM

## 2021-12-09 DIAGNOSIS — E55.9 VITAMIN D DEFICIENCY: ICD-10-CM

## 2021-12-09 PROCEDURE — 82306 VITAMIN D 25 HYDROXY: CPT

## 2021-12-09 PROCEDURE — 36415 COLL VENOUS BLD VENIPUNCTURE: CPT

## 2021-12-09 PROCEDURE — 80053 COMPREHEN METABOLIC PANEL: CPT

## 2021-12-10 ENCOUNTER — TELEPHONE (OUTPATIENT)
Dept: ORTHOPEDIC SURGERY | Facility: CLINIC | Age: 81
End: 2021-12-10

## 2021-12-10 LAB
25(OH)D3 SERPL-MCNC: 92.9 NG/ML
ALBUMIN SERPL-MCNC: 4.4 G/DL (ref 3.5–5.2)
ALBUMIN/GLOB SERPL: 1.9 G/DL
ALP SERPL-CCNC: 82 U/L (ref 39–117)
ALT SERPL W P-5'-P-CCNC: 14 U/L (ref 1–33)
ANION GAP SERPL CALCULATED.3IONS-SCNC: 11.4 MMOL/L (ref 5–15)
AST SERPL-CCNC: 20 U/L (ref 1–32)
BILIRUB SERPL-MCNC: 0.2 MG/DL (ref 0–1.2)
BUN SERPL-MCNC: 9 MG/DL (ref 8–23)
BUN/CREAT SERPL: 11.3 (ref 7–25)
CALCIUM SPEC-SCNC: 9.5 MG/DL (ref 8.6–10.5)
CHLORIDE SERPL-SCNC: 105 MMOL/L (ref 98–107)
CO2 SERPL-SCNC: 25.6 MMOL/L (ref 22–29)
CREAT SERPL-MCNC: 0.8 MG/DL (ref 0.57–1)
GFR SERPL CREATININE-BSD FRML MDRD: 69 ML/MIN/1.73
GLOBULIN UR ELPH-MCNC: 2.3 GM/DL
GLUCOSE SERPL-MCNC: 97 MG/DL (ref 65–99)
POTASSIUM SERPL-SCNC: 3.7 MMOL/L (ref 3.5–5.2)
PROT SERPL-MCNC: 6.7 G/DL (ref 6–8.5)
SODIUM SERPL-SCNC: 142 MMOL/L (ref 136–145)

## 2021-12-10 NOTE — TELEPHONE ENCOUNTER
Call placed to patient, left message on machine advising as per Aletha and to call back with any questions.

## 2021-12-10 NOTE — TELEPHONE ENCOUNTER
----- Message from MATI Crouch sent at 12/10/2021  8:54 AM EST -----  Okay for Prolia injection.  Please let patient know that her vitamin D is on the higher side of normal.  She should cut the amount of vitamin D that she is currently taking in half or go to every other day dosing.

## 2021-12-16 ENCOUNTER — OFFICE VISIT (OUTPATIENT)
Dept: ORTHOPEDIC SURGERY | Facility: CLINIC | Age: 81
End: 2021-12-16

## 2021-12-16 VITALS
WEIGHT: 170 LBS | DIASTOLIC BLOOD PRESSURE: 76 MMHG | HEIGHT: 64 IN | SYSTOLIC BLOOD PRESSURE: 137 MMHG | HEART RATE: 76 BPM | BODY MASS INDEX: 29.02 KG/M2

## 2021-12-16 DIAGNOSIS — F33.42 RECURRENT MAJOR DEPRESSIVE DISORDER, IN FULL REMISSION (HCC): ICD-10-CM

## 2021-12-16 DIAGNOSIS — M35.01 KERATOCONJUNCTIVITIS SICCA, IN SJOGREN'S SYNDROME (HCC): ICD-10-CM

## 2021-12-16 DIAGNOSIS — M81.0 AGE-RELATED OSTEOPOROSIS WITHOUT CURRENT PATHOLOGICAL FRACTURE: Primary | ICD-10-CM

## 2021-12-16 DIAGNOSIS — C34.2 PRIMARY MALIGNANT NEOPLASM OF RIGHT MIDDLE LOBE OF LUNG (HCC): ICD-10-CM

## 2021-12-16 DIAGNOSIS — I48.0 PAROXYSMAL ATRIAL FIBRILLATION (HCC): ICD-10-CM

## 2021-12-16 DIAGNOSIS — M05.79 RHEUMATOID ARTHRITIS INVOLVING MULTIPLE SITES WITH POSITIVE RHEUMATOID FACTOR (HCC): ICD-10-CM

## 2021-12-16 DIAGNOSIS — Z51.81 MEDICATION MONITORING ENCOUNTER: ICD-10-CM

## 2021-12-16 DIAGNOSIS — E55.9 VITAMIN D DEFICIENCY: ICD-10-CM

## 2021-12-16 DIAGNOSIS — Z87.310 HISTORY OF OSTEOPOROTIC PATHOLOGICAL FRACTURE: ICD-10-CM

## 2021-12-16 PROCEDURE — 99214 OFFICE O/P EST MOD 30 MIN: CPT | Performed by: PHYSICIAN ASSISTANT

## 2021-12-16 PROCEDURE — 96372 THER/PROPH/DIAG INJ SC/IM: CPT | Performed by: PHYSICIAN ASSISTANT

## 2021-12-16 NOTE — PATIENT INSTRUCTIONS
Osteoporosis    Osteoporosis is when the bones get thin and weak. This can cause your bones to break (fracture) more easily.  What are the causes?  The exact cause of this condition is not known.  What increases the risk?  · Having family members with this condition.  · Not eating enough healthy foods.  · Taking certain medicines.  · Being female.  · Being age 50 or older.  · Smoking or using other products that contain nicotine or tobacco, such as e-cigarettes or chewing tobacco.  · Not exercising.  · Being of  or  ancestry.  · Having a small body frame.  What are the signs or symptoms?  A broken bone might be the first sign, especially if the break results from a fall or injury that usually would not cause a bone to break.  Other signs and symptoms include:  · Pain in the neck or low back.  · Being hunched over (stooped posture).  · Getting shorter.  How is this treated?  · Eating more foods with more calcium and vitamin D in them.  · Doing exercises.  · Stopping tobacco use.  · Limiting how much alcohol you drink.  · Taking medicines to slow bone loss or help make the bones stronger.  · Taking supplements of calcium and vitamin D every day.  · Taking medicines to replace chemicals in the body (hormone replacement medicines).  · Monitoring your levels of calcium and vitamin D.  The goal of treatment is to strengthen your bones and lower your risk for a bone break.  Follow these instructions at home:  Eating and drinking  Eat plenty of calcium and vitamin D. These nutrients are good for your bones. Good sources of calcium and vitamin D include:  · Some fish, such as salmon and tuna.  · Foods that have calcium and vitamin D added to them (fortified foods), such as some breakfast cereals.  · Egg yolks.  · Cheese.  · Liver.    Activity  Do exercises as told by your doctor. Ask your doctor what exercises are safe for you. You should do:  · Exercises that make your muscles work to hold your body weight up  (weight-bearing exercises). These include jordyn chi, yoga, and walking.  · Exercises to make your muscles stronger. One example is lifting weights.  Lifestyle  · Do not drink alcohol if:  ? Your doctor tells you not to drink.  ? You are pregnant, may be pregnant, or are planning to become pregnant.  · If you drink alcohol:  ? Limit how much you use to:  § 0-1 drink a day for women.  § 0-2 drinks a day for men.  · Know how much alcohol is in your drink. In the [place] one drink equals one 12 oz bottle of beer (355 mL), one 5 oz glass of wine (148 mL), or one 1½ oz glass of hard liquor (44 mL).  · Do not smoke or use any products that contain nicotine or tobacco. If you need help quitting, ask your doctor.  Preventing falls  · Use tools to help you move around (mobility aids) as needed. These include canes, walkers, scooters, and crutches.  · Keep rooms well-lit.  · Put away things on the floor that could make you trip. These include cords and rugs.  · Install safety rails on stairs. Install grab bars in bathrooms.  · Use rubber mats in slippery areas, like bathrooms.  · Wear shoes that:  ? Fit you well.  ? Support your feet.  ? Have closed toes.  ? Have rubber soles or low heels.  · Tell your doctor about all of the medicines you are taking. Some medicines can make you more likely to fall.  General instructions  · Take over-the-counter and prescription medicines only as told by your doctor.  · Keep all follow-up visits.  Contact a doctor if:  · You have not been tested (screened) for osteoporosis and you are:  ? A woman who is age 65 or older.  ? A man who is age 70 or older.  Get help right away if:  · You fall.  · You get hurt.  Summary  · Osteoporosis happens when your bones get thin and weak.  · Weak bones can break (fracture) more easily.  · Eat plenty of calcium and vitamin D. These are good for your bones.  · Tell your doctor about all of the medicines that you take.  This information is not intended to replace  advice given to you by your health care provider. Make sure you discuss any questions you have with your health care provider.  Document Revised: 06/03/2021 Document Reviewed: 06/03/2021  Elsevier Patient Education © 2021 [employer]      Fall Prevention in the Home, Adult  Falls can cause injuries. They can happen to people of all ages. There are many things you can do to make your home safe and to help prevent falls. Ask for help when making these changes, if needed.  What actions can I take to prevent falls?  General Instructions  · Use good lighting in all rooms. Replace any light bulbs that burn out.  · Turn on the lights when you go into a dark area. Use night-lights.  · Keep items that you use often in easy-to-reach places. Lower the shelves around your home if necessary.  · Set up your furniture so you have a clear path. Avoid moving your furniture around.  · Do not have throw rugs and other things on the floor that can make you trip.  · Avoid walking on wet floors.  · If any of your floors are uneven, fix them.  · Add color or contrast paint or tape to clearly vi and help you see:  ? Any grab bars or handrails.  ? First and last steps of stairways.  ? Where the edge of each step is.  · If you use a stepladder:  ? Make sure that it is fully opened. Do not climb a closed stepladder.  ? Make sure that both sides of the stepladder are locked into place.  ? Ask someone to hold the stepladder for you while you use it.  · If there are any pets around you, be aware of where they are.  What can I do in the bathroom?         · Keep the floor dry. Clean up any water that spills onto the floor as soon as it happens.  · Remove soap buildup in the tub or shower regularly.  · Use non-skid mats or decals on the floor of the tub or shower.  · Attach bath mats securely with double-sided, non-slip rug tape.  · If you need to sit down in the shower, use a plastic, non-slip stool.  · Install grab bars by the toilet and in  the tub and shower. Do not use towel bars as grab bars.  What can I do in the bedroom?  · Make sure that you have a light by your bed that is easy to reach.  · Do not use any sheets or blankets that are too big for your bed. They should not hang down onto the floor.  · Have a firm chair that has side arms. You can use this for support while you get dressed.  What can I do in the kitchen?  · Clean up any spills right away.  · If you need to reach something above you, use a strong step stool that has a grab bar.  · Keep electrical cords out of the way.  · Do not use floor polish or wax that makes floors slippery. If you must use wax, use non-skid floor wax.  What can I do with my stairs?  · Do not leave any items on the stairs.  · Make sure that you have a light switch at the top of the stairs and the bottom of the stairs. If you do not have them, ask someone to add them for you.  · Make sure that there are handrails on both sides of the stairs, and use them. Fix handrails that are broken or loose. Make sure that handrails are as long as the stairways.  · Install non-slip stair treads on all stairs in your home.  · Avoid having throw rugs at the top or bottom of the stairs. If you do have throw rugs, attach them to the floor with carpet tape.  · Choose a carpet that does not hide the edge of the steps on the stairway.  · Check any carpeting to make sure that it is firmly attached to the stairs. Fix any carpet that is loose or worn.  What can I do on the outside of my home?  · Use bright outdoor lighting.  · Regularly fix the edges of walkways and driveways and fix any cracks.  · Remove anything that might make you trip as you walk through a door, such as a raised step or threshold.  · Trim any bushes or trees on the path to your home.  · Regularly check to see if handrails are loose or broken. Make sure that both sides of any steps have handrails.  · Install guardrails along the edges of any raised decks and  porches.  · Clear walking paths of anything that might make someone trip, such as tools or rocks.  · Have any leaves, snow, or ice cleared regularly.  · Use sand or salt on walking paths during winter.  · Clean up any spills in your garage right away. This includes grease or oil spills.  What other actions can I take?  · Wear shoes that:  ? Have a low heel. Do not wear high heels.  ? Have rubber bottoms.  ? Are comfortable and fit you well.  ? Are closed at the toe. Do not wear open-toe sandals.  · Use tools that help you move around (mobility aids) if they are needed. These include:  ? Canes.  ? Walkers.  ? Scooters.  ? Crutches.  · Review your medicines with your doctor. Some medicines can make you feel dizzy. This can increase your chance of falling.  Ask your doctor what other things you can do to help prevent falls.  Where to find more information  · Centers for Disease Control and Prevention, [place]: https://cdc.gov  · National [place] on Aging: https://og5kkqg.diann.nih.gov  Contact a doctor if:  · You are afraid of falling at home.  · You feel weak, drowsy, or dizzy at home.  · You fall at home.  Summary  · There are many simple things that you can do to make your home safe and to help prevent falls.  · Ways to make your home safe include removing tripping hazards and installing grab bars in the bathroom.  · Ask for help when making these changes in your home.  This information is not intended to replace advice given to you by your health care provider. Make sure you discuss any questions you have with your health care provider.  Document Revised: 04/09/2020 Document Reviewed: 08/02/2018  Else"[employer] Patient Education © 2021 [employer]      Sit-to-Stand Exercise    The sit-to-stand exercise (also known as the chair stand or chair rise exercise) strengthens your lower body and helps you maintain or improve your mobility and independence. The goal is to do the sit-to-stand exercise without using your hands.  This will be easier as you become stronger. You should always talk with your health care provider before starting any exercise program, especially if you have had recent surgery.  Do the exercise exactly as told by your health care provider and adjust it as directed. It is normal to feel mild stretching, pulling, tightness, or discomfort as you do this exercise, but you should stop right away if you feel sudden pain or your pain gets worse. Do not begin doing this exercise until told by your health care provider.  What the sit-to-stand exercise does  The sit-to-stand exercise helps to strengthen the muscles in your thighs and the muscles in the center of your body that give you stability (core muscles). This exercise is especially helpful if:  · You have had knee or hip surgery.  · You have trouble getting up from a chair, out of a car, or off the toilet.  How to do the sit-to-stand exercise  1. Sit toward the front edge of a sturdy chair without armrests. Your knees should be bent and your feet should be flat on the floor and shoulder-width apart.  2. Place your hands lightly on each side of the seat. Keep your back and neck as straight as possible, with your chest slightly forward.  3. Breathe in slowly. Lean forward and slightly shift your weight to the front of your feet.  4. Breathe out as you slowly stand up. Use your hands as little as possible.  5. Stand and pause for a full breath in and out.  6. Breathe in as you sit down slowly. Tighten your core and abdominal muscles to control your lowering as much as possible.  7. Breathe out slowly.  8. Do this exercise 10-15 times. If needed, do it fewer times until you build up strength.  9. Rest for 1 minute, then do another set of 10-15 repetitions.  To change the difficulty of the sit-to-stand exercise  · If the exercise is too difficult, use a chair with sturdy armrests, and push off the armrests to help you come to the standing position. You can also use the  armrests to help slowly lower yourself back to sitting. As this gets easier, try to use your arms less. You can also place a firm cushion or pillow on the chair to make the surface higher.  · If this exercise is too easy, do not use your arms to help raise or lower yourself. You can also wear a weighted vest, use hand weights, increase your repetitions, or try a lower chair.  General tips  · You may feel tired when starting an exercise routine. This is normal.  · You may have muscle soreness that lasts a few days. This is normal. As you get stronger, you may not feel muscle soreness.  · Use smooth, steady movements.  · Do not  hold your breath during strength exercises. This can cause unsafe changes in your blood pressure.  · Breathe in slowly through your nose, and breathe out slowly through your mouth.  Summary  · Strengthening your lower body is an important step to help you move safely and independently.  · The sit-to-stand exercise helps strengthen the muscles in your thighs and core.  · You should always talk with your health care provider before starting any exercise program, especially if you have had recent surgery.  This information is not intended to replace advice given to you by your health care provider. Make sure you discuss any questions you have with your health care provider.  Document Revised: 10/16/2019 Document Reviewed: 02/08/2018  Elsevier Patient Education © 2021 Elsevier Inc.      Preventing Health Risks of Being Overweight  Maintaining a healthy body weight is an important part of your overall health. Your healthy body weight depends on your age, gender, and height. Being overweight puts you at risk for many health problems, including:  · Heart disease.  · Diabetes.  · Problems sleeping.  · Joint problems.  You can make changes to your diet and lifestyle to prevent these risks. Consider working with a health care provider or a dietitian to make these changes.  What nutrition changes can be  made?    · Eat only as much as your body needs. In most cases, this is about 2,000 calories a day, but the amount varies depending on your height, gender, and activity level. Ask your health care provider how many calories you should have each day. Eating more than your body needs on a regular basis can cause you to become overweight or obese.  · Eat slowly, and stop eating when you feel full.  · Choose healthy foods, including:  ? Fruits and vegetables.  ? Lean meats.  ? Low-fat dairy products.  ? High-fiber foods, such as whole grains and beans.  ? Healthy snacks like vegetable sticks, a piece of fruit, or a small amount of yogurt or cheese.  · Avoid foods and drinks that are high in sugar, salt (sodium), saturated fat, or trans fat. This includes:  ? Many desserts such as candy, cookies, and ice cream.  ? Soda.  ? Fried foods.  ? Processed meats such as hot dogs or lunch meats.  ? Prepackaged snack foods.  What lifestyle changes can be made?    · Exercise for at least 150 minutes a week to prevent weight gain, or as often as recommended by your health care provider. Do moderate-intensity exercise, such as brisk walking.  ? Spread it out by exercising for 30 minutes 5 days a week, or in short 10-minute bursts several times a day.  · Find other ways to stay active and burn calories, such as yard work or a hobby that involves physical activity.  · Get at least 8 hours of sleep each night. When you are well-rested, you are more likely to be active and make healthy choices during the day. To sleep better:  ? Try to go to bed and wake up at about the same time every day.  ? Keep your bedroom dark, quiet, and cool.  ? Make sure that your bed is comfortable.  ? Avoid stimulating activities, such as watching television or exercising, for at least one hour before bedtime.  Why are these changes important?  Eating healthy and being active helps you lose weight and prevent health problems caused by being overweight. Making  these changes can also help you manage stress, feel better mentally, and connect with friends and family.  What can happen if changes are not made?  Being overweight can affect you for your entire life. You may develop joint or bone problems that make it painful or difficult for you to play sports or do activities you enjoy. Being overweight puts stress on your heart and lungs and can lead to medical problems like diabetes, heart disease, and sleeping problems.  Where to find support  You can get support for preventing health risks of being overweight from:  · Your health care provider or a dietitian. They can provide guidance about healthy eating and healthy lifestyle choices.  · Weight loss support groups, online or in-person.  Where to find more information  · MyPlate: www.[employer] World[employer]plate.gov  ? This an online tool that provides personalized recommendations about foods to eat each day.  · The Centers for Disease Control and Prevention: www.cdc.gov/healthyweight  ? This resource gives tips for managing weight and having an active lifestyle.  Summary  · To prevent unhealthy weight gain, it is important to maintain a healthy diet high in vegetables and whole grains, exercise regularly, and get at least 8 hours of sleep each night.  · Making these changes helps prevent many long-term (chronic) health conditions that can shorten your life, such as diabetes, heart disease, and stroke.  This information is not intended to replace advice given to you by your health care provider. Make sure you discuss any questions you have with your health care provider.  Document Revised: 04/15/2021 Document Reviewed: 04/15/2021  [employer] Patient Education © 2021 Elsevier Inc.      Advance Care Planning and Advance Directives     You make decisions on a daily basis - decisions about where you want to live, your career, your home, your life. Perhaps one of the most important decisions you face is your choice for future medical care. Take  time to talk with your family and your healthcare team and start planning today.  Advance Care Planning is a process that can help you:  · Understand possible future healthcare decisions in light of your own experiences  · Reflect on those decision in light of your goals and values  · Discuss your decisions with those closest to you and the healthcare professionals that care for you  · Make a plan by creating a document that reflects your wishes    Surrogate Decision Maker  In the event of a medical emergency, which has left you unable to communicate or to make your own decisions, you would need someone to make decisions for you.  It is important to discuss your preferences for medical treatment with this person while you are in good health.     Qualities of a surrogate decision maker:  • Willing to take on this role and responsibility  • Knows what you want for future medical care  • Willing to follow your wishes even if they don't agree with them  • Able to make difficult medical decisions under stressful circumstances    Advance Directives  These are legal documents you can create that will guide your healthcare team and decision maker(s) when needed. These documents can be stored in the electronic medical record.    · Living Will - a legal document to guide your care if you have a terminal condition or a serious illness and are unable to communicate. States vary by statute in document names/types, but most forms may include one or more of the following:        -  Directions regarding life-prolonging treatments        -  Directions regarding artificially provided nutrition/hydration        -  Choosing a healthcare decision maker        -  Direction regarding organ/tissue donation    · Durable Power of  for Healthcare - this document names an -in-fact to make medical decisions for you, but it may also allow this person to make personal and financial decisions for you. Please seek the advice of an   if you need this type of document.    **Advance Directives are not required and no one may discriminate against you if you do not sign one.    Medical Orders  Many states allow specific forms/orders signed by your physician to record your wishes for medical treatment in your current state of health. This form, signed in personal communication with your physician, addresses resuscitation and other medical interventions that you may or may not want.      For more information or to schedule a time with a [facility] Advance Care Planning Facilitator contact: [facility].[employer]/ACP or call [phone] and someone will contact you directly.

## 2021-12-16 NOTE — PROGRESS NOTES
ORTHO FOLLOW UP       Subjective:    HPI:   Alia Cheney is a 81 y.o. female who presents in follow-up for osteoporosis.  She is currently on Prolia reports that she is doing well with no noticeable side effects.  Her last injection was on 6/14/2021.  She also continues 1200 mg of calcium and 2000 international units of D3 per day.  She denies any fractures since last office visit.  She is walking for physical activity.  She does not drive anymore.  She reports 4 falls in the last year without significant injury.  She denies any new pain or change in her existing pain.  Recent labs were reviewed.  She did have a new DEXA scan on 5/24/2021 at [facility], which revealed a T score of -2.0 in the right total hip.  When compared to 2019, this did show a decrease in bone density of 4.2% in the spine and no significant change in the right hip.  Her overall T score in the spine was in the normal range.    [facility], and patient is at HIGH risk for falls. Assessment completed on:12/16/2021      Past Medical History:   Diagnosis Date   • Anal stenosis 1/3/2012   • Anxiety 11/25/2013   • Arrhythmia, ventricular 4/20/2012   • Arthritis    • Ataxia 8/6/2013   • Carpal tunnel syndrome, bilateral 2/13/2017   • Depression 1/3/2012   • Elevated cholesterol    • Gastroesophageal reflux disease 6/29/2012   • History of osteoporotic pathological fracture     Bilateral wrist-unknown date   • Hx of total knee replacement, right 6/29/2017   • Hyperlipidemia 5/2/2016   • Insomnia 8/6/2013   • Irritable bowel syndrome 8/6/2013   • Keratoconjunctivitis sicca, in Sjogren's syndrome (HCC) 9/21/2016   • Memory loss 12/7/2018   • Osteoarthritis of knee 5/19/2017   • Osteoporosis     fosamax [id number], on prolia(2016)   • Paroxysmal atrial fibrillation (HCC) 11/9/2011   • Primary malignant neoplasm of right middle lobe of lung (HCC) 8/21/2018   • Rheumatoid arthritis ([facility])    • Tension-type  headache, not intractable 2017   • Vitamin B12 deficiency 2016   • Vitamin D deficiency 2017       Past Surgical History:   Procedure Laterality Date   • ABDOMINAL SURGERY     • ANAL DILATION     • APPENDECTOMY     • BREAST BIOPSY Right    • CARDIAC CATHETERIZATION     • CATARACT EXTRACTION WITH INTRAOCULAR LENS IMPLANT Bilateral    • CHOLECYSTECTOMY     • COLONOSCOPY     • CYSTOSCOPY     • HYSTERECTOMY     • JOINT REPLACEMENT     • LUNG REMOVAL, PARTIAL Right 2018    RIGHT VATS WEDGE RESECTION OF RML PN RIGHT MIDDLE LOBECTOMY    • SUBTOTAL HYSTERECTOMY     • TONSILLECTOMY     • TOTAL HIP ARTHROPLASTY Left 2019    Procedure: TOTAL HIP ARTHROPLASTY ANTERIOR;  Surgeon: Lenny Khan MD;  Location: [facility] MAIN OR;  Service: Orthopedics   • TOTAL KNEE ARTHROPLASTY Right 2017   • WRIST FRACTURE SURGERY Bilateral        Social History     Occupational History   • Not on file   Tobacco Use   • Smoking status: Former Smoker     Packs/day: 1.00     Years: 15.00     Pack years: 15.00     Types: Cigarettes     Start date:      Quit date:      Years since quittin.9   • Smokeless tobacco: Never Used   Substance and Sexual Activity   • Alcohol use: Yes   • Drug use: No   • Sexual activity: Not Currently      The following portions of the patient's history were reviewed and updated as appropriate: allergies, current medications, past family history, past medical history, past social history, past surgical history and problem list.    Medications:    Current Outpatient Medications:   •  calcium carbonate (OS-DAHLIA) 1250 (500 Ca) MG tablet, Take 600 mg by mouth Daily., Disp: , Rfl:   •  Cholecalciferol (VITAMIN D3 SUPER STRENGTH) 50 MCG (2000 UT) tablet, 2 po q d, Disp: 60 each, Rfl: 11  •  leflunomide (ARAVA) 10 MG tablet, TAKE 1 TABLET EVERY DAY, Disp: 90 tablet, Rfl: 3  •  LORazepam (Ativan) 0.5 MG tablet, 1 po bid prn anxiety, Disp: 30 tablet, Rfl: 0  •  memantine (Namenda) 10 MG  "tablet, Take 1 tablet by mouth 2 (Two) Times a Day., Disp: 30 tablet, Rfl: 0  •  metoprolol tartrate (LOPRESSOR) 25 MG tablet, Take 0.5 tablets by mouth 2 (Two) Times a Day., Disp: 30 tablet, Rfl: 0  •  Multiple Vitamins-Minerals (PreserVision AREDS) capsule, Take  by mouth., Disp: , Rfl:   •  pantoprazole (PROTONIX) 40 MG EC tablet, Take 1 tablet by mouth Daily., Disp: 30 tablet, Rfl: 0  •  polyethylene glycol (MIRALAX) 17 GM/SCOOP powder, Take 17 g by mouth 2 (Two) Times a Day., Disp: 1020 g, Rfl: 2  •  pravastatin (PRAVACHOL) 40 MG tablet, Take 1 tablet by mouth every night at bedtime., Disp: 30 tablet, Rfl: 0  •  traZODone (DESYREL) 50 MG tablet, Take 2 tablets by mouth Every Night., Disp: 60 tablet, Rfl: 0  •  venlafaxine XR (Effexor XR) 75 MG 24 hr capsule, Take 1 capsule by mouth Daily. Take with 150mg cap for total 225., Disp: 30 capsule, Rfl: 0  •  venlafaxine XR (EFFEXOR-XR) 150 MG 24 hr capsule, Take 1 capsule by mouth Every Morning. Take with 75mg tab for a total of 225mg, Disp: 30 capsule, Rfl: 0  •  vitamin B-12 (CYANOCOBALAMIN) 1000 MCG tablet, Take 1,000 mcg by mouth Daily., Disp: , Rfl:     Current Facility-Administered Medications:   •  denosumab (PROLIA) syringe 60 mg, 60 mg, Subcutaneous, Q6 Months, Aletha Guadalupe PA, 60 mg at 12/16/21 1335    Allergies:  No Known Allergies    Review of Systems:  Gen -no fever, chills , sweats, headache   Eyes - no irritation or discharge   ENT -  no ear pain , runny nose , sore throat , difficulty swallowing   Resp - no cough , congestion , excessive expectoration   CVS - no chest pain , palpitations.   Abd - no pain , nausea , vomiting , diarrhea   Skin - no rash , lesions.   Neuro - no dizziness    Please see HPI for any other pertinent positives.  All other systems were reviewed and are negative.       Objective   Objective:    /76 (BP Location: Left arm, Patient Position: Sitting, Cuff Size: Adult)   Pulse 76   Ht 162.6 cm (64\")   Wt 77.1 kg " (170 lb)   BMI 29.18 kg/m²     Physical Examination:  Overweight individual in no acute distress, patient is alert and cooperative with the exam, appears to have normal mood and affect with a normal attention span and concentration, ambulating unassisted  normocephalic, atraumatic, extraocular movements intact, conjunctiva and sclera are clear without nystagmus, normal canals with grossly normal hearing  no lymphadenopathy or thyromegaly  normal respiratory effort  abdomen is nontender, without guarding or rebound and nondistended  Kyphotic  pulses normal in all 4 extremities, no clubbing, cyanosis, or edema noted  cranial nerves II-XII grossly intact with no focal defects  skin intact without lesions or rashes visible        Imaging:  Unknown date-fragility fractures of bilateral wrists  6/14/2016-DEXA scan at [facility], revealed a T score of -2.4 in the right total hip, with FRAX scores of 14.9% and 4.6%.  2016-patient started on Prolia  5/22/2019-DEXA scan at [facility], revealed a T score of -2.1 in the right total hip.  When compared to 2016, this did show an increase in bone density of 6.4% in the spine and 3.6% in the hips.  5/24/2021-DEXA scan at [facility], revealed a T score of -2.0 in the right total hip, -1.5 in the right femoral neck, and -0.8 in the spine.  When compared to 2019, this did show a decrease in bone density of 4.2% in the spine and no significant change in the right hip.  Spine bone mineral density is within normal parameters.          Assessment:  1. Age-related osteoporosis without current pathological fracture    2. History of osteoporotic pathological fracture    3. Vitamin D deficiency    4. Rheumatoid arthritis involving multiple sites with positive rheumatoid factor (HCC)    5. Primary malignant neoplasm of right middle lobe of lung (HCC)    6. Paroxysmal atrial fibrillation (HCC)    7. Recurrent major depressive disorder, in full remission ([facility])     8. Medication monitoring encounter    9. Keratoconjunctivitis sicca, in Sjogren's syndrome (HCC)         Currently on Prolia since 2016        Plan:  Prolia injection today.  With her history of fragility fractures and multiple risk factors, she continues to be a very high risk for future fractures.  At this time, I am recommending that she continue her Prolia injections every 6 months, as well as her calcium and vitamin D daily.  Her recent vitamin D level was on the high side of normal, so she will decrease her vitamin D to every other day dosing.  She has deferred physical therapy at this time.  We did review weightbearing exercises and fall prevention today.  I will plan to see her back in 1 year and as needed, and she will be scheduled for her next Prolia injection in 6 months.  She should call with any questions or concerns.     Advance Care Planning   ACP discussion was held with the patient during this visit. Patient has an advance directive (not in EMR), copy requested.          MATI Crouch  12/16/21  13:50 EST

## 2022-04-27 DIAGNOSIS — F41.9 ANXIETY: ICD-10-CM

## 2022-04-27 RX ORDER — PANTOPRAZOLE SODIUM 40 MG/1
TABLET, DELAYED RELEASE ORAL
Qty: 90 TABLET | Refills: 0 | Status: SHIPPED | OUTPATIENT
Start: 2022-04-27 | End: 2022-05-13 | Stop reason: SDUPTHER

## 2022-04-27 RX ORDER — VENLAFAXINE HYDROCHLORIDE 75 MG/1
CAPSULE, EXTENDED RELEASE ORAL
Qty: 90 CAPSULE | Refills: 0 | Status: SHIPPED | OUTPATIENT
Start: 2022-04-27 | End: 2022-05-13

## 2022-04-27 RX ORDER — PRAVASTATIN SODIUM 40 MG
40 TABLET ORAL
Qty: 90 TABLET | Refills: 0 | Status: SHIPPED | OUTPATIENT
Start: 2022-04-27 | End: 2022-05-13 | Stop reason: SDUPTHER

## 2022-04-27 RX ORDER — VENLAFAXINE HYDROCHLORIDE 150 MG/1
150 CAPSULE, EXTENDED RELEASE ORAL EVERY MORNING
Qty: 90 CAPSULE | Refills: 0 | Status: SHIPPED | OUTPATIENT
Start: 2022-04-27 | End: 2022-05-13 | Stop reason: SDUPTHER

## 2022-04-27 RX ORDER — TRAZODONE HYDROCHLORIDE 50 MG/1
100 TABLET ORAL NIGHTLY
Qty: 180 TABLET | Refills: 0 | Status: SHIPPED | OUTPATIENT
Start: 2022-04-27 | End: 2022-05-13 | Stop reason: SDUPTHER

## 2022-05-12 NOTE — PROGRESS NOTES
Subjective     Alia Cheney is a 81 y.o. female.     Patient is here to follow-up on her chronic medical conditions.  Pt current resides in assisted living at [facility].  She had a lobectomy a few years ago for lung cancer.  She is walking regularly with a group.  She is doing well overall and no concerns    Rheumatoid arthritis-patient is currently on Arava 10 mg daily. She has been out of it for a month. Had taken some left over prednisone that she took in the meantime. She is doing well on the medications. She has been on them for years and was previously managed by Dr. Lucas.      Anxiety/depression-patient is currently on Effexor  mg daily and Ativan 0.5 mg twice daily as needed. She does not take the ativan often.  She is doing well. Denies any SI or HI.     Hypertension/afib-patient is currently on metoprolol 12.5 mg twice daily.  She does not see cardiology, but did in the past.  Has occasional SOA. Denies CP, dizziness, HA.      Insomnia-patient is currently on trazodone 100 mg nightly. She is doing well on the medication.      Hyperlipidemia-patient is currently on pravastatin 40 mg daily. Doing well on med.  She hasn't been eating a well balanced diet recently.      Memory loss-patient is currently on Namenda 10 mg BID.  She states that her memory is stable      Osteoporosis-patient is currently on Prolia.  She sees Zoila Guadalupe.    Constipation- chronic issue. Takes miralax BID. She is on linzess 145mg daily and colace bid. She is having regular BM now. She sees GI.     Labs- due  DEXA- UTD  Colonoscopy- refused  Mammogram- refused       The following portions of the patient's history were reviewed and updated as appropriate: allergies, current medications, past family history, past medical history, past social history, past surgical history and problem list.    Review of Systems   Constitutional: Negative for chills, fatigue and fever.   Eyes: Negative for blurred vision and double vision.  "  Respiratory: Positive for shortness of breath. Negative for chest tightness.    Cardiovascular: Negative for chest pain and palpitations.   Gastrointestinal: Positive for constipation. Negative for abdominal pain, nausea and vomiting.   Genitourinary: Negative for dysuria, frequency and urgency.   Musculoskeletal: Negative for arthralgias and back pain.   Neurological: Negative for dizziness and headache.   Psychiatric/Behavioral: Negative for self-injury, suicidal ideas, depressed mood and stress. The patient is not nervous/anxious.        Objective     /77 (BP Location: Left arm, Patient Position: Sitting, Cuff Size: Adult)   Pulse 67   Temp 98.2 °F (36.8 °C) (Temporal)   Resp 16   Ht 162.6 cm (64\")   Wt 78.9 kg (174 lb)   SpO2 98%   BMI 29.87 kg/m²     Current Outpatient Medications on File Prior to Visit   Medication Sig Dispense Refill   • calcium carbonate (OS-DAHLIA) 1250 (500 Ca) MG tablet Take 600 mg by mouth Daily.     • Cholecalciferol (VITAMIN D3 SUPER STRENGTH) 50 MCG (2000 UT) tablet 2 po q d 60 each 11   • LORazepam (Ativan) 0.5 MG tablet 1 po bid prn anxiety 30 tablet 0   • Multiple Vitamins-Minerals (PreserVision AREDS) capsule Take  by mouth.     • polyethylene glycol (MIRALAX) 17 GM/SCOOP powder Take 17 g by mouth 2 (Two) Times a Day. 1020 g 2   • vitamin B-12 (CYANOCOBALAMIN) 1000 MCG tablet Take 1,000 mcg by mouth Daily.     • [DISCONTINUED] leflunomide (ARAVA) 10 MG tablet TAKE 1 TABLET EVERY DAY 90 tablet 3   • [DISCONTINUED] Linzess 145 MCG capsule capsule Take 1 capsule by mouth Daily.     • [DISCONTINUED] memantine (Namenda) 10 MG tablet Take 1 tablet by mouth 2 (Two) Times a Day. 30 tablet 0   • [DISCONTINUED] metoprolol tartrate (LOPRESSOR) 25 MG tablet TAKE 1/2 TABLET TWICE DAILY 90 tablet 0   • [DISCONTINUED] pantoprazole (PROTONIX) 40 MG EC tablet TAKE 1 TABLET EVERY DAY 90 tablet 0   • [DISCONTINUED] pravastatin (PRAVACHOL) 40 MG tablet TAKE 1 TABLET BY MOUTH EVERY NIGHT " AT BEDTIME. 90 tablet 0   • [DISCONTINUED] traZODone (DESYREL) 50 MG tablet TAKE 2 TABLETS BY MOUTH EVERY NIGHT. 180 tablet 0   • [DISCONTINUED] venlafaxine XR (EFFEXOR-XR) 150 MG 24 hr capsule TAKE 1 CAPSULE BY MOUTH EVERY MORNING. TAKE WITH 75MG TAB FOR A TOTAL OF 225MG 90 capsule 0   • [DISCONTINUED] venlafaxine XR (EFFEXOR-XR) 75 MG 24 hr capsule TAKE 1 CAPSULE BY MOUTH DAILY. TAKE WITH 150MG CAP FOR TOTAL 225MG. 90 capsule 0     Current Facility-Administered Medications on File Prior to Visit   Medication Dose Route Frequency Provider Last Rate Last Admin   • denosumab (PROLIA) syringe 60 mg  60 mg Subcutaneous Q6 Months Aletha Guadalupe PA   60 mg at 12/16/21 1335        Physical Exam  Vitals reviewed.   Constitutional:       General: She is not in acute distress.     Appearance: Normal appearance. She is well-developed. She is not diaphoretic.   HENT:      Head: Normocephalic and atraumatic.   Eyes:      General:         Right eye: No discharge.         Left eye: No discharge.      Extraocular Movements: Extraocular movements intact.      Conjunctiva/sclera: Conjunctivae normal.   Cardiovascular:      Rate and Rhythm: Normal rate and regular rhythm.      Heart sounds: No murmur heard.  Pulmonary:      Effort: Pulmonary effort is normal. No respiratory distress.      Breath sounds: Normal breath sounds. No wheezing or rales.   Abdominal:      General: Bowel sounds are normal.      Palpations: Abdomen is soft.   Musculoskeletal:         General: Normal range of motion.      Cervical back: Normal range of motion.   Skin:     General: Skin is warm and dry.   Neurological:      General: No focal deficit present.      Mental Status: She is alert and oriented to person, place, and time.   Psychiatric:         Mood and Affect: Mood normal.         Behavior: Behavior normal.         Thought Content: Thought content normal.         Judgment: Judgment normal.           Assessment & Plan     Diagnoses and all orders  for this visit:    1. Anxiety (Primary)  Comments:  stable  cont effexor  denies SI or HI  Orders:  -     venlafaxine XR (EFFEXOR-XR) 75 MG 24 hr capsule; Take 1 capsule by mouth Daily.  Dispense: 90 capsule; Refill: 1    2. Rheumatoid arthritis involving multiple sites with positive rheumatoid factor (HCC)  Comments:  stable  restart Arava  check labs    3. Hypertension, unspecified type  Comments:  stable  cont beta blocker for afib as well  Orders:  -     Comprehensive Metabolic Panel; Future  -     Lipid Panel; Future    4. Insomnia, unspecified type  Comments:  stable  cont trazodone    5. Constipation, unspecified constipation type  Comments:  improved  cont linzess, miralax, stool softener  sees GI    6. Memory loss  Comments:  stable  cont namenda    Other orders  -     pravastatin (PRAVACHOL) 40 MG tablet; Take 1 tablet by mouth every night at bedtime.  Dispense: 90 tablet; Refill: 0  -     pantoprazole (PROTONIX) 40 MG EC tablet; Take 1 tablet by mouth Daily.  Dispense: 90 tablet; Refill: 1  -     metoprolol tartrate (LOPRESSOR) 25 MG tablet; Take 0.5 tablets by mouth 2 (Two) Times a Day.  Dispense: 90 tablet; Refill: 1  -     memantine (Namenda) 10 MG tablet; Take 1 tablet by mouth 2 (Two) Times a Day.  Dispense: 180 tablet; Refill: 1  -     leflunomide (ARAVA) 10 MG tablet; Take 1 tablet by mouth Daily.  Dispense: 90 tablet; Refill: 3  -     Linzess 145 MCG capsule capsule; Take 1 capsule by mouth Daily.  Dispense: 90 capsule; Refill: 1  -     venlafaxine XR (EFFEXOR-XR) 150 MG 24 hr capsule; Take 1 capsule by mouth Every Morning. Take with 75mg tab for a total of 225mg  Dispense: 90 capsule; Refill: 1  -     traZODone (DESYREL) 50 MG tablet; Take 2 tablets by mouth Every Night.  Dispense: 180 tablet; Refill: 1

## 2022-05-13 ENCOUNTER — OFFICE VISIT (OUTPATIENT)
Dept: FAMILY MEDICINE CLINIC | Facility: CLINIC | Age: 82
End: 2022-05-13

## 2022-05-13 VITALS
WEIGHT: 174 LBS | DIASTOLIC BLOOD PRESSURE: 77 MMHG | HEIGHT: 64 IN | RESPIRATION RATE: 16 BRPM | SYSTOLIC BLOOD PRESSURE: 136 MMHG | HEART RATE: 67 BPM | TEMPERATURE: 98.2 F | OXYGEN SATURATION: 98 % | BODY MASS INDEX: 29.71 KG/M2

## 2022-05-13 DIAGNOSIS — M05.79 RHEUMATOID ARTHRITIS INVOLVING MULTIPLE SITES WITH POSITIVE RHEUMATOID FACTOR: ICD-10-CM

## 2022-05-13 DIAGNOSIS — G47.00 INSOMNIA, UNSPECIFIED TYPE: ICD-10-CM

## 2022-05-13 DIAGNOSIS — F41.9 ANXIETY: Primary | ICD-10-CM

## 2022-05-13 DIAGNOSIS — R41.3 MEMORY LOSS: ICD-10-CM

## 2022-05-13 DIAGNOSIS — I10 HYPERTENSION, UNSPECIFIED TYPE: ICD-10-CM

## 2022-05-13 DIAGNOSIS — K59.00 CONSTIPATION, UNSPECIFIED CONSTIPATION TYPE: ICD-10-CM

## 2022-05-13 PROCEDURE — 99214 OFFICE O/P EST MOD 30 MIN: CPT | Performed by: NURSE PRACTITIONER

## 2022-05-13 RX ORDER — PRAVASTATIN SODIUM 40 MG
40 TABLET ORAL
Qty: 90 TABLET | Refills: 0 | Status: SHIPPED | OUTPATIENT
Start: 2022-05-13 | End: 2022-10-02

## 2022-05-13 RX ORDER — PANTOPRAZOLE SODIUM 40 MG/1
40 TABLET, DELAYED RELEASE ORAL DAILY
Qty: 90 TABLET | Refills: 1 | Status: SHIPPED | OUTPATIENT
Start: 2022-05-13 | End: 2022-11-22

## 2022-05-13 RX ORDER — LEFLUNOMIDE 10 MG/1
10 TABLET ORAL DAILY
Qty: 90 TABLET | Refills: 3 | Status: SHIPPED | OUTPATIENT
Start: 2022-05-13

## 2022-05-13 RX ORDER — VENLAFAXINE HYDROCHLORIDE 150 MG/1
150 CAPSULE, EXTENDED RELEASE ORAL EVERY MORNING
Qty: 90 CAPSULE | Refills: 1 | Status: ON HOLD | OUTPATIENT
Start: 2022-05-13 | End: 2022-08-04

## 2022-05-13 RX ORDER — MEMANTINE HYDROCHLORIDE 10 MG/1
10 TABLET ORAL 2 TIMES DAILY
Qty: 180 TABLET | Refills: 1 | Status: SHIPPED | OUTPATIENT
Start: 2022-05-13 | End: 2022-11-22

## 2022-05-13 RX ORDER — LINACLOTIDE 145 UG/1
1 CAPSULE, GELATIN COATED ORAL DAILY
COMMUNITY
Start: 2022-04-21 | End: 2022-05-13 | Stop reason: SDUPTHER

## 2022-05-13 RX ORDER — TRAZODONE HYDROCHLORIDE 50 MG/1
100 TABLET ORAL NIGHTLY
Qty: 180 TABLET | Refills: 1 | Status: SHIPPED | OUTPATIENT
Start: 2022-05-13 | End: 2022-11-22

## 2022-05-13 RX ORDER — VENLAFAXINE HYDROCHLORIDE 75 MG/1
75 CAPSULE, EXTENDED RELEASE ORAL DAILY
Qty: 90 CAPSULE | Refills: 1 | Status: ON HOLD | OUTPATIENT
Start: 2022-05-13 | End: 2022-08-04

## 2022-05-13 RX ORDER — LINACLOTIDE 145 UG/1
145 CAPSULE, GELATIN COATED ORAL DAILY
Qty: 90 CAPSULE | Refills: 1 | Status: ON HOLD | OUTPATIENT
Start: 2022-05-13 | End: 2022-08-04

## 2022-05-20 ENCOUNTER — TELEPHONE (OUTPATIENT)
Dept: ORTHOPEDIC SURGERY | Facility: CLINIC | Age: 82
End: 2022-05-20

## 2022-05-20 DIAGNOSIS — M81.0 AGE-RELATED OSTEOPOROSIS WITHOUT CURRENT PATHOLOGICAL FRACTURE: Primary | ICD-10-CM

## 2022-05-24 RX ORDER — DENOSUMAB 60 MG/ML
60 INJECTION SUBCUTANEOUS
Qty: 1 ML | Refills: 1 | Status: SHIPPED | OUTPATIENT
Start: 2022-05-24 | End: 2022-06-20 | Stop reason: SDUPTHER

## 2022-06-20 ENCOUNTER — CLINICAL SUPPORT (OUTPATIENT)
Dept: ORTHOPEDIC SURGERY | Facility: CLINIC | Age: 82
End: 2022-06-20

## 2022-06-20 VITALS
WEIGHT: 176.4 LBS | SYSTOLIC BLOOD PRESSURE: 136 MMHG | DIASTOLIC BLOOD PRESSURE: 74 MMHG | BODY MASS INDEX: 30.11 KG/M2 | HEART RATE: 71 BPM | HEIGHT: 64 IN

## 2022-06-20 DIAGNOSIS — M81.0 AGE-RELATED OSTEOPOROSIS WITHOUT CURRENT PATHOLOGICAL FRACTURE: Primary | ICD-10-CM

## 2022-06-20 PROCEDURE — 96372 THER/PROPH/DIAG INJ SC/IM: CPT | Performed by: PHYSICIAN ASSISTANT

## 2022-06-20 NOTE — PROGRESS NOTES
Patient presents for Prolia injection from [facility] pharmacy. Patient had no issues with the last Prolia injection. Patient last Calcium was 9.5mg/dL. Injection administered and patient tolerated without complication.

## 2022-06-23 ENCOUNTER — HOSPITAL ENCOUNTER (OUTPATIENT)
Facility: HOSPITAL | Age: 82
Setting detail: OBSERVATION
Discharge: HOME | End: 2022-06-25
Attending: EMERGENCY MEDICINE | Admitting: INTERNAL MEDICINE

## 2022-06-23 ENCOUNTER — APPOINTMENT (OUTPATIENT)
Dept: CT IMAGING | Facility: HOSPITAL | Age: 82
End: 2022-06-23

## 2022-06-23 ENCOUNTER — APPOINTMENT (OUTPATIENT)
Dept: GENERAL RADIOLOGY | Facility: HOSPITAL | Age: 82
End: 2022-06-23

## 2022-06-23 DIAGNOSIS — R55 SYNCOPE, UNSPECIFIED SYNCOPE TYPE: Primary | ICD-10-CM

## 2022-06-23 LAB
ALBUMIN SERPL-MCNC: 3.9 G/DL (ref 3.5–5.2)
ALBUMIN/GLOB SERPL: 1.5 G/DL
ALP SERPL-CCNC: 74 U/L (ref 39–117)
ALT SERPL W P-5'-P-CCNC: 20 U/L (ref 1–33)
ANION GAP SERPL CALCULATED.3IONS-SCNC: 17 MMOL/L (ref 5–15)
APTT PPP: 29.9 SECONDS (ref 61–76.5)
AST SERPL-CCNC: 21 U/L (ref 1–32)
BASOPHILS # BLD AUTO: 0 10*3/MM3 (ref 0–0.2)
BASOPHILS NFR BLD AUTO: 0.2 % (ref 0–1.5)
BILIRUB SERPL-MCNC: 0.3 MG/DL (ref 0–1.2)
BUN SERPL-MCNC: 8 MG/DL (ref 8–23)
BUN/CREAT SERPL: 11.6 (ref 7–25)
CALCIUM SPEC-SCNC: 8.9 MG/DL (ref 8.6–10.5)
CHLORIDE SERPL-SCNC: 107 MMOL/L (ref 98–107)
CO2 SERPL-SCNC: 19 MMOL/L (ref 22–29)
CREAT SERPL-MCNC: 0.69 MG/DL (ref 0.57–1)
DEPRECATED RDW RBC AUTO: 46.4 FL (ref 37–54)
EGFRCR SERPLBLD CKD-EPI 2021: 86.2 ML/MIN/1.73
EOSINOPHIL # BLD AUTO: 0 10*3/MM3 (ref 0–0.4)
EOSINOPHIL NFR BLD AUTO: 0 % (ref 0.3–6.2)
ERYTHROCYTE [DISTWIDTH] IN BLOOD BY AUTOMATED COUNT: 14.8 % (ref 12.3–15.4)
GLOBULIN UR ELPH-MCNC: 2.6 GM/DL
GLUCOSE BLDC GLUCOMTR-MCNC: 89 MG/DL (ref 70–105)
GLUCOSE SERPL-MCNC: 89 MG/DL (ref 65–99)
HCT VFR BLD AUTO: 40.2 % (ref 34–46.6)
HGB BLD-MCNC: 13.2 G/DL (ref 12–15.9)
HOLD SPECIMEN: NORMAL
HOLD SPECIMEN: NORMAL
INR PPP: 1.09 (ref 0.93–1.1)
LYMPHOCYTES # BLD AUTO: 2.8 10*3/MM3 (ref 0.7–3.1)
LYMPHOCYTES NFR BLD AUTO: 43.2 % (ref 19.6–45.3)
MAGNESIUM SERPL-MCNC: 2.2 MG/DL (ref 1.6–2.4)
MCH RBC QN AUTO: 30 PG (ref 26.6–33)
MCHC RBC AUTO-ENTMCNC: 32.9 G/DL (ref 31.5–35.7)
MCV RBC AUTO: 91.4 FL (ref 79–97)
MONOCYTES # BLD AUTO: 0.6 10*3/MM3 (ref 0.1–0.9)
MONOCYTES NFR BLD AUTO: 9.8 % (ref 5–12)
NEUTROPHILS NFR BLD AUTO: 3.1 10*3/MM3 (ref 1.7–7)
NEUTROPHILS NFR BLD AUTO: 46.8 % (ref 42.7–76)
NRBC BLD AUTO-RTO: 0.1 /100 WBC (ref 0–0.2)
NT-PROBNP SERPL-MCNC: 411.9 PG/ML (ref 0–1800)
PLATELET # BLD AUTO: 264 10*3/MM3 (ref 140–450)
PMV BLD AUTO: 8.1 FL (ref 6–12)
POTASSIUM SERPL-SCNC: 3.6 MMOL/L (ref 3.5–5.2)
PROT SERPL-MCNC: 6.5 G/DL (ref 6–8.5)
PROTHROMBIN TIME: 11.2 SECONDS (ref 9.6–11.7)
RBC # BLD AUTO: 4.4 10*6/MM3 (ref 3.77–5.28)
SARS-COV-2 RNA PNL SPEC NAA+PROBE: NOT DETECTED
SODIUM SERPL-SCNC: 143 MMOL/L (ref 136–145)
TROPONIN T SERPL-MCNC: <0.01 NG/ML (ref 0–0.03)
TSH SERPL DL<=0.05 MIU/L-ACNC: 2.49 UIU/ML (ref 0.27–4.2)
WBC NRBC COR # BLD: 6.6 10*3/MM3 (ref 3.4–10.8)
WHOLE BLOOD HOLD COAG: NORMAL

## 2022-06-23 PROCEDURE — 71045 X-RAY EXAM CHEST 1 VIEW: CPT

## 2022-06-23 PROCEDURE — 84484 ASSAY OF TROPONIN QUANT: CPT | Performed by: EMERGENCY MEDICINE

## 2022-06-23 PROCEDURE — 70450 CT HEAD/BRAIN W/O DYE: CPT

## 2022-06-23 PROCEDURE — 72125 CT NECK SPINE W/O DYE: CPT

## 2022-06-23 PROCEDURE — G0378 HOSPITAL OBSERVATION PER HR: HCPCS

## 2022-06-23 PROCEDURE — 87635 SARS-COV-2 COVID-19 AMP PRB: CPT | Performed by: EMERGENCY MEDICINE

## 2022-06-23 PROCEDURE — 85730 THROMBOPLASTIN TIME PARTIAL: CPT | Performed by: EMERGENCY MEDICINE

## 2022-06-23 PROCEDURE — C9803 HOPD COVID-19 SPEC COLLECT: HCPCS

## 2022-06-23 PROCEDURE — 36415 COLL VENOUS BLD VENIPUNCTURE: CPT | Performed by: INTERNAL MEDICINE

## 2022-06-23 PROCEDURE — 85025 COMPLETE CBC W/AUTO DIFF WBC: CPT | Performed by: EMERGENCY MEDICINE

## 2022-06-23 PROCEDURE — 82962 GLUCOSE BLOOD TEST: CPT

## 2022-06-23 PROCEDURE — 80053 COMPREHEN METABOLIC PANEL: CPT | Performed by: EMERGENCY MEDICINE

## 2022-06-23 PROCEDURE — 85610 PROTHROMBIN TIME: CPT | Performed by: EMERGENCY MEDICINE

## 2022-06-23 PROCEDURE — 93005 ELECTROCARDIOGRAM TRACING: CPT

## 2022-06-23 PROCEDURE — 99284 EMERGENCY DEPT VISIT MOD MDM: CPT

## 2022-06-23 PROCEDURE — 84484 ASSAY OF TROPONIN QUANT: CPT | Performed by: INTERNAL MEDICINE

## 2022-06-23 PROCEDURE — 83880 ASSAY OF NATRIURETIC PEPTIDE: CPT | Performed by: EMERGENCY MEDICINE

## 2022-06-23 PROCEDURE — 83735 ASSAY OF MAGNESIUM: CPT | Performed by: EMERGENCY MEDICINE

## 2022-06-23 PROCEDURE — 84443 ASSAY THYROID STIM HORMONE: CPT | Performed by: EMERGENCY MEDICINE

## 2022-06-23 RX ORDER — VENLAFAXINE HYDROCHLORIDE 75 MG/1
150 CAPSULE, EXTENDED RELEASE ORAL
Status: DISCONTINUED | OUTPATIENT
Start: 2022-06-24 | End: 2022-06-25 | Stop reason: HOSPADM

## 2022-06-23 RX ORDER — NITROGLYCERIN 0.4 MG/1
0.4 TABLET SUBLINGUAL
Status: DISCONTINUED | OUTPATIENT
Start: 2022-06-23 | End: 2022-06-25 | Stop reason: HOSPADM

## 2022-06-23 RX ORDER — SODIUM CHLORIDE 0.9 % (FLUSH) 0.9 %
10 SYRINGE (ML) INJECTION AS NEEDED
Status: DISCONTINUED | OUTPATIENT
Start: 2022-06-23 | End: 2022-06-25 | Stop reason: HOSPADM

## 2022-06-23 RX ORDER — SODIUM CHLORIDE 9 MG/ML
125 INJECTION, SOLUTION INTRAVENOUS CONTINUOUS
Status: DISCONTINUED | OUTPATIENT
Start: 2022-06-23 | End: 2022-06-25 | Stop reason: HOSPADM

## 2022-06-23 RX ORDER — TRAZODONE HYDROCHLORIDE 100 MG/1
100 TABLET ORAL NIGHTLY
Status: DISCONTINUED | OUTPATIENT
Start: 2022-06-23 | End: 2022-06-25 | Stop reason: HOSPADM

## 2022-06-23 RX ORDER — ONDANSETRON 2 MG/ML
4 INJECTION INTRAMUSCULAR; INTRAVENOUS EVERY 6 HOURS PRN
Status: DISCONTINUED | OUTPATIENT
Start: 2022-06-23 | End: 2022-06-25 | Stop reason: HOSPADM

## 2022-06-23 RX ORDER — PANTOPRAZOLE SODIUM 40 MG/1
40 TABLET, DELAYED RELEASE ORAL DAILY
Status: DISCONTINUED | OUTPATIENT
Start: 2022-06-24 | End: 2022-06-25 | Stop reason: HOSPADM

## 2022-06-23 RX ORDER — LORAZEPAM 1 MG/1
1 TABLET ORAL 2 TIMES DAILY PRN
Status: DISCONTINUED | OUTPATIENT
Start: 2022-06-23 | End: 2022-06-25 | Stop reason: HOSPADM

## 2022-06-23 RX ORDER — LEFLUNOMIDE 20 MG/1
10 TABLET ORAL DAILY
Status: DISCONTINUED | OUTPATIENT
Start: 2022-06-24 | End: 2022-06-25 | Stop reason: HOSPADM

## 2022-06-23 RX ORDER — ACETAMINOPHEN 325 MG/1
650 TABLET ORAL EVERY 4 HOURS PRN
Status: DISCONTINUED | OUTPATIENT
Start: 2022-06-23 | End: 2022-06-25 | Stop reason: HOSPADM

## 2022-06-23 RX ORDER — SODIUM CHLORIDE 0.9 % (FLUSH) 0.9 %
3 SYRINGE (ML) INJECTION EVERY 12 HOURS SCHEDULED
Status: DISCONTINUED | OUTPATIENT
Start: 2022-06-23 | End: 2022-06-25 | Stop reason: HOSPADM

## 2022-06-23 RX ORDER — SODIUM CHLORIDE 0.9 % (FLUSH) 0.9 %
3-10 SYRINGE (ML) INJECTION AS NEEDED
Status: DISCONTINUED | OUTPATIENT
Start: 2022-06-23 | End: 2022-06-25 | Stop reason: HOSPADM

## 2022-06-23 RX ORDER — ATORVASTATIN CALCIUM 10 MG/1
10 TABLET, FILM COATED ORAL DAILY
Status: DISCONTINUED | OUTPATIENT
Start: 2022-06-23 | End: 2022-06-25 | Stop reason: HOSPADM

## 2022-06-23 RX ORDER — POLYETHYLENE GLYCOL 3350 17 G/17G
17 POWDER, FOR SOLUTION ORAL DAILY
Status: DISCONTINUED | OUTPATIENT
Start: 2022-06-24 | End: 2022-06-25 | Stop reason: HOSPADM

## 2022-06-23 RX ORDER — MEMANTINE HYDROCHLORIDE 10 MG/1
10 TABLET ORAL 2 TIMES DAILY
Status: DISCONTINUED | OUTPATIENT
Start: 2022-06-23 | End: 2022-06-25 | Stop reason: HOSPADM

## 2022-06-23 RX ORDER — VENLAFAXINE HYDROCHLORIDE 75 MG/1
75 CAPSULE, EXTENDED RELEASE ORAL
Status: DISCONTINUED | OUTPATIENT
Start: 2022-06-24 | End: 2022-06-25 | Stop reason: HOSPADM

## 2022-06-23 RX ADMIN — MEMANTINE 10 MG: 10 TABLET ORAL at 21:42

## 2022-06-23 RX ADMIN — ATORVASTATIN CALCIUM 10 MG: 10 TABLET, FILM COATED ORAL at 21:42

## 2022-06-23 RX ADMIN — SODIUM CHLORIDE 125 ML/HR: 9 INJECTION, SOLUTION INTRAVENOUS at 21:01

## 2022-06-23 RX ADMIN — METOPROLOL TARTRATE 12.5 MG: 25 TABLET, FILM COATED ORAL at 21:42

## 2022-06-23 RX ADMIN — TRAZODONE HYDROCHLORIDE 100 MG: 100 TABLET ORAL at 21:42

## 2022-06-23 NOTE — ED PROVIDER NOTES
Subjective   Chief complaint: Patient is a pleasant 81-year-old.  She does have some history of dementia.  She was at home with her daughter.  She called out and when her daughter got to her she stated she felt like she was going to pass out.  Her daughter is a nurse practitioner and called the ambulance right away.  She resumed Contrave transporter in route.  But she sustained a full syncopal event.  When EMS arrived they had difficult time finding pulse, however the patient never did lose pulse and she did wake up spontaneously.  There was some tacky bradycardia symptoms.  She was tachycardic around 120 per her daughter's report and her heart rate dropped as low as 40.  This is happened to her in the past.  She never had to have a pacemaker placed.  She sees Dr. Novak.  Currently she feels fatigued.  She states she has a headache.  She does not have any chest pain or abdominal pain.    Context: She was sitting in chair when she got the original sensation that she was going to pass out.    Duration: The syncopal event was brief, however she is still generalized weak fatigue and not back to baseline    Timing: Sudden onset    Severity: Patient does not quantify    Associated Symptoms: As noted above.        PCP:            Review of Systems   Unable to perform ROS: Dementia   Constitutional: Positive for fatigue.   Respiratory: Negative.    Neurological: Positive for syncope and headaches.       Past Medical History:   Diagnosis Date   • Anal stenosis 1/3/2012   • Anxiety 11/25/2013   • Arrhythmia, ventricular 4/20/2012   • Arthritis    • Ataxia 8/6/2013   • Carpal tunnel syndrome, bilateral 2/13/2017   • Depression 1/3/2012   • Elevated cholesterol    • Gastroesophageal reflux disease 6/29/2012   • History of osteoporotic pathological fracture     Bilateral wrist-unknown date   • Hx of total knee replacement, right 6/29/2017   • Hyperlipidemia 5/2/2016   • Insomnia 8/6/2013   • Irritable bowel syndrome 8/6/2013   •  Keratoconjunctivitis sicca, in Sjogren's syndrome (HCC) 2016   • Memory loss 2018   • Osteoarthritis of knee 2017   • Osteoporosis     fosamax [id number], on prolia()   • Paroxysmal atrial fibrillation (HCC) 2011   • Primary malignant neoplasm of right middle lobe of lung (HCC) 2018   • Rheumatoid arthritis (HCC)    • Tension-type headache, not intractable 2017   • Vitamin B12 deficiency 2016   • Vitamin D deficiency 2017       No Known Allergies    Past Surgical History:   Procedure Laterality Date   • ABDOMINAL SURGERY     • ANAL DILATION     • APPENDECTOMY     • BREAST BIOPSY Right    • CARDIAC CATHETERIZATION     • CATARACT EXTRACTION WITH INTRAOCULAR LENS IMPLANT Bilateral    • CHOLECYSTECTOMY     • COLONOSCOPY     • CYSTOSCOPY     • HYSTERECTOMY     • JOINT REPLACEMENT     • LUNG REMOVAL, PARTIAL Right 2018    RIGHT VATS WEDGE RESECTION OF RML PN RIGHT MIDDLE LOBECTOMY    • SUBTOTAL HYSTERECTOMY     • TONSILLECTOMY     • TOTAL HIP ARTHROPLASTY Left 2019    Procedure: TOTAL HIP ARTHROPLASTY ANTERIOR;  Surgeon: Lenny Khan MD;  Location: [facility] MAIN OR;  Service: Orthopedics   • TOTAL KNEE ARTHROPLASTY Right 2017   • WRIST FRACTURE SURGERY Bilateral        Family History   Problem Relation Age of Onset   • Alcohol abuse Father    • Heart disease Father        Social History     Socioeconomic History   • Marital status:    Tobacco Use   • Smoking status: Former Smoker     Packs/day: 1.00     Years: 15.00     Pack years: 15.00     Types: Cigarettes     Start date:      Quit date:      Years since quittin.5   • Smokeless tobacco: Never Used   Substance and Sexual Activity   • Alcohol use: Yes   • Drug use: No   • Sexual activity: Not Currently           Objective   Physical Exam  Vitals and nursing note reviewed.   Constitutional:       Appearance: She is ill-appearing.   HENT:      Head: Normocephalic and atraumatic.    Cardiovascular:      Rate and Rhythm: Normal rate and regular rhythm.   Pulmonary:      Effort: Pulmonary effort is normal.      Breath sounds: Normal breath sounds.   Abdominal:      Tenderness: There is no abdominal tenderness.   Musculoskeletal:         General: No swelling or tenderness.      Cervical back: No rigidity.   Skin:     General: Skin is warm and dry.      Capillary Refill: Capillary refill takes less than 2 seconds.   Neurological:      General: No focal deficit present.      Mental Status: She is alert.      Comments: Patient generally weak.  Did not appreciate focal findings.   Psychiatric:         Behavior: Behavior normal.         Procedures           ED Course                                           MDM  Number of Diagnoses or Management Options  Syncope, unspecified syncope type  Diagnosis management comments: Patient's daughter did discuss the case with her admitting team and they admitted the patient to the [facility].  Blood test at this point time revealed no emergent abnormality.  CT head is pending.  Sounds as though the patient was tacky and then bradycardia and had a syncopal event.  Has not been bradycardic during her stay here.  But definitely needs to be admitted to the hospital for cardiac evaluation.         Amount and/or Complexity of Data Reviewed  Clinical lab tests: reviewed  Tests in the radiology section of CPT®: reviewed  Tests in the medicine section of CPT®: reviewed  Discussion of test results with the performing providers: yes  Discuss the patient with other providers: yes  Independent visualization of images, tracings, or specimens: yes    Patient Progress  Patient progress: other (comment)      Final diagnoses:   Syncope, unspecified syncope type   Headache    ED Disposition  ED Disposition     ED Disposition   Decision to Admit    Condition   --    Comment   Level of Care: Telemetry [5]   Admitting Physician: CALIN CHEW [[id number]]   Attending Physician: CALIN CHEW  D [[id number]]   Patient Class: Observation [104]   Bed Request Comments: john               No follow-up provider specified.       Medication List      No changes were made to your prescriptions during this visit.          Alen Khoury DO  06/23/22 1942

## 2022-06-24 ENCOUNTER — APPOINTMENT (OUTPATIENT)
Dept: CARDIOLOGY | Facility: HOSPITAL | Age: 82
End: 2022-06-24

## 2022-06-24 LAB
BACTERIA UR QL AUTO: ABNORMAL /HPF
BH CV ECHO MEAS - ACS: 2.02 CM
BH CV ECHO MEAS - AI P1/2T: 1091 MSEC
BH CV ECHO MEAS - AO MAX PG: 9.4 MMHG
BH CV ECHO MEAS - AO MEAN PG: 4.7 MMHG
BH CV ECHO MEAS - AO ROOT DIAM: 3.1 CM
BH CV ECHO MEAS - AO V2 MAX: 153.1 CM/SEC
BH CV ECHO MEAS - AO V2 VTI: 34.6 CM
BH CV ECHO MEAS - AVA(I,D): 2.48 CM2
BH CV ECHO MEAS - EDV(CUBED): 96.8 ML
BH CV ECHO MEAS - EDV(MOD-SP4): 77.7 ML
BH CV ECHO MEAS - EF(MOD-BP): 71 %
BH CV ECHO MEAS - EF(MOD-SP4): 70.9 %
BH CV ECHO MEAS - ESV(CUBED): 32 ML
BH CV ECHO MEAS - ESV(MOD-SP4): 22.6 ML
BH CV ECHO MEAS - FS: 30.8 %
BH CV ECHO MEAS - IVS/LVPW: 0.78 CM
BH CV ECHO MEAS - IVSD: 0.91 CM
BH CV ECHO MEAS - LA A2CS (ATRIAL LENGTH): 4 CM
BH CV ECHO MEAS - LV DIASTOLIC VOL/BSA (35-75): 40.4 CM2
BH CV ECHO MEAS - LV MASS(C)D: 166.3 GRAMS
BH CV ECHO MEAS - LV MAX PG: 4.3 MMHG
BH CV ECHO MEAS - LV MEAN PG: 1.59 MMHG
BH CV ECHO MEAS - LV SYSTOLIC VOL/BSA (12-30): 11.8 CM2
BH CV ECHO MEAS - LV V1 MAX: 103.5 CM/SEC
BH CV ECHO MEAS - LV V1 VTI: 24.6 CM
BH CV ECHO MEAS - LVIDD: 4.6 CM
BH CV ECHO MEAS - LVIDS: 3.2 CM
BH CV ECHO MEAS - LVOT AREA: 3.5 CM2
BH CV ECHO MEAS - LVOT DIAM: 2.11 CM
BH CV ECHO MEAS - LVPWD: 1.16 CM
BH CV ECHO MEAS - MR MAX PG: 37 MMHG
BH CV ECHO MEAS - MR MAX VEL: 304 CM/SEC
BH CV ECHO MEAS - MV A MAX VEL: 97 CM/SEC
BH CV ECHO MEAS - MV DEC SLOPE: 350.8 CM/SEC2
BH CV ECHO MEAS - MV DEC TIME: 0.23 MSEC
BH CV ECHO MEAS - MV E MAX VEL: 80 CM/SEC
BH CV ECHO MEAS - MV E/A: 0.82
BH CV ECHO MEAS - MV MAX PG: 3.4 MMHG
BH CV ECHO MEAS - MV MEAN PG: 1.41 MMHG
BH CV ECHO MEAS - MV V2 VTI: 29 CM
BH CV ECHO MEAS - MVA(VTI): 3 CM2
BH CV ECHO MEAS - PA V2 MAX: 83.3 CM/SEC
BH CV ECHO MEAS - PULM A REVS DUR: 0.11 SEC
BH CV ECHO MEAS - PULM A REVS VEL: 32 CM/SEC
BH CV ECHO MEAS - PULM DIAS VEL: 40.5 CM/SEC
BH CV ECHO MEAS - PULM S/D: 1.69
BH CV ECHO MEAS - PULM SYS VEL: 68.3 CM/SEC
BH CV ECHO MEAS - RAP SYSTOLE: 3 MMHG
BH CV ECHO MEAS - RVDD: 2.2 CM
BH CV ECHO MEAS - RVOT DIAM: 1.86 CM
BH CV ECHO MEAS - RVSP: 32.2 MMHG
BH CV ECHO MEAS - SI(MOD-SP4): 28.6 ML/M2
BH CV ECHO MEAS - SV(LVOT): 85.7 ML
BH CV ECHO MEAS - SV(MOD-SP4): 55.1 ML
BH CV ECHO MEAS - TR MAX PG: 29.2 MMHG
BH CV ECHO MEAS - TR MAX VEL: 269.8 CM/SEC
BILIRUB UR QL STRIP: NEGATIVE
CLARITY UR: CLEAR
COLOR UR: YELLOW
GLUCOSE UR STRIP-MCNC: NEGATIVE MG/DL
HGB UR QL STRIP.AUTO: NEGATIVE
HYALINE CASTS UR QL AUTO: ABNORMAL /LPF
KETONES UR QL STRIP: ABNORMAL
LEUKOCYTE ESTERASE UR QL STRIP.AUTO: ABNORMAL
MAXIMAL PREDICTED HEART RATE: 137 BPM
NITRITE UR QL STRIP: NEGATIVE
PH UR STRIP.AUTO: 7 [PH] (ref 5–8)
PROT UR QL STRIP: NEGATIVE
RBC # UR STRIP: ABNORMAL /HPF
REF LAB TEST METHOD: ABNORMAL
SP GR UR STRIP: 1.01 (ref 1–1.03)
SQUAMOUS #/AREA URNS HPF: ABNORMAL /HPF
STRESS TARGET HR: 116 BPM
TROPONIN T SERPL-MCNC: <0.01 NG/ML (ref 0–0.03)
UROBILINOGEN UR QL STRIP: ABNORMAL
WBC # UR STRIP: ABNORMAL /HPF

## 2022-06-24 PROCEDURE — 81001 URINALYSIS AUTO W/SCOPE: CPT | Performed by: INTERNAL MEDICINE

## 2022-06-24 PROCEDURE — G0378 HOSPITAL OBSERVATION PER HR: HCPCS

## 2022-06-24 PROCEDURE — 93306 TTE W/DOPPLER COMPLETE: CPT | Performed by: INTERNAL MEDICINE

## 2022-06-24 PROCEDURE — 97162 PT EVAL MOD COMPLEX 30 MIN: CPT

## 2022-06-24 PROCEDURE — 93306 TTE W/DOPPLER COMPLETE: CPT

## 2022-06-24 RX ADMIN — VENLAFAXINE HYDROCHLORIDE 75 MG: 75 CAPSULE, EXTENDED RELEASE ORAL at 09:00

## 2022-06-24 RX ADMIN — TRAZODONE HYDROCHLORIDE 100 MG: 100 TABLET ORAL at 20:18

## 2022-06-24 RX ADMIN — POLYETHYLENE GLYCOL 3350 17 G: 17 POWDER, FOR SOLUTION ORAL at 09:48

## 2022-06-24 RX ADMIN — Medication 3 ML: at 20:19

## 2022-06-24 RX ADMIN — Medication 3 ML: at 09:00

## 2022-06-24 RX ADMIN — METOPROLOL TARTRATE 12.5 MG: 25 TABLET, FILM COATED ORAL at 09:46

## 2022-06-24 RX ADMIN — METOPROLOL TARTRATE 12.5 MG: 25 TABLET, FILM COATED ORAL at 20:19

## 2022-06-24 RX ADMIN — MEMANTINE 10 MG: 10 TABLET ORAL at 20:19

## 2022-06-24 RX ADMIN — VENLAFAXINE HYDROCHLORIDE 150 MG: 75 CAPSULE, EXTENDED RELEASE ORAL at 09:46

## 2022-06-24 RX ADMIN — PANTOPRAZOLE SODIUM 40 MG: 40 TABLET, DELAYED RELEASE ORAL at 09:48

## 2022-06-24 RX ADMIN — LEFLUNOMIDE 10 MG: 20 TABLET ORAL at 09:49

## 2022-06-24 RX ADMIN — MEMANTINE 10 MG: 10 TABLET ORAL at 09:48

## 2022-06-24 RX ADMIN — ATORVASTATIN CALCIUM 10 MG: 10 TABLET, FILM COATED ORAL at 20:18

## 2022-06-24 NOTE — PLAN OF CARE
Problem: Fall Injury Risk  Goal: Absence of Fall and Fall-Related Injury  Outcome: Ongoing, Progressing  Intervention: Promote Injury-Free Environment  Recent Flowsheet Documentation  Taken 6/24/2022 0400 by Carlyn Reyes RN  Safety Promotion/Fall Prevention:   activity supervised   fall prevention program maintained   lighting adjusted     Problem: Adult Inpatient Plan of Care  Goal: Plan of Care Review  Outcome: Ongoing, Progressing  Goal: Patient-Specific Goal (Individualized)  Outcome: Ongoing, Progressing  Goal: Absence of Hospital-Acquired Illness or Injury  Outcome: Ongoing, Progressing  Intervention: Identify and Manage Fall Risk  Recent Flowsheet Documentation  Taken 6/24/2022 0400 by Carlyn Reyes RN  Safety Promotion/Fall Prevention:   activity supervised   fall prevention program maintained   lighting adjusted  Intervention: Prevent Skin Injury  Recent Flowsheet Documentation  Taken 6/24/2022 0000 by Carlyn Reyes RN  Skin Protection:   adhesive use limited   drying agents applied   hydrocolloids used  Taken 6/23/2022 1939 by Carlyn Reyes RN  Skin Protection: adhesive use limited  Intervention: Prevent and Manage VTE (Venous Thromboembolism) Risk  Recent Flowsheet Documentation  Taken 6/24/2022 0400 by Carlyn Reyes RN  VTE Prevention/Management:   bilateral   sequential compression devices on  Taken 6/24/2022 0000 by Carlyn Reyes RN  VTE Prevention/Management:   bilateral   sequential compression devices on  Taken 6/23/2022 1939 by Carlyn Reyes RN  VTE Prevention/Management:   bilateral   sequential compression devices on  Goal: Optimal Comfort and Wellbeing  Outcome: Ongoing, Progressing  Intervention: Monitor Pain and Promote Comfort  Recent Flowsheet Documentation  Taken 6/24/2022 0400 by Carlyn Reyes RN  Pain Management Interventions:   care clustered   quiet environment facilitated   no interventions per patient request  Taken 6/23/2022 1939 by Eric  COLE Alcocer  Pain Management Interventions: quiet environment facilitated  Intervention: Provide Person-Centered Care  Recent Flowsheet Documentation  Taken 6/24/2022 0400 by Carlyn Reyes RN  Trust Relationship/Rapport:   care explained   choices provided  Taken 6/24/2022 0000 by Carlyn Reyes RN  Trust Relationship/Rapport:   care explained   choices provided  Taken 6/23/2022 1939 by Carlyn Reyes RN  Trust Relationship/Rapport:   care explained   choices provided  Goal: Readiness for Transition of Care  Outcome: Ongoing, Progressing  Intervention: Mutually Develop Transition Plan  Recent Flowsheet Documentation  Taken 6/23/2022 1944 by Carlyn Reyes RN  Transportation Anticipated: health plan transportation  Patient/Family Anticipated Services at Transition: none  Patient/Family Anticipates Transition to: other (see comments)  Taken 6/23/2022 1942 by Carlyn Reyes RN  Equipment Currently Used at Home: none     Problem: Asthma Comorbidity  Goal: Maintenance of Asthma Control  Outcome: Ongoing, Progressing     Problem: Behavioral Health Comorbidity  Goal: Maintenance of Behavioral Health Symptom Control  Outcome: Ongoing, Progressing     Problem: Diabetes Comorbidity  Goal: Blood Glucose Level Within Targeted Range  Outcome: Ongoing, Progressing     Problem: Heart Failure Comorbidity  Goal: Maintenance of Heart Failure Symptom Control  Outcome: Ongoing, Progressing     Problem: Hypertension Comorbidity  Goal: Blood Pressure in Desired Range  Outcome: Ongoing, Progressing  Intervention: Maintain Blood Pressure Management  Recent Flowsheet Documentation  Taken 6/23/2022 1939 by Carlyn Reyes RN  Syncope Management: position changed slowly     Problem: Osteoarthritis Comorbidity  Goal: Maintenance of Osteoarthritis Symptom Control  Outcome: Ongoing, Progressing     Problem: Obstructive Sleep Apnea Risk or Actual Comorbidity Management  Goal: Unobstructed Breathing During Sleep  Outcome: Ongoing,  Progressing     Problem: Pain Chronic (Persistent) (Comorbidity Management)  Goal: Acceptable Pain Control and Functional Ability  Outcome: Ongoing, Progressing  Intervention: Develop Pain Management Plan  Recent Flowsheet Documentation  Taken 6/24/2022 0400 by Carlyn Reyes RN  Pain Management Interventions:   care clustered   quiet environment facilitated   no interventions per patient request  Taken 6/23/2022 1939 by Carlyn Reyes RN  Pain Management Interventions: quiet environment facilitated  Intervention: Optimize Psychosocial Wellbeing  Recent Flowsheet Documentation  Taken 6/24/2022 0400 by Carlyn Reyes RN  Diversional Activities: television  Family/Support System Care: self-care encouraged  Taken 6/24/2022 0000 by Carlyn Reyes RN  Supportive Measures: active listening utilized  Taken 6/23/2022 1939 by Carlyn Reyes RN  Supportive Measures: active listening utilized  Diversional Activities: television  Family/Support System Care: self-care encouraged     Problem: Seizure Disorder Comorbidity  Goal: Maintenance of Seizure Control  Outcome: Ongoing, Progressing   Goal Outcome Evaluation:

## 2022-06-24 NOTE — H&P
Patient Care Team:  Obdulia Spicer APRN as PCP - General (Nurse Practitioner)    Chief complaint syncope    Subjective     Pt is pleasant 81-year-old that was brought into the ER last night by EMS after a syncopal episode.  She does have some history of dementia.  She was at home with her daughter (who is a nurse practitioner).  She called out and when her daughter got to her she stated she felt like she was going to pass out.   the ambulance was called right away.  When EMS arrived to the home,  they had difficult time finding a pulse, however the patient never lost her pulse.  she did wake up spontaneously.  There were some tacky bradycardia symptoms.  She was tachycardic around 120 per her daughter's report and her heart rate dropped as low as 40.  This has happened to her in the past.  She never had to have a pacemaker placed.  She sees Dr. Novak.  Currently she feels fatigued.  She will be admitted for further evaluation     Review of Systems   Constitutional: Positive for fatigue.   HENT: Negative.    Respiratory: Negative.    Cardiovascular: Negative.    Gastrointestinal: Negative.    Musculoskeletal: Positive for arthralgias.   Neurological: Positive for syncope and weakness. Negative for dizziness and headaches.   Psychiatric/Behavioral: Positive for confusion.          History  Past Medical History:   Diagnosis Date   • Anal stenosis 1/3/2012   • Anxiety 11/25/2013   • Arrhythmia, ventricular 4/20/2012   • Arthritis    • Ataxia 8/6/2013   • Carpal tunnel syndrome, bilateral 2/13/2017   • Depression 1/3/2012   • Elevated cholesterol    • Gastroesophageal reflux disease 6/29/2012   • History of osteoporotic pathological fracture     Bilateral wrist-unknown date   • Hx of total knee replacement, right 6/29/2017   • Hyperlipidemia 5/2/2016   • Insomnia 8/6/2013   • Irritable bowel syndrome 8/6/2013   • Keratoconjunctivitis sicca, in Sjogren's syndrome (HCC) 9/21/2016   • Memory loss 12/7/2018   •  Osteoarthritis of knee 2017   • Osteoporosis     fosamax p7cntnod, on prolia(2016)   • Paroxysmal atrial fibrillation (HCC) 2011   • Primary malignant neoplasm of right middle lobe of lung (HCC) 2018   • Rheumatoid arthritis (HCC)    • Tension-type headache, not intractable 2017   • Vitamin B12 deficiency 2016   • Vitamin D deficiency 2017     Past Surgical History:   Procedure Laterality Date   • ABDOMINAL SURGERY     • ANAL DILATION     • APPENDECTOMY     • BREAST BIOPSY Right    • CARDIAC CATHETERIZATION     • CATARACT EXTRACTION WITH INTRAOCULAR LENS IMPLANT Bilateral    • CHOLECYSTECTOMY     • COLONOSCOPY     • CYSTOSCOPY     • HYSTERECTOMY     • JOINT REPLACEMENT     • LUNG REMOVAL, PARTIAL Right 2018    RIGHT VATS WEDGE RESECTION OF RML PN RIGHT MIDDLE LOBECTOMY    • SUBTOTAL HYSTERECTOMY     • TONSILLECTOMY     • TOTAL HIP ARTHROPLASTY Left 2019    Procedure: TOTAL HIP ARTHROPLASTY ANTERIOR;  Surgeon: Lenny Khan MD;  Location: [facility] MAIN OR;  Service: Orthopedics   • TOTAL KNEE ARTHROPLASTY Right 2017   • WRIST FRACTURE SURGERY Bilateral      Family History   Problem Relation Age of Onset   • Alcohol abuse Father    • Heart disease Father      Social History     Tobacco Use   • Smoking status: Former Smoker     Packs/day: 1.00     Years: 15.00     Pack years: 15.00     Types: Cigarettes     Start date:      Quit date:      Years since quittin.5   • Smokeless tobacco: Never Used   Substance Use Topics   • Alcohol use: Yes   • Drug use: No     Facility-Administered Medications Prior to Admission   Medication Dose Route Frequency Provider Last Rate Last Admin   • denosumab (PROLIA) syringe 60 mg  60 mg Subcutaneous Q6 Months Aletha Guadalupe PA   60 mg at 22 1406     Medications Prior to Admission   Medication Sig Dispense Refill Last Dose   • calcium carbonate (OS-DAHLIA) 1250 (500 Ca) MG tablet Take 600 mg by mouth Daily.   2022 at  Unknown time   • Cholecalciferol (VITAMIN D3 SUPER STRENGTH) 50 MCG (2000 UT) tablet 2 po q d 60 each 11 6/23/2022 at Unknown time   • leflunomide (ARAVA) 10 MG tablet Take 1 tablet by mouth Daily. 90 tablet 3 6/23/2022 at Unknown time   • LORazepam (Ativan) 0.5 MG tablet 1 po bid prn anxiety 30 tablet 0 6/22/2022 at Unknown time   • memantine (Namenda) 10 MG tablet Take 1 tablet by mouth 2 (Two) Times a Day. 180 tablet 1 6/22/2022 at Unknown time   • metoprolol tartrate (LOPRESSOR) 25 MG tablet Take 0.5 tablets by mouth 2 (Two) Times a Day. 90 tablet 1 6/23/2022 at Unknown time   • polyethylene glycol (MIRALAX) 17 GM/SCOOP powder Take 17 g by mouth 2 (Two) Times a Day. 1020 g 2 6/23/2022 at Unknown time   • pravastatin (PRAVACHOL) 40 MG tablet Take 1 tablet by mouth every night at bedtime. 90 tablet 0 6/22/2022 at Unknown time   • traZODone (DESYREL) 50 MG tablet Take 2 tablets by mouth Every Night. 180 tablet 1 6/22/2022 at Unknown time   • venlafaxine XR (EFFEXOR-XR) 150 MG 24 hr capsule Take 1 capsule by mouth Every Morning. Take with 75mg tab for a total of 225mg 90 capsule 1 6/23/2022 at Unknown time   • venlafaxine XR (EFFEXOR-XR) 75 MG 24 hr capsule Take 1 capsule by mouth Daily. 90 capsule 1 6/23/2022 at Unknown time   • vitamin B-12 (CYANOCOBALAMIN) 1000 MCG tablet Take 1,000 mcg by mouth Daily.   6/23/2022 at Unknown time   • Linzess 145 MCG capsule capsule Take 1 capsule by mouth Daily. 90 capsule 1    • Multiple Vitamins-Minerals (PreserVision AREDS) capsule Take  by mouth.      • pantoprazole (PROTONIX) 40 MG EC tablet Take 1 tablet by mouth Daily. 90 tablet 1      Allergies:  Patient has no known allergies.    Objective     Vital Signs  Temp:  [97.9 °F (36.6 °C)-98.7 °F (37.1 °C)] 98.7 °F (37.1 °C)  Heart Rate:  [58-77] 64  Resp:  [13-24] 14  BP: (132-182)/(54-90) 132/57     Physical Exam:      General Appearance:    Alert, cooperative, in no acute distress   Head:    Normocephalic, without obvious  abnormality, atraumatic   Eyes:            Lids and lashes normal, conjunctivae and sclerae normal, no   icterus, no pallor, corneas clear, PERRLA   Ears:    Ears appear intact with no abnormalities noted   Throat:   No oral lesions, no thrush, oral mucosa moist   Neck:   No adenopathy, supple, trachea midline, no thyromegaly, no   carotid bruit, no JVD   Lungs:     Clear to auscultation,respirations regular, even and                  unlabored    Heart:    Regular rhythm and normal rate, normal S1 and S2, no            murmur, no gallop, no rub, no click   Chest Wall:    No abnormalities observed   Abdomen:     Normal bowel sounds, no masses, no organomegaly, soft        non-tender, non-distended, no guarding, no rebound                tenderness   Extremities:   Moves all extremities well, no edema, no cyanosis, no             redness   Pulses:   Pulses palpable and equal bilaterally   Skin:   No bleeding, bruising or rash   Lymph nodes:   No palpable adenopathy   Neurologic:   Cranial nerves 2 - 12 grossly intact, sensation intact, DTR       present and equal bilaterally       Results Review:     Imaging Results (Last 24 Hours)     Procedure Component Value Units Date/Time    CT Cervical Spine Without Contrast [[id number]] Collected: 06/1940     Updated: 06/23/22 1945    Narrative:      DATE OF EXAM:  6/23/2022 7:00 PM     PROCEDURE:  CT CERVICAL SPINE WO CONTRAST-     INDICATIONS:   neck pain; R55-Syncope and collapse, history of lung cancer     COMPARISON:   No Comparisons Available     TECHNIQUE:  Routine transaxial slices were obtained through the cervical spine  without the administration of intravenous contrast. Reconstructed  coronal and sagittal images were also obtained. Automated exposure  control and iterative construction methods were used.      FINDINGS:  No fracture is identified. There is no lytic or blastic bone lesion. The  craniocervical junction appears intact. There is degenerative change  of  the atlantodental junction.     At C2-3, there is mild facet joint degeneration.     At C3-4 there is mild disc space narrowing. There is bilateral  uncovertebral spurring with left foraminal impingement.     At C4-5, C4 is subluxed anteriorly 1 mm relative to C5. There is right  facet joint degeneration.     C5-6, there is mild left facet joint degeneration. There is chronic  appearing deformity of the right C6 lamina.     At C6-C7, no significant bone abnormality.     At C7-T1, no significant bone abnormality.     Soft tissue evaluation in the canal is very limited and grossly  negative. Limited lung apical evaluation is negative. There are  bilateral thyroid nodules, largest about 1.7 cm.       Impression:      1. Negative for evidence of injury to the cervical spine.  2. There is mild degenerative change of the cervical spine mainly  involving the facet joints.  3. Bilateral thyroid nodules.     Electronically Signed By-Zhanna Cedeno MD On:6/23/2022 7:43 PM  This report was finalized on [id number] by  Zhanna Cedeno MD.    CT Head Without Contrast [[id number]] Collected: 06/23/22 1937     Updated: 06/23/22 1943    Narrative:      CT HEAD WO CONTRAST-     Date of Exam: 6/23/2022 7:00 PM     Indication: Syncope/headache; R55-Syncope and collapse.     Comparison: None available.     Technique:  Without contrast, contiguous axial CT images of the head  were obtained from skull base to vertex.  Coronal reconstructions were  performed.  Automated exposure control and iterative reconstruction  methods were used.     FINDINGS  There is no intracranial hemorrhage or mass effect. There is mild  cerebral and cerebellar atrophy. There is mild patchy decreased density  in the white matter. There is no evidence of extra-axial pathology.  Ventricles and cisterns are patent. No skull abnormality is seen. No  gross orbital abnormality. There is no fluid in the included sinuses or  mastoids.       Impression:      1. No  acute process is identified. Negative for hemorrhage or mass  effect.  2. There is cerebral and cerebellar atrophy. There is white matter  disease which is favored to be due to chronic small vessel ischemia.     Electronically Signed By-Zhanna Cedeno MD On:6/23/2022 7:38 PM  This report was finalized on [id number] by  Zhanna Cedeno MD.    XR Chest 1 View [[id number]] Collected: 06/23/22 1853     Updated: 06/23/22 1858    Narrative:      Examination: XR CHEST 1 VW-     Date of Exam: 6/23/2022 6:41 PM     Indication: Syncope; R55-Syncope and collapse.       Comparison: 07/05/2019     Technique: 1 view of the chest      FINDINGS:  The heart size is within normal. There is distortion of the right  hilum/mediastinum, with adjacent surgical sutures, similar to the prior  exam. There is a fat pad in the right cardiophrenic angle. There is no  vascular congestion, airspace opacity, or developing pulmonary nodule.  No pleural effusion or pneumothorax is seen. No significant bone  abnormality.       Impression:      1. Stable exam, with chronic postoperative changes in the right chest.  No acute process demonstrated.           Electronically Signed By-Zhanna Cedeno MD On:6/23/2022 6:55 PM  This report was finalized on [id number] by  Zhanna Cedeno MD.           Lab Results (last 24 hours)     Procedure Component Value Units Date/Time    Urinalysis With Culture If Indicated - Urine, Clean Catch [[id number]]  (Abnormal) Collected: 06/24/22 0215    Specimen: Urine, Clean Catch Updated: 06/24/22 0228     Color, UA Yellow     Appearance, UA Clear     pH, UA 7.0     Specific Gravity, UA 1.009     Glucose, UA Negative     Ketones, UA Trace     Bilirubin, UA Negative     Blood, UA Negative     Protein, UA Negative     Leuk Esterase, UA Trace     Nitrite, UA Negative     Urobilinogen, UA 0.2 E.U./dL    Narrative:      In absence of clinical symptoms, the presence of pyuria, bacteria, and/or nitrites on the urinalysis result does  not correlate with infection.    Urinalysis, Microscopic Only - Urine, Clean Catch [[id number]]  (Abnormal) Collected: 06/24/22 0215    Specimen: Urine, Clean Catch Updated: 06/24/22 0228     RBC, UA 0-2 /HPF      WBC, UA 3-5 /HPF      Comment: Urine culture not indicated.        Bacteria, UA None Seen /HPF      Squamous Epithelial Cells, UA 0-2 /HPF      Hyaline Casts, UA None Seen /LPF      Methodology Automated Microscopy    Troponin [[id number]]  (Normal) Collected: 06/23/22 2254    Specimen: Blood Updated: 06/24/22 0003     Troponin T <0.010 ng/mL     Narrative:      Troponin T Reference Range:  <= 0.03 ng/mL-   Negative for AMI  >0.03 ng/mL-     Abnormal for myocardial necrosis.  Clinicians would have to utilize clinical acumen, EKG, Troponin and serial changes to determine if it is an Acute Myocardial Infarction or myocardial injury due to an underlying chronic condition.       Results may be falsely decreased if patient taking Biotin.      Protime-INR [[id number]]  (Normal) Collected: 06/23/22 1818    Specimen: Blood Updated: 06/23/22 1944     Protime 11.2 Seconds      INR 1.09    aPTT [[id number]]  (Abnormal) Collected: 06/23/22 1818    Specimen: Blood Updated: 06/23/22 1944     PTT 29.9 seconds     [place] Draw [[id number]] Collected: 06/23/22 1818    Specimen: Blood Updated: 06/23/22 1933    Narrative:      The following orders were created for panel order [place] Draw.  Procedure                               Abnormality         Status                     ---------                               -----------         ------                     Green Top (Gel)[[id number]]                                  Final result               Lavender Top[[id number]]                                                                Gold Top - SST[[id number]]                                   Final result               Light Blue Top[[id number]]                                   Final result                 Please view results for  these tests on the individual orders.    Light Blue Top [[id number]] Collected: 06/23/22 1818    Specimen: Blood Updated: 06/23/22 1933     Extra Tube Hold for add-ons.     Comment: Auto resulted       COVID PRE-OP / PRE-PROCEDURE SCREENING ORDER (NO ISOLATION) - Swab, Nasopharynx [[id number]]  (Normal) Collected: 06/23/22 1843    Specimen: Swab from Nasopharynx Updated: 06/23/22 1906    Narrative:      The following orders were created for panel order COVID PRE-OP / PRE-PROCEDURE SCREENING ORDER (NO ISOLATION) - Swab, Nasopharynx.  Procedure                               Abnormality         Status                     ---------                               -----------         ------                     COVID-19,CEPHEID/EMANUEL,CO...[[id number]]  Normal              Final result                 Please view results for these tests on the individual orders.    COVID-19,CEPHEID/EMANUEL,COR/SURJIT/PAD/SIOMNA IN-HOUSE(OR EMERGENT/ADD-ON),NP SWAB IN TRANSPORT MEDIA 3-4 HR TAT, RT-PCR - Swab, Nasopharynx [[id number]]  (Normal) Collected: 06/23/22 1843    Specimen: Swab from Nasopharynx Updated: 06/23/22 1906     COVID19 Not Detected    Narrative:      Fact sheet for providers: https://www.fda.gov/media/836657/download     Fact sheet for patients: https://www.fda.gov/media/560335/download  Fact sheet for providers: https://www.fda.gov/media/361355/download    Fact sheet for patients: https://www.fda.gov/media/172872/download    Test performed by PCR.    BNP [[id number]]  (Normal) Collected: 06/23/22 1818    Specimen: Blood Updated: 06/23/22 1905     proBNP 411.9 pg/mL     Narrative:      Among patients with dyspnea, NT-proBNP is highly sensitive for the detection of acute congestive heart failure. In addition NT-proBNP of <300 pg/ml effectively rules out acute congestive heart failure with 99% negative predictive value.    Results may be falsely decreased if patient taking Biotin.      Troponin [[id number]]  (Normal) Collected: 06/23/22 1818     Specimen: Blood Updated: 06/23/22 1905     Troponin T <0.010 ng/mL     Narrative:      Troponin T Reference Range:  <= 0.03 ng/mL-   Negative for AMI  >0.03 ng/mL-     Abnormal for myocardial necrosis.  Clinicians would have to utilize clinical acumen, EKG, Troponin and serial changes to determine if it is an Acute Myocardial Infarction or myocardial injury due to an underlying chronic condition.       Results may be falsely decreased if patient taking Biotin.      TSH [[id number]]  (Normal) Collected: 06/23/22 1818    Specimen: Blood Updated: 06/23/22 1905     TSH 2.490 uIU/mL     Comprehensive Metabolic Panel [[id number]]  (Abnormal) Collected: 06/23/22 1818    Specimen: Blood Updated: 06/23/22 1900     Glucose 89 mg/dL      BUN 8 mg/dL      Creatinine 0.69 mg/dL      Sodium 143 mmol/L      Potassium 3.6 mmol/L      Chloride 107 mmol/L      CO2 19.0 mmol/L      Calcium 8.9 mg/dL      Total Protein 6.5 g/dL      Albumin 3.90 g/dL      ALT (SGPT) 20 U/L      AST (SGOT) 21 U/L      Alkaline Phosphatase 74 U/L      Total Bilirubin 0.3 mg/dL      Globulin 2.6 gm/dL      A/G Ratio 1.5 g/dL      BUN/Creatinine Ratio 11.6     Anion Gap 17.0 mmol/L      eGFR 86.2 mL/min/1.73      Comment: National Kidney Foundation and American Society of Nephrology (ASN) Task Force recommended calculation based on the Chronic Kidney Disease Epidemiology Collaboration (CKD-EPI) equation refit without adjustment for race.       Narrative:      GFR Normal >60  Chronic Kidney Disease <60  Kidney Failure <15      Magnesium [[id number]]  (Normal) Collected: 06/23/22 1818    Specimen: Blood Updated: 06/23/22 1900     Magnesium 2.2 mg/dL     CBC & Differential [[id number]]  (Abnormal) Collected: 06/23/22 1818    Specimen: Blood Updated: 06/23/22 1845    Narrative:      The following orders were created for panel order CBC & Differential.  Procedure                               Abnormality         Status                     ---------                                -----------         ------                     CBC Auto Differential[[id number]]        Abnormal            Final result                 Please view results for these tests on the individual orders.    CBC Auto Differential [[id number]]  (Abnormal) Collected: 06/23/22 1818    Specimen: Blood Updated: 06/23/22 1845     WBC 6.60 10*3/mm3      RBC 4.40 10*6/mm3      Hemoglobin 13.2 g/dL      Hematocrit 40.2 %      MCV 91.4 fL      MCH 30.0 pg      MCHC 32.9 g/dL      RDW 14.8 %      RDW-SD 46.4 fl      MPV 8.1 fL      Platelets 264 10*3/mm3      Neutrophil % 46.8 %      Lymphocyte % 43.2 %      Monocyte % 9.8 %      Eosinophil % 0.0 %      Basophil % 0.2 %      Neutrophils, Absolute 3.10 10*3/mm3      Lymphocytes, Absolute 2.80 10*3/mm3      Monocytes, Absolute 0.60 10*3/mm3      Eosinophils, Absolute 0.00 10*3/mm3      Basophils, Absolute 0.00 10*3/mm3      nRBC 0.1 /100 WBC     Gold Top - SST [[id number]] Collected: 06/23/22 1818    Specimen: Blood Updated: 06/23/22 1832     Extra Tube hold    Green Top (Gel) [[id number]] Collected: 06/23/22 1818    Specimen: Blood Updated: 06/23/22 1831     Extra Tube hold    POC Glucose Once [[id number]]  (Normal) Collected: 06/23/22 1815    Specimen: Blood Updated: 06/23/22 1816     Glucose 89 mg/dL      Comment: Serial Number: [id number]:  [id number]              I reviewed the patient's new clinical results.    Assessment & Plan       Syncope, unspecified syncope type  - admit for further evaluation. Keep on telemetry.  Order echo and consult cardiology.  Pt is currently stable    H/o SVT - on metoprolol    CAD - non-obstructive    HTN - home meds    HLD - statin    DM2 - diet controlled    Dementia - home meds    Anxiety - ativan prn    PT consult in place    I discussed the patients findings and my recommendations with patient.     Gracie Hurtado MD  06/24/22  09:07 EDT

## 2022-06-24 NOTE — PLAN OF CARE
Goal Outcome Evaluation:      82 y/o F presents to [facility] by EMS after syncopal episode with tachy patricia symptoms (40-120BPM).  PMH of dementia. At baseline, pt living at [facility] on [address], independent with all ADLs and does not use AD. This date, pt Lottie to sit EOB and CGA to stand. Pt was Lottie for ambulation without AD. Moderate LOB with head turns during ambulation with assistance needed to correct. CGA/Lottie for ambulation with RW. Balance assessment suggests pt relies heavily on vision for balance and has decreased sensation in feet. Pt educated on use of walker to improve balance for all ambulation and agreed that it would be beneficial.  Vitals remained WNL and pt was asymptomatic. Anticipate d/c back to [facility] with HHPT to address balance deficits.

## 2022-06-24 NOTE — THERAPY EVALUATION
Patient Name: Alia Cheney  : 1940    MRN: [id number]                              Today's Date: 2022       Admit Date: 2022    Visit Dx:     ICD-10-CM ICD-9-CM   1. Syncope, unspecified syncope type  R55 780.2     Patient Active Problem List   Diagnosis   • Absolute anemia   • Anal stenosis   • Anxiety   • Arrhythmia, ventricular   • Ataxia   • Carpal tunnel syndrome, bilateral   • Colles' fracture   • Coronary artery disease   • Depression   • Physical exam   • Gastroesophageal reflux disease   • Hx of total knee replacement, right   • Hyperlipidemia   • Insomnia   • Irritable bowel syndrome   • Keratoconjunctivitis sicca, in Sjogren's syndrome (HCC)   • Memory loss   • Osteoarthritis of knee   • Osteoporosis   • Paroxysmal atrial fibrillation (HCC)   • Primary malignant neoplasm of right middle lobe of lung (HCC)   • Rheumatoid arthritis (HCC)   • Trochanteric bursitis of left hip   • Vitamin B12 deficiency   • Vitamin D deficiency   • History of total hip replacement, left   • Overweight (BMI 25.0-29.9)   • Allergic rhinitis   • Lung nodule   • Other specified dorsopathies, site unspecified   • Other specified examination   • Primary osteoarthritis of both knees   • Foot callus   • Petechiae   • Medicare annual wellness visit, subsequent   • History of osteoporotic pathological fracture   • Syncope, unspecified syncope type     Past Medical History:   Diagnosis Date   • Anal stenosis 1/3/2012   • Anxiety 2013   • Arrhythmia, ventricular 2012   • Arthritis    • Ataxia 2013   • Carpal tunnel syndrome, bilateral 2017   • Depression 1/3/2012   • Elevated cholesterol    • Gastroesophageal reflux disease 2012   • History of osteoporotic pathological fracture     Bilateral wrist-unknown date   • Hx of total knee replacement, right 2017   • Hyperlipidemia 2016   • Insomnia 2013   • Irritable bowel syndrome 2013   • Keratoconjunctivitis sicca, in Sjogren's  syndrome (HCC) 9/21/2016   • Memory loss 12/7/2018   • Osteoarthritis of knee 5/19/2017   • Osteoporosis     fosamax [id number], on prolia(2016)   • Paroxysmal atrial fibrillation (HCC) 11/9/2011   • Primary malignant neoplasm of right middle lobe of lung (HCC) 8/21/2018   • Rheumatoid arthritis (HCC)    • Tension-type headache, not intractable 12/26/2017   • Vitamin B12 deficiency 5/2/2016   • Vitamin D deficiency 11/20/2017     Past Surgical History:   Procedure Laterality Date   • ABDOMINAL SURGERY     • ANAL DILATION     • APPENDECTOMY     • BREAST BIOPSY Right    • CARDIAC CATHETERIZATION  2009   • CATARACT EXTRACTION WITH INTRAOCULAR LENS IMPLANT Bilateral 2009   • CHOLECYSTECTOMY     • COLONOSCOPY     • CYSTOSCOPY     • HYSTERECTOMY     • JOINT REPLACEMENT     • LUNG REMOVAL, PARTIAL Right 08/06/2018    RIGHT VATS WEDGE RESECTION OF RML PN RIGHT MIDDLE LOBECTOMY    • SUBTOTAL HYSTERECTOMY     • TONSILLECTOMY     • TOTAL HIP ARTHROPLASTY Left 7/17/2019    Procedure: TOTAL HIP ARTHROPLASTY ANTERIOR;  Surgeon: Lenny Khan MD;  Location: [facility] OR;  Service: Orthopedics   • TOTAL KNEE ARTHROPLASTY Right 2017   • WRIST FRACTURE SURGERY Bilateral       General Information     Row Name 06/24/22 1558          Physical Therapy Time and Intention    Document Type evaluation  -KB     Mode of Treatment physical therapy  -KB     Row Name 06/24/22 1558          General Information    Patient Profile Reviewed yes  -KB     Prior Level of Function independent:;all household mobility;community mobility;ADL's;dressing;bathing  -KB     Existing Precautions/Restrictions fall  -KB     Row Name 06/24/22 1558          Living Environment    People in Home [facility]  -KB     Row Name 06/24/22 1558          Home Main Entrance    Number of Stairs, Main Entrance none  -KB     Row Name 06/24/22 1558          Stairs Within Home, Primary    Number of Stairs, Within Home, Primary none  -KB     Row Name 06/24/22 1558           Cognition    Orientation Status (Cognition) oriented x 4  -KB     Row Name 06/24/22 1558          Safety Issues, Functional Mobility    Safety Issues Affecting Function (Mobility) insight into deficits/self-awareness  -KB     Impairments Affecting Function (Mobility) balance;endurance/activity tolerance;postural/trunk control;sensation/sensory awareness  -KB           User Key  (r) = Recorded By, (t) = Taken By, (c) = Cosigned By    Initials Name Provider Type    Rosibel Miramontes PT Physical Therapist               Mobility     Row Name 06/24/22 1559          Bed Mobility    Bed Mobility supine-sit  -KB     Supine-Sit [place] (Bed Mobility) minimum assist (75% patient effort)  -KB     Assistive Device (Bed Mobility) bed rails;head of bed elevated  -KB     Row Name 06/24/22 1559          Sit-Stand Transfer    Sit-Stand [place] (Transfers) contact guard  -KB     Row Name 06/24/22 1559          Gait/Stairs (Locomotion)    [place] Level (Gait) minimum assist (75% patient effort)  LOB with head turns without AD  -KB     Assistive Device (Gait) walker, front-wheeled  -KB     Distance in Feet (Gait) 2x100 ft with and w/o RW  -KB     Deviations/Abnormal Patterns (Gait) gait speed decreased;susan decreased  -KB           User Key  (r) = Recorded By, (t) = Taken By, (c) = Cosigned By    Initials Name Provider Type    Rosibel Miramontes PT Physical Therapist               Obj/Interventions     Row Name 06/24/22 1602          Range of Motion Comprehensive    General Range of Motion no range of motion deficits identified  -KB     Row Name 06/24/22 1602          Strength Comprehensive (MMT)    Comment, General Manual Muscle Testing (MMT) Assessment LE grossly 4+/5  -KB     Row Name 06/24/22 1603          Balance    Balance Assessment sitting static balance;standing static balance;standing dynamic balance  -KB     Static Sitting Balance independent  -KB     Position, Sitting Balance unsupported;sitting edge  of bed  -KB     Static Standing Balance contact guard  -KB     Dynamic Standing Balance minimal assist  -KB     Position/Device Used, Standing Balance walker, front-wheeled  -KB     Comment, Balance Balance tested with NBS with eyes open for 30 seconds with no LOB. NBS eyes closed <10 seconds with no step reaction.  -KB           User Key  (r) = Recorded By, (t) = Taken By, (c) = Cosigned By    Initials Name Provider Type    Rosibel Miramontes, PT Physical Therapist               Goals/Plan     Row Name 06/24/22 1609          Bed Mobility Goal 1 (PT)    Activity/Assistive Device (Bed Mobility Goal 1, PT) bed mobility activities, all  -KB     [place] Level/Cues Needed (Bed Mobility Goal 1, PT) independent  -KB     Time Frame (Bed Mobility Goal 1, PT) long term goal (LTG);2 weeks  -KB     Row Name 06/24/22 1609          Transfer Goal 1 (PT)    Activity/Assistive Device (Transfer Goal 1, PT) transfers, all  -KB     [place] Level/Cues Needed (Transfer Goal 1, PT) independent  -KB     Time Frame (Transfer Goal 1, PT) long term goal (LTG);2 weeks  -KB     Row Name 06/24/22 1609          Gait Training Goal 1 (PT)    Activity/Assistive Device (Gait Training Goal 1, PT) gait (walking locomotion);assistive device use  -KB     [place] Level (Gait Training Goal 1, PT) modified independence  -KB     Distance (Gait Training Goal 1, PT) 400 ft with RW  -KB     Row Name 06/24/22 1609          Therapy Assessment/Plan (PT)    Planned Therapy Interventions (PT) balance training;bed mobility training;gait training;home exercise program;postural re-education;patient/family education;strengthening;transfer training  -KB           User Key  (r) = Recorded By, (t) = Taken By, (c) = Cosigned By    Initials Name Provider Type    Rosibel Miramontes, PT Physical Therapist               Clinical Impression     Row Name 06/24/22 1606          Pain    Pretreatment Pain Rating 0/10 - no pain  -KB     Posttreatment Pain Rating 0/10 -  no pain  -     Row Name 06/24/22 1606          Plan of Care Review    Plan of Care Reviewed With patient  -     Outcome Evaluation 82 y/o F presents to [facility] by EMS after syncopal episode with tachy patricia symptoms (40-120BPM).  PMH of dementia. At baseline, pt living at [facility] on [address], independent with all ADLs and does not use AD. This date, pt Lottie to sit EOB and CGA to stand. Pt was Lottie for ambulation without AD. Moderate LOB with head turns during ambulation with assistance needed to correct. CGA/Lottie for ambulation with RW. Balance assessment suggests pt relies heavily on vision for balance and has decreased sensation in feet. Pt educated on use of walker to improve balance for all ambulation and agreed that it would be beneficial.  Vitals remained WNL and pt was asymptomatic. Anticipate d/c back to [facility] with HHPT to address balance deficits.  -     Row Name 06/24/22 1606          Therapy Assessment/Plan (PT)    Rehab Potential (PT) good, to achieve stated therapy goals  -     Criteria for Skilled Interventions Met (PT) yes;meets criteria;skilled treatment is necessary  -     Therapy Frequency (PT) 3 times/wk  -     Predicted Duration of Therapy Intervention (PT) until d/c  -     Row Name 06/24/22 1606          Vital Signs    Pre Systolic BP Rehab 139  -KB     Pre Treatment Diastolic BP 55  sitting EOB  -KB     Intra Systolic BP Rehab 123  -KB     Intra Treatment Diastolic BP 66  standing  -KB     Post Systolic BP Rehab 135  -KB     Post Treatment Diastolic BP 60  Standing after ambulation  -     O2 Delivery Pre Treatment room air  -KB     O2 Delivery Intra Treatment room air  -KB     O2 Delivery Post Treatment room air  -     Row Name 06/24/22 1606          Positioning and Restraints    Pre-Treatment Position in bed  -KB     Post Treatment Position chair  -KB     In Chair sitting;exit alarm on;call light within reach  -KB           User Key  (r) = Recorded By, (t) = Taken By, (c) =  Cosigned By    Initials Name Provider Type    Rosibel Miramontes, PT Physical Therapist               Outcome Measures     Row Name 06/24/22 1610          How much help from another person do you currently need...    Turning from your back to your side while in flat bed without using bedrails? 3  -KB     Moving from lying on back to sitting on the side of a flat bed without bedrails? 3  -KB     Moving to and from a bed to a chair (including a wheelchair)? 4  -KB     Standing up from a chair using your arms (e.g., wheelchair, bedside chair)? 4  -KB     Climbing 3-5 steps with a railing? 1  -KB     To walk in hospital room? 3  -KB     AM-PAC 6 Clicks Score (PT) 18  -KB     Highest level of mobility 6 --> Walked 10 steps or more  -KB     Row Name 06/24/22 1610          Functional Assessment    Outcome Measure Options AM-PAC 6 Clicks Basic Mobility (PT)  -KB           User Key  (r) = Recorded By, (t) = Taken By, (c) = Cosigned By    Initials Name Provider Type    Rosibel Miramontes, PT Physical Therapist                             Physical Therapy Education                 Title: PT OT SLP Therapies (Done)     Topic: Physical Therapy (Done)     Point: Mobility training (Done)     Learning Progress Summary           Patient Acceptance, E, VU by LUBNA at 6/24/2022 1612                   Point: Home exercise program (Done)     Learning Progress Summary           Patient Acceptance, E, VU by LUBNA at 6/24/2022 1612                   Point: Body mechanics (Done)     Learning Progress Summary           Patient Acceptance, E, VU by  at 6/24/2022 1612                               User Key     Initials Effective Dates Name Provider Type [facility] 05/25/22 -  Rosibel Harmon, PT Physical Therapist PT              PT Recommendation and Plan  Planned Therapy Interventions (PT): balance training, bed mobility training, gait training, home exercise program, postural re-education, patient/family education, strengthening, transfer  training  Plan of Care Reviewed With: patient  Outcome Evaluation: 80 y/o F presents to [facility] by EMS after syncopal episode with tachy patricia symptoms (40-120BPM).  PMH of dementia. At baseline, pt living at [facility] on [address] [facility], independent with all ADLs and does not use AD. This date, pt Lottie to sit EOB and CGA to stand. Pt was Lottie for ambulation without AD. Moderate LOB with head turns during ambulation with assistance needed to correct. CGA/Lottie for ambulation with RW. Balance assessment suggests pt relies heavily on vision for balance and has decreased sensation in feet. Pt educated on use of walker to improve balance for all ambulation and agreed that it would be beneficial.  Vitals remained WNL and pt was asymptomatic. Anticipate d/c back to [facility] with HHPT to address balance deficits.     Time Calculation:    PT Charges     Row Name 06/24/22 1612             Time Calculation    Start Time 1404  -KB      Stop Time 1438  -KB      Time Calculation (min) 34 min  -KB      PT Received On 06/24/22  -KB      PT - Next Appointment 06/27/22  -KB      PT Goal Re-Cert Due Date 07/08/22  -KB              Time Calculation- PT    Total Timed Code Minutes- PT 0 minute(s)  -KB            User Key  (r) = Recorded By, (t) = Taken By, (c) = Cosigned By    Initials Name Provider Type    Thomas Miramontes, PT Physical Therapist              Therapy Charges for Today     Code Description Service Date Service Provider Modifiers Qty    34402354301 HC PT EVAL MOD COMPLEXITY 4 6/24/2022 Thomas Harmon, PT GP 1          PT G-Codes  Outcome Measure Options: AM-PAC 6 Clicks Basic Mobility (PT)  AM-PAC 6 Clicks Score (PT): 18    THOMAS HARMON PT  6/24/2022

## 2022-06-24 NOTE — CASE MANAGEMENT/SOCIAL WORK
Discharge Planning Assessment  [facility]     Patient Name: Alia Cheney  MRN: [id number]  Today's Date: 6/24/2022    Admit Date: 6/23/2022     Discharge Needs Assessment     Row Name 06/24/22 1235       Living Environment    People in Home alone    Unique Family Situation Lives at [facility]    Current Living Arrangements assisted living facility    Primary Care Provided by self    Provides Primary Care For no one    Family Caregiver if Needed child(hair), adult    Quality of Family Relationships helpful;involved;supportive    Able to Return to Prior Arrangements yes    Living Arrangement Comments Lives at [facility].       Resource/Environmental Concerns    Resource/Environmental Concerns none    Transportation Concerns none       Transition Planning    Patient/Family Anticipates Transition to home    Patient/Family Anticipated Services at Transition none       Discharge Needs Assessment    Readmission Within the Last 30 Days no previous admission in last 30 days    Current Outpatient/Agency/Support Group assisted living facility    Equipment Currently Used at Home none    Concerns to be Addressed discharge planning    Anticipated Changes Related to Illness none    Equipment Needed After Discharge none    Outpatient/Agency/Support Group Needs assisted living facility    Discharge Facility/Level of Care Needs assisted living facility    Provided Post Acute Provider List? N/A    Provided Post Acute Provider Quality & Resource List? N/A               Discharge Plan     Row Name 06/24/22 1241       Plan    Plan Anticipates home at [facility] [address] [facility]. Lives alone.  Barrier: Echo today. Barrier: PT pending.    Patient/Family in Agreement with Plan yes    Plan Comments CM to patient's room to complete initial assessment. Patient's granddaughter (Rossana) at bedside. Patient lives at [facility] [address] [facility]. No DME. IADL. Patient does not drive. Daughter will transport at  discharge. Home pharmacy is [facility], but patient agreeable to Meds to Bed. CM updated pharmacy.              Continued Care and Services - Admitted Since 6/23/2022     Destination Coordination complete.    Service Provider Request Status Selected Services Address Phone Fax Patient Preferred    [facility]   Selected Assisted Living [address] - [place] [address] [phone] -- --              Expected Discharge Date and Time     Expected Discharge Date Expected Discharge Time    Jun 27, 2022          Demographic Summary     Row Name 06/24/22 1235       General Information    Admission Type observation    Arrived From emergency department    Required Notices Provided Observation Status Notice    Referral Source emergency department    Reason for Consult discharge planning    Preferred Language English       Contact Information    Permission Granted to Share Info With                Functional Status     Row Name 06/24/22 1235       Functional Status    Usual Activity Tolerance good    Current Activity Tolerance good       Functional Status, IADL    Medications independent    Meal Preparation independent    Housekeeping independent    Laundry independent    Shopping independent       Mental Status    General Appearance WDL WDL       Mental Status Summary    Recent Changes in Mental Status/Cognitive Functioning no changes              Met with patient in room wearing PPE: mask, face shield/goggles, gloves, gown.      Maintained distance greater than six feet and spent less than 15 minutes in the room.    Yadira Hampton RN, MSN  Care Manager  [phone]

## 2022-06-24 NOTE — CONSULTS
Cardiology [place]        Subjective:     Encounter Date:06/23/2022      Patient ID: Alia Cheney is a 81 y.o. female.    Chief Complaint: syncope    Referring Physician: Rosetta Romano MD    HPI:  Alia Cheney is a 81 y.o. female who presents with syncopal episode yesterday. Ms. Cheney was a prior patient of Dr. Tello.  Past cardiac history includes SVT, prior cardiac cath 2009 which showed non obstructive CAD (LAD 20-30%, RCA 20%), negative lexiscan in 2013, prior normal LV function in 2018.  Additional Pmh includes HLD, HTN, IBS, anxiety, RA, malignant neoplasm of right middle lobe of lung.    Ms. Cheney was visiting her daughter when she got up from a seated position, became lightheaded and fell backwards. She believes she passed out.  Per ER documentation- EMS reported tachycardia with HR 120s. Patient denies prior syncopal episodes outside of yesterday. She denies recent illness. She is SR on my encounter. HR 60s.  She denies chest pain or SOA.  Cardiology has been consulted for syncope.       Past Medical History:   Diagnosis Date   • Anal stenosis 1/3/2012   • Anxiety 11/25/2013   • Arrhythmia, ventricular 4/20/2012   • Arthritis    • Ataxia 8/6/2013   • Carpal tunnel syndrome, bilateral 2/13/2017   • Depression 1/3/2012   • Elevated cholesterol    • Gastroesophageal reflux disease 6/29/2012   • History of osteoporotic pathological fracture     Bilateral wrist-unknown date   • Hx of total knee replacement, right 6/29/2017   • Hyperlipidemia 5/2/2016   • Insomnia 8/6/2013   • Irritable bowel syndrome 8/6/2013   • Keratoconjunctivitis sicca, in Sjogren's syndrome (HCC) 9/21/2016   • Memory loss 12/7/2018   • Osteoarthritis of knee 5/19/2017   • Osteoporosis     fosamax g5nreahs, on prolia(2016)   • Paroxysmal atrial fibrillation (HCC) 11/9/2011   • Primary malignant neoplasm of right middle lobe of lung (HCC) 8/21/2018   • Rheumatoid arthritis (HCC)    • Tension-type headache, not intractable 12/26/2017    • Vitamin B12 deficiency 2016   • Vitamin D deficiency 2017       Past Surgical History:   Procedure Laterality Date   • ABDOMINAL SURGERY     • ANAL DILATION     • APPENDECTOMY     • BREAST BIOPSY Right    • CARDIAC CATHETERIZATION     • CATARACT EXTRACTION WITH INTRAOCULAR LENS IMPLANT Bilateral    • CHOLECYSTECTOMY     • COLONOSCOPY     • CYSTOSCOPY     • HYSTERECTOMY     • JOINT REPLACEMENT     • LUNG REMOVAL, PARTIAL Right 2018    RIGHT VATS WEDGE RESECTION OF RML PN RIGHT MIDDLE LOBECTOMY    • SUBTOTAL HYSTERECTOMY     • TONSILLECTOMY     • TOTAL HIP ARTHROPLASTY Left 2019    Procedure: TOTAL HIP ARTHROPLASTY ANTERIOR;  Surgeon: Lenny Khan MD;  Location: [facility] MAIN OR;  Service: Orthopedics   • TOTAL KNEE ARTHROPLASTY Right 2017   • WRIST FRACTURE SURGERY Bilateral        Family History   Problem Relation Age of Onset   • Alcohol abuse Father    • Heart disease Father        Social History     Socioeconomic History   • Marital status:    Tobacco Use   • Smoking status: Former Smoker     Packs/day: 1.00     Years: 15.00     Pack years: 15.00     Types: Cigarettes     Start date:      Quit date:      Years since quittin.5   • Smokeless tobacco: Never Used   Substance and Sexual Activity   • Alcohol use: Yes   • Drug use: No   • Sexual activity: Not Currently         No Known Allergies    Current Medications:   Scheduled Meds:atorvastatin, 10 mg, Oral, Daily  leflunomide, 10 mg, Oral, Daily  memantine, 10 mg, Oral, BID  metoprolol tartrate, 12.5 mg, Oral, BID  pantoprazole, 40 mg, Oral, Daily  polyethylene glycol, 17 g, Oral, Daily  sodium chloride, 3 mL, Intravenous, Q12H  traZODone, 100 mg, Oral, Nightly  venlafaxine XR, 150 mg, Oral, Daily With Breakfast  venlafaxine XR, 75 mg, Oral, Daily With Breakfast      Continuous Infusions:sodium chloride, 125 mL/hr, Last Rate: 125 mL/hr (22)        Review of Systems   Constitutional: Negative for  "chills, diaphoresis and malaise/fatigue.   Cardiovascular: Positive for near-syncope and syncope. Negative for chest pain, dyspnea on exertion, irregular heartbeat, leg swelling, orthopnea, palpitations and paroxysmal nocturnal dyspnea.   Respiratory: Negative for cough, shortness of breath, sleep disturbances due to breathing and sputum production.    Gastrointestinal: Negative for change in bowel habit.   Genitourinary: Negative for urgency.   Neurological: Positive for light-headedness. Negative for dizziness and headaches.   Psychiatric/Behavioral: Negative for altered mental status.            Objective:         /57   Pulse 64   Temp 98.7 °F (37.1 °C) (Oral)   Resp 14   Ht 165.1 cm (65\")   Wt 84.8 kg (187 lb)   SpO2 99%   BMI 31.12 kg/m²     Physical Exam:  General Appearance:    Alert, cooperative, in no acute distress                                Head: Atraumatic, normocephalic, PERRLA               Neck:   supple,  no JVD   Lungs:     Clear to auscultation,respirations regular, even and               unlabored    Heart:    Regular rhythm and normal rate, normal S1 and S2   Abdomen:     Normal bowel sounds, no masses, no organomegaly, soft  non-tender, non-distended, no guarding, no rebound  tenderness   Extremities:   Moves all extremities well, no edema, no cyanosis, no  redness   Pulses:   Pulses palpable and equal bilaterally   Skin:   No bleeding, bruising or rash   Neurologic:   Awake, alert, oriented x3                 ASCVD RIsk Score::  The ASCVD Risk score ([place] GETACHEW Jr., et al., 2013) failed to calculate for the following reasons:    The 2013 ASCVD risk score is only valid for ages 40 to 79      Lab Review:     Results from last 7 days   Lab Units 06/23/22  1818   SODIUM mmol/L 143   POTASSIUM mmol/L 3.6   CHLORIDE mmol/L 107   CO2 mmol/L 19.0*   BUN mg/dL 8   CREATININE mg/dL 0.69   GLUCOSE mg/dL 89   CALCIUM mg/dL 8.9   AST (SGOT) U/L 21   ALT (SGPT) U/L 20     Results from last 7 " days   Lab Units 06/23/22 2254 06/23/22  1818   TROPONIN T ng/mL <0.010 <0.010     Results from last 7 days   Lab Units 06/23/22  1818   WBC 10*3/mm3 6.60   HEMOGLOBIN g/dL 13.2   HEMATOCRIT % 40.2   PLATELETS 10*3/mm3 264     Results from last 7 days   Lab Units 06/23/22  1818   INR  1.09   APTT seconds 29.9*     Results from last 7 days   Lab Units 06/23/22  1818   MAGNESIUM mg/dL 2.2           Invalid input(s): LDLCALC  Results from last 7 days   Lab Units 06/23/22  1818   PROBNP pg/mL 411.9     Results from last 7 days   Lab Units 06/23/22  1818   TSH uIU/mL 2.490       Recent Radiology:  Imaging Results (Most Recent)     Procedure Component Value Units Date/Time    CT Cervical Spine Without Contrast [[id number]] Collected: 06/1938     Updated: 06/23/22 1945    Narrative:      DATE OF EXAM:  6/23/2022 7:00 PM     PROCEDURE:  CT CERVICAL SPINE WO CONTRAST-     INDICATIONS:   neck pain; R55-Syncope and collapse, history of lung cancer     COMPARISON:   No Comparisons Available     TECHNIQUE:  Routine transaxial slices were obtained through the cervical spine  without the administration of intravenous contrast. Reconstructed  coronal and sagittal images were also obtained. Automated exposure  control and iterative construction methods were used.      FINDINGS:  No fracture is identified. There is no lytic or blastic bone lesion. The  craniocervical junction appears intact. There is degenerative change of  the atlantodental junction.     At C2-3, there is mild facet joint degeneration.     At C3-4 there is mild disc space narrowing. There is bilateral  uncovertebral spurring with left foraminal impingement.     At C4-5, C4 is subluxed anteriorly 1 mm relative to C5. There is right  facet joint degeneration.     C5-6, there is mild left facet joint degeneration. There is chronic  appearing deformity of the right C6 lamina.     At C6-C7, no significant bone abnormality.     At C7-T1, no significant bone  abnormality.     Soft tissue evaluation in the canal is very limited and grossly  negative. Limited lung apical evaluation is negative. There are  bilateral thyroid nodules, largest about 1.7 cm.       Impression:      1. Negative for evidence of injury to the cervical spine.  2. There is mild degenerative change of the cervical spine mainly  involving the facet joints.  3. Bilateral thyroid nodules.     Electronically Signed By-Zhanna Cedeno MD On:6/23/2022 7:43 PM  This report was finalized on [id number] by  Zhanna Cedeno MD.    CT Head Without Contrast [[id number]] Collected: 06/23/22 1937     Updated: 06/23/22 1943    Narrative:      CT HEAD WO CONTRAST-     Date of Exam: 6/23/2022 7:00 PM     Indication: Syncope/headache; R55-Syncope and collapse.     Comparison: None available.     Technique:  Without contrast, contiguous axial CT images of the head  were obtained from skull base to vertex.  Coronal reconstructions were  performed.  Automated exposure control and iterative reconstruction  methods were used.     FINDINGS  There is no intracranial hemorrhage or mass effect. There is mild  cerebral and cerebellar atrophy. There is mild patchy decreased density  in the white matter. There is no evidence of extra-axial pathology.  Ventricles and cisterns are patent. No skull abnormality is seen. No  gross orbital abnormality. There is no fluid in the included sinuses or  mastoids.       Impression:      1. No acute process is identified. Negative for hemorrhage or mass  effect.  2. There is cerebral and cerebellar atrophy. There is white matter  disease which is favored to be due to chronic small vessel ischemia.     Electronically Signed By-Zhanna Cedeno MD On:6/23/2022 7:38 PM  This report was finalized on [id number] by  Zhanna Cedeno MD.    XR Chest 1 View [[id number]] Collected: 06/23/22 1853     Updated: 06/23/22 1858    Narrative:      Examination: XR CHEST 1 VW-     Date of Exam: 6/23/2022 6:41 PM      Indication: Syncope; R55-Syncope and collapse.       Comparison: 07/05/2019     Technique: 1 view of the chest      FINDINGS:  The heart size is within normal. There is distortion of the right  hilum/mediastinum, with adjacent surgical sutures, similar to the prior  exam. There is a fat pad in the right cardiophrenic angle. There is no  vascular congestion, airspace opacity, or developing pulmonary nodule.  No pleural effusion or pneumothorax is seen. No significant bone  abnormality.       Impression:      1. Stable exam, with chronic postoperative changes in the right chest.  No acute process demonstrated.           Electronically Signed By-Zhanna Cedeno MD On:6/23/2022 6:55 PM  This report was finalized on [id number] by  Zhanna Cedeno MD.            ECHOCARDIOGRAM:                        Assessment:         Active Hospital Problems    Diagnosis  POA   • Syncope, unspecified syncope type [R55]  Yes     1. Syncope    2. Prior SVT in past  - she takes metoprolol 12.5mg BID    3. Non obstructive CAD per [facility]  - normal stress test 2013    4. Prior normal LV function    5. HTN    6. HLD    7. DM     Plan:   Check orthostatic vital signs  Check 2D ECHO  Monitor on telemetry  Will plan for monitor at discharge             SHAWNA Connelly  06/24/22  09:26 EDT

## 2022-06-25 ENCOUNTER — APPOINTMENT (OUTPATIENT)
Dept: RESPIRATORY THERAPY | Facility: HOSPITAL | Age: 82
End: 2022-06-25

## 2022-06-25 ENCOUNTER — READMISSION MANAGEMENT (OUTPATIENT)
Dept: CALL CENTER | Facility: HOSPITAL | Age: 82
End: 2022-06-25

## 2022-06-25 VITALS
TEMPERATURE: 98.1 F | OXYGEN SATURATION: 95 % | SYSTOLIC BLOOD PRESSURE: 145 MMHG | HEART RATE: 66 BPM | BODY MASS INDEX: 31.16 KG/M2 | HEIGHT: 65 IN | WEIGHT: 187 LBS | DIASTOLIC BLOOD PRESSURE: 66 MMHG | RESPIRATION RATE: 18 BRPM

## 2022-06-25 PROCEDURE — G0378 HOSPITAL OBSERVATION PER HR: HCPCS

## 2022-06-25 PROCEDURE — 99214 OFFICE O/P EST MOD 30 MIN: CPT | Performed by: INTERNAL MEDICINE

## 2022-06-25 RX ADMIN — VENLAFAXINE HYDROCHLORIDE 150 MG: 75 CAPSULE, EXTENDED RELEASE ORAL at 08:45

## 2022-06-25 RX ADMIN — MEMANTINE 10 MG: 10 TABLET ORAL at 08:45

## 2022-06-25 RX ADMIN — VENLAFAXINE HYDROCHLORIDE 75 MG: 75 CAPSULE, EXTENDED RELEASE ORAL at 08:47

## 2022-06-25 RX ADMIN — PANTOPRAZOLE SODIUM 40 MG: 40 TABLET, DELAYED RELEASE ORAL at 08:45

## 2022-06-25 RX ADMIN — METOPROLOL TARTRATE 12.5 MG: 25 TABLET, FILM COATED ORAL at 08:45

## 2022-06-25 RX ADMIN — Medication 3 ML: at 08:46

## 2022-06-25 RX ADMIN — LEFLUNOMIDE 10 MG: 20 TABLET ORAL at 08:45

## 2022-06-25 NOTE — OUTREACH NOTE
Prep Survey    Flowsheet Row Responses   [facility] patient discharged from? [facility]   Is LACE score < 7 ? No   Emergency Room discharge w/ pulse ox? No   Eligibility TCM   [facility]   Date of Admission 06/23/22   Date of Discharge 06/25/22   Discharge Disposition Home or Self Care   Discharge diagnosis syncope, CAD, memory loss   Does the patient have one of the following disease processes/diagnoses(primary or secondary)? Other   Does the patient have Home health ordered? No   Is there a DME ordered? Yes   What DME was ordered? walker from [place]   General alerts for this patient Lissy AMARAL   Prep survey completed? Yes          ARIANNA OLEA - Registered Nurse

## 2022-06-25 NOTE — DISCHARGE SUMMARY
Date of Discharge:  6/25/2022    Discharge Diagnosis:   **Syncope, unspecified syncope type [R55]   Overweight (BMI 25.0-29.9) [E66.3]   Memory loss [R41.3]   Coronary artery disease [I25.10]       Presenting Problem/History of Present Illness  Active Hospital Problems    Diagnosis  POA   • **Syncope, unspecified syncope type [R55]  Yes   • Overweight (BMI 25.0-29.9) [E66.3]  Yes   • Memory loss [R41.3]  Yes   • Coronary artery disease [I25.10]  Yes      Resolved Hospital Problems   No resolved problems to display.          Hospital Course  Patient is a 81 y.o. female presented with syncopal episode at her assisted living facility.   There was some variability of heart rate noted prior to arriving to facility.  EKG in the ER showed sinus rhythm with PACs.  She had no recurrence of her symptoms while in the hospital and she remained in sinus rhythm.  Echocardiogram showed normal LV function, and normal right ventricular pressures mild diastolic dysfunction.  No medication changes were made.  She is being discharged home with a 30-day event monitor.  She will follow-up with Dr. Novak in 1 month and with her primary care provider at her next regularly scheduled appointment.    Procedures Performed         Consults:   Consults     Date and Time Order Name Status Description    6/23/2022  7:39 PM Inpatient Cardiology Consult Completed           Pertinent Test Results:    Lab Results (most recent)     Procedure Component Value Units Date/Time    Urinalysis With Culture If Indicated - Urine, Clean Catch [[id number]]  (Abnormal) Collected: 06/24/22 0215    Specimen: Urine, Clean Catch Updated: 06/24/22 0228     Color, UA Yellow     Appearance, UA Clear     pH, UA 7.0     Specific Gravity, UA 1.009     Glucose, UA Negative     Ketones, UA Trace     Bilirubin, UA Negative     Blood, UA Negative     Protein, UA Negative     Leuk Esterase, UA Trace     Nitrite, UA Negative     Urobilinogen, UA 0.2 E.U./dL    Narrative:      In  absence of clinical symptoms, the presence of pyuria, bacteria, and/or nitrites on the urinalysis result does not correlate with infection.    Urinalysis, Microscopic Only - Urine, Clean Catch [[id number]]  (Abnormal) Collected: 06/24/22 0215    Specimen: Urine, Clean Catch Updated: 06/24/22 0228     RBC, UA 0-2 /HPF      WBC, UA 3-5 /HPF      Comment: Urine culture not indicated.        Bacteria, UA None Seen /HPF      Squamous Epithelial Cells, UA 0-2 /HPF      Hyaline Casts, UA None Seen /LPF      Methodology Automated Microscopy    Troponin [[id number]]  (Normal) Collected: 06/23/22 2254    Specimen: Blood Updated: 06/24/22 0003     Troponin T <0.010 ng/mL     Narrative:      Troponin T Reference Range:  <= 0.03 ng/mL-   Negative for AMI  >0.03 ng/mL-     Abnormal for myocardial necrosis.  Clinicians would have to utilize clinical acumen, EKG, Troponin and serial changes to determine if it is an Acute Myocardial Infarction or myocardial injury due to an underlying chronic condition.       Results may be falsely decreased if patient taking Biotin.      Protime-INR [[id number]]  (Normal) Collected: 06/23/22 1818    Specimen: Blood Updated: 06/23/22 1944     Protime 11.2 Seconds      INR 1.09    aPTT [[id number]]  (Abnormal) Collected: 06/23/22 1818    Specimen: Blood Updated: 06/23/22 1944     PTT 29.9 seconds     [place] Draw [[id number]] Collected: 06/23/22 1818    Specimen: Blood Updated: 06/23/22 1933    Narrative:      The following orders were created for panel order [place] Draw.  Procedure                               Abnormality         Status                     ---------                               -----------         ------                     Green Top (Gel)[[id number]]                                  Final result               Lavender Top[[id number]]                                                                Gold Top - SST[[id number]]                                   Final result                Light Blue Top[[id number]]                                   Final result                 Please view results for these tests on the individual orders.    Light Blue Top [[id number]] Collected: 06/23/22 1818    Specimen: Blood Updated: 06/23/22 1933     Extra Tube Hold for add-ons.     Comment: Auto resulted       COVID PRE-OP / PRE-PROCEDURE SCREENING ORDER (NO ISOLATION) - Swab, Nasopharynx [[id number]]  (Normal) Collected: 06/23/22 1843    Specimen: Swab from Nasopharynx Updated: 06/23/22 1906    Narrative:      The following orders were created for panel order COVID PRE-OP / PRE-PROCEDURE SCREENING ORDER (NO ISOLATION) - Swab, Nasopharynx.  Procedure                               Abnormality         Status                     ---------                               -----------         ------                     COVID-19,CEPHEID/EMANUEL,CO...[[id number]]  Normal              Final result                 Please view results for these tests on the individual orders.    COVID-19,CEPHEID/EMANUEL,COR/SURJIT/PAD/SIMONA IN-HOUSE(OR EMERGENT/ADD-ON),NP SWAB IN TRANSPORT MEDIA 3-4 HR TAT, RT-PCR - Swab, Nasopharynx [[id number]]  (Normal) Collected: 06/23/22 1843    Specimen: Swab from Nasopharynx Updated: 06/23/22 1906     COVID19 Not Detected    Narrative:      Fact sheet for providers: https://www.fda.gov/media/340572/download     Fact sheet for patients: https://www.fda.gov/media/916887/download  Fact sheet for providers: https://www.fda.gov/media/983654/download    Fact sheet for patients: https://www.fda.gov/media/388367/download    Test performed by PCR.    BNP [[id number]]  (Normal) Collected: 06/23/22 1818    Specimen: Blood Updated: 06/23/22 1905     proBNP 411.9 pg/mL     Narrative:      Among patients with dyspnea, NT-proBNP is highly sensitive for the detection of acute congestive heart failure. In addition NT-proBNP of <300 pg/ml effectively rules out acute congestive heart failure with 99% negative predictive  value.    Results may be falsely decreased if patient taking Biotin.      Troponin [[id number]]  (Normal) Collected: 06/23/22 1818    Specimen: Blood Updated: 06/23/22 1905     Troponin T <0.010 ng/mL     Narrative:      Troponin T Reference Range:  <= 0.03 ng/mL-   Negative for AMI  >0.03 ng/mL-     Abnormal for myocardial necrosis.  Clinicians would have to utilize clinical acumen, EKG, Troponin and serial changes to determine if it is an Acute Myocardial Infarction or myocardial injury due to an underlying chronic condition.       Results may be falsely decreased if patient taking Biotin.      TSH [[id number]]  (Normal) Collected: 06/23/22 1818    Specimen: Blood Updated: 06/23/22 1905     TSH 2.490 uIU/mL     Comprehensive Metabolic Panel [[id number]]  (Abnormal) Collected: 06/23/22 1818    Specimen: Blood Updated: 06/23/22 1900     Glucose 89 mg/dL      BUN 8 mg/dL      Creatinine 0.69 mg/dL      Sodium 143 mmol/L      Potassium 3.6 mmol/L      Chloride 107 mmol/L      CO2 19.0 mmol/L      Calcium 8.9 mg/dL      Total Protein 6.5 g/dL      Albumin 3.90 g/dL      ALT (SGPT) 20 U/L      AST (SGOT) 21 U/L      Alkaline Phosphatase 74 U/L      Total Bilirubin 0.3 mg/dL      Globulin 2.6 gm/dL      A/G Ratio 1.5 g/dL      BUN/Creatinine Ratio 11.6     Anion Gap 17.0 mmol/L      eGFR 86.2 mL/min/1.73      Comment: National Kidney Foundation and American Society of Nephrology (ASN) Task Force recommended calculation based on the Chronic Kidney Disease Epidemiology Collaboration (CKD-EPI) equation refit without adjustment for race.       Narrative:      GFR Normal >60  Chronic Kidney Disease <60  Kidney Failure <15      Magnesium [[id number]]  (Normal) Collected: 06/23/22 1818    Specimen: Blood Updated: 06/23/22 1900     Magnesium 2.2 mg/dL     CBC & Differential [[id number]]  (Abnormal) Collected: 06/23/22 1818    Specimen: Blood Updated: 06/23/22 1845    Narrative:      The following orders were created for panel order  CBC & Differential.  Procedure                               Abnormality         Status                     ---------                               -----------         ------                     CBC Auto Differential[[id number]]        Abnormal            Final result                 Please view results for these tests on the individual orders.    CBC Auto Differential [[id number]]  (Abnormal) Collected: 06/23/22 1818    Specimen: Blood Updated: 06/23/22 1845     WBC 6.60 10*3/mm3      RBC 4.40 10*6/mm3      Hemoglobin 13.2 g/dL      Hematocrit 40.2 %      MCV 91.4 fL      MCH 30.0 pg      MCHC 32.9 g/dL      RDW 14.8 %      RDW-SD 46.4 fl      MPV 8.1 fL      Platelets 264 10*3/mm3      Neutrophil % 46.8 %      Lymphocyte % 43.2 %      Monocyte % 9.8 %      Eosinophil % 0.0 %      Basophil % 0.2 %      Neutrophils, Absolute 3.10 10*3/mm3      Lymphocytes, Absolute 2.80 10*3/mm3      Monocytes, Absolute 0.60 10*3/mm3      Eosinophils, Absolute 0.00 10*3/mm3      Basophils, Absolute 0.00 10*3/mm3      nRBC 0.1 /100 WBC     Gold Top - SST [[id number]] Collected: 06/23/22 1818    Specimen: Blood Updated: 06/23/22 1832     Extra Tube hold    Green Top (Gel) [[id number]] Collected: 06/23/22 1818    Specimen: Blood Updated: 06/23/22 1831     Extra Tube hold    POC Glucose Once [[id number]]  (Normal) Collected: 06/23/22 1815    Specimen: Blood Updated: 06/23/22 1816     Glucose 89 mg/dL      Comment: Serial Number: [id number]:  [id number]              Results for orders placed during the hospital encounter of 06/23/22    Adult Transthoracic Echo Complete W/ Cont if Necessary Per Protocol    Interpretation Summary  · Estimated right ventricular systolic pressure from tricuspid regurgitation is normal (<35 mmHg).  · Left ventricular ejection fraction appears to be 56 - 60%.  · Left ventricular diastolic function is consistent with (grade I) impaired relaxation and age.  · Presence of mild to moderate aortic regurgitation.               Condition on Discharge: Stable    Vital Signs  Temp:  [98.1 °F (36.7 °C)-98.4 °F (36.9 °C)] 98.1 °F (36.7 °C)  Heart Rate:  [60-69] 66  Resp:  [12-19] 18  BP: (124-166)/(46-67) 145/66    Physical Exam:     General Appearance:    Alert, cooperative, in no acute distress   Head:    Normocephalic, without obvious abnormality, atraumatic   Eyes:            Lids and lashes normal, conjunctivae and sclerae normal, no   icterus, no pallor, corneas clear, PERRLA   Ears:    Ears appear intact with no abnormalities noted   Throat:   No oral lesions, no thrush, oral mucosa moist   Neck:   No adenopathy, supple, trachea midline, no thyromegaly, no   carotid bruit, no JVD   Lungs:     Clear to auscultation,respirations regular, even and                  unlabored    Heart:    Regular rhythm and normal rate, normal S1 and S2, no            murmur, no gallop, no rub, no click   Chest Wall:    No abnormalities observed   Abdomen:     Normal bowel sounds, no masses, no organomegaly, soft        non-tender, non-distended, no guarding, no rebound                tenderness   Extremities:   Moves all extremities well, no edema, no cyanosis, no             redness   Pulses:   Pulses palpable and equal bilaterally   Skin:   No bleeding, bruising or rash   Lymph nodes:   No palpable adenopathy   Neurologic:   Cranial nerves 2 - 12 grossly intact, sensation intact, DTR       present and equal bilaterally       Discharge Disposition  Home or Self Care    Discharge Medications     Discharge Medications      Continue These Medications      Instructions Start Date   calcium carbonate 1250 (500 Ca) MG tablet  Commonly known as: OS-DAHLIA   600 mg, Oral, Daily      Cholecalciferol 50 MCG (2000 UT) tablet  Commonly known as: Vitamin D3 Super Strength   2 po q d      leflunomide 10 MG tablet  Commonly known as: ARAVA   10 mg, Oral, Daily      Linzess 145 MCG capsule capsule  Generic drug: linaclotide   145 mcg, Oral, Daily      LORazepam  0.5 MG tablet  Commonly known as: Ativan   1 po bid prn anxiety      memantine 10 MG tablet  Commonly known as: Namenda   10 mg, Oral, 2 Times Daily      metoprolol tartrate 25 MG tablet  Commonly known as: LOPRESSOR   12.5 mg, Oral, 2 Times Daily      pantoprazole 40 MG EC tablet  Commonly known as: PROTONIX   40 mg, Oral, Daily      polyethylene glycol 17 GM/SCOOP powder  Commonly known as: MIRALAX   17 g, Oral, 2 Times Daily      pravastatin 40 MG tablet  Commonly known as: PRAVACHOL   40 mg, Oral, Every Night at Bedtime      PreserVision AREDS capsule   Oral      traZODone 50 MG tablet  Commonly known as: DESYREL   100 mg, Oral, Nightly      venlafaxine XR 75 MG 24 hr capsule  Commonly known as: EFFEXOR-XR   75 mg, Oral, Daily      venlafaxine  MG 24 hr capsule  Commonly known as: EFFEXOR-XR   150 mg, Oral, Every Morning, Take with 75mg tab for a total of 225mg      vitamin B-12 1000 MCG tablet  Commonly known as: CYANOCOBALAMIN   1,000 mcg, Oral, Daily             Discharge Diet: No restrictions    Activity at Discharge: Use walker at all times    Follow-up Appointments  Future Appointments   Date Time Provider Department Center   11/14/2022  8:30 AM Obdulia Spicer APRN [facility]   12/21/2022  1:45 PM Aletha Guadalupe PA [facility]     Additional Instructions for the Follow-ups that You Need to Schedule     Discharge Follow-up with PCP   As directed       Currently Documented PCP:    Obdulia Spicer APRN    PCP Phone Number:    [phone]     Follow Up Details: Next regularly scheduled appointment.         Discharge Follow-up with Specified Provider: Dr. Novak; 1 Month   As directed      To: Dr. Novak    Follow Up: 1 Month               Test Results Pending at Discharge       Rosetta Romano MD  06/25/22  11:39 EDT    Time: Discharge 25 min

## 2022-06-25 NOTE — DISCHARGE PLACEMENT REQUEST
"[address] [place] [address] [place] [address]  Dept. Phone:  [phone]  Dept. Fax:  [phone] Date Ordered: 2022         Patient:  Alia Cheney MRN:  [id number]   [address] [facility] [address]  [place] [address] :  1940  SSN:    Phone: [phone] Sex:  F     Weight: 84.8 kg (187 lb)         Ht Readings from Last 1 Encounters:   22 165.1 cm (65\")         Walker               (Order ID: [id number])    Diagnosis:  Syncope, unspecified syncope type (R55 [ICD-10-CM] 780.2 [ICD-9-CM])   Quantity:  1     Equipment:  Walker Folding with Wheels  Length of Need (99 Months = Lifetime): 99 Months = Lifetime        Authorizing Provider's Phone: [phone]  Verbal Order Mode: Verbal with readback   Authorizing Provider: Rosetta Romano MD  Authorizing Provider's NPI: [id number]     Order Entered By: Rubia Farnsworth RN 2022 10:34 AM        Alia Cheney (81 y.o. Female)             Date of Birth   1940    Social Security Number       Address   [address] [place] [address]    Home Phone   [phone]    MRN   [id number]       Lutheran   B[employer]ism    Marital Status                               Admission Date   22    Admission Type   Emergency    Admitting Provider   Gracie Hurtado MD    Attending Provider   Gracie Hurtado MD    Department, Room/Bed   [facility] NEURO HEART, 254/1       Discharge Date       Discharge Disposition       Discharge Destination                               Attending Provider: Gracie Hurtado MD    Allergies: No Known Allergies    Isolation: None   Infection: None   Code Status: No CPR   Advance Care Planning Activity    Ht: 165.1 cm (65\")   Wt: 84.8 kg (187 lb)    Admission Cmt: None   Principal Problem: Syncope, unspecified syncope type [R55]                 Active Insurance as of 2022     Primary Coverage     Payor Plan Insurance Group Employer/Plan Group    MEDICARE MEDICARE A & B      Payor " Plan Address Payor Plan Phone Number Payor Plan Fax Number Effective Dates    [address] [phone]  7/1/2003 - None Entered    [facility] [id number]       Subscriber Name Subscriber Birth Date Member HERNANDO RILEY 1940 [id number]           Secondary Coverage     Payor Plan Insurance Group Employer/Plan Group    [place]      Payor Plan Address Payor Plan Phone Number Payor Plan Fax Number Effective Dates    [address]   11/18/2021 - None Entered    [place] [address]       Subscriber Name Subscriber Birth Date Member HERNANDO RILEY 1940 [id number]                 Emergency Contacts      (Rel.) Home Phone Work Phone Mobile Phone    JOHN JOHNSON (Daughter) [phone] -- --    YAYA MUNGUIA (Son) -- [phone]               History & Physical      Graice Hurtado MD at 06/24/22 0907          Patient Care Team:  Obdulia Spicer APRN as PCP - General (Nurse Practitioner)    Chief complaint syncope    Subjective     Pt is pleasant 81-year-old that was brought into the ER last night by EMS after a syncopal episode.  She does have some history of dementia.  She was at home with her daughter (who is a nurse practitioner).  She called out and when her daughter got to her she stated she felt like she was going to pass out.   the ambulance was called right away.  When EMS arrived to the home,  they had difficult time finding a pulse, however the patient never lost her pulse.  she did wake up spontaneously.  There were some tacky bradycardia symptoms.  She was tachycardic around 120 per her daughter's report and her heart rate dropped as low as 40.  This has happened to her in the past.  She never had to have a pacemaker placed.  She sees Dr. Novak.  Currently she feels fatigued.  She will be admitted for further evaluation     Review of Systems   Constitutional: Positive for fatigue.   HENT: Negative.    Respiratory: Negative.     Cardiovascular: Negative.    Gastrointestinal: Negative.    Musculoskeletal: Positive for arthralgias.   Neurological: Positive for syncope and weakness. Negative for dizziness and headaches.   Psychiatric/Behavioral: Positive for confusion.          History  Past Medical History:   Diagnosis Date   • Anal stenosis 1/3/2012   • Anxiety 11/25/2013   • Arrhythmia, ventricular 4/20/2012   • Arthritis    • Ataxia 8/6/2013   • Carpal tunnel syndrome, bilateral 2/13/2017   • Depression 1/3/2012   • Elevated cholesterol    • Gastroesophageal reflux disease 6/29/2012   • History of osteoporotic pathological fracture     Bilateral wrist-unknown date   • Hx of total knee replacement, right 6/29/2017   • Hyperlipidemia 5/2/2016   • Insomnia 8/6/2013   • Irritable bowel syndrome 8/6/2013   • Keratoconjunctivitis sicca, in Sjogren's syndrome (HCC) 9/21/2016   • Memory loss 12/7/2018   • Osteoarthritis of knee 5/19/2017   • Osteoporosis     fosamax [id number], on prolia(2016)   • Paroxysmal atrial fibrillation (HCC) 11/9/2011   • Primary malignant neoplasm of right middle lobe of lung (HCC) 8/21/2018   • Rheumatoid arthritis (HCC)    • Tension-type headache, not intractable 12/26/2017   • Vitamin B12 deficiency 5/2/2016   • Vitamin D deficiency 11/20/2017     Past Surgical History:   Procedure Laterality Date   • ABDOMINAL SURGERY     • ANAL DILATION     • APPENDECTOMY     • BREAST BIOPSY Right    • CARDIAC CATHETERIZATION  2009   • CATARACT EXTRACTION WITH INTRAOCULAR LENS IMPLANT Bilateral 2009   • CHOLECYSTECTOMY     • COLONOSCOPY     • CYSTOSCOPY     • HYSTERECTOMY     • JOINT REPLACEMENT     • LUNG REMOVAL, PARTIAL Right 08/06/2018    RIGHT VATS WEDGE RESECTION OF RML PN RIGHT MIDDLE LOBECTOMY    • SUBTOTAL HYSTERECTOMY     • TONSILLECTOMY     • TOTAL HIP ARTHROPLASTY Left 7/17/2019    Procedure: TOTAL HIP ARTHROPLASTY ANTERIOR;  Surgeon: Lenny Khan MD;  Location: [facility] MAIN OR;  Service: Orthopedics   • TOTAL KNEE  ARTHROPLASTY Right 2017   • WRIST FRACTURE SURGERY Bilateral      Family History   Problem Relation Age of Onset   • Alcohol abuse Father    • Heart disease Father      Social History     Tobacco Use   • Smoking status: Former Smoker     Packs/day: 1.00     Years: 15.00     Pack years: 15.00     Types: Cigarettes     Start date:      Quit date:      Years since quittin.5   • Smokeless tobacco: Never Used   Substance Use Topics   • Alcohol use: Yes   • Drug use: No     Facility-Administered Medications Prior to Admission   Medication Dose Route Frequency Provider Last Rate Last Admin   • denosumab (PROLIA) syringe 60 mg  60 mg Subcutaneous Q6 Months Aletha Guadalupe PA   60 mg at 22 1406     Medications Prior to Admission   Medication Sig Dispense Refill Last Dose   • calcium carbonate (OS-DAHLIA) 1250 (500 Ca) MG tablet Take 600 mg by mouth Daily.   2022 at Unknown time   • Cholecalciferol (VITAMIN D3 SUPER STRENGTH) 50 MCG (2000 UT) tablet 2 po q d 60 each 11 2022 at Unknown time   • leflunomide (ARAVA) 10 MG tablet Take 1 tablet by mouth Daily. 90 tablet 3 2022 at Unknown time   • LORazepam (Ativan) 0.5 MG tablet 1 po bid prn anxiety 30 tablet 0 2022 at Unknown time   • memantine (Namenda) 10 MG tablet Take 1 tablet by mouth 2 (Two) Times a Day. 180 tablet 1 2022 at Unknown time   • metoprolol tartrate (LOPRESSOR) 25 MG tablet Take 0.5 tablets by mouth 2 (Two) Times a Day. 90 tablet 1 2022 at Unknown time   • polyethylene glycol (MIRALAX) 17 GM/SCOOP powder Take 17 g by mouth 2 (Two) Times a Day. 1020 g 2 2022 at Unknown time   • pravastatin (PRAVACHOL) 40 MG tablet Take 1 tablet by mouth every night at bedtime. 90 tablet 0 2022 at Unknown time   • traZODone (DESYREL) 50 MG tablet Take 2 tablets by mouth Every Night. 180 tablet 1 2022 at Unknown time   • venlafaxine XR (EFFEXOR-XR) 150 MG 24 hr capsule Take 1 capsule by mouth Every Morning. Take with  75mg tab for a total of 225mg 90 capsule 1 6/23/2022 at Unknown time   • venlafaxine XR (EFFEXOR-XR) 75 MG 24 hr capsule Take 1 capsule by mouth Daily. 90 capsule 1 6/23/2022 at Unknown time   • vitamin B-12 (CYANOCOBALAMIN) 1000 MCG tablet Take 1,000 mcg by mouth Daily.   6/23/2022 at Unknown time   • Linzess 145 MCG capsule capsule Take 1 capsule by mouth Daily. 90 capsule 1    • Multiple Vitamins-Minerals (PreserVision AREDS) capsule Take  by mouth.      • pantoprazole (PROTONIX) 40 MG EC tablet Take 1 tablet by mouth Daily. 90 tablet 1      Allergies:  Patient has no known allergies.    Objective     Vital Signs  Temp:  [97.9 °F (36.6 °C)-98.7 °F (37.1 °C)] 98.7 °F (37.1 °C)  Heart Rate:  [58-77] 64  Resp:  [13-24] 14  BP: (132-182)/(54-90) 132/57     Physical Exam:      General Appearance:    Alert, cooperative, in no acute distress   Head:    Normocephalic, without obvious abnormality, atraumatic   Eyes:            Lids and lashes normal, conjunctivae and sclerae normal, no   icterus, no pallor, corneas clear, PERRLA   Ears:    Ears appear intact with no abnormalities noted   Throat:   No oral lesions, no thrush, oral mucosa moist   Neck:   No adenopathy, supple, trachea midline, no thyromegaly, no   carotid bruit, no JVD   Lungs:     Clear to auscultation,respirations regular, even and                  unlabored    Heart:    Regular rhythm and normal rate, normal S1 and S2, no            murmur, no gallop, no rub, no click   Chest Wall:    No abnormalities observed   Abdomen:     Normal bowel sounds, no masses, no organomegaly, soft        non-tender, non-distended, no guarding, no rebound                tenderness   Extremities:   Moves all extremities well, no edema, no cyanosis, no             redness   Pulses:   Pulses palpable and equal bilaterally   Skin:   No bleeding, bruising or rash   Lymph nodes:   No palpable adenopathy   Neurologic:   Cranial nerves 2 - 12 grossly intact, sensation intact, DTR        present and equal bilaterally       Results Review:     Imaging Results (Last 24 Hours)     Procedure Component Value Units Date/Time    CT Cervical Spine Without Contrast [[id number]] Collected: 06/1940     Updated: 06/23/22 1945    Narrative:      DATE OF EXAM:  6/23/2022 7:00 PM     PROCEDURE:  CT CERVICAL SPINE WO CONTRAST-     INDICATIONS:   neck pain; R55-Syncope and collapse, history of lung cancer     COMPARISON:   No Comparisons Available     TECHNIQUE:  Routine transaxial slices were obtained through the cervical spine  without the administration of intravenous contrast. Reconstructed  coronal and sagittal images were also obtained. Automated exposure  control and iterative construction methods were used.      FINDINGS:  No fracture is identified. There is no lytic or blastic bone lesion. The  craniocervical junction appears intact. There is degenerative change of  the atlantodental junction.     At C2-3, there is mild facet joint degeneration.     At C3-4 there is mild disc space narrowing. There is bilateral  uncovertebral spurring with left foraminal impingement.     At C4-5, C4 is subluxed anteriorly 1 mm relative to C5. There is right  facet joint degeneration.     C5-6, there is mild left facet joint degeneration. There is chronic  appearing deformity of the right C6 lamina.     At C6-C7, no significant bone abnormality.     At C7-T1, no significant bone abnormality.     Soft tissue evaluation in the canal is very limited and grossly  negative. Limited lung apical evaluation is negative. There are  bilateral thyroid nodules, largest about 1.7 cm.       Impression:      1. Negative for evidence of injury to the cervical spine.  2. There is mild degenerative change of the cervical spine mainly  involving the facet joints.  3. Bilateral thyroid nodules.     Electronically Signed By-Zhanna Cedeno MD On:6/23/2022 7:43 PM  This report was finalized on [id number] by  Zhanna Cedeno MD.    CT Head  Without Contrast [[id number]] Collected: 06/23/22 1937     Updated: 06/23/22 1943    Narrative:      CT HEAD WO CONTRAST-     Date of Exam: 6/23/2022 7:00 PM     Indication: Syncope/headache; R55-Syncope and collapse.     Comparison: None available.     Technique:  Without contrast, contiguous axial CT images of the head  were obtained from skull base to vertex.  Coronal reconstructions were  performed.  Automated exposure control and iterative reconstruction  methods were used.     FINDINGS  There is no intracranial hemorrhage or mass effect. There is mild  cerebral and cerebellar atrophy. There is mild patchy decreased density  in the white matter. There is no evidence of extra-axial pathology.  Ventricles and cisterns are patent. No skull abnormality is seen. No  gross orbital abnormality. There is no fluid in the included sinuses or  mastoids.       Impression:      1. No acute process is identified. Negative for hemorrhage or mass  effect.  2. There is cerebral and cerebellar atrophy. There is white matter  disease which is favored to be due to chronic small vessel ischemia.     Electronically Signed By-Zhanna Cedeno MD On:6/23/2022 7:38 PM  This report was finalized on [id number] by  Zhanna Cedeno MD.    XR Chest 1 View [[id number]] Collected: 06/23/22 1853     Updated: 06/23/22 1858    Narrative:      Examination: XR CHEST 1 VW-     Date of Exam: 6/23/2022 6:41 PM     Indication: Syncope; R55-Syncope and collapse.       Comparison: 07/05/2019     Technique: 1 view of the chest      FINDINGS:  The heart size is within normal. There is distortion of the right  hilum/mediastinum, with adjacent surgical sutures, similar to the prior  exam. There is a fat pad in the right cardiophrenic angle. There is no  vascular congestion, airspace opacity, or developing pulmonary nodule.  No pleural effusion or pneumothorax is seen. No significant bone  abnormality.       Impression:      1. Stable exam, with chronic  postoperative changes in the right chest.  No acute process demonstrated.           Electronically Signed By-Zhanna Cedeno MD On:6/23/2022 6:55 PM  This report was finalized on [id number] by  Zhanna Cedeno MD.           Lab Results (last 24 hours)     Procedure Component Value Units Date/Time    Urinalysis With Culture If Indicated - Urine, Clean Catch [[id number]]  (Abnormal) Collected: 06/24/22 0215    Specimen: Urine, Clean Catch Updated: 06/24/22 0228     Color, UA Yellow     Appearance, UA Clear     pH, UA 7.0     Specific Gravity, UA 1.009     Glucose, UA Negative     Ketones, UA Trace     Bilirubin, UA Negative     Blood, UA Negative     Protein, UA Negative     Leuk Esterase, UA Trace     Nitrite, UA Negative     Urobilinogen, UA 0.2 E.U./dL    Narrative:      In absence of clinical symptoms, the presence of pyuria, bacteria, and/or nitrites on the urinalysis result does not correlate with infection.    Urinalysis, Microscopic Only - Urine, Clean Catch [[id number]]  (Abnormal) Collected: 06/24/22 0215    Specimen: Urine, Clean Catch Updated: 06/24/22 0228     RBC, UA 0-2 /HPF      WBC, UA 3-5 /HPF      Comment: Urine culture not indicated.        Bacteria, UA None Seen /HPF      Squamous Epithelial Cells, UA 0-2 /HPF      Hyaline Casts, UA None Seen /LPF      Methodology Automated Microscopy    Troponin [[id number]]  (Normal) Collected: 06/23/22 2254    Specimen: Blood Updated: 06/24/22 0003     Troponin T <0.010 ng/mL     Narrative:      Troponin T Reference Range:  <= 0.03 ng/mL-   Negative for AMI  >0.03 ng/mL-     Abnormal for myocardial necrosis.  Clinicians would have to utilize clinical acumen, EKG, Troponin and serial changes to determine if it is an Acute Myocardial Infarction or myocardial injury due to an underlying chronic condition.       Results may be falsely decreased if patient taking Biotin.      Protime-INR [[id number]]  (Normal) Collected: 06/23/22 1818    Specimen: Blood Updated:  06/23/22 1944     Protime 11.2 Seconds      INR 1.09    aPTT [[id number]]  (Abnormal) Collected: 06/23/22 1818    Specimen: Blood Updated: 06/23/22 1944     PTT 29.9 seconds     [place] Draw [[id number]] Collected: 06/23/22 1818    Specimen: Blood Updated: 06/23/22 1933    Narrative:      The following orders were created for panel order [place] Draw.  Procedure                               Abnormality         Status                     ---------                               -----------         ------                     Green Top (Gel)[[id number]]                                  Final result               Lavender Top[[id number]]                                                                Gold Top - SST[[id number]]                                   Final result               Light Blue Top[[id number]]                                   Final result                 Please view results for these tests on the individual orders.    Light Blue Top [[id number]] Collected: 06/23/22 1818    Specimen: Blood Updated: 06/23/22 1933     Extra Tube Hold for add-ons.     Comment: Auto resulted       COVID PRE-OP / PRE-PROCEDURE SCREENING ORDER (NO ISOLATION) - Swab, Nasopharynx [[id number]]  (Normal) Collected: 06/23/22 1843    Specimen: Swab from Nasopharynx Updated: 06/23/22 1906    Narrative:      The following orders were created for panel order COVID PRE-OP / PRE-PROCEDURE SCREENING ORDER (NO ISOLATION) - Swab, Nasopharynx.  Procedure                               Abnormality         Status                     ---------                               -----------         ------                     COVID-19,CEPHEID/EMANUEL,CO...[[id number]]  Normal              Final result                 Please view results for these tests on the individual orders.    COVID-19,CEPHEID/EMANUEL,COR/SURJIT/PAD/SIMONA IN-HOUSE(OR EMERGENT/ADD-ON),NP SWAB IN TRANSPORT MEDIA 3-4 HR TAT, RT-PCR - Swab, Nasopharynx [[id number]]  (Normal) Collected: 06/23/22 1843     Specimen: Swab from Nasopharynx Updated: 06/23/22 1906     COVID19 Not Detected    Narrative:      Fact sheet for providers: https://www.fda.gov/media/798148/download     Fact sheet for patients: https://www.fda.gov/media/416027/download  Fact sheet for providers: https://www.fda.gov/media/245073/download    Fact sheet for patients: https://www.fda.gov/media/970540/download    Test performed by PCR.    BNP [[id number]]  (Normal) Collected: 06/23/22 1818    Specimen: Blood Updated: 06/23/22 1905     proBNP 411.9 pg/mL     Narrative:      Among patients with dyspnea, NT-proBNP is highly sensitive for the detection of acute congestive heart failure. In addition NT-proBNP of <300 pg/ml effectively rules out acute congestive heart failure with 99% negative predictive value.    Results may be falsely decreased if patient taking Biotin.      Troponin [[id number]]  (Normal) Collected: 06/23/22 1818    Specimen: Blood Updated: 06/23/22 1905     Troponin T <0.010 ng/mL     Narrative:      Troponin T Reference Range:  <= 0.03 ng/mL-   Negative for AMI  >0.03 ng/mL-     Abnormal for myocardial necrosis.  Clinicians would have to utilize clinical acumen, EKG, Troponin and serial changes to determine if it is an Acute Myocardial Infarction or myocardial injury due to an underlying chronic condition.       Results may be falsely decreased if patient taking Biotin.      TSH [[id number]]  (Normal) Collected: 06/23/22 1818    Specimen: Blood Updated: 06/23/22 1905     TSH 2.490 uIU/mL     Comprehensive Metabolic Panel [[id number]]  (Abnormal) Collected: 06/23/22 1818    Specimen: Blood Updated: 06/23/22 1900     Glucose 89 mg/dL      BUN 8 mg/dL      Creatinine 0.69 mg/dL      Sodium 143 mmol/L      Potassium 3.6 mmol/L      Chloride 107 mmol/L      CO2 19.0 mmol/L      Calcium 8.9 mg/dL      Total Protein 6.5 g/dL      Albumin 3.90 g/dL      ALT (SGPT) 20 U/L      AST (SGOT) 21 U/L      Alkaline Phosphatase 74 U/L      Total Bilirubin  0.3 mg/dL      Globulin 2.6 gm/dL      A/G Ratio 1.5 g/dL      BUN/Creatinine Ratio 11.6     Anion Gap 17.0 mmol/L      eGFR 86.2 mL/min/1.73      Comment: National Kidney Foundation and American Society of Nephrology (ASN) Task Force recommended calculation based on the Chronic Kidney Disease Epidemiology Collaboration (CKD-EPI) equation refit without adjustment for race.       Narrative:      GFR Normal >60  Chronic Kidney Disease <60  Kidney Failure <15      Magnesium [[id number]]  (Normal) Collected: 06/23/22 1818    Specimen: Blood Updated: 06/23/22 1900     Magnesium 2.2 mg/dL     CBC & Differential [[id number]]  (Abnormal) Collected: 06/23/22 1818    Specimen: Blood Updated: 06/23/22 1845    Narrative:      The following orders were created for panel order CBC & Differential.  Procedure                               Abnormality         Status                     ---------                               -----------         ------                     CBC Auto Differential[[id number]]        Abnormal            Final result                 Please view results for these tests on the individual orders.    CBC Auto Differential [[id number]]  (Abnormal) Collected: 06/23/22 1818    Specimen: Blood Updated: 06/23/22 1845     WBC 6.60 10*3/mm3      RBC 4.40 10*6/mm3      Hemoglobin 13.2 g/dL      Hematocrit 40.2 %      MCV 91.4 fL      MCH 30.0 pg      MCHC 32.9 g/dL      RDW 14.8 %      RDW-SD 46.4 fl      MPV 8.1 fL      Platelets 264 10*3/mm3      Neutrophil % 46.8 %      Lymphocyte % 43.2 %      Monocyte % 9.8 %      Eosinophil % 0.0 %      Basophil % 0.2 %      Neutrophils, Absolute 3.10 10*3/mm3      Lymphocytes, Absolute 2.80 10*3/mm3      Monocytes, Absolute 0.60 10*3/mm3      Eosinophils, Absolute 0.00 10*3/mm3      Basophils, Absolute 0.00 10*3/mm3      nRBC 0.1 /100 WBC     Gold Top - SST [[id number]] Collected: 06/23/22 1818    Specimen: Blood Updated: 06/23/22 1832     Extra Tube hold    Green Top (Gel)  [[id number]] Collected: 06/23/22 1818    Specimen: Blood Updated: 06/23/22 1831     Extra Tube hold    POC Glucose Once [[id number]]  (Normal) Collected: 06/23/22 1815    Specimen: Blood Updated: 06/23/22 1816     Glucose 89 mg/dL      Comment: Serial Number: [id number]:  [id number]              I reviewed the patient's new clinical results.    Assessment & Plan       Syncope, unspecified syncope type  - admit for further evaluation. Keep on telemetry.  Order echo and consult cardiology.  Pt is currently stable    H/o SVT - on metoprolol    CAD - non-obstructive    HTN - home meds    HLD - statin    DM2 - diet controlled    Dementia - home meds    Anxiety - ativan prn    PT consult in place    I discussed the patients findings and my recommendations with patient.     Gracie Hurtado MD  06/24/22  09:07 EDT        Electronically signed by Gracie Hurtado MD at 06/24/22 8358

## 2022-06-25 NOTE — PLAN OF CARE
Goal Outcome Evaluation:         Patient resting in bed at this time, no discomfort noted, bed alarm is on and patient is educated about calling out for help using call light

## 2022-06-25 NOTE — CASE MANAGEMENT/SOCIAL WORK
Continued Stay Note  BLAKE Trejo     Patient Name: Alia Cheney  MRN: [id number]  Today's Date: 6/25/2022    Admit Date: 6/23/2022     Discharge Plan     Row Name 06/25/22 1536       Plan    Plan Comments CM received message inquiring of a rollator.  explained that they are not stocked in house on the weekend,and could be ordered on monday and delivered to her home.  Family wishes to pursue a RW at this time and will further obtain a rollator as needed from home.  order obtained and sent to [facility].  cm delivered rw to pt's room, set the height according to hospitals rw in room and notified family that it had been delivered.    Final Discharge Disposition Code 01 - home or self-care    Final Note [facility], wiht RW from [facility] loomis closet.                Expected Discharge Date and Time     Expected Discharge Date Expected Discharge Time    Jun 25, 2022           Met with patient in room wearing PPE: mask.      Maintained distance greater than six feet and spent less than 15 minutes in the room.        Rubia Farnsworth RN

## 2022-06-25 NOTE — PROGRESS NOTES
"Referring Provider: Hospitalist    Reason for follow-up: Syncope     Patient Care Team:  Obdulia Spicer APRN as PCP - General (Nurse Practitioner)    Subjective .  Patient doing well without any symptoms    Objective  Lying in bed comfortably     Review of Systems   Constitutional: Negative for fever and malaise/fatigue.   Cardiovascular: Negative for chest pain, dyspnea on exertion and palpitations.   Respiratory: Negative for cough and shortness of breath.    Skin: Negative for rash.   Gastrointestinal: Negative for abdominal pain, nausea and vomiting.   Neurological: Negative for focal weakness and headaches.   All other systems reviewed and are negative.      Patient has no known allergies.    Scheduled Meds:atorvastatin, 10 mg, Oral, Daily  leflunomide, 10 mg, Oral, Daily  memantine, 10 mg, Oral, BID  metoprolol tartrate, 12.5 mg, Oral, BID  pantoprazole, 40 mg, Oral, Daily  polyethylene glycol, 17 g, Oral, Daily  sodium chloride, 3 mL, Intravenous, Q12H  traZODone, 100 mg, Oral, Nightly  venlafaxine XR, 150 mg, Oral, Daily With Breakfast  venlafaxine XR, 75 mg, Oral, Daily With Breakfast      Continuous Infusions:sodium chloride, 125 mL/hr, Last Rate: 125 mL/hr (06/23/22 2101)      PRN Meds:.•  acetaminophen  •  LORazepam  •  nitroglycerin  •  ondansetron  •  [COMPLETED] Insert peripheral IV **AND** sodium chloride  •  sodium chloride        VITAL SIGNS  Vitals:    06/24/22 2228 06/25/22 0243 06/25/22 0620 06/25/22 1019   BP: 124/46 136/66 127/60 145/66   BP Location: Right arm Right arm Right arm Right arm   Patient Position: Lying Lying Lying Lying   Pulse: 60 61 64 66   Resp: 19 17 13 18   Temp: 98.3 °F (36.8 °C) 98.4 °F (36.9 °C) 98.4 °F (36.9 °C) 98.1 °F (36.7 °C)   TempSrc: Axillary Axillary Axillary Oral   SpO2: 93% 94% 95%    Weight:       Height:           Flowsheet Rows    Flowsheet Row First Filed Value   Admission Height 165.1 cm (65\") Documented at 06/23/2022 1810   Admission Weight 79.8 kg " (176 lb) Documented at 06/23/2022 1810           TELEMETRY: Sinus rhythm    Physical Exam:  Constitutional:       Appearance: Well-developed.   Eyes:      General: No scleral icterus.     Conjunctiva/sclera: Conjunctivae normal.   HENT:      Head: Normocephalic and atraumatic.   Neck:      Vascular: No carotid bruit or JVD.   Pulmonary:      Effort: Pulmonary effort is normal.      Breath sounds: Normal breath sounds. No wheezing. No rales.   Cardiovascular:      Normal rate. Regular rhythm.   Pulses:     Intact distal pulses.   Abdominal:      General: Bowel sounds are normal.      Palpations: Abdomen is soft.   Musculoskeletal:      Cervical back: Normal range of motion and neck supple. Skin:     General: Skin is warm and dry.      Findings: No rash.   Neurological:      Mental Status: Alert.          Results Review:   I reviewed the patient's new clinical results.  Lab Results (last 24 hours)     ** No results found for the last 24 hours. **          Imaging Results (Last 24 Hours)     ** No results found for the last 24 hours. **          EKG      I personally viewed and interpreted the patient's EKG/Telemetry data:    ECHOCARDIOGRAM:    STRESS MYOVIEW:    CARDIAC CATHETERIZATION:    OTHER:         Assessment & Plan     Principal Problem:    Syncope, unspecified syncope type  Active Problems:    Coronary artery disease    Memory loss    Overweight (BMI 25.0-29.9)  History of SVT  Hypertension      Patient presented with a syncopal episode and is ruled out for MI by get enzymes  Patient has no orthostasis  Patient had an echocardiogram the results of which are still pending  Patient had a Holter monitor at discharge  Blood pressure heart rate stable  Will follow as an outpatient    I discussed the patients findings and my recommendations with patient and nurse    Alonso King MD  06/25/22  11:25 EDT

## 2022-06-25 NOTE — NURSING NOTE
Patient woke up in an confused state, she was shouting at staff, and taking off leads and pulse ox, patient is currently refusing to wear leads and pulse ox, called son to see if this is a normal occurrence for her and son states this is not a normal for her, patient continues to refuse to wear leads and pulse ox, but is pleasantly confused, will continue to monitor and encourage her to put leads and pulse ox back on   Detail Level: Detailed

## 2022-06-26 LAB — QT INTERVAL: 424 MS

## 2022-06-27 ENCOUNTER — TRANSITIONAL CARE MANAGEMENT TELEPHONE ENCOUNTER (OUTPATIENT)
Dept: CALL CENTER | Facility: HOSPITAL | Age: 82
End: 2022-06-27

## 2022-06-27 NOTE — OUTREACH NOTE
Call Center TCM Note    Flowsheet Row Responses   [facility] patient discharged from? [facility]   Does the patient have one of the following disease processes/diagnoses(primary or secondary)? Other   TCM attempt successful? Yes   Call start time 0859   Call end time 0906   General alerts for this patient Lissy Wong [place]   Discharge diagnosis syncope, CAD, memory loss   Person spoke with today (if not patient) and relationship Patient and Naomy-daughter   Meds reviewed with patient/caregiver? Yes   Is the patient having any side effects they believe may be caused by any medication additions or changes? No   Does the patient have all medications ordered at discharge? N/A   Is the patient taking all medications as directed (includes completed medication regime)? Yes   Does the patient have a primary care provider?  Yes   Does the patient have an appointment with their PCP within 7 days of discharge? No   Comments regarding PCP 11/14/22   What is preventing the patient from scheduling follow up appointments within 7 days of discharge? --  [Dtr declined scheduling a hospital d/c f/u appt at this time, stated she will call PCP SHAWNA to schedule an appt that works with her schedule]   Nursing Interventions Educated patient on importance of making appointment   Has the patient kept scheduled appointments due by today? N/A   Has home health visited the patient within 72 hours of discharge? N/A   What DME was ordered? walker from [place]   Has all DME been delivered? Yes   Psychosocial issues? No   Comments Pt is wearing a heart monitor for 30days   Did the patient receive a copy of their discharge instructions? Yes   Nursing interventions Reviewed instructions with patient   What is the patient's perception of their health status since discharge? Same   Is the patient/caregiver able to teach back the hierarchy of who to call/visit for symptoms/problems? PCP, Specialist, Home health nurse, Urgent Care, ED, 911 Yes   If the  patient is a current smoker, are they able to teach back resources for cessation? Not a smoker   TCM call completed? Yes   Wrap up additional comments Pt stated she did not think the heart monitor was working, Dtr stated it is working fine, pt is just confused. No questions/concerns at this time          SUZANNA STINSON RN    6/27/2022, 09:10 EDT

## 2022-07-05 ENCOUNTER — READMISSION MANAGEMENT (OUTPATIENT)
Dept: CALL CENTER | Facility: HOSPITAL | Age: 82
End: 2022-07-05

## 2022-07-05 NOTE — OUTREACH NOTE
Medical Week 2 Survey    Flowsheet Row Responses   [facility] patient discharged from? [facility]   Does the patient have one of the following disease processes/diagnoses(primary or secondary)? Other   Week 2 attempt successful? Yes   Call start time 1916   Discharge diagnosis syncope, CAD, memory loss   Call end time 1918   Person spoke with today (if not patient) and relationship Naomy-daughter   Does the patient have all medications ordered at discharge? N/A   Is the patient taking all medications as directed (includes completed medication regime)? Yes   Does the patient have a primary care provider?  Yes   Does the patient have an appointment with their PCP within 7 days of discharge? No   What is preventing the patient from scheduling follow up appointments within 7 days of discharge? Haven't had time   Nursing Interventions Advised patient to make appointment   Has the patient kept scheduled appointments due by today? N/A   Has home health visited the patient within 72 hours of discharge? N/A   What DME was ordered? walker from [place]   Has all DME been delivered? Yes   Psychosocial issues? No   What is the patient's perception of their health status since discharge? Improving   Is the patient/caregiver able to teach back signs and symptoms related to disease process for when to call PCP? Yes   Is the patient/caregiver able to teach back signs and symptoms related to disease process for when to call 911? Yes   Is the patient/caregiver able to teach back the hierarchy of who to call/visit for symptoms/problems? PCP, Specialist, Home health nurse, Urgent Care, ED, 911 Yes   If the patient is a current smoker, are they able to teach back resources for cessation? Not a smoker   Additional teach back comments States she is doing well.  Has walker and has added basket and cup [place].  Heart monitor is working good.   Week 2 Call Completed? Yes   Wrap up additional comments Denies questions or needs at this time.           ELIZABETH KENNY - Licensed Nurse

## 2022-07-26 LAB
Lab: 38
TOAL ENROLLMENT DAYS: 30

## 2022-07-26 PROCEDURE — 93228 REMOTE 30 DAY ECG REV/REPORT: CPT | Performed by: INTERNAL MEDICINE

## 2022-08-03 ENCOUNTER — APPOINTMENT (OUTPATIENT)
Dept: GENERAL RADIOLOGY | Facility: HOSPITAL | Age: 82
End: 2022-08-03

## 2022-08-03 ENCOUNTER — PREP FOR SURGERY (OUTPATIENT)
Dept: OTHER | Facility: HOSPITAL | Age: 82
End: 2022-08-03

## 2022-08-03 ENCOUNTER — HOSPITAL ENCOUNTER (INPATIENT)
Facility: HOSPITAL | Age: 82
LOS: 1 days | Discharge: HOME | End: 2022-08-05
Attending: EMERGENCY MEDICINE | Admitting: FAMILY MEDICINE

## 2022-08-03 ENCOUNTER — APPOINTMENT (OUTPATIENT)
Dept: CT IMAGING | Facility: HOSPITAL | Age: 82
End: 2022-08-03

## 2022-08-03 DIAGNOSIS — R00.2 PALPITATION: ICD-10-CM

## 2022-08-03 DIAGNOSIS — G89.18 POST-OP PAIN: ICD-10-CM

## 2022-08-03 DIAGNOSIS — R55 SYNCOPE, UNSPECIFIED SYNCOPE TYPE: Primary | ICD-10-CM

## 2022-08-03 DIAGNOSIS — F41.9 ANXIETY: ICD-10-CM

## 2022-08-03 DIAGNOSIS — I49.5 SICK SINUS SYNDROME: ICD-10-CM

## 2022-08-03 DIAGNOSIS — R51.9 NONINTRACTABLE HEADACHE, UNSPECIFIED CHRONICITY PATTERN, UNSPECIFIED HEADACHE TYPE: ICD-10-CM

## 2022-08-03 DIAGNOSIS — I49.5 SICK SINUS SYNDROME: Primary | ICD-10-CM

## 2022-08-03 LAB
ALBUMIN SERPL-MCNC: 4.1 G/DL (ref 3.5–5.2)
ALBUMIN/GLOB SERPL: 1.7 G/DL
ALP SERPL-CCNC: 79 U/L (ref 39–117)
ALT SERPL W P-5'-P-CCNC: 19 U/L (ref 1–33)
ANION GAP SERPL CALCULATED.3IONS-SCNC: 10 MMOL/L (ref 5–15)
ANION GAP SERPL CALCULATED.3IONS-SCNC: 12 MMOL/L (ref 5–15)
APTT PPP: 27.4 SECONDS (ref 61–76.5)
AST SERPL-CCNC: 21 U/L (ref 1–32)
BASOPHILS # BLD AUTO: 0 10*3/MM3 (ref 0–0.2)
BASOPHILS # BLD AUTO: 0 10*3/MM3 (ref 0–0.2)
BASOPHILS NFR BLD AUTO: 0.2 % (ref 0–1.5)
BASOPHILS NFR BLD AUTO: 0.4 % (ref 0–1.5)
BILIRUB SERPL-MCNC: 0.3 MG/DL (ref 0–1.2)
BILIRUB UR QL STRIP: NEGATIVE
BUN SERPL-MCNC: 8 MG/DL (ref 8–23)
BUN SERPL-MCNC: 8 MG/DL (ref 8–23)
BUN/CREAT SERPL: 12.7 (ref 7–25)
BUN/CREAT SERPL: 13.6 (ref 7–25)
CALCIUM SPEC-SCNC: 9 MG/DL (ref 8.6–10.5)
CALCIUM SPEC-SCNC: 9.2 MG/DL (ref 8.6–10.5)
CHLORIDE SERPL-SCNC: 107 MMOL/L (ref 98–107)
CHLORIDE SERPL-SCNC: 109 MMOL/L (ref 98–107)
CLARITY UR: CLEAR
CO2 SERPL-SCNC: 21 MMOL/L (ref 22–29)
CO2 SERPL-SCNC: 25 MMOL/L (ref 22–29)
COLOR UR: YELLOW
CREAT SERPL-MCNC: 0.59 MG/DL (ref 0.57–1)
CREAT SERPL-MCNC: 0.63 MG/DL (ref 0.57–1)
DEPRECATED RDW RBC AUTO: 45.9 FL (ref 37–54)
DEPRECATED RDW RBC AUTO: 49 FL (ref 37–54)
EGFRCR SERPLBLD CKD-EPI 2021: 87.6 ML/MIN/1.73
EGFRCR SERPLBLD CKD-EPI 2021: 89 ML/MIN/1.73
EOSINOPHIL # BLD AUTO: 0 10*3/MM3 (ref 0–0.4)
EOSINOPHIL # BLD AUTO: 0 10*3/MM3 (ref 0–0.4)
EOSINOPHIL NFR BLD AUTO: 0 % (ref 0.3–6.2)
EOSINOPHIL NFR BLD AUTO: 0 % (ref 0.3–6.2)
ERYTHROCYTE [DISTWIDTH] IN BLOOD BY AUTOMATED COUNT: 14.5 % (ref 12.3–15.4)
ERYTHROCYTE [DISTWIDTH] IN BLOOD BY AUTOMATED COUNT: 14.9 % (ref 12.3–15.4)
GLOBULIN UR ELPH-MCNC: 2.4 GM/DL
GLUCOSE SERPL-MCNC: 120 MG/DL (ref 65–99)
GLUCOSE SERPL-MCNC: 124 MG/DL (ref 65–99)
GLUCOSE UR STRIP-MCNC: NEGATIVE MG/DL
HCT VFR BLD AUTO: 39.9 % (ref 34–46.6)
HCT VFR BLD AUTO: 41 % (ref 34–46.6)
HGB BLD-MCNC: 13.3 G/DL (ref 12–15.9)
HGB BLD-MCNC: 13.5 G/DL (ref 12–15.9)
HGB UR QL STRIP.AUTO: NEGATIVE
INR PPP: 1.07 (ref 0.93–1.1)
KETONES UR QL STRIP: NEGATIVE
LEUKOCYTE ESTERASE UR QL STRIP.AUTO: NEGATIVE
LYMPHOCYTES # BLD AUTO: 1.6 10*3/MM3 (ref 0.7–3.1)
LYMPHOCYTES # BLD AUTO: 1.8 10*3/MM3 (ref 0.7–3.1)
LYMPHOCYTES NFR BLD AUTO: 34.8 % (ref 19.6–45.3)
LYMPHOCYTES NFR BLD AUTO: 39.4 % (ref 19.6–45.3)
MAGNESIUM SERPL-MCNC: 2.2 MG/DL (ref 1.6–2.4)
MCH RBC QN AUTO: 30.2 PG (ref 26.6–33)
MCH RBC QN AUTO: 30.5 PG (ref 26.6–33)
MCHC RBC AUTO-ENTMCNC: 32.9 G/DL (ref 31.5–35.7)
MCHC RBC AUTO-ENTMCNC: 33.4 G/DL (ref 31.5–35.7)
MCV RBC AUTO: 90.3 FL (ref 79–97)
MCV RBC AUTO: 92.7 FL (ref 79–97)
MONOCYTES # BLD AUTO: 0.5 10*3/MM3 (ref 0.1–0.9)
MONOCYTES # BLD AUTO: 0.5 10*3/MM3 (ref 0.1–0.9)
MONOCYTES NFR BLD AUTO: 11 % (ref 5–12)
MONOCYTES NFR BLD AUTO: 11.6 % (ref 5–12)
NEUTROPHILS NFR BLD AUTO: 2.3 10*3/MM3 (ref 1.7–7)
NEUTROPHILS NFR BLD AUTO: 2.5 10*3/MM3 (ref 1.7–7)
NEUTROPHILS NFR BLD AUTO: 48.6 % (ref 42.7–76)
NEUTROPHILS NFR BLD AUTO: 54 % (ref 42.7–76)
NITRITE UR QL STRIP: NEGATIVE
NRBC BLD AUTO-RTO: 0 /100 WBC (ref 0–0.2)
NRBC BLD AUTO-RTO: 0.1 /100 WBC (ref 0–0.2)
NT-PROBNP SERPL-MCNC: 255.6 PG/ML (ref 0–1800)
PH UR STRIP.AUTO: 8.5 [PH] (ref 5–8)
PLATELET # BLD AUTO: 216 10*3/MM3 (ref 140–450)
PLATELET # BLD AUTO: 239 10*3/MM3 (ref 140–450)
PMV BLD AUTO: 8 FL (ref 6–12)
PMV BLD AUTO: 8.1 FL (ref 6–12)
POTASSIUM SERPL-SCNC: 3.5 MMOL/L (ref 3.5–5.2)
POTASSIUM SERPL-SCNC: 4 MMOL/L (ref 3.5–5.2)
PROT SERPL-MCNC: 6.5 G/DL (ref 6–8.5)
PROT UR QL STRIP: NEGATIVE
PROTHROMBIN TIME: 11 SECONDS (ref 9.6–11.7)
RBC # BLD AUTO: 4.41 10*6/MM3 (ref 3.77–5.28)
RBC # BLD AUTO: 4.42 10*6/MM3 (ref 3.77–5.28)
SARS-COV-2 RNA PNL SPEC NAA+PROBE: NOT DETECTED
SODIUM SERPL-SCNC: 140 MMOL/L (ref 136–145)
SODIUM SERPL-SCNC: 144 MMOL/L (ref 136–145)
SP GR UR STRIP: 1.01 (ref 1–1.03)
TROPONIN T SERPL-MCNC: <0.01 NG/ML (ref 0–0.03)
TROPONIN T SERPL-MCNC: <0.01 NG/ML (ref 0–0.03)
UROBILINOGEN UR QL STRIP: ABNORMAL
WBC NRBC COR # BLD: 4.6 10*3/MM3 (ref 3.4–10.8)
WBC NRBC COR # BLD: 4.7 10*3/MM3 (ref 3.4–10.8)

## 2022-08-03 PROCEDURE — 99215 OFFICE O/P EST HI 40 MIN: CPT | Performed by: INTERNAL MEDICINE

## 2022-08-03 PROCEDURE — 80053 COMPREHEN METABOLIC PANEL: CPT

## 2022-08-03 PROCEDURE — 85730 THROMBOPLASTIN TIME PARTIAL: CPT

## 2022-08-03 PROCEDURE — 81003 URINALYSIS AUTO W/O SCOPE: CPT

## 2022-08-03 PROCEDURE — G0378 HOSPITAL OBSERVATION PER HR: HCPCS

## 2022-08-03 PROCEDURE — 85025 COMPLETE CBC W/AUTO DIFF WBC: CPT | Performed by: FAMILY MEDICINE

## 2022-08-03 PROCEDURE — 83735 ASSAY OF MAGNESIUM: CPT | Performed by: FAMILY MEDICINE

## 2022-08-03 PROCEDURE — 85610 PROTHROMBIN TIME: CPT

## 2022-08-03 PROCEDURE — 25010000002 ENOXAPARIN PER 10 MG: Performed by: FAMILY MEDICINE

## 2022-08-03 PROCEDURE — 71045 X-RAY EXAM CHEST 1 VIEW: CPT

## 2022-08-03 PROCEDURE — 70450 CT HEAD/BRAIN W/O DYE: CPT

## 2022-08-03 PROCEDURE — 85025 COMPLETE CBC W/AUTO DIFF WBC: CPT

## 2022-08-03 PROCEDURE — 93005 ELECTROCARDIOGRAM TRACING: CPT | Performed by: EMERGENCY MEDICINE

## 2022-08-03 PROCEDURE — 36415 COLL VENOUS BLD VENIPUNCTURE: CPT | Performed by: FAMILY MEDICINE

## 2022-08-03 PROCEDURE — 99214 OFFICE O/P EST MOD 30 MIN: CPT | Performed by: INTERNAL MEDICINE

## 2022-08-03 PROCEDURE — 87635 SARS-COV-2 COVID-19 AMP PRB: CPT | Performed by: EMERGENCY MEDICINE

## 2022-08-03 PROCEDURE — P9612 CATHETERIZE FOR URINE SPEC: HCPCS

## 2022-08-03 PROCEDURE — 83880 ASSAY OF NATRIURETIC PEPTIDE: CPT

## 2022-08-03 PROCEDURE — 84484 ASSAY OF TROPONIN QUANT: CPT

## 2022-08-03 PROCEDURE — 99285 EMERGENCY DEPT VISIT HI MDM: CPT

## 2022-08-03 RX ORDER — PANTOPRAZOLE SODIUM 40 MG/1
40 TABLET, DELAYED RELEASE ORAL DAILY
Status: DISCONTINUED | OUTPATIENT
Start: 2022-08-04 | End: 2022-08-05 | Stop reason: HOSPADM

## 2022-08-03 RX ORDER — MAGNESIUM SULFATE HEPTAHYDRATE 40 MG/ML
2 INJECTION, SOLUTION INTRAVENOUS AS NEEDED
Status: DISCONTINUED | OUTPATIENT
Start: 2022-08-03 | End: 2022-08-05 | Stop reason: HOSPADM

## 2022-08-03 RX ORDER — SODIUM CHLORIDE 0.9 % (FLUSH) 0.9 %
10 SYRINGE (ML) INJECTION AS NEEDED
Status: DISCONTINUED | OUTPATIENT
Start: 2022-08-03 | End: 2022-08-05 | Stop reason: HOSPADM

## 2022-08-03 RX ORDER — ACETAMINOPHEN 325 MG/1
650 TABLET ORAL EVERY 4 HOURS PRN
Status: DISCONTINUED | OUTPATIENT
Start: 2022-08-03 | End: 2022-08-05 | Stop reason: HOSPADM

## 2022-08-03 RX ORDER — ONDANSETRON 4 MG/1
4 TABLET, FILM COATED ORAL EVERY 6 HOURS PRN
Status: DISCONTINUED | OUTPATIENT
Start: 2022-08-03 | End: 2022-08-05 | Stop reason: HOSPADM

## 2022-08-03 RX ORDER — POTASSIUM CHLORIDE 7.45 MG/ML
10 INJECTION INTRAVENOUS
Status: DISCONTINUED | OUTPATIENT
Start: 2022-08-03 | End: 2022-08-05 | Stop reason: HOSPADM

## 2022-08-03 RX ORDER — ACETAMINOPHEN 650 MG/1
650 SUPPOSITORY RECTAL EVERY 4 HOURS PRN
Status: DISCONTINUED | OUTPATIENT
Start: 2022-08-03 | End: 2022-08-05 | Stop reason: HOSPADM

## 2022-08-03 RX ORDER — ACETAMINOPHEN 160 MG/5ML
650 SOLUTION ORAL EVERY 4 HOURS PRN
Status: DISCONTINUED | OUTPATIENT
Start: 2022-08-03 | End: 2022-08-05 | Stop reason: HOSPADM

## 2022-08-03 RX ORDER — TRAZODONE HYDROCHLORIDE 100 MG/1
100 TABLET ORAL NIGHTLY
Status: DISCONTINUED | OUTPATIENT
Start: 2022-08-03 | End: 2022-08-05 | Stop reason: HOSPADM

## 2022-08-03 RX ORDER — LEFLUNOMIDE 20 MG/1
10 TABLET ORAL DAILY
Status: DISCONTINUED | OUTPATIENT
Start: 2022-08-04 | End: 2022-08-05 | Stop reason: HOSPADM

## 2022-08-03 RX ORDER — LORAZEPAM 1 MG/1
1 TABLET ORAL 2 TIMES DAILY PRN
Status: DISCONTINUED | OUTPATIENT
Start: 2022-08-03 | End: 2022-08-05 | Stop reason: HOSPADM

## 2022-08-03 RX ORDER — SODIUM CHLORIDE 0.9 % (FLUSH) 0.9 %
3-10 SYRINGE (ML) INJECTION AS NEEDED
Status: DISCONTINUED | OUTPATIENT
Start: 2022-08-03 | End: 2022-08-05 | Stop reason: HOSPADM

## 2022-08-03 RX ORDER — VENLAFAXINE HYDROCHLORIDE 75 MG/1
75 CAPSULE, EXTENDED RELEASE ORAL DAILY
Status: DISCONTINUED | OUTPATIENT
Start: 2022-08-04 | End: 2022-08-04

## 2022-08-03 RX ORDER — POLYETHYLENE GLYCOL 3350 17 G/17G
17 POWDER, FOR SOLUTION ORAL DAILY
Refills: 2 | Status: DISCONTINUED | OUTPATIENT
Start: 2022-08-03 | End: 2022-08-05 | Stop reason: HOSPADM

## 2022-08-03 RX ORDER — ONDANSETRON 2 MG/ML
4 INJECTION INTRAMUSCULAR; INTRAVENOUS EVERY 6 HOURS PRN
Status: DISCONTINUED | OUTPATIENT
Start: 2022-08-03 | End: 2022-08-05 | Stop reason: HOSPADM

## 2022-08-03 RX ORDER — POTASSIUM CHLORIDE 1.5 G/1.77G
40 POWDER, FOR SOLUTION ORAL AS NEEDED
Status: DISCONTINUED | OUTPATIENT
Start: 2022-08-03 | End: 2022-08-05 | Stop reason: HOSPADM

## 2022-08-03 RX ORDER — ATORVASTATIN CALCIUM 10 MG/1
10 TABLET, FILM COATED ORAL DAILY
Refills: 0 | Status: DISCONTINUED | OUTPATIENT
Start: 2022-08-03 | End: 2022-08-05 | Stop reason: HOSPADM

## 2022-08-03 RX ORDER — MAGNESIUM SULFATE HEPTAHYDRATE 40 MG/ML
4 INJECTION, SOLUTION INTRAVENOUS AS NEEDED
Status: DISCONTINUED | OUTPATIENT
Start: 2022-08-03 | End: 2022-08-05 | Stop reason: HOSPADM

## 2022-08-03 RX ORDER — POTASSIUM CHLORIDE 20 MEQ/1
40 TABLET, EXTENDED RELEASE ORAL AS NEEDED
Status: DISCONTINUED | OUTPATIENT
Start: 2022-08-03 | End: 2022-08-05 | Stop reason: HOSPADM

## 2022-08-03 RX ORDER — MEMANTINE HYDROCHLORIDE 10 MG/1
10 TABLET ORAL 2 TIMES DAILY
Status: DISCONTINUED | OUTPATIENT
Start: 2022-08-03 | End: 2022-08-05 | Stop reason: HOSPADM

## 2022-08-03 RX ORDER — SODIUM CHLORIDE 0.9 % (FLUSH) 0.9 %
3-10 SYRINGE (ML) INJECTION AS NEEDED
Status: CANCELLED | OUTPATIENT
Start: 2022-08-03

## 2022-08-03 RX ORDER — SODIUM CHLORIDE 0.9 % (FLUSH) 0.9 %
3 SYRINGE (ML) INJECTION EVERY 12 HOURS SCHEDULED
Status: DISCONTINUED | OUTPATIENT
Start: 2022-08-03 | End: 2022-08-05 | Stop reason: HOSPADM

## 2022-08-03 RX ORDER — VENLAFAXINE HYDROCHLORIDE 75 MG/1
150 CAPSULE, EXTENDED RELEASE ORAL EVERY MORNING
Status: DISCONTINUED | OUTPATIENT
Start: 2022-08-04 | End: 2022-08-05 | Stop reason: HOSPADM

## 2022-08-03 RX ORDER — ENOXAPARIN SODIUM 100 MG/ML
40 INJECTION SUBCUTANEOUS
Status: DISCONTINUED | OUTPATIENT
Start: 2022-08-03 | End: 2022-08-04

## 2022-08-03 RX ORDER — SODIUM CHLORIDE 0.9 % (FLUSH) 0.9 %
3 SYRINGE (ML) INJECTION EVERY 12 HOURS SCHEDULED
Status: CANCELLED | OUTPATIENT
Start: 2022-08-03

## 2022-08-03 RX ADMIN — TRAZODONE HYDROCHLORIDE 100 MG: 100 TABLET ORAL at 20:13

## 2022-08-03 RX ADMIN — POLYETHYLENE GLYCOL 3350 17 G: 17 POWDER, FOR SOLUTION ORAL at 19:23

## 2022-08-03 RX ADMIN — POTASSIUM CHLORIDE 40 MEQ: 1500 TABLET, EXTENDED RELEASE ORAL at 20:29

## 2022-08-03 RX ADMIN — MEMANTINE 10 MG: 10 TABLET ORAL at 20:13

## 2022-08-03 RX ADMIN — METOPROLOL TARTRATE 12.5 MG: 25 TABLET, FILM COATED ORAL at 20:13

## 2022-08-03 RX ADMIN — Medication 3 ML: at 20:14

## 2022-08-03 RX ADMIN — ATORVASTATIN CALCIUM 10 MG: 10 TABLET, FILM COATED ORAL at 20:13

## 2022-08-03 RX ADMIN — ENOXAPARIN SODIUM 40 MG: 100 INJECTION SUBCUTANEOUS at 19:23

## 2022-08-03 NOTE — ED NOTES
Pt was at nurses station at facility where she lives when suddenly started to feel dizzy and shaky. Pt sat down on walker, and had syncopal episode. Pt states that she thinks the nurse caught her and kept her from hitting her head on the floor. Pt is not c/o cp, sob, or abd pain. Pt is currently feeling dizzy. Pt is alert and oriented.

## 2022-08-03 NOTE — CONSULTS
Referring Provider: Naomy MATOS    Attending: Gracie Hurtado MD    Reason for Consultation:    Syncope  History of SVT  Tachybradycardia syndrome    Chief complaint:    Syncope       History of present illness:     Patient is 82-year-old white female whose past medical history is significant for tachybradycardia syndrome, SVT, hypertension, who is admitted with syncope.    Patient is a resident of [facility].  She apparently stood up and tried to walk.  She reported that she had racing of the heart.  She complain of palpitation described as racing of the heart.  She was lightheaded and dizzy and she fell down but she was supported by person around her and she did not hit the floor.  Patient recently was admitted in June 2022 with a similar episode when she was noted to have narrow complex tachycardia in emergency room on EKG.  Patient was started on low-dose beta-blocker therapy.  Echocardiogram at that time showed EF of 55 to 60% and mild to moderate aortic regurgitation noted.  Patient also underwent event monitor which showed 30 beat run of narrow complex tachycardia possibly atrial tachycardia.    In 2009 patient had cardiac catheterization which showed nonobstructive disease.  In 2013 patient had stress test which was unremarkable.    Patient is comfortable at present.  She denies any chest pain or tightness or heaviness.  No significant shortness of breath.  She walks with a walker.        Review of Systems  Review of Systems   Constitutional: Negative for chills and fever.   HENT: Negative for ear discharge and nosebleeds.    Eyes: Negative for discharge and redness.   Cardiovascular: Positive for near-syncope, palpitations and syncope. Negative for chest pain, orthopnea and paroxysmal nocturnal dyspnea.   Respiratory: Negative for cough, shortness of breath and wheezing.    Endocrine: Negative for heat intolerance.   Skin: Negative for rash.   Musculoskeletal: Positive for arthritis and joint  pain. Negative for myalgias.   Gastrointestinal: Negative for abdominal pain, melena, nausea and vomiting.   Genitourinary: Negative for dysuria and hematuria.   Neurological: Negative for dizziness, light-headedness, numbness and tremors.   Psychiatric/Behavioral: Negative for depression. The patient is not nervous/anxious.        Past Medical History  Past Medical History:   Diagnosis Date   • Anal stenosis 1/3/2012   • Anxiety 11/25/2013   • Arrhythmia, ventricular 4/20/2012   • Arthritis    • Ataxia 8/6/2013   • Carpal tunnel syndrome, bilateral 2/13/2017   • Depression 1/3/2012   • Elevated cholesterol    • Gastroesophageal reflux disease 6/29/2012   • History of osteoporotic pathological fracture     Bilateral wrist-unknown date   • Hx of total knee replacement, right 6/29/2017   • Hyperlipidemia 5/2/2016   • Insomnia 8/6/2013   • Irritable bowel syndrome 8/6/2013   • Keratoconjunctivitis sicca, in Sjogren's syndrome (HCC) 9/21/2016   • Memory loss 12/7/2018   • Osteoarthritis of knee 5/19/2017   • Osteoporosis     fosamax [id number], on prolia(2016)   • Paroxysmal atrial fibrillation (HCC) 11/9/2011   • Primary malignant neoplasm of right middle lobe of lung (HCC) 8/21/2018   • Rheumatoid arthritis (HCC)    • Tension-type headache, not intractable 12/26/2017   • Vitamin B12 deficiency 5/2/2016   • Vitamin D deficiency 11/20/2017    and   Past Surgical History:   Procedure Laterality Date   • ABDOMINAL SURGERY     • ANAL DILATION     • APPENDECTOMY     • BREAST BIOPSY Right    • CARDIAC CATHETERIZATION  2009   • CATARACT EXTRACTION WITH INTRAOCULAR LENS IMPLANT Bilateral 2009   • CHOLECYSTECTOMY     • COLONOSCOPY     • CYSTOSCOPY     • HYSTERECTOMY     • JOINT REPLACEMENT     • LUNG REMOVAL, PARTIAL Right 08/06/2018    RIGHT VATS WEDGE RESECTION OF RML PN RIGHT MIDDLE LOBECTOMY    • SUBTOTAL HYSTERECTOMY     • TONSILLECTOMY     • TOTAL HIP ARTHROPLASTY Left 7/17/2019    Procedure: TOTAL HIP ARTHROPLASTY  "ANTERIOR;  Surgeon: Lenny Khan MD;  Location: [facility] OR;  Service: Orthopedics   • TOTAL KNEE ARTHROPLASTY Right 2017   • WRIST FRACTURE SURGERY Bilateral        Family History  Family History   Problem Relation Age of Onset   • Alcohol abuse Father    • Heart disease Father        Social History  Social History     Socioeconomic History   • Marital status:    Tobacco Use   • Smoking status: Former Smoker     Packs/day: 1.00     Years: 15.00     Pack years: 15.00     Types: Cigarettes     Start date:      Quit date:      Years since quittin.6   • Smokeless tobacco: Never Used   Substance and Sexual Activity   • Alcohol use: Yes   • Drug use: No   • Sexual activity: Not Currently       Objective     Physical Exam:    Vital Signs  Vitals:    22 1505 22 1517 22 1532 22 1719   BP: (!) 154/125 160/86 158/70 164/76   BP Location:    Right arm   Patient Position:    Lying   Pulse: 78 74 70 74   Resp:    20   Temp:    97.9 °F (36.6 °C)   TempSrc:    Oral   SpO2:    100%   Weight:    78.3 kg (172 lb 9.9 oz)   Height:    162.6 cm (64\")       Weight  Flowsheet Rows    Flowsheet Row First Filed Value   Admission Height 162.6 cm (64\") Documented at 2022 1436   Admission Weight 81.6 kg (180 lb) Documented at 2022 1436           Constitutional:       Appearance: Well-developed.   Eyes:      General: No scleral icterus.        Right eye: No discharge.   HENT:      Head: Normocephalic and atraumatic.   Neck:      Thyroid: No thyromegaly.      Lymphadenopathy: No cervical adenopathy.   Pulmonary:      Effort: Pulmonary effort is normal. No respiratory distress.      Breath sounds: Normal breath sounds. No wheezing. No rales.   Cardiovascular:      Normal rate. Regular rhythm.      No gallop.   Edema:     Peripheral edema absent.   Abdominal:      Tenderness: There is no abdominal tenderness.   Skin:     Findings: No erythema or rash.   Neurological:      Mental Status: " Alert and oriented to person, place, and time.         Results Review:  Lab Results (last 24 hours)     Procedure Component Value Units Date/Time    COVID PRE-OP / PRE-PROCEDURE SCREENING ORDER (NO ISOLATION) - Swab, Nasopharynx [[id number]]  (Normal) Collected: 08/03/22 1640    Specimen: Swab from Nasopharynx Updated: 08/03/22 1704    Narrative:      The following orders were created for panel order COVID PRE-OP / PRE-PROCEDURE SCREENING ORDER (NO ISOLATION) - Swab, Nasopharynx.  Procedure                               Abnormality         Status                     ---------                               -----------         ------                     COVID-19,CEPHEID/EMANUEL,CO...[[id number]]  Normal              Final result                 Please view results for these tests on the individual orders.    COVID-19,CEPHEID/EMANUEL,COR/SURJIT/PAD/SIMONA IN-HOUSE(OR EMERGENT/ADD-ON),NP SWAB IN TRANSPORT MEDIA 3-4 HR TAT, RT-PCR - Swab, Nasopharynx [[id number]]  (Normal) Collected: 08/03/22 1640    Specimen: Swab from Nasopharynx Updated: 08/03/22 1704     COVID19 Not Detected    Narrative:      Fact sheet for providers: https://www.fda.gov/media/747183/download     Fact sheet for patients: https://www.fda.gov/media/007868/download  Fact sheet for providers: https://www.fda.gov/media/544679/download    Fact sheet for patients: https://www.fda.gov/media/684920/download    Test performed by PCR.    Urinalysis With Culture If Indicated - Urine, Catheter In/Out [[id number]]  (Abnormal) Collected: 08/03/22 1641    Specimen: Urine, Catheter In/Out Updated: 08/03/22 1647     Color, UA Yellow     Appearance, UA Clear     pH, UA 8.5     Specific Gravity, UA 1.007     Glucose, UA Negative     Ketones, UA Negative     Bilirubin, UA Negative     Blood, UA Negative     Protein, UA Negative     Leuk Esterase, UA Negative     Nitrite, UA Negative     Urobilinogen, UA 0.2 E.U./dL    Narrative:      In absence of clinical symptoms, the presence of  pyuria, bacteria, and/or nitrites on the urinalysis result does not correlate with infection.  Urine microscopic not indicated.    Comprehensive Metabolic Panel [[id number]]  (Abnormal) Collected: 08/03/22 1500    Specimen: Blood Updated: 08/03/22 1533     Glucose 120 mg/dL      BUN 8 mg/dL      Creatinine 0.63 mg/dL      Sodium 144 mmol/L      Potassium 4.0 mmol/L      Chloride 109 mmol/L      CO2 25.0 mmol/L      Calcium 9.2 mg/dL      Total Protein 6.5 g/dL      Albumin 4.10 g/dL      ALT (SGPT) 19 U/L      AST (SGOT) 21 U/L      Alkaline Phosphatase 79 U/L      Total Bilirubin 0.3 mg/dL      Globulin 2.4 gm/dL      A/G Ratio 1.7 g/dL      BUN/Creatinine Ratio 12.7     Anion Gap 10.0 mmol/L      eGFR 87.6 mL/min/1.73      Comment: National Kidney Foundation and American Society of Nephrology (ASN) Task Force recommended calculation based on the Chronic Kidney Disease Epidemiology Collaboration (CKD-EPI) equation refit without adjustment for race.       Narrative:      GFR Normal >60  Chronic Kidney Disease <60  Kidney Failure <15      Protime-INR [[id number]]  (Normal) Collected: 08/03/22 1500    Specimen: Blood Updated: 08/03/22 1533     Protime 11.0 Seconds      INR 1.07    aPTT [[id number]]  (Abnormal) Collected: 08/03/22 1500    Specimen: Blood Updated: 08/03/22 1533     PTT 27.4 seconds     Troponin [[id number]]  (Normal) Collected: 08/03/22 1500    Specimen: Blood Updated: 08/03/22 1533     Troponin T <0.010 ng/mL     Narrative:      Troponin T Reference Range:  <= 0.03 ng/mL-   Negative for AMI  >0.03 ng/mL-     Abnormal for myocardial necrosis.  Clinicians would have to utilize clinical acumen, EKG, Troponin and serial changes to determine if it is an Acute Myocardial Infarction or myocardial injury due to an underlying chronic condition.       Results may be falsely decreased if patient taking Biotin.      BNP [[id number]]  (Normal) Collected: 08/03/22 1500    Specimen: Blood Updated: 08/03/22 1531     proBNP  255.6 pg/mL     Narrative:      Among patients with dyspnea, NT-proBNP is highly sensitive for the detection of acute congestive heart failure. In addition NT-proBNP of <300 pg/ml effectively rules out acute congestive heart failure with 99% negative predictive value.    Results may be falsely decreased if patient taking Biotin.      CBC & Differential [[id number]]  (Abnormal) Collected: 08/03/22 1500    Specimen: Blood Updated: 08/03/22 1517    Narrative:      The following orders were created for panel order CBC & Differential.  Procedure                               Abnormality         Status                     ---------                               -----------         ------                     CBC Auto Differential[[id number]]        Abnormal            Final result                 Please view results for these tests on the individual orders.    CBC Auto Differential [[id number]]  (Abnormal) Collected: 08/03/22 1500    Specimen: Blood Updated: 08/03/22 1517     WBC 4.60 10*3/mm3      RBC 4.41 10*6/mm3      Hemoglobin 13.3 g/dL      Hematocrit 39.9 %      MCV 90.3 fL      MCH 30.2 pg      MCHC 33.4 g/dL      RDW 14.5 %      RDW-SD 45.9 fl      MPV 8.0 fL      Platelets 239 10*3/mm3      Neutrophil % 54.0 %      Lymphocyte % 34.8 %      Monocyte % 11.0 %      Eosinophil % 0.0 %      Basophil % 0.2 %      Neutrophils, Absolute 2.50 10*3/mm3      Lymphocytes, Absolute 1.60 10*3/mm3      Monocytes, Absolute 0.50 10*3/mm3      Eosinophils, Absolute 0.00 10*3/mm3      Basophils, Absolute 0.00 10*3/mm3      nRBC 0.0 /100 WBC         Imaging Results (Last 72 Hours)     Procedure Component Value Units Date/Time    CT Head Without Contrast [[id number]] Collected: 08/03/22 1626     Updated: 08/03/22 1630    Narrative:         DATE OF EXAM:  8/3/2022 4:14 PM     PROCEDURE:   CT HEAD WO CONTRAST-     INDICATIONS:   Syncope, headaches, numbness and tingling.     COMPARISON:  06/23/2022.     TECHNIQUE:   Routine transaxial cuts  were obtained through the head without the  administration of contrast. Automated exposure control and iterative  reconstruction methods were used.      FINDINGS:  There is mild volume loss. There is prominence of the sulci, fissures,  ventricles, and basal cisterns. There are some areas of decreased  density in the white matter tracts felt to represent chronic  microvascular ischemia. There is no acute hemorrhage, midline shift, or  suspicious extra-axial fluid collections. The paranasal and mastoid  sinuses are clear. The calvarium is unremarkable.        Impression:         1. No acute findings.  2. Volume loss secondary to age-appropriate atrophy.  3. Chronic ischemic changes.     Electronically Signed By-Parminder Johns MD On:8/3/2022 4:28 PM  This report was finalized on [id number] by  Parminder Johns MD.    XR Chest 1 View [[id number]] Collected: 08/03/22 1524     Updated: 08/03/22 1528    Narrative:      DATE OF EXAM:  8/3/2022 3:18 PM     PROCEDURE:  XR CHEST 1 VW-     INDICATIONS:  syncope, palpitations; R55-Syncope and collapse; R00.2-Palpitations;  R51.9-Headache, unspecified     COMPARISON:  AP chest x-ray 06/23/2022, CT chest 05/26/2021     TECHNIQUE:   Single radiographic AP view of the chest was obtained.     FINDINGS:  There are increased mild bibasilar airspace opacities. No pneumothorax  or large pleural effusion is seen. Cardiac silhouette remains mildly  enlarged.        Impression:      Increased mild bibasilar airspace opacities, which may be due to  atelectasis and/or pneumonia.     Electronically Signed By-Barbara Cosme MD On:8/3/2022 3:25 PM  This report was finalized on [id number] by  Barbara Cosme MD.        ECG 12 Lead   Preliminary Result   HEART RATE= 70  bpm   RR Interval= 856  ms   OK Interval= 151  ms   P Horizontal Axis= 18  deg   P Front Axis= 47  deg   QRSD Interval= 102  ms   QT Interval= 414  ms   QRS Axis= -53  deg   T Wave Axis= 35  deg   - ABNORMAL ECG -   Sinus rhythm    Left anterior fascicular block   Electronically Signed By:    Date and Time of Study: 2022-08-03 15:12:51      ECG 12 Lead    (Results Pending)     CBC    Results from last 7 days   Lab Units 08/03/22  1500   WBC 10*3/mm3 4.60   HEMOGLOBIN g/dL 13.3   PLATELETS 10*3/mm3 239     BMP   Results from last 7 days   Lab Units 08/03/22  1500   SODIUM mmol/L 144   POTASSIUM mmol/L 4.0   CHLORIDE mmol/L 109*   CO2 mmol/L 25.0   BUN mg/dL 8   CREATININE mg/dL 0.63   GLUCOSE mg/dL 120*     CMP   Results from last 7 days   Lab Units 08/03/22  1500   SODIUM mmol/L 144   POTASSIUM mmol/L 4.0   CHLORIDE mmol/L 109*   CO2 mmol/L 25.0   BUN mg/dL 8   CREATININE mg/dL 0.63   GLUCOSE mg/dL 120*   ALBUMIN g/dL 4.10   BILIRUBIN mg/dL 0.3   ALK PHOS U/L 79   AST (SGOT) U/L 21   ALT (SGPT) U/L 19     Medication Review  Scheduled Meds:atorvastatin, 10 mg, Oral, Daily  leflunomide, 10 mg, Oral, Daily  memantine, 10 mg, Oral, BID  metoprolol tartrate, 12.5 mg, Oral, BID  pantoprazole, 40 mg, Oral, Daily  polyethylene glycol, 17 g, Oral, Daily  sodium chloride, 3 mL, Intravenous, Q12H  traZODone, 100 mg, Oral, Nightly  [START ON 8/4/2022] venlafaxine XR, 150 mg, Oral, QAM  venlafaxine XR, 75 mg, Oral, Daily      Continuous Infusions:Pharmacy to Dose enoxaparin (LOVENOX),       PRN Meds:.•  acetaminophen **OR** acetaminophen **OR** acetaminophen  •  LORazepam  •  magnesium sulfate **OR** magnesium sulfate **OR** magnesium sulfate  •  ondansetron **OR** ondansetron  •  Pharmacy to Dose enoxaparin (LOVENOX)  •  potassium chloride **OR** potassium chloride **OR** potassium chloride  •  [COMPLETED] Insert peripheral IV **AND** sodium chloride  •  sodium chloride    Assessment:      Syncope, unspecified syncope type        Plan:    MDM:    1.  Syncope:    Patient had near syncopal episode and previously has been documented to have narrow complex tachycardia consistent with atrial tachycardia versus SVT.  I personally think patient has  tachybradycardia syndrome.  I believe that patient should be considered for possible permanent pacemaker in order to treat the tachyarrhythmia.  Possibility of ablation for atrial tachycardia is there.  I will discuss with Dr. Jennifer Ngo.  I will ask him to see the patient.    2.  Tachybradycardia syndrome:    Consider permanent pacemaker.  I would request EP consultation    I discussed the patients findings and my recommendations with patient    Ismael Novak MD  08/03/22  17:51 EDT

## 2022-08-03 NOTE — H&P
"Patient Care Team:  Obdulia Spicer APRN as PCP - General (Nurse Practitioner)    Chief complaint syncope    Subjective      82-year-old female who presents to the ER by EMS from local assisted living facility.  EMS reports that facility nurse stated the patient had multiple syncopal episodes this morning.  Patient recalls 1 episode where she was falling and the nurse caught her, she denies hitting her head.  But does not really remember repeated episodes.  She states when she awoke this morning she had a posterior headache with \"ringing\" in her head, it was not sudden onset or there was no thunderclap sensation, it was not the worst headache of her life, it has resolved at that her arrival to the emergency room.  She has no history of headaches.  She did not require treatment.  She has had no nausea or vomiting she states she did have some palpitations this morning that she have now resolved.  She has had no chest pain or shortness of breath.  No other complaints.  She is otherwise alert oriented and appropriate at my evaluation, she does have a history of memory loss to which she takes Namenda.  In the ER, labs and CT head are unremarkable    Review of Systems   Constitutional: Positive for fatigue. Negative for activity change and appetite change.   HENT: Negative.    Respiratory: Negative.    Cardiovascular: Positive for palpitations. Negative for chest pain.   Gastrointestinal: Negative.    Musculoskeletal: Positive for arthralgias.   Neurological: Positive for syncope and weakness. Negative for dizziness.   Psychiatric/Behavioral: Positive for confusion.          History  Past Medical History:   Diagnosis Date   • Anal stenosis 1/3/2012   • Anxiety 11/25/2013   • Arrhythmia, ventricular 4/20/2012   • Arthritis    • Ataxia 8/6/2013   • Carpal tunnel syndrome, bilateral 2/13/2017   • Depression 1/3/2012   • Elevated cholesterol    • Gastroesophageal reflux disease 6/29/2012   • History of osteoporotic " pathological fracture     Bilateral wrist-unknown date   • Hx of total knee replacement, right 2017   • Hyperlipidemia 2016   • Insomnia 2013   • Irritable bowel syndrome 2013   • Keratoconjunctivitis sicca, in Sjogren's syndrome (HCC) 2016   • Memory loss 2018   • Osteoarthritis of knee 2017   • Osteoporosis     fosamax [id number], on prolia()   • Paroxysmal atrial fibrillation (HCC) 2011   • Primary malignant neoplasm of right middle lobe of lung (HCC) 2018   • Rheumatoid arthritis (HCC)    • Tension-type headache, not intractable 2017   • Vitamin B12 deficiency 2016   • Vitamin D deficiency 2017     Past Surgical History:   Procedure Laterality Date   • ABDOMINAL SURGERY     • ANAL DILATION     • APPENDECTOMY     • BREAST BIOPSY Right    • CARDIAC CATHETERIZATION     • CATARACT EXTRACTION WITH INTRAOCULAR LENS IMPLANT Bilateral    • CHOLECYSTECTOMY     • COLONOSCOPY     • CYSTOSCOPY     • HYSTERECTOMY     • JOINT REPLACEMENT     • LUNG REMOVAL, PARTIAL Right 2018    RIGHT VATS WEDGE RESECTION OF RML PN RIGHT MIDDLE LOBECTOMY    • SUBTOTAL HYSTERECTOMY     • TONSILLECTOMY     • TOTAL HIP ARTHROPLASTY Left 2019    Procedure: TOTAL HIP ARTHROPLASTY ANTERIOR;  Surgeon: Lenny Khan MD;  Location: [facility] MAIN OR;  Service: Orthopedics   • TOTAL KNEE ARTHROPLASTY Right 2017   • WRIST FRACTURE SURGERY Bilateral      Family History   Problem Relation Age of Onset   • Alcohol abuse Father    • Heart disease Father      Social History     Tobacco Use   • Smoking status: Former Smoker     Packs/day: 1.00     Years: 15.00     Pack years: 15.00     Types: Cigarettes     Start date:      Quit date:      Years since quittin.6   • Smokeless tobacco: Never Used   Substance Use Topics   • Alcohol use: Yes   • Drug use: No     Facility-Administered Medications Prior to Admission   Medication Dose Route Frequency Provider Last Rate Last  Admin   • denosumab (PROLIA) syringe 60 mg  60 mg Subcutaneous Q6 Months Aletha Guadalupe PA   60 mg at 06/20/22 1406     Medications Prior to Admission   Medication Sig Dispense Refill Last Dose   • calcium carbonate (OS-DAHLIA) 1250 (500 Ca) MG tablet Take 600 mg by mouth Daily.      • Cholecalciferol (VITAMIN D3 SUPER STRENGTH) 50 MCG (2000 UT) tablet 2 po q d 60 each 11    • leflunomide (ARAVA) 10 MG tablet Take 1 tablet by mouth Daily. 90 tablet 3    • Linzess 145 MCG capsule capsule Take 1 capsule by mouth Daily. 90 capsule 1    • LORazepam (Ativan) 0.5 MG tablet 1 po bid prn anxiety 30 tablet 0    • memantine (Namenda) 10 MG tablet Take 1 tablet by mouth 2 (Two) Times a Day. 180 tablet 1    • metoprolol tartrate (LOPRESSOR) 25 MG tablet Take 0.5 tablets by mouth 2 (Two) Times a Day. 90 tablet 1    • Multiple Vitamins-Minerals (PreserVision AREDS) capsule Take  by mouth.      • pantoprazole (PROTONIX) 40 MG EC tablet Take 1 tablet by mouth Daily. 90 tablet 1    • polyethylene glycol (MIRALAX) 17 GM/SCOOP powder Take 17 g by mouth 2 (Two) Times a Day. 1020 g 2    • pravastatin (PRAVACHOL) 40 MG tablet Take 1 tablet by mouth every night at bedtime. 90 tablet 0    • traZODone (DESYREL) 50 MG tablet Take 2 tablets by mouth Every Night. 180 tablet 1    • venlafaxine XR (EFFEXOR-XR) 150 MG 24 hr capsule Take 1 capsule by mouth Every Morning. Take with 75mg tab for a total of 225mg 90 capsule 1    • venlafaxine XR (EFFEXOR-XR) 75 MG 24 hr capsule Take 1 capsule by mouth Daily. 90 capsule 1    • vitamin B-12 (CYANOCOBALAMIN) 1000 MCG tablet Take 1,000 mcg by mouth Daily.        Allergies:  Patient has no known allergies.    Objective     Vital Signs  Temp:  [97.9 °F (36.6 °C)] 97.9 °F (36.6 °C)  Heart Rate:  [70-78] 70  Resp:  [20] 20  BP: (144-174)/() 158/70     Physical Exam:      General Appearance:    Alert, cooperative, in no acute distress, pleasantly confused   Head:    Normocephalic, without obvious  abnormality, atraumatic   Eyes:            Lids and lashes normal, conjunctivae and sclerae normal, no   icterus, no pallor, corneas clear, PERRLA   Ears:    Ears appear intact with no abnormalities noted   Throat:   No oral lesions, no thrush, oral mucosa moist   Neck:   No adenopathy, supple, trachea midline, no thyromegaly, no   carotid bruit, no JVD   Lungs:     Clear to auscultation,respirations regular, even and                  unlabored    Heart:    Regular rhythm and normal rate, normal S1 and S2, no            murmur, no gallop, no rub, no click   Chest Wall:    No abnormalities observed   Abdomen:     Normal bowel sounds, no masses, no organomegaly, soft        non-tender, non-distended, no guarding, no rebound                tenderness   Extremities:   Moves all extremities well, no edema, no cyanosis, no             redness   Pulses:   Pulses palpable and equal bilaterally   Skin:   No bleeding, bruising or rash   Lymph nodes:   No palpable adenopathy   Neurologic:   Cranial nerves 2 - 12 grossly intact, sensation intact, DTR       present and equal bilaterally       Results Review:     Imaging Results (Last 24 Hours)     Procedure Component Value Units Date/Time    CT Head Without Contrast [[id number]] Collected: 08/03/22 1626     Updated: 08/03/22 1630    Narrative:         DATE OF EXAM:  8/3/2022 4:14 PM     PROCEDURE:   CT HEAD WO CONTRAST-     INDICATIONS:   Syncope, headaches, numbness and tingling.     COMPARISON:  06/23/2022.     TECHNIQUE:   Routine transaxial cuts were obtained through the head without the  administration of contrast. Automated exposure control and iterative  reconstruction methods were used.      FINDINGS:  There is mild volume loss. There is prominence of the sulci, fissures,  ventricles, and basal cisterns. There are some areas of decreased  density in the white matter tracts felt to represent chronic  microvascular ischemia. There is no acute hemorrhage, midline shift,  or  suspicious extra-axial fluid collections. The paranasal and mastoid  sinuses are clear. The calvarium is unremarkable.        Impression:         1. No acute findings.  2. Volume loss secondary to age-appropriate atrophy.  3. Chronic ischemic changes.     Electronically Signed By-Parminder Johns MD On:8/3/2022 4:28 PM  This report was finalized on [id number] by  Parminder Johns MD.    XR Chest 1 View [[id number]] Collected: 08/03/22 1524     Updated: 08/03/22 1528    Narrative:      DATE OF EXAM:  8/3/2022 3:18 PM     PROCEDURE:  XR CHEST 1 VW-     INDICATIONS:  syncope, palpitations; R55-Syncope and collapse; R00.2-Palpitations;  R51.9-Headache, unspecified     COMPARISON:  AP chest x-ray 06/23/2022, CT chest 05/26/2021     TECHNIQUE:   Single radiographic AP view of the chest was obtained.     FINDINGS:  There are increased mild bibasilar airspace opacities. No pneumothorax  or large pleural effusion is seen. Cardiac silhouette remains mildly  enlarged.        Impression:      Increased mild bibasilar airspace opacities, which may be due to  atelectasis and/or pneumonia.     Electronically Signed By-Barbara Cosme MD On:8/3/2022 3:25 PM  This report was finalized on [id number] by  Barbara Cosme MD.           Lab Results (last 24 hours)     Procedure Component Value Units Date/Time    COVID PRE-OP / PRE-PROCEDURE SCREENING ORDER (NO ISOLATION) - Swab, Nasopharynx [[id number]]  (Normal) Collected: 08/03/22 1640    Specimen: Swab from Nasopharynx Updated: 08/03/22 1704    Narrative:      The following orders were created for panel order COVID PRE-OP / PRE-PROCEDURE SCREENING ORDER (NO ISOLATION) - Swab, Nasopharynx.  Procedure                               Abnormality         Status                     ---------                               -----------         ------                     COVID-19,CEPHEID/EMANUEL,CO...[[id number]]  Normal              Final result                 Please view results for these tests on  the individual orders.    COVID-19,CEPHEID/EMANUEL,COR/SURJIT/PAD/SIMONA IN-HOUSE(OR EMERGENT/ADD-ON),NP SWAB IN TRANSPORT MEDIA 3-4 HR TAT, RT-PCR - Swab, Nasopharynx [[id number]]  (Normal) Collected: 08/03/22 1640    Specimen: Swab from Nasopharynx Updated: 08/03/22 1704     COVID19 Not Detected    Narrative:      Fact sheet for providers: https://www.fda.gov/media/184263/download     Fact sheet for patients: https://www.fda.gov/media/667921/download  Fact sheet for providers: https://www.fda.gov/media/487968/download    Fact sheet for patients: https://www.[employer].gov/media/001051/download    Test performed by PCR.    Urinalysis With Culture If Indicated - Urine, Catheter In/Out [[id number]]  (Abnormal) Collected: 08/03/22 1641    Specimen: Urine, Catheter In/Out Updated: 08/03/22 1647     Color, UA Yellow     Appearance, UA Clear     pH, UA 8.5     Specific Gravity, UA 1.007     Glucose, UA Negative     Ketones, UA Negative     Bilirubin, UA Negative     Blood, UA Negative     Protein, UA Negative     Leuk Esterase, UA Negative     Nitrite, UA Negative     Urobilinogen, UA 0.2 E.U./dL    Narrative:      In absence of clinical symptoms, the presence of pyuria, bacteria, and/or nitrites on the urinalysis result does not correlate with infection.  Urine microscopic not indicated.    Comprehensive Metabolic Panel [[id number]]  (Abnormal) Collected: 08/03/22 1500    Specimen: Blood Updated: 08/03/22 1533     Glucose 120 mg/dL      BUN 8 mg/dL      Creatinine 0.63 mg/dL      Sodium 144 mmol/L      Potassium 4.0 mmol/L      Chloride 109 mmol/L      CO2 25.0 mmol/L      Calcium 9.2 mg/dL      Total Protein 6.5 g/dL      Albumin 4.10 g/dL      ALT (SGPT) 19 U/L      AST (SGOT) 21 U/L      Alkaline Phosphatase 79 U/L      Total Bilirubin 0.3 mg/dL      Globulin 2.4 gm/dL      A/G Ratio 1.7 g/dL      BUN/Creatinine Ratio 12.7     Anion Gap 10.0 mmol/L      eGFR 87.6 mL/min/1.73      Comment: National Kidney Foundation and American Society  of Nephrology (ASN) Task Force recommended calculation based on the Chronic Kidney Disease Epidemiology Collaboration (CKD-EPI) equation refit without adjustment for race.       Narrative:      GFR Normal >60  Chronic Kidney Disease <60  Kidney Failure <15      Protime-INR [[id number]]  (Normal) Collected: 08/03/22 1500    Specimen: Blood Updated: 08/03/22 1533     Protime 11.0 Seconds      INR 1.07    aPTT [[id number]]  (Abnormal) Collected: 08/03/22 1500    Specimen: Blood Updated: 08/03/22 1533     PTT 27.4 seconds     Troponin [[id number]]  (Normal) Collected: 08/03/22 1500    Specimen: Blood Updated: 08/03/22 1533     Troponin T <0.010 ng/mL     Narrative:      Troponin T Reference Range:  <= 0.03 ng/mL-   Negative for AMI  >0.03 ng/mL-     Abnormal for myocardial necrosis.  Clinicians would have to utilize clinical acumen, EKG, Troponin and serial changes to determine if it is an Acute Myocardial Infarction or myocardial injury due to an underlying chronic condition.       Results may be falsely decreased if patient taking Biotin.      BNP [[id number]]  (Normal) Collected: 08/03/22 1500    Specimen: Blood Updated: 08/03/22 1531     proBNP 255.6 pg/mL     Narrative:      Among patients with dyspnea, NT-proBNP is highly sensitive for the detection of acute congestive heart failure. In addition NT-proBNP of <300 pg/ml effectively rules out acute congestive heart failure with 99% negative predictive value.    Results may be falsely decreased if patient taking Biotin.      CBC & Differential [[id number]]  (Abnormal) Collected: 08/03/22 1500    Specimen: Blood Updated: 08/03/22 1517    Narrative:      The following orders were created for panel order CBC & Differential.  Procedure                               Abnormality         Status                     ---------                               -----------         ------                     CBC Auto Differential[[id number]]        Abnormal            Final result                  Please view results for these tests on the individual orders.    CBC Auto Differential [[id number]]  (Abnormal) Collected: 08/03/22 1500    Specimen: Blood Updated: 08/03/22 1517     WBC 4.60 10*3/mm3      RBC 4.41 10*6/mm3      Hemoglobin 13.3 g/dL      Hematocrit 39.9 %      MCV 90.3 fL      MCH 30.2 pg      MCHC 33.4 g/dL      RDW 14.5 %      RDW-SD 45.9 fl      MPV 8.0 fL      Platelets 239 10*3/mm3      Neutrophil % 54.0 %      Lymphocyte % 34.8 %      Monocyte % 11.0 %      Eosinophil % 0.0 %      Basophil % 0.2 %      Neutrophils, Absolute 2.50 10*3/mm3      Lymphocytes, Absolute 1.60 10*3/mm3      Monocytes, Absolute 0.50 10*3/mm3      Eosinophils, Absolute 0.00 10*3/mm3      Basophils, Absolute 0.00 10*3/mm3      nRBC 0.0 /100 WBC            I reviewed the patient's new clinical results.    Assessment & Plan       Syncope, unspecified syncope type  - admit for further evaluation.  Consult cardiology.  May need pacemaker placement    Dementia - home meds.  She is at baseline    Paroxysmal afib - NSR    RA  HLD  Mood disorder  Osteoporosis  H/o lung cancer s/p resection    DNR/DNI  protonix  lovenox    I discussed the patients findings and my recommendations with patient.     Gracie Hurtado MD  08/03/22  17:07 EDT

## 2022-08-03 NOTE — ED PROVIDER NOTES
"Subjective   Chief Complaint: Syncope, palpitations      HPI: Patient is an 82-year-old female who presents to the ER by EMS from local assisted living facility.  EMS reports that facility nurse stated the patient had multiple syncopal episodes this morning.  Patient recalls 1 episode where she was falling and the nurse caught her, she denies hitting her head.  But does not really remember repeated episodes.  She states when she awoke this morning she had a posterior headache with \"ringing\" in her head, it was not sudden onset or there was no thunderclap sensation, it was not the worst headache of her life, it has resolved at that her arrival to the emergency room.  She has no history of headaches.  She did not require treatment.  She has had no nausea or vomiting she states she did have some palpitations this morning that she have now resolved.  She has had no chest pain or shortness of breath.  No other complaints.  She is otherwise alert oriented and appropriate at my evaluation, she does have a history of memory loss to which she takes Namenda.    PCP: [place]          Review of Systems   Constitutional: Negative.  Negative for chills, fatigue and unexpected weight change.   Respiratory: Negative.  Negative for cough and shortness of breath.    Cardiovascular: Positive for palpitations. Negative for chest pain and leg swelling.   Gastrointestinal: Negative.  Negative for abdominal pain, nausea and vomiting.   Genitourinary: Negative.  Negative for dysuria, hematuria, pelvic pain and urgency.   Musculoskeletal: Negative.    Skin: Negative.    Neurological: Positive for syncope and headaches. Negative for dizziness and light-headedness.   Hematological: Negative.    Psychiatric/Behavioral: Negative.        Past Medical History:   Diagnosis Date   • Anal stenosis 1/3/2012   • Anxiety 11/25/2013   • Arrhythmia, ventricular 4/20/2012   • Arthritis    • Ataxia 8/6/2013   • Carpal tunnel syndrome, bilateral 2/13/2017 "   • Depression 1/3/2012   • Elevated cholesterol    • Gastroesophageal reflux disease 2012   • History of osteoporotic pathological fracture     Bilateral wrist-unknown date   • Hx of total knee replacement, right 2017   • Hyperlipidemia 2016   • Insomnia 2013   • Irritable bowel syndrome 2013   • Keratoconjunctivitis sicca, in Sjogren's syndrome (HCC) 2016   • Memory loss 2018   • Osteoarthritis of knee 2017   • Osteoporosis     fosamax [id number], on prolia()   • Paroxysmal atrial fibrillation (HCC) 2011   • Primary malignant neoplasm of right middle lobe of lung (HCC) 2018   • Rheumatoid arthritis ([facility])    • Tension-type headache, not intractable 2017   • Vitamin B12 deficiency 2016   • Vitamin D deficiency 2017       No Known Allergies    Past Surgical History:   Procedure Laterality Date   • ABDOMINAL SURGERY     • ANAL DILATION     • APPENDECTOMY     • BREAST BIOPSY Right    • CARDIAC CATHETERIZATION     • CATARACT EXTRACTION WITH INTRAOCULAR LENS IMPLANT Bilateral    • CHOLECYSTECTOMY     • COLONOSCOPY     • CYSTOSCOPY     • HYSTERECTOMY     • JOINT REPLACEMENT     • LUNG REMOVAL, PARTIAL Right 2018    RIGHT VATS WEDGE RESECTION OF RML PN RIGHT MIDDLE LOBECTOMY    • SUBTOTAL HYSTERECTOMY     • TONSILLECTOMY     • TOTAL HIP ARTHROPLASTY Left 2019    Procedure: TOTAL HIP ARTHROPLASTY ANTERIOR;  Surgeon: Lenny Khan MD;  Location: [facility] OR;  Service: Orthopedics   • TOTAL KNEE ARTHROPLASTY Right 2017   • WRIST FRACTURE SURGERY Bilateral        Family History   Problem Relation Age of Onset   • Alcohol abuse Father    • Heart disease Father        Social History     Socioeconomic History   • Marital status:    Tobacco Use   • Smoking status: Former Smoker     Packs/day: 1.00     Years: 15.00     Pack years: 15.00     Types: Cigarettes     Start date:      Quit date:      Years since quittin.6   •  Smokeless tobacco: Never Used   Substance and Sexual Activity   • Alcohol use: Yes   • Drug use: No   • Sexual activity: Not Currently           Objective   Physical Exam  Vitals reviewed.   Constitutional:       Appearance: She is not toxic-appearing.   HENT:      Head: Normocephalic.      Right Ear: Tympanic membrane and ear canal normal.      Left Ear: Tympanic membrane and ear canal normal.      Nose: Nose normal.      Mouth/Throat:      Mouth: Mucous membranes are dry.      Pharynx: Oropharynx is clear.   Eyes:      Extraocular Movements: Extraocular movements intact.      Pupils: Pupils are equal, round, and reactive to light.   Cardiovascular:      Rate and Rhythm: Normal rate and regular rhythm.      Pulses: Normal pulses.      Heart sounds: Normal heart sounds. No murmur heard.  Pulmonary:      Effort: Pulmonary effort is normal.      Breath sounds: Normal breath sounds. No wheezing.   Abdominal:      General: Bowel sounds are normal.      Palpations: Abdomen is soft.      Tenderness: There is no abdominal tenderness.   Musculoskeletal:         General: Normal range of motion.      Cervical back: Normal range of motion and neck supple.   Skin:     General: Skin is warm and dry.   Neurological:      General: No focal deficit present.      Mental Status: She is alert and oriented to person, place, and time.      Motor: No weakness.         Procedures     EKG obtained reviewed by myself read by Dr. Woods, sinus rhythm with rate of 70 no ST elevation or ectopy noted this was compared to a previous that was obtained June 23, 2022, sinus rhythm with a rate of 78.         ED Course  ED Course as of 08/03/22 1946   Wed Aug 03, 2022   1520 Daughter at bedside states that patient has had similar episodes to this in the past and is followed by Dr. Novak.  Dr. Hurtado evaluated patient for admission. [BH]      ED Course User Index  [BH] Diane Archibald, APRN      /76 (BP Location: Right arm, Patient Position:  "Lying)   Pulse 74   Temp 97.9 °F (36.6 °C) (Oral)   Resp 20   Ht 162.6 cm (64\")   Wt 78.3 kg (172 lb 9.9 oz)   SpO2 100%   BMI 29.63 kg/m²   Labs Reviewed   COMPREHENSIVE METABOLIC PANEL - Abnormal; Notable for the following components:       Result Value    Glucose 120 (*)     Chloride 109 (*)     All other components within normal limits    Narrative:     GFR Normal >60  Chronic Kidney Disease <60  Kidney Failure <15     APTT - Abnormal; Notable for the following components:    PTT 27.4 (*)     All other components within normal limits   URINALYSIS W/ CULTURE IF INDICATED - Abnormal; Notable for the following components:    pH, UA 8.5 (*)     All other components within normal limits    Narrative:     In absence of clinical symptoms, the presence of pyuria, bacteria, and/or nitrites on the urinalysis result does not correlate with infection.  Urine microscopic not indicated.   CBC WITH AUTO DIFFERENTIAL - Abnormal; Notable for the following components:    Eosinophil % 0.0 (*)     All other components within normal limits   CBC WITH AUTO DIFFERENTIAL - Abnormal; Notable for the following components:    Eosinophil % 0.0 (*)     All other components within normal limits   COVID-19,CEPHEID/EMANUEL,COR/SURJIT/PAD/SIMONA IN-HOUSE,NP SWAB IN TRANSPORT MEDIA 3-4 HR TAT, RT-PCR - Normal    Narrative:     Fact sheet for providers: https://www.fda.gov/media/888948/download     Fact sheet for patients: https://www.fda.gov/media/031920/download  Fact sheet for providers: https://www.fda.gov/media/415989/download    Fact sheet for patients: https://www.fda.gov/media/161237/download    Test performed by PCR.   PROTIME-INR - Normal   TROPONIN (IN-HOUSE) - Normal    Narrative:     Troponin T Reference Range:  <= 0.03 ng/mL-   Negative for AMI  >0.03 ng/mL-     Abnormal for myocardial necrosis.  Clinicians would have to utilize clinical acumen, EKG, Troponin and serial changes to determine if it is an Acute Myocardial Infarction or " myocardial injury due to an underlying chronic condition.       Results may be falsely decreased if patient taking Biotin.     BNP (IN-HOUSE) - Normal    Narrative:     Among patients with dyspnea, NT-proBNP is highly sensitive for the detection of acute congestive heart failure. In addition NT-proBNP of <300 pg/ml effectively rules out acute congestive heart failure with 99% negative predictive value.    Results may be falsely decreased if patient taking Biotin.     MAGNESIUM - Normal   COVID PRE-OP / PRE-PROCEDURE SCREENING ORDER (NO ISOLATION)    Narrative:     The following orders were created for panel order COVID PRE-OP / PRE-PROCEDURE SCREENING ORDER (NO ISOLATION) - Swab, Nasopharynx.  Procedure                               Abnormality         Status                     ---------                               -----------         ------                     COVID-19,CEPHEID/EMANUEL,CO...[[id number]]  Normal              Final result                 Please view results for these tests on the individual orders.   TROPONIN (IN-HOUSE)   BASIC METABOLIC PANEL   CBC AND DIFFERENTIAL    Narrative:     The following orders were created for panel order CBC & Differential.  Procedure                               Abnormality         Status                     ---------                               -----------         ------                     CBC Auto Differential[[id number]]        Abnormal            Final result                 Please view results for these tests on the individual orders.   CBC AND DIFFERENTIAL    Narrative:     The following orders were created for panel order CBC & Differential.  Procedure                               Abnormality         Status                     ---------                               -----------         ------                     CBC Auto Differential[[id number]]        Abnormal            Final result                 Please view results for these tests on the individual orders.      Medications   sodium chloride 0.9 % flush 10 mL (has no administration in time range)   leflunomide (ARAVA) tablet 10 mg (has no administration in time range)   LORazepam (ATIVAN) tablet 1 mg (has no administration in time range)   memantine (NAMENDA) tablet 10 mg (has no administration in time range)   metoprolol tartrate (LOPRESSOR) half tablet 12.5 mg (has no administration in time range)   pantoprazole (PROTONIX) EC tablet 40 mg (has no administration in time range)   polyethylene glycol (MIRALAX) packet 17 g (17 g Oral Given 8/3/22 1923)   atorvastatin (LIPITOR) tablet 10 mg (has no administration in time range)   traZODone (DESYREL) tablet 100 mg (has no administration in time range)   venlafaxine XR (EFFEXOR-XR) 24 hr capsule 150 mg (has no administration in time range)   venlafaxine XR (EFFEXOR-XR) 24 hr capsule 75 mg (has no administration in time range)   sodium chloride 0.9 % flush 3 mL (has no administration in time range)   sodium chloride 0.9 % flush 3-10 mL (has no administration in time range)   Pharmacy to Dose enoxaparin (LOVENOX) (has no administration in time range)   acetaminophen (TYLENOL) tablet 650 mg (has no administration in time range)     Or   acetaminophen (TYLENOL) 160 MG/5ML solution 650 mg (has no administration in time range)     Or   acetaminophen (TYLENOL) suppository 650 mg (has no administration in time range)   ondansetron (ZOFRAN) tablet 4 mg (has no administration in time range)     Or   ondansetron (ZOFRAN) injection 4 mg (has no administration in time range)   potassium chloride (K-DUR,KLOR-CON) CR tablet 40 mEq (has no administration in time range)     Or   potassium chloride (KLOR-CON) packet 40 mEq (has no administration in time range)     Or   potassium chloride 10 mEq in 100 mL IVPB (has no administration in time range)   Magnesium Sulfate 2 gram Bolus, followed by 8 gram infusion (total Mg dose 10 grams)- Mg less than or equal to 1mg/dL (has no administration in  time range)     Or   Magnesium Sulfate 2 gram / 50mL Infusion (GIVE X 3 BAGS TO EQUAL 6GM TOTAL DOSE) - Mg 1.1 - 1.5 mg/dl (has no administration in time range)     Or   Magnesium Sulfate 4 gram infusion- Mg 1.6-1.9 mg/dL (has no administration in time range)   Enoxaparin Sodium (LOVENOX) syringe 40 mg (40 mg Subcutaneous Given 8/3/22 1923)     CT Head Without Contrast    Result Date: 8/3/2022   1. No acute findings. 2. Volume loss secondary to age-appropriate atrophy. 3. Chronic ischemic changes.  Electronically Signed By-Parminder Johns MD On:8/3/2022 4:28 PM This report was finalized on [id number] by  Parminder Johns MD.    XR Chest 1 View    Result Date: 8/3/2022  Increased mild bibasilar airspace opacities, which may be due to atelectasis and/or pneumonia.  Electronically Signed By-Barbara Cosme MD On:8/3/2022 3:25 PM This report was finalized on [id number] by  Barbara Cosme MD.                                         MDM  Number of Diagnoses or Management Options  Nonintractable headache, unspecified chronicity pattern, unspecified headache type  Palpitation  Syncope, unspecified syncope type  Diagnosis management comments: While in the emergency room patient was placed on a cardiac monitor and an IV was established attempted orthostatic vital signs, nurse stated that when she went to standing she had some shaking and felt unstable, blood pressure that was obtained appears an accurate.  With patient's reported posterior headache with unusual sound a CT of the head was completed and was negative for acute findings.  Spoke with daughter who states that patient has had similar episodes in the past and is followed by cardiology.  Dr. Hurtado evaluated the patient in the emergency room for admission.  Patient's headache was resolved at her arrival to the emergency room and appears at her baseline.  CBC and CMP were essentially normal urinalysis negative for urinary tract infection.  Patient was alert oriented  appropriate with nontoxic in appearance at time of mission with no further questions.    Chart review: 6/23/2022 patient was seen in the emergency room and admitted for syncope    Comorbidities: See above, reviewed    Differentials: Electrolyte imbalance, dehydration, urinary tract infection, ICH not all inclusive of differentials considered    Appropriate PPE worn throughout the care of this patient.       Amount and/or Complexity of Data Reviewed  Clinical lab tests: reviewed  Tests in the radiology section of CPT®: reviewed  Tests in the medicine section of CPT®: reviewed    Patient Progress  Patient progress: stable      Final diagnoses:   Syncope, unspecified syncope type   Palpitation   Nonintractable headache, unspecified chronicity pattern, unspecified headache type       ED Disposition  ED Disposition     ED Disposition   Decision to Admit    Condition   --    Comment   Level of Care: Med/Surg [1]   Diagnosis: Syncope, unspecified syncope type [[id number]]   Admitting Physician: CALIN CHEW [[id number]]   Attending Physician: CALIN CHEW [[id number]]               No follow-up provider specified.       Medication List      No changes were made to your prescriptions during this visit.          Diane Archibald, APRN  08/03/22 1946

## 2022-08-03 NOTE — CONSULTS
HP      Name: Alia Cheney ADMIT: 8/3/2022   : 1940  PCP: Obdulia Spicer APRN    MRN: [id number] LOS: 0 days   AGE/SEX: 82 y.o. female  ROOM: 224/1     Chief Complaint   Patient presents with   • Syncope       Subjective        History of present illness  Alia Cheney is an 82-year-old female patient who was admitted to the hospital due to syncopal episode.  Patient is a resident at [facility], and that she was trying to get up and walk but she felt palpitations and extreme lightheadedness and was about to fall but a person next to her supported her.  Patient had similar episode in 2022 and at that time she was taken to the ER and was found to have narrow complex tachycardia.  She had an event monitor recently which showed nonspecific runs of atrial tachycardia of up to 30 beats.  Patient had been on low-dose beta-blocker without significant improvement in her palpitations.  She denies any exertional chest pain or shortness of breath.    Past Medical History:   Diagnosis Date   • Anal stenosis 1/3/2012   • Anxiety 2013   • Arrhythmia, ventricular 2012   • Arthritis    • Ataxia 2013   • Carpal tunnel syndrome, bilateral 2017   • Depression 1/3/2012   • Elevated cholesterol    • Gastroesophageal reflux disease 2012   • History of osteoporotic pathological fracture     Bilateral wrist-unknown date   • Hx of total knee replacement, right 2017   • Hyperlipidemia 2016   • Insomnia 2013   • Irritable bowel syndrome 2013   • Keratoconjunctivitis sicca, in Sjogren's syndrome (HCC) 2016   • Memory loss 2018   • Osteoarthritis of knee 2017   • Osteoporosis     fosamax [id number], on prolia(2016)   • Paroxysmal atrial fibrillation (HCC) 2011   • Primary malignant neoplasm of right middle lobe of lung (HCC) 2018   • Rheumatoid arthritis (HCC)    • Tension-type headache, not intractable 2017   • Vitamin B12 deficiency 2016   •  Vitamin D deficiency 2017     Past Surgical History:   Procedure Laterality Date   • ABDOMINAL SURGERY     • ANAL DILATION     • APPENDECTOMY     • BREAST BIOPSY Right    • CARDIAC CATHETERIZATION     • CATARACT EXTRACTION WITH INTRAOCULAR LENS IMPLANT Bilateral    • CHOLECYSTECTOMY     • COLONOSCOPY     • CYSTOSCOPY     • HYSTERECTOMY     • JOINT REPLACEMENT     • LUNG REMOVAL, PARTIAL Right 2018    RIGHT VATS WEDGE RESECTION OF RML PN RIGHT MIDDLE LOBECTOMY    • SUBTOTAL HYSTERECTOMY     • TONSILLECTOMY     • TOTAL HIP ARTHROPLASTY Left 2019    Procedure: TOTAL HIP ARTHROPLASTY ANTERIOR;  Surgeon: Lenny Khan MD;  Location: [facility] MAIN OR;  Service: Orthopedics   • TOTAL KNEE ARTHROPLASTY Right 2017   • WRIST FRACTURE SURGERY Bilateral      Family History   Problem Relation Age of Onset   • Alcohol abuse Father    • Heart disease Father      Social History     Tobacco Use   • Smoking status: Former Smoker     Packs/day: 1.00     Years: 15.00     Pack years: 15.00     Types: Cigarettes     Start date:      Quit date:      Years since quittin.6   • Smokeless tobacco: Never Used   Substance Use Topics   • Alcohol use: Yes   • Drug use: No     Facility-Administered Medications Prior to Admission   Medication Dose Route Frequency Provider Last Rate Last Admin   • denosumab (PROLIA) syringe 60 mg  60 mg Subcutaneous Q6 Months Aletha Guadalupe PA   60 mg at 22 1406     Medications Prior to Admission   Medication Sig Dispense Refill Last Dose   • calcium carbonate (OS-DAHLIA) 1250 (500 Ca) MG tablet Take 600 mg by mouth Daily.      • Cholecalciferol (VITAMIN D3 SUPER STRENGTH) 50 MCG (2000 UT) tablet 2 po q d 60 each 11    • leflunomide (ARAVA) 10 MG tablet Take 1 tablet by mouth Daily. 90 tablet 3    • Linzess 145 MCG capsule capsule Take 1 capsule by mouth Daily. 90 capsule 1    • LORazepam (Ativan) 0.5 MG tablet 1 po bid prn anxiety 30 tablet 0    • memantine (Namenda) 10  MG tablet Take 1 tablet by mouth 2 (Two) Times a Day. 180 tablet 1    • metoprolol tartrate (LOPRESSOR) 25 MG tablet Take 0.5 tablets by mouth 2 (Two) Times a Day. 90 tablet 1    • Multiple Vitamins-Minerals (PreserVision AREDS) capsule Take  by mouth.      • pantoprazole (PROTONIX) 40 MG EC tablet Take 1 tablet by mouth Daily. 90 tablet 1    • polyethylene glycol (MIRALAX) 17 GM/SCOOP powder Take 17 g by mouth 2 (Two) Times a Day. 1020 g 2    • pravastatin (PRAVACHOL) 40 MG tablet Take 1 tablet by mouth every night at bedtime. 90 tablet 0    • traZODone (DESYREL) 50 MG tablet Take 2 tablets by mouth Every Night. 180 tablet 1    • venlafaxine XR (EFFEXOR-XR) 150 MG 24 hr capsule Take 1 capsule by mouth Every Morning. Take with 75mg tab for a total of 225mg 90 capsule 1    • venlafaxine XR (EFFEXOR-XR) 75 MG 24 hr capsule Take 1 capsule by mouth Daily. 90 capsule 1    • vitamin B-12 (CYANOCOBALAMIN) 1000 MCG tablet Take 1,000 mcg by mouth Daily.        Allergies:  Patient has no known allergies.    Review of systems    Constitutional: Negative.    Respiratory and cardiovascular: As detailed in HPI section.  Gastrointestinal: Negative for constipation, nausea and vomiting negative for abdominal distention, abdominal pain and diarrhea.   Genitourinary: Negative for difficulty urinating and flank pain.   Musculoskeletal: Negative for arthralgias, joint swelling and myalgias.   Skin: Negative for color change, rash and wound.   Neurological: Negative for dizziness, syncope, weakness and headaches.   Hematological: Negative for adenopathy.   Psychiatric/Behavioral: Negative for confusion.   All other systems reviewed and are negative.       Physical Exam  VITALS REVIEWED    General:      well developed, in no acute distress.    Head:      normocephalic and atraumatic.    Eyes:      PERRL/EOM intact, conjunctiva and sclera clear with out nystagmus.    Neck:      no masses, thyromegaly,  trachea central with normal  respiratory effort and PMI displaced laterally  Lungs:      Clear to auscultation bilaterally  Heart:       regular rate and rhythm  Msk:      no deformity or scoliosis noted of thoracic or lumbar spine.    Pulses:      pulses normal in all 4 extremities.    Extremities:       no lower extremity edema  Neurologic:      no focal deficits.   alert oriented x3  Skin:      intact without lesions or rashes.    Psych:      alert and cooperative; normal mood and affect; normal attention span and concentration.      Result Review :               Pertinent cardiac workup    1. EKG 8/3/2022 sinus rhythm at 70 bpm  2. Echo 6/24/2022 ejection fraction 55 to 60%  3. Event monitor for 20 days starting on 6/25/2022 up to 30 beat runs of atrial tachycardia.          Assessment and Plan         Syncope, unspecified syncope type    Sick sinus syndrome (HCC)      Alia Cheney is an 82-year-old female patient who has been experiencing recurrent episodes of syncope, usually preceded by palpitations.  On several occasions patient was found to have runs of supraventricular tachycardia, usually short-lived and consistent with atrial tachycardia.  She had been on low-dose beta-blocker without significant relief.  She has also been experiencing episodes of bradycardia.  Her clinical presentation is consistent with sick sinus syndrome and she would benefit from a dual-chamber pacemaker implantation which would prevent bradycardia but also allow better control of her supraventricular tachycardia with more beta-blockade or adding calcium channel blocker.  As far as performing EP study and ablation, arrhythmia is nonsustained and inconsistent in cycle length therefore it would be quite difficult to map and ablate and therefore I think that pacemaker route would be more appropriate for her.  Patient and her daughter are agreeable.        No follow-ups on file.  Patient was given instructions and counseling regarding her condition or for health  maintenance advice. Please see specific information pulled into the AVS if appropriate.

## 2022-08-04 ENCOUNTER — ANESTHESIA EVENT (OUTPATIENT)
Dept: CARDIOLOGY | Facility: HOSPITAL | Age: 82
End: 2022-08-04

## 2022-08-04 ENCOUNTER — ANESTHESIA (OUTPATIENT)
Dept: CARDIOLOGY | Facility: HOSPITAL | Age: 82
End: 2022-08-04

## 2022-08-04 ENCOUNTER — HOSPITAL ENCOUNTER (OUTPATIENT)
Facility: HOSPITAL | Age: 82
Setting detail: HOSPITAL OUTPATIENT SURGERY
End: 2022-08-04
Attending: INTERNAL MEDICINE | Admitting: INTERNAL MEDICINE

## 2022-08-04 ENCOUNTER — APPOINTMENT (OUTPATIENT)
Dept: GENERAL RADIOLOGY | Facility: HOSPITAL | Age: 82
End: 2022-08-04

## 2022-08-04 DIAGNOSIS — I49.5 SICK SINUS SYNDROME: ICD-10-CM

## 2022-08-04 LAB
ANION GAP SERPL CALCULATED.3IONS-SCNC: 9 MMOL/L (ref 5–15)
BASOPHILS # BLD AUTO: 0 10*3/MM3 (ref 0–0.2)
BASOPHILS NFR BLD AUTO: 0 % (ref 0–1.5)
BUN SERPL-MCNC: 7 MG/DL (ref 8–23)
BUN/CREAT SERPL: 12.5 (ref 7–25)
CALCIUM SPEC-SCNC: 8.8 MG/DL (ref 8.6–10.5)
CHLORIDE SERPL-SCNC: 110 MMOL/L (ref 98–107)
CO2 SERPL-SCNC: 25 MMOL/L (ref 22–29)
CREAT SERPL-MCNC: 0.56 MG/DL (ref 0.57–1)
DEPRECATED RDW RBC AUTO: 45.9 FL (ref 37–54)
EGFRCR SERPLBLD CKD-EPI 2021: 90.1 ML/MIN/1.73
EOSINOPHIL # BLD AUTO: 0 10*3/MM3 (ref 0–0.4)
EOSINOPHIL NFR BLD AUTO: 0 % (ref 0.3–6.2)
ERYTHROCYTE [DISTWIDTH] IN BLOOD BY AUTOMATED COUNT: 14.4 % (ref 12.3–15.4)
GLUCOSE SERPL-MCNC: 94 MG/DL (ref 65–99)
HCT VFR BLD AUTO: 40.1 % (ref 34–46.6)
HGB BLD-MCNC: 13 G/DL (ref 12–15.9)
LYMPHOCYTES # BLD AUTO: 1.9 10*3/MM3 (ref 0.7–3.1)
LYMPHOCYTES NFR BLD AUTO: 42 % (ref 19.6–45.3)
MAGNESIUM SERPL-MCNC: 2.1 MG/DL (ref 1.6–2.4)
MCH RBC QN AUTO: 29.7 PG (ref 26.6–33)
MCHC RBC AUTO-ENTMCNC: 32.5 G/DL (ref 31.5–35.7)
MCV RBC AUTO: 91.3 FL (ref 79–97)
MONOCYTES # BLD AUTO: 0.5 10*3/MM3 (ref 0.1–0.9)
MONOCYTES NFR BLD AUTO: 12.2 % (ref 5–12)
NEUTROPHILS NFR BLD AUTO: 2 10*3/MM3 (ref 1.7–7)
NEUTROPHILS NFR BLD AUTO: 45.8 % (ref 42.7–76)
NRBC BLD AUTO-RTO: 0 /100 WBC (ref 0–0.2)
PLATELET # BLD AUTO: 224 10*3/MM3 (ref 140–450)
PMV BLD AUTO: 8.1 FL (ref 6–12)
POTASSIUM SERPL-SCNC: 4.2 MMOL/L (ref 3.5–5.2)
RBC # BLD AUTO: 4.39 10*6/MM3 (ref 3.77–5.28)
SODIUM SERPL-SCNC: 144 MMOL/L (ref 136–145)
WBC NRBC COR # BLD: 4.5 10*3/MM3 (ref 3.4–10.8)

## 2022-08-04 PROCEDURE — 99024 POSTOP FOLLOW-UP VISIT: CPT | Performed by: INTERNAL MEDICINE

## 2022-08-04 PROCEDURE — C1894 INTRO/SHEATH, NON-LASER: HCPCS | Performed by: INTERNAL MEDICINE

## 2022-08-04 PROCEDURE — 33208 INSRT HEART PM ATRIAL & VENT: CPT | Performed by: INTERNAL MEDICINE

## 2022-08-04 PROCEDURE — 02HK3JZ INSERTION OF PACEMAKER LEAD INTO RIGHT VENTRICLE, PERCUTANEOUS APPROACH: ICD-10-PCS | Performed by: INTERNAL MEDICINE

## 2022-08-04 PROCEDURE — 63710000001 PREDNISONE PER 5 MG: Performed by: FAMILY MEDICINE

## 2022-08-04 PROCEDURE — 0JH606Z INSERTION OF PACEMAKER, DUAL CHAMBER INTO CHEST SUBCUTANEOUS TISSUE AND FASCIA, OPEN APPROACH: ICD-10-PCS | Performed by: INTERNAL MEDICINE

## 2022-08-04 PROCEDURE — 0 IOPAMIDOL PER 1 ML: Performed by: INTERNAL MEDICINE

## 2022-08-04 PROCEDURE — 25010000002 FENTANYL CITRATE (PF) 100 MCG/2ML SOLUTION: Performed by: NURSE ANESTHETIST, CERTIFIED REGISTERED

## 2022-08-04 PROCEDURE — C1898 LEAD, PMKR, OTHER THAN TRANS: HCPCS | Performed by: INTERNAL MEDICINE

## 2022-08-04 PROCEDURE — C1785 PMKR, DUAL, RATE-RESP: HCPCS | Performed by: INTERNAL MEDICINE

## 2022-08-04 PROCEDURE — 02H63JZ INSERTION OF PACEMAKER LEAD INTO RIGHT ATRIUM, PERCUTANEOUS APPROACH: ICD-10-PCS | Performed by: INTERNAL MEDICINE

## 2022-08-04 PROCEDURE — 97162 PT EVAL MOD COMPLEX 30 MIN: CPT

## 2022-08-04 PROCEDURE — 25010000002 CEFAZOLIN PER 500 MG: Performed by: INTERNAL MEDICINE

## 2022-08-04 PROCEDURE — 80048 BASIC METABOLIC PNL TOTAL CA: CPT | Performed by: FAMILY MEDICINE

## 2022-08-04 PROCEDURE — 25010000002 MIDAZOLAM PER 1 MG: Performed by: NURSE ANESTHETIST, CERTIFIED REGISTERED

## 2022-08-04 PROCEDURE — 25010000002 PROPOFOL 10 MG/ML EMULSION: Performed by: NURSE ANESTHETIST, CERTIFIED REGISTERED

## 2022-08-04 PROCEDURE — 71045 X-RAY EXAM CHEST 1 VIEW: CPT

## 2022-08-04 PROCEDURE — 87102 FUNGUS ISOLATION CULTURE: CPT | Performed by: FAMILY MEDICINE

## 2022-08-04 PROCEDURE — 83735 ASSAY OF MAGNESIUM: CPT | Performed by: FAMILY MEDICINE

## 2022-08-04 PROCEDURE — C1769 GUIDE WIRE: HCPCS | Performed by: INTERNAL MEDICINE

## 2022-08-04 PROCEDURE — 85025 COMPLETE CBC W/AUTO DIFF WBC: CPT | Performed by: FAMILY MEDICINE

## 2022-08-04 PROCEDURE — 25010000002 ONDANSETRON PER 1 MG: Performed by: INTERNAL MEDICINE

## 2022-08-04 DEVICE — PACE/SENSE LEAD
Type: IMPLANTABLE DEVICE | Site: CHEST | Status: FUNCTIONAL
Brand: INGEVITY™+

## 2022-08-04 DEVICE — PACEMAKER
Type: IMPLANTABLE DEVICE | Site: CHEST | Status: FUNCTIONAL
Brand: ACCOLADE™ MRI DR

## 2022-08-04 RX ORDER — SODIUM CHLORIDE 9 MG/ML
INJECTION, SOLUTION INTRAVENOUS CONTINUOUS PRN
Status: DISCONTINUED | OUTPATIENT
Start: 2022-08-04 | End: 2022-08-04 | Stop reason: SURG

## 2022-08-04 RX ORDER — MIDAZOLAM HYDROCHLORIDE 1 MG/ML
INJECTION INTRAMUSCULAR; INTRAVENOUS AS NEEDED
Status: DISCONTINUED | OUTPATIENT
Start: 2022-08-04 | End: 2022-08-04 | Stop reason: SURG

## 2022-08-04 RX ORDER — MORPHINE SULFATE 2 MG/ML
2 INJECTION, SOLUTION INTRAMUSCULAR; INTRAVENOUS
Status: DISCONTINUED | OUTPATIENT
Start: 2022-08-04 | End: 2022-08-05 | Stop reason: HOSPADM

## 2022-08-04 RX ORDER — PREDNISONE 2.5 MG
2.5 TABLET ORAL DAILY
COMMUNITY
End: 2022-08-11

## 2022-08-04 RX ORDER — FENTANYL CITRATE 50 UG/ML
INJECTION, SOLUTION INTRAMUSCULAR; INTRAVENOUS AS NEEDED
Status: DISCONTINUED | OUTPATIENT
Start: 2022-08-04 | End: 2022-08-04 | Stop reason: SURG

## 2022-08-04 RX ORDER — ACETAMINOPHEN 325 MG/1
650 TABLET ORAL ONCE AS NEEDED
Status: DISCONTINUED | OUTPATIENT
Start: 2022-08-04 | End: 2022-08-05 | Stop reason: HOSPADM

## 2022-08-04 RX ORDER — ACETAMINOPHEN 650 MG/1
650 SUPPOSITORY RECTAL ONCE AS NEEDED
Status: DISCONTINUED | OUTPATIENT
Start: 2022-08-04 | End: 2022-08-05 | Stop reason: HOSPADM

## 2022-08-04 RX ORDER — VENLAFAXINE HYDROCHLORIDE 150 MG/1
150 CAPSULE, EXTENDED RELEASE ORAL DAILY
COMMUNITY
End: 2022-11-17

## 2022-08-04 RX ORDER — PREDNISONE 1 MG/1
2.5 TABLET ORAL DAILY
Status: DISCONTINUED | OUTPATIENT
Start: 2022-08-04 | End: 2022-08-05 | Stop reason: HOSPADM

## 2022-08-04 RX ORDER — LORAZEPAM 0.5 MG/1
0.5 TABLET ORAL 2 TIMES DAILY PRN
COMMUNITY

## 2022-08-04 RX ORDER — HYDROCODONE BITARTRATE AND ACETAMINOPHEN 7.5; 325 MG/1; MG/1
1 TABLET ORAL ONCE AS NEEDED
Status: COMPLETED | OUTPATIENT
Start: 2022-08-04 | End: 2022-08-04

## 2022-08-04 RX ORDER — ONDANSETRON 2 MG/ML
4 INJECTION INTRAMUSCULAR; INTRAVENOUS ONCE AS NEEDED
Status: DISCONTINUED | OUTPATIENT
Start: 2022-08-04 | End: 2022-08-05 | Stop reason: HOSPADM

## 2022-08-04 RX ORDER — LIDOCAINE HYDROCHLORIDE AND EPINEPHRINE BITARTRATE 20; .01 MG/ML; MG/ML
INJECTION, SOLUTION SUBCUTANEOUS AS NEEDED
Status: DISCONTINUED | OUTPATIENT
Start: 2022-08-04 | End: 2022-08-04 | Stop reason: HOSPADM

## 2022-08-04 RX ORDER — SODIUM CHLORIDE 0.9 % (FLUSH) 0.9 %
3-10 SYRINGE (ML) INJECTION AS NEEDED
Status: DISCONTINUED | OUTPATIENT
Start: 2022-08-04 | End: 2022-08-04

## 2022-08-04 RX ORDER — HYDROCODONE BITARTRATE AND ACETAMINOPHEN 7.5; 325 MG/1; MG/1
2 TABLET ORAL EVERY 4 HOURS PRN
Status: DISCONTINUED | OUTPATIENT
Start: 2022-08-04 | End: 2022-08-05 | Stop reason: HOSPADM

## 2022-08-04 RX ORDER — SODIUM CHLORIDE 0.9 % (FLUSH) 0.9 %
3 SYRINGE (ML) INJECTION EVERY 12 HOURS SCHEDULED
Status: DISCONTINUED | OUTPATIENT
Start: 2022-08-04 | End: 2022-08-04

## 2022-08-04 RX ORDER — LIDOCAINE HYDROCHLORIDE 10 MG/ML
INJECTION, SOLUTION EPIDURAL; INFILTRATION; INTRACAUDAL; PERINEURAL AS NEEDED
Status: DISCONTINUED | OUTPATIENT
Start: 2022-08-04 | End: 2022-08-04 | Stop reason: SURG

## 2022-08-04 RX ADMIN — METOPROLOL TARTRATE 25 MG: 25 TABLET, FILM COATED ORAL at 20:12

## 2022-08-04 RX ADMIN — HYDROCODONE BITARTRATE AND ACETAMINOPHEN 1 TABLET: 7.5; 325 TABLET ORAL at 20:14

## 2022-08-04 RX ADMIN — TRAZODONE HYDROCHLORIDE 100 MG: 100 TABLET ORAL at 20:11

## 2022-08-04 RX ADMIN — VENLAFAXINE HYDROCHLORIDE 75 MG: 75 CAPSULE, EXTENDED RELEASE ORAL at 07:08

## 2022-08-04 RX ADMIN — SODIUM CHLORIDE: 0.9 INJECTION, SOLUTION INTRAVENOUS at 12:15

## 2022-08-04 RX ADMIN — METOPROLOL TARTRATE 12.5 MG: 25 TABLET, FILM COATED ORAL at 08:04

## 2022-08-04 RX ADMIN — Medication 3 ML: at 20:12

## 2022-08-04 RX ADMIN — PANTOPRAZOLE SODIUM 40 MG: 40 TABLET, DELAYED RELEASE ORAL at 08:04

## 2022-08-04 RX ADMIN — CEFAZOLIN 2 G: 2 INJECTION, POWDER, FOR SOLUTION INTRAMUSCULAR; INTRAVENOUS at 12:30

## 2022-08-04 RX ADMIN — FENTANYL CITRATE 50 MCG: 50 INJECTION, SOLUTION INTRAMUSCULAR; INTRAVENOUS at 12:25

## 2022-08-04 RX ADMIN — ONDANSETRON 4 MG: 2 INJECTION INTRAMUSCULAR; INTRAVENOUS at 14:57

## 2022-08-04 RX ADMIN — LEFLUNOMIDE 10 MG: 20 TABLET ORAL at 08:04

## 2022-08-04 RX ADMIN — MEMANTINE 10 MG: 10 TABLET ORAL at 20:11

## 2022-08-04 RX ADMIN — ATORVASTATIN CALCIUM 10 MG: 10 TABLET, FILM COATED ORAL at 20:12

## 2022-08-04 RX ADMIN — MEMANTINE 10 MG: 10 TABLET ORAL at 08:04

## 2022-08-04 RX ADMIN — PROPOFOL 30 MCG/KG/MIN: 10 INJECTION, EMULSION INTRAVENOUS at 12:29

## 2022-08-04 RX ADMIN — VENLAFAXINE HYDROCHLORIDE 150 MG: 75 CAPSULE, EXTENDED RELEASE ORAL at 07:08

## 2022-08-04 RX ADMIN — Medication 3 ML: at 08:04

## 2022-08-04 RX ADMIN — LIDOCAINE HYDROCHLORIDE 40 MG: 10 INJECTION, SOLUTION EPIDURAL; INFILTRATION; INTRACAUDAL; PERINEURAL at 12:30

## 2022-08-04 RX ADMIN — FENTANYL CITRATE 50 MCG: 50 INJECTION, SOLUTION INTRAMUSCULAR; INTRAVENOUS at 13:00

## 2022-08-04 RX ADMIN — PREDNISONE 2.5 MG: 5 TABLET ORAL at 17:52

## 2022-08-04 RX ADMIN — MIDAZOLAM 2 MG: 1 INJECTION INTRAMUSCULAR; INTRAVENOUS at 12:25

## 2022-08-04 RX ADMIN — CEFAZOLIN 2 G: 2 INJECTION, POWDER, FOR SOLUTION INTRAMUSCULAR; INTRAVENOUS at 20:12

## 2022-08-04 NOTE — THERAPY EVALUATION
Patient Name: Alia Cheney  : 1940    MRN: [id number]                              Today's Date: 2022       Admit Date: 8/3/2022    Visit Dx:     ICD-10-CM ICD-9-CM   1. Syncope, unspecified syncope type  R55 780.2   2. Palpitation  R00.2 785.1   3. Nonintractable headache, unspecified chronicity pattern, unspecified headache type  R51.9 784.0   4. Sick sinus syndrome (HCC)  I49.5 427.81     Patient Active Problem List   Diagnosis   • Absolute anemia   • Anal stenosis   • Anxiety   • Arrhythmia, ventricular   • Ataxia   • Carpal tunnel syndrome, bilateral   • Colles' fracture   • Coronary artery disease   • Depression   • Physical exam   • Gastroesophageal reflux disease   • Hx of total knee replacement, right   • Hyperlipidemia   • Insomnia   • Irritable bowel syndrome   • Keratoconjunctivitis sicca, in Sjogren's syndrome (HCC)   • Memory loss   • Osteoarthritis of knee   • Osteoporosis   • Paroxysmal atrial fibrillation (HCC)   • Primary malignant neoplasm of right middle lobe of lung (HCC)   • Rheumatoid arthritis (HCC)   • Trochanteric bursitis of left hip   • Vitamin B12 deficiency   • Vitamin D deficiency   • History of total hip replacement, left   • Overweight (BMI 25.0-29.9)   • Allergic rhinitis   • Lung nodule   • Other specified dorsopathies, site unspecified   • Other specified examination   • Primary osteoarthritis of both knees   • Foot callus   • Petechiae   • Medicare annual wellness visit, subsequent   • History of osteoporotic pathological fracture   • Syncope, unspecified syncope type   • Sick sinus syndrome (HCC)     Past Medical History:   Diagnosis Date   • Anal stenosis 1/3/2012   • Anxiety 2013   • Arrhythmia, ventricular 2012   • Arthritis    • Ataxia 2013   • Carpal tunnel syndrome, bilateral 2017   • Depression 1/3/2012   • Elevated cholesterol    • Gastroesophageal reflux disease 2012   • History of osteoporotic pathological fracture     Bilateral  wrist-unknown date   • Hx of total knee replacement, right 6/29/2017   • Hyperlipidemia 5/2/2016   • Insomnia 8/6/2013   • Irritable bowel syndrome 8/6/2013   • Keratoconjunctivitis sicca, in Sjogren's syndrome (HCC) 9/21/2016   • Memory loss 12/7/2018   • Osteoarthritis of knee 5/19/2017   • Osteoporosis     fosamax [id number], on prolia(2016)   • Paroxysmal atrial fibrillation (HCC) 11/9/2011   • Primary malignant neoplasm of right middle lobe of lung (HCC) 8/21/2018   • Rheumatoid arthritis (HCC)    • Tension-type headache, not intractable 12/26/2017   • Vitamin B12 deficiency 5/2/2016   • Vitamin D deficiency 11/20/2017     Past Surgical History:   Procedure Laterality Date   • ABDOMINAL SURGERY     • ANAL DILATION     • APPENDECTOMY     • BREAST BIOPSY Right    • CARDIAC CATHETERIZATION  2009   • CATARACT EXTRACTION WITH INTRAOCULAR LENS IMPLANT Bilateral 2009   • CHOLECYSTECTOMY     • COLONOSCOPY     • CYSTOSCOPY     • HYSTERECTOMY     • JOINT REPLACEMENT     • LUNG REMOVAL, PARTIAL Right 08/06/2018    RIGHT VATS WEDGE RESECTION OF RML PN RIGHT MIDDLE LOBECTOMY    • SUBTOTAL HYSTERECTOMY     • TONSILLECTOMY     • TOTAL HIP ARTHROPLASTY Left 7/17/2019    Procedure: TOTAL HIP ARTHROPLASTY ANTERIOR;  Surgeon: Lenny Khan MD;  Location: [facility] MAIN OR;  Service: Orthopedics   • TOTAL KNEE ARTHROPLASTY Right 2017   • WRIST FRACTURE SURGERY Bilateral       General Information     Row Name 08/04/22 1344          Physical Therapy Time and Intention    Document Type evaluation  -AM     Mode of Treatment physical therapy  -AM     Row Name 08/04/22 1728          General Information    Patient Profile Reviewed yes  -AM     Prior Level of Function independent:;gait;transfer;bed mobility  has been using RW for less than a month  -AM     Existing Precautions/Restrictions fall  -AM     Barriers to Rehab none identified  -AM     Row Name 08/04/22 7838          Living Environment    People in Home facility resident  assisted  living  -AM     Row Name 08/04/22 1344          Home Main Entrance    Number of Stairs, Main Entrance none  -AM     Row Name 08/04/22 1344          Stairs Within Home, Primary    Number of Stairs, Within Home, Primary none  -AM     Row Name 08/04/22 1344          Cognition    Orientation Status (Cognition) oriented x 4  -AM     Row Name 08/04/22 1344          Safety Issues, Functional Mobility    Impairments Affecting Function (Mobility) balance;endurance/activity tolerance;strength  -AM     Comment, Safety Issues/Impairments (Mobility) gait belt utilized  -AM           User Key  (r) = Recorded By, (t) = Taken By, (c) = Cosigned By    Initials Name Provider Type    AM Darwin Peter PT Physical Therapist               Mobility     Row Name 08/04/22 1345          Bed Mobility    Bed Mobility bed mobility (all) activities  -AM     All Activities, [employer] (Bed Mobility) verbal cues;contact guard;1 person assist  -AM     Assistive Device (Bed Mobility) bed rails;head of bed elevated  -AM     Comment, (Bed Mobility) LOB posteriorly when transitioning to sitting EOB.  -AM     Row Name 08/04/22 1345          Sit-Stand Transfer    Sit-Stand [employer] (Transfers) contact guard;minimum assist (75% patient effort);verbal cues;1 person assist  -AM     Assistive Device (Sit-Stand Transfers) walker, front-wheeled  -AM     Row Name 08/04/22 1345          Gait/Stairs (Locomotion)    [employer] Level (Gait) contact guard;1 person assist  -AM     Assistive Device (Gait) walker, front-wheeled  -AM     Distance in Feet (Gait) 20'  -AM     Deviations/Abnormal Patterns (Gait) base of support, narrow;susan decreased;gait speed decreased  -AM     Bilateral Gait Deviations forward flexed posture  -AM           User Key  (r) = Recorded By, (t) = Taken By, (c) = Cosigned By    Initials Name Provider Type    AM Darwin Peter PT Physical Therapist               Obj/Interventions     Row Name 08/04/22 1347          Range of Motion  Comprehensive    General Range of Motion no range of motion deficits identified  -AM     [facility] Name 08/04/22 1347          Strength Comprehensive (MMT)    Comment, General Manual Muscle Testing (MMT) Assessment 4+/5 throughout  -AM     [facility] Name 08/04/22 1347          Motor Skills    Motor Skills functional endurance  -AM     Functional Endurance fair  -AM     Row Name 08/04/22 1347          Balance    Balance Assessment sitting static balance;sitting dynamic balance;sit to stand dynamic balance;standing static balance;standing dynamic balance  -AM     Static Sitting Balance supervision  -AM     Dynamic Sitting Balance supervision  -AM     Position, Sitting Balance unsupported;sitting edge of bed  -AM     Sit to Stand Dynamic Balance contact guard;minimal assist;1-person assist  -AM     Static Standing Balance contact guard  -AM     Dynamic Standing Balance contact guard  -AM     Position/Device Used, Standing Balance supported;walker, rolling  -AM     [facility] Name 08/04/22 1347          Sensory Assessment (Somatosensory)    Sensory Assessment (Somatosensory) sensation intact  -AM           User Key  (r) = Recorded By, (t) = Taken By, (c) = Cosigned By    Initials Name Provider Type    AM Darwin Peter, PT Physical Therapist               Goals/Plan     Row Name 08/04/22 1351          Bed Mobility Goal 1 (PT)    Activity/Assistive Device (Bed Mobility Goal 1, PT) bed mobility activities, all  -AM     [place] Level/Cues Needed (Bed Mobility Goal 1, PT) modified independence  -AM     Time Frame (Bed Mobility Goal 1, PT) long term goal (LTG)  -AM     Row Name 08/04/22 1351          Transfer Goal 1 (PT)    Activity/Assistive Device (Transfer Goal 1, PT) transfers, all  -AM     [place] Level/Cues Needed (Transfer Goal 1, PT) modified independence  -AM     Time Frame (Transfer Goal 1, PT) long term goal (LTG)  -AM     Row Name 08/04/22 1351          Gait Training Goal 1 (PT)    Activity/Assistive Device (Gait Training  Goal 1, PT) gait (walking locomotion);assistive device use  -AM     [place] Level (Gait Training Goal 1, PT) modified independence  -AM     Distance (Gait Training Goal 1, PT) 150'  -AM     Time Frame (Gait Training Goal 1, PT) long term goal (LTG)  -AM     Row Name 08/04/22 1351          Therapy Assessment/Plan (PT)    Planned Therapy Interventions (PT) balance training;bed mobility training;gait training;strengthening;patient/family education;transfer training  -AM           User Key  (r) = Recorded By, (t) = Taken By, (c) = Cosigned By    Initials Name Provider Type    AM Darwin Peter, PT Physical Therapist               Clinical Impression     Row Name 08/04/22 1348          Pain    Pretreatment Pain Rating 0/10 - no pain  -AM     Posttreatment Pain Rating 0/10 - no pain  -AM     Row Name 08/04/22 1348          Plan of Care Review    Plan of Care Reviewed With patient  -AM     Outcome Evaluation Pt is a 81 y/o female admitted from an assisted living facility secondary to mutiple episodes of syncope.  CT head (-), Chest X-ray: atelectasis and/or PNA.  Pt reports she had been using a RW in assisted living facility for less than a month prior to hospitalization.  Pt required cga for bed mobility with posterior LOB with transitioning to sitting EOB, cga/min A for transfers with use of RW and ambulated 20' with RW with cga.  Pt with no c/o dizziness during session.  Recommend HHPT.  -AM     Row Name 08/04/22 1348          Therapy Assessment/Plan (PT)    Patient/Family Therapy Goals Statement (PT) To go home  -AM     Rehab Potential (PT) good, to achieve stated therapy goals  -AM     Criteria for Skilled Interventions Met (PT) yes  -AM     Therapy Frequency (PT) 3 times/wk  -AM     Predicted Duration of Therapy Intervention (PT) until d/c  -AM     Row Name 08/04/22 1348          Vital Signs    O2 Delivery Pre Treatment room air  -AM     O2 Delivery Intra Treatment room air  -AM     O2 Delivery Post Treatment  room air  -AM     Pre Patient Position Supine  -AM     Intra Patient Position Standing  -AM     Post Patient Position Supine  -AM     Row Name 08/04/22 1348          Positioning and Restraints    Pre-Treatment Position in bed  -AM     Post Treatment Position bed  -AM     In Bed supine;call light within reach;encouraged to call for assist;exit alarm on  -AM           User Key  (r) = Recorded By, (t) = Taken By, (c) = Cosigned By    Initials Name Provider Type    AM Darwin Peter, PT Physical Therapist               Outcome Measures     Row Name 08/04/22 1351 08/04/22 0800       How much help from another person do you currently need...    Turning from your back to your side while in flat bed without using bedrails? 3  -AM 4  -CS    Moving from lying on back to sitting on the side of a flat bed without bedrails? 3  -AM 4  -CS    Moving to and from a bed to a chair (including a wheelchair)? 3  -AM 4  -CS    Standing up from a chair using your arms (e.g., wheelchair, bedside chair)? 3  -AM 4  -CS    Climbing 3-5 steps with a railing? 2  -AM 3  -CS    To walk in hospital room? 3  -AM 3  -CS    AM-PAC 6 Clicks Score (PT) 17  -AM 22  -CS    Highest level of mobility 5 --> Static standing  -AM 7 --> Walked 25 feet or more  -CS          User Key  (r) = Recorded By, (t) = Taken By, (c) = Cosigned By    Initials Name Provider Type    CS Harini Hamilton RN Registered Nurse    Darwin Hoff, PT Physical Therapist                             Physical Therapy Education                 Title: PT OT SLP Therapies (Done)     Topic: Physical Therapy (Done)     Point: Mobility training (Done)     Learning Progress Summary           Patient Acceptance, E,TB, VU by AM at 8/4/2022 1352    Acceptance, E, VU by SH at 8/3/2022 1944    Acceptance, E, VU by AB at 8/3/2022 1716                   Point: Home exercise program (Done)     Learning Progress Summary           Patient Acceptance, E,TB, VU by AM at 8/4/2022 1352    Acceptance, E,  VU by  at 8/3/2022 1944    Acceptance, E, VU by AB at 8/3/2022 1716                   Point: Body mechanics (Done)     Learning Progress Summary           Patient Acceptance, E,TB, VU by AM at 8/4/2022 1352    Acceptance, E, VU by  at 8/3/2022 1944    Acceptance, E, VU by AB at 8/3/2022 1716                   Point: Precautions (Done)     Learning Progress Summary           Patient Acceptance, E,TB, VU by AM at 8/4/2022 1352                               User Key     Initials Effective Dates Name Provider Type Discipline    AB 06/26/20 -  Lissette Farfan, RN Registered Nurse Nurse     06/16/21 -  Maribell Breaux RN Registered Nurse Nurse     05/10/21 -  Darwin Peter PT Physical Therapist PT              PT Recommendation and Plan  Planned Therapy Interventions (PT): balance training, bed mobility training, gait training, strengthening, patient/family education, transfer training  Plan of Care Reviewed With: patient  Outcome Evaluation: Pt is a 83 y/o female admitted from an assisted living facility secondary to mutiple episodes of syncope.  CT head (-), Chest X-ray: atelectasis and/or PNA.  Pt reports she had been using a RW in assisted living facility for less than a month prior to hospitalization.  Pt required cga for bed mobility with posterior LOB with transitioning to sitting EOB, cga/min A for transfers with use of RW and ambulated 20' with RW with cga.  Pt with no c/o dizziness during session.  Recommend HHPT.     Time Calculation:    PT Charges     Row Name 08/04/22 1352             Time Calculation    Start Time 0928  -AM      Stop Time 0948  -AM      Time Calculation (min) 20 min  -AM      PT Received On 08/04/22  -AM      PT - Next Appointment 08/05/22  -AM      PT Goal Re-Cert Due Date 08/18/22  -AM            User Key  (r) = Recorded By, (t) = Taken By, (c) = Cosigned By    Initials Name Provider Type    AM Darwin Peter, MAUREEN Physical Therapist              Therapy Charges for Today     Code  Description Service Date Service Provider Modifiers Qty    67772097370 HC PT EVAL MOD COMPLEXITY 3 8/4/2022 Darwin Peter, PT GP 1          PT G-Codes  AM-PAC 6 Clicks Score (PT): 17    Darwin Peter, PT  8/4/2022

## 2022-08-04 NOTE — PLAN OF CARE
Goal Outcome Evaluation:           Progress: no change  Outcome Evaluation: Pt down at heart cath getting a pacemaker placed. Will continue to monitor.

## 2022-08-04 NOTE — ANESTHESIA POSTPROCEDURE EVALUATION
Patient: Alia Cheney    Procedure Summary     Date: 08/04/22 Room / Location: [facility] / [facility]    Anesthesia Start: 1215 Anesthesia Stop: 1403    Procedure: Pacemaker DC [facility] (N/A ) Diagnosis:       Sick sinus syndrome (HCC)      (sick sinus syndrome)    Providers: Patricia Willis MD Provider: Mathieu Solis MD    Anesthesia Type: MAC ASA Status: 4          Anesthesia Type: MAC    Vitals  Vitals Value Taken Time   /65 08/04/22 1402   Temp     Pulse 76 08/04/22 1415   Resp 14 08/04/22 1400   SpO2 98 % 08/04/22 1415   Vitals shown include unvalidated device data.        Post Anesthesia Care and Evaluation    Patient location during evaluation: PACU  Patient participation: complete - patient participated  Level of consciousness: awake  Pain scale: See nurse's notes for pain score.  Pain management: adequate    Airway patency: patent  Anesthetic complications: No anesthetic complications  PONV Status: none  Cardiovascular status: acceptable  Respiratory status: acceptable and spontaneous ventilation  Hydration status: acceptable    Comments: Patient seen and examined postoperatively; vital signs stable; SpO2 greater than or equal to 90%; cardiopulmonary status stable; nausea/vomiting adequately controlled; pain adequately controlled; no apparent anesthesia complications; patient discharged from anesthesia care when discharge criteria were met

## 2022-08-04 NOTE — CASE MANAGEMENT/SOCIAL WORK
Discharge Planning Assessment  [facility]     Patient Name: Alia Cheney  MRN: [id number]  Today's Date: 8/4/2022    Admit Date: 8/3/2022     Discharge Needs Assessment     Row Name 08/04/22 1639       Living Environment    People in Home facility resident    Unique Family Situation [facility]    Current Living Arrangements assisted living facility    Primary Care Provided by self    Provides Primary Care For no one    Family Caregiver if Needed child(hair), adult    Family Caregiver Names Daughter- Naomy    Quality of Family Relationships supportive;involved;helpful    Able to Return to Prior Arrangements yes       Resource/Environmental Concerns    Resource/Environmental Concerns none    Transportation Concerns none       Transition Planning    Patient/Family Anticipates Transition to home    Transportation Anticipated family or friend will provide       Discharge Needs Assessment    Equipment Currently Used at Home walker, rolling    Concerns to be Addressed discharge planning    Anticipated Changes Related to Illness none    Equipment Needed After Discharge none               Discharge Plan     Row Name 08/04/22 1640       Plan    Plan DC Plan: Return to [facility] assisted living    Patient/Family in Agreement with Plan yes    Plan Comments The patient reports that she lives at [facility] assisted [facility]. PCP verified. She reports that her daughterNaomy takes care of setting up her medications. She reports that the facility provide her meals. She reports that she uses a rolling walker. She reports that she no longer drives and her daughter helps with appointments and errands. She reports that she is IADL's with her assistive equipment. She denies any discharge needs or transportation issues.           Expected Discharge Date and Time     Expected Discharge Date Expected Discharge Time    Aug 6, 2022          Demographic Summary     Row Name 08/04/22 1638       General  Information    Admission Type inpatient    Arrived From emergency department    Referral Source admission list    Reason for Consult discharge planning    Preferred Language English       Contact Information    Permission Granted to Share Info With                Functional Status     Row Name 08/04/22 1638       Functional Status    Usual Activity Tolerance good    Current Activity Tolerance good       Functional Status, IADL    Medications assistive person    Meal Preparation assistive person    Housekeeping assistive equipment    Laundry assistive equipment    Shopping assistive equipment and person       Mental Status    General Appearance WDL WDL       Mental Status Summary    Recent Changes in Mental Status/Cognitive Functioning no changes         Met with patient in room wearing PPE: mask/googles  Maintained distance greater than 6 feet and spent less than 15 minutes in the room.    Obdulia Prater RN     Office Phone: [phone]  Office Cell: [phone]

## 2022-08-04 NOTE — PLAN OF CARE
Goal Outcome Evaluation:  Plan of Care Reviewed With: patient        Progress: no change  Outcome Evaluation: pt went patricia a few times on heart monitor, had no complaints during the shift, ambulated to BSC assist stand by, rested most of the night

## 2022-08-04 NOTE — ANESTHESIA PREPROCEDURE EVALUATION
Anesthesia Evaluation     Patient summary reviewed and Nursing notes reviewed   no history of anesthetic complications:  NPO Solid Status: > 8 hours  NPO Liquid Status: > 8 hours           Airway   Dental      Pulmonary    (+) lung cancer,   Cardiovascular     ECG reviewed  Patient on routine beta blocker    (+) valvular problems/murmurs AI, CAD, dysrhythmias Paroxysmal Atrial Fib, hyperlipidemia,       Neuro/Psych  (+) headaches, syncope, numbness, psychiatric history Anxiety and Depression,    GI/Hepatic/Renal/Endo    (+)  GERD, PUD,      Musculoskeletal     Abdominal    Substance History      OB/GYN          Other   arthritis, autoimmune disease rheumatoid arthritis,    history of cancer    ROS/Med Hx Other: LAFB, sick sinus syndrome, allergies, CTS, ataxia, insomnia, IBS, osteoporosis, low vit B12 and D, bursitis, lung nodule, memory loss    Echocardiogram Findings    Left Ventricle Calculated left ventricular EF = 71% Left ventricular ejection fraction appears to be 56 - 60%.   Normal left ventricular cavity size and wall thickness noted. All left ventricular wall segments contract normally. Left ventricular diastolic function is consistent with (grade I) impaired relaxation and age.  Right Ventricle Normal right ventricular cavity size, wall thickness, systolic function and septal motion noted.  Left Atrium The left atrial cavity is mildly dilated.  Right Atrium Normal right atrial cavity size noted. The inferior vena cava is normally sized. Normal IVC inspiratory collapse of greater than 50% noted.  Aortic Valve The aortic valve is abnormal in structure. The aortic valve exhibits sclerosis. The aortic valve appears trileaflet. Mild to moderate aortic valve regurgitation is present. No aortic valve stenosis is present.  Mitral Valve The mitral valve is structurally normal with no significant stenosis present. Trace mitral valve regurgitation is present.  Tricuspid Valve The tricuspid valve is structurally  normal with no stenosis present. Trace tricuspid valve regurgitation is present. Estimated right ventricular systolic pressure from tricuspid regurgitation is normal (<35 mmHg).  Pulmonic Valve The pulmonic valve is not well visualized. No pulmonic valve regurgitation or significant stenosis is present.  Greater Vessels No dilation of the aortic root is present. No dilation of the sinuses of Valsalva is present.  Pericardium The pericardium is normal. There is no evidence of pericardial effusion. .                    Anesthesia Plan    ASA 4     MAC     (Patient identified; pre-operative vital signs, all relevant labs/studies, complete medical/surgical/anesthetic history, full medication list, full allergy list, and NPO status obtained/reviewed; physical assessment performed; anesthetic options, side effects, potential complications, risks, and benefits discussed; questions answered; written anesthesia consent obtained; patient cleared for procedure; anesthesia machine and equipment checked and functioning)    Anesthetic plan, risks, benefits, and alternatives have been provided, discussed and informed consent has been obtained with: patient.    Plan discussed with CRNA and CAA.        CODE STATUS:    Medical Intervention Limits: NO intubation (DNI)  Code Status (Patient has no pulse and is not breathing): No CPR (Do Not Attempt to Resuscitate)  Medical Interventions (Patient has pulse or is breathing): Limited Support

## 2022-08-04 NOTE — PROGRESS NOTES
LOS: 0 days   Patient Care Team:  Obdulia Spicer APRN as PCP - General (Nurse Practitioner)    Subjective     Interval History:     Patient Complaints: down getting pacemaker.  Did well overnight    History taken from: patient    Review of Systems        Objective     Vital Signs  Temp:  [97.6 °F (36.4 °C)-97.9 °F (36.6 °C)] 97.7 °F (36.5 °C)  Heart Rate:  [59-84] 73  Resp:  [14-20] 14  BP: ()/(55-82) 147/67       Results Review:    Lab Results (last 24 hours)     Procedure Component Value Units Date/Time    Basic Metabolic Panel [[id number]]  (Abnormal) Collected: 08/04/22 0033    Specimen: Blood Updated: 08/04/22 0134     Glucose 94 mg/dL      BUN 7 mg/dL      Creatinine 0.56 mg/dL      Sodium 144 mmol/L      Potassium 4.2 mmol/L      Comment: Slight hemolysis detected by analyzer. Results may be affected.        Chloride 110 mmol/L      CO2 25.0 mmol/L      Calcium 8.8 mg/dL      BUN/Creatinine Ratio 12.5     Anion Gap 9.0 mmol/L      eGFR 90.1 mL/min/1.73      Comment: National Kidney Foundation and American Society of Nephrology (ASN) Task Force recommended calculation based on the Chronic Kidney Disease Epidemiology Collaboration (CKD-EPI) equation refit without adjustment for race.       Narrative:      GFR Normal >60  Chronic Kidney Disease <60  Kidney Failure <15      Magnesium [[id number]]  (Normal) Collected: 08/04/22 0033    Specimen: Blood Updated: 08/04/22 0134     Magnesium 2.1 mg/dL     CBC & Differential [[id number]]  (Abnormal) Collected: 08/04/22 0033    Specimen: Blood Updated: 08/04/22 0107    Narrative:      The following orders were created for panel order CBC & Differential.  Procedure                               Abnormality         Status                     ---------                               -----------         ------                     CBC Auto Differential[[id number]]        Abnormal            Final result                 Please view results for these tests on the  individual orders.    CBC Auto Differential [[id number]]  (Abnormal) Collected: 08/04/22 0033    Specimen: Blood Updated: 08/04/22 0107     WBC 4.50 10*3/mm3      RBC 4.39 10*6/mm3      Hemoglobin 13.0 g/dL      Hematocrit 40.1 %      MCV 91.3 fL      MCH 29.7 pg      MCHC 32.5 g/dL      RDW 14.4 %      RDW-SD 45.9 fl      MPV 8.1 fL      Platelets 224 10*3/mm3      Neutrophil % 45.8 %      Lymphocyte % 42.0 %      Monocyte % 12.2 %      Eosinophil % 0.0 %      Basophil % 0.0 %      Neutrophils, Absolute 2.00 10*3/mm3      Lymphocytes, Absolute 1.90 10*3/mm3      Monocytes, Absolute 0.50 10*3/mm3      Eosinophils, Absolute 0.00 10*3/mm3      Basophils, Absolute 0.00 10*3/mm3      nRBC 0.0 /100 WBC     Basic Metabolic Panel [[id number]]  (Abnormal) Collected: 08/03/22 1915    Specimen: Blood Updated: 08/03/22 1952     Glucose 124 mg/dL      BUN 8 mg/dL      Creatinine 0.59 mg/dL      Sodium 140 mmol/L      Potassium 3.5 mmol/L      Chloride 107 mmol/L      CO2 21.0 mmol/L      Calcium 9.0 mg/dL      BUN/Creatinine Ratio 13.6     Anion Gap 12.0 mmol/L      eGFR 89.0 mL/min/1.73      Comment: National Kidney Foundation and American Society of Nephrology (ASN) Task Force recommended calculation based on the Chronic Kidney Disease Epidemiology Collaboration (CKD-EPI) equation refit without adjustment for race.       Narrative:      GFR Normal >60  Chronic Kidney Disease <60  Kidney Failure <15      Troponin [[id number]]  (Normal) Collected: 08/03/22 1915    Specimen: Blood Updated: 08/03/22 1952     Troponin T <0.010 ng/mL     Narrative:      Troponin T Reference Range:  <= 0.03 ng/mL-   Negative for AMI  >0.03 ng/mL-     Abnormal for myocardial necrosis.  Clinicians would have to utilize clinical acumen, EKG, Troponin and serial changes to determine if it is an Acute Myocardial Infarction or myocardial injury due to an underlying chronic condition.       Results may be falsely decreased if patient taking Biotin.       Magnesium [[id number]]  (Normal) Collected: 08/03/22 1751    Specimen: Blood Updated: 08/03/22 1843     Magnesium 2.2 mg/dL     CBC & Differential [[id number]]  (Abnormal) Collected: 08/03/22 1751    Specimen: Blood Updated: 08/03/22 1830    Narrative:      The following orders were created for panel order CBC & Differential.  Procedure                               Abnormality         Status                     ---------                               -----------         ------                     CBC Auto Differential[[id number]]        Abnormal            Final result                 Please view results for these tests on the individual orders.    CBC Auto Differential [[id number]]  (Abnormal) Collected: 08/03/22 1751    Specimen: Blood Updated: 08/03/22 1830     WBC 4.70 10*3/mm3      RBC 4.42 10*6/mm3      Hemoglobin 13.5 g/dL      Hematocrit 41.0 %      MCV 92.7 fL      MCH 30.5 pg      MCHC 32.9 g/dL      RDW 14.9 %      RDW-SD 49.0 fl      MPV 8.1 fL      Platelets 216 10*3/mm3      Neutrophil % 48.6 %      Lymphocyte % 39.4 %      Monocyte % 11.6 %      Eosinophil % 0.0 %      Basophil % 0.4 %      Neutrophils, Absolute 2.30 10*3/mm3      Lymphocytes, Absolute 1.80 10*3/mm3      Monocytes, Absolute 0.50 10*3/mm3      Eosinophils, Absolute 0.00 10*3/mm3      Basophils, Absolute 0.00 10*3/mm3      nRBC 0.1 /100 WBC     COVID PRE-OP / PRE-PROCEDURE SCREENING ORDER (NO ISOLATION) - Swab, Nasopharynx [[id number]]  (Normal) Collected: 08/03/22 1640    Specimen: Swab from Nasopharynx Updated: 08/03/22 1704    Narrative:      The following orders were created for panel order COVID PRE-OP / PRE-PROCEDURE SCREENING ORDER (NO ISOLATION) - Swab, Nasopharynx.  Procedure                               Abnormality         Status                     ---------                               -----------         ------                     COVID-19,CEPHEID/EMANUEL,CO...[[id number]]  Normal              Final result                 Please  view results for these tests on the individual orders.    COVID-19,CEPHEID/EMANUEL,COR/SURJIT/PAD/SIMONA IN-HOUSE(OR EMERGENT/ADD-ON),NP SWAB IN TRANSPORT MEDIA 3-4 HR TAT, RT-PCR - Swab, Nasopharynx [[id number]]  (Normal) Collected: 08/03/22 1640    Specimen: Swab from Nasopharynx Updated: 08/03/22 1704     COVID19 Not Detected    Narrative:      Fact sheet for providers: https://www.fda.gov/media/163376/download     Fact sheet for patients: https://www.fda.gov/media/440745/download  Fact sheet for providers: https://www.fda.gov/media/246327/download    Fact sheet for patients: https://www.fda.gov/media/500152/download    Test performed by PCR.    Urinalysis With Culture If Indicated - Urine, Catheter In/Out [[id number]]  (Abnormal) Collected: 08/03/22 1641    Specimen: Urine, Catheter In/Out Updated: 08/03/22 1647     Color, UA Yellow     Appearance, UA Clear     pH, UA 8.5     Specific Gravity, UA 1.007     Glucose, UA Negative     Ketones, UA Negative     Bilirubin, UA Negative     Blood, UA Negative     Protein, UA Negative     Leuk Esterase, UA Negative     Nitrite, UA Negative     Urobilinogen, UA 0.2 E.U./dL    Narrative:      In absence of clinical symptoms, the presence of pyuria, bacteria, and/or nitrites on the urinalysis result does not correlate with infection.  Urine microscopic not indicated.           Imaging Results (Last 24 Hours)     Procedure Component Value Units Date/Time    XR Chest 1 View [[id number]] Collected: 08/04/22 1536     Updated: 08/04/22 1539    Narrative:      DATE OF EXAM:  8/4/2022 3:32 PM     PROCEDURE:  XR CHEST 1 VW-     INDICATIONS:  Post ICD / Pacer Implant; R55-Syncope and collapse; R00.2-Palpitations;  R51.9-Headache, unspecified; I49.5-Sick sinus syndrome     COMPARISON:  8/3/2022     TECHNIQUE:   Single radiographic AP view of the chest was obtained.     FINDINGS:  Interval placement of a left subclavian dual-chamber pacemaker. No  pneumothorax. Heart size and pulmonary vessels  normal. Staples and  fibrosis in the right perihilar region. Chronic mild increased  interstitial markings in the lung bases        Impression:      No pneumothorax status post pacemaker placement     Electronically Signed By-Lele Guerra On:8/4/2022 3:37 PM  This report was finalized on [id number] by  Lele Guerra, .    CT Head Without Contrast [[id number]] Collected: 08/03/22 1626     Updated: 08/03/22 1630    Narrative:         DATE OF EXAM:  8/3/2022 4:14 PM     PROCEDURE:   CT HEAD WO CONTRAST-     INDICATIONS:   Syncope, headaches, numbness and tingling.     COMPARISON:  06/23/2022.     TECHNIQUE:   Routine transaxial cuts were obtained through the head without the  administration of contrast. Automated exposure control and iterative  reconstruction methods were used.      FINDINGS:  There is mild volume loss. There is prominence of the sulci, fissures,  ventricles, and basal cisterns. There are some areas of decreased  density in the white matter tracts felt to represent chronic  microvascular ischemia. There is no acute hemorrhage, midline shift, or  suspicious extra-axial fluid collections. The paranasal and mastoid  sinuses are clear. The calvarium is unremarkable.        Impression:         1. No acute findings.  2. Volume loss secondary to age-appropriate atrophy.  3. Chronic ischemic changes.     Electronically Signed By-Parminder Johns MD On:8/3/2022 4:28 PM  This report was finalized on [id number] by  Parminder Johns MD.               I reviewed the patient's new clinical results.    Medication Review:   Scheduled Meds:atorvastatin, 10 mg, Oral, Daily  ceFAZolin, 2 g, Intravenous, Q8H  leflunomide, 10 mg, Oral, Daily  memantine, 10 mg, Oral, BID  metoprolol tartrate, 25 mg, Oral, BID  pantoprazole, 40 mg, Oral, Daily  polyethylene glycol, 17 g, Oral, Daily  sodium chloride, 3 mL, Intravenous, Q12H  traZODone, 100 mg, Oral, Nightly  venlafaxine XR, 150 mg, Oral, QAM  venlafaxine XR, 75 mg, Oral,  Daily      Continuous Infusions:   PRN Meds:.•  acetaminophen **OR** acetaminophen **OR** acetaminophen  •  LORazepam  •  magnesium sulfate **OR** magnesium sulfate **OR** magnesium sulfate  •  ondansetron **OR** ondansetron  •  potassium chloride **OR** potassium chloride **OR** potassium chloride  •  [COMPLETED] Insert peripheral IV **AND** sodium chloride  •  sodium chloride     Assessment & Plan       Syncope, unspecified syncope type    Sick sinus syndrome (HCC)    Syncope, unspecified syncope type  SSS - pacemaker placement today     Dementia - home meds.  She is at baseline     Paroxysmal afib - NSR     RA  HLD  Mood disorder  Osteoporosis  H/o lung cancer s/p resection     DNR/DNI  protonix  lovenox    Plan for disposition:home when able    Gracie Hurtado MD  08/04/22  16:26 EDT

## 2022-08-04 NOTE — PROGRESS NOTES
LOS: 0 days   Admiting Physician- Gracie Hurtado MD    Reason For Followup:    Symptomatic bradycardia  Near syncope  S/p permanent pacemaker    Subjective     Patient is still drowsy from the procedure.  However she is lying comfortably.  Complaining of pain at the pacemaker site implantation    Objective     Hemodynamics are stable    Review of Systems:   Review of Systems   Constitutional: Negative for chills and fever.   HENT: Negative for ear discharge and nosebleeds.    Eyes: Negative for discharge and redness.   Cardiovascular: Negative for chest pain, orthopnea, palpitations, paroxysmal nocturnal dyspnea and syncope.   Respiratory: Negative for cough, shortness of breath and wheezing.    Endocrine: Negative for heat intolerance.   Skin: Negative for rash.   Musculoskeletal: Negative for arthritis and myalgias.   Gastrointestinal: Negative for abdominal pain, melena, nausea and vomiting.   Genitourinary: Negative for dysuria and hematuria.   Neurological: Negative for dizziness, light-headedness, numbness and tremors.   Psychiatric/Behavioral: Negative for depression. The patient is not nervous/anxious.          Vital Signs  Vitals:    08/04/22 1415 08/04/22 1430 08/04/22 1445 08/04/22 1500   BP: 146/65 145/61 142/55 147/67   BP Location:       Patient Position:       Pulse: 76 74 72 73   Resp:       Temp:       TempSrc:       SpO2: 98% 99% 99% 100%   Weight:       Height:         Wt Readings from Last 1 Encounters:   08/03/22 78.3 kg (172 lb 9.9 oz)       Intake/Output Summary (Last 24 hours) at 8/4/2022 1935  Last data filed at 8/4/2022 1334  Gross per 24 hour   Intake 460 ml   Output 705 ml   Net -245 ml     Physical Exam:  Constitutional:       Appearance: Well-developed.   Eyes:      General: No scleral icterus.        Right eye: No discharge.   HENT:      Head: Normocephalic and atraumatic.   Neck:      Thyroid: No thyromegaly.      Lymphadenopathy: No cervical adenopathy.   Pulmonary:      Effort:  Pulmonary effort is normal. No respiratory distress.      Breath sounds: Normal breath sounds. No wheezing. No rales.   Chest:      Comments: Pacemaker site is stable without hematoma.  Cardiovascular:      Normal rate. Regular rhythm.      No gallop.   Edema:     Peripheral edema absent.   Abdominal:      Tenderness: There is no abdominal tenderness.   Skin:     Findings: No erythema or rash.   Neurological:      Mental Status: Alert and oriented to person, place, and time.         Results Review:   Lab Results (last 24 hours)     Procedure Component Value Units Date/Time    Basic Metabolic Panel [[id number]]  (Abnormal) Collected: 08/04/22 0033    Specimen: Blood Updated: 08/04/22 0134     Glucose 94 mg/dL      BUN 7 mg/dL      Creatinine 0.56 mg/dL      Sodium 144 mmol/L      Potassium 4.2 mmol/L      Comment: Slight hemolysis detected by analyzer. Results may be affected.        Chloride 110 mmol/L      CO2 25.0 mmol/L      Calcium 8.8 mg/dL      BUN/Creatinine Ratio 12.5     Anion Gap 9.0 mmol/L      eGFR 90.1 mL/min/1.73      Comment: National Kidney Foundation and American Society of Nephrology (ASN) Task Force recommended calculation based on the Chronic Kidney Disease Epidemiology Collaboration (CKD-EPI) equation refit without adjustment for race.       Narrative:      GFR Normal >60  Chronic Kidney Disease <60  Kidney Failure <15      Magnesium [[id number]]  (Normal) Collected: 08/04/22 0033    Specimen: Blood Updated: 08/04/22 0134     Magnesium 2.1 mg/dL     CBC & Differential [[id number]]  (Abnormal) Collected: 08/04/22 0033    Specimen: Blood Updated: 08/04/22 0107    Narrative:      The following orders were created for panel order CBC & Differential.  Procedure                               Abnormality         Status                     ---------                               -----------         ------                     CBC Auto Differential[[id number]]        Abnormal            Final result                  Please view results for these tests on the individual orders.    CBC Auto Differential [[id number]]  (Abnormal) Collected: 08/04/22 0033    Specimen: Blood Updated: 08/04/22 0107     WBC 4.50 10*3/mm3      RBC 4.39 10*6/mm3      Hemoglobin 13.0 g/dL      Hematocrit 40.1 %      MCV 91.3 fL      MCH 29.7 pg      MCHC 32.5 g/dL      RDW 14.4 %      RDW-SD 45.9 fl      MPV 8.1 fL      Platelets 224 10*3/mm3      Neutrophil % 45.8 %      Lymphocyte % 42.0 %      Monocyte % 12.2 %      Eosinophil % 0.0 %      Basophil % 0.0 %      Neutrophils, Absolute 2.00 10*3/mm3      Lymphocytes, Absolute 1.90 10*3/mm3      Monocytes, Absolute 0.50 10*3/mm3      Eosinophils, Absolute 0.00 10*3/mm3      Basophils, Absolute 0.00 10*3/mm3      nRBC 0.0 /100 WBC     Basic Metabolic Panel [[id number]]  (Abnormal) Collected: 08/03/22 1915    Specimen: Blood Updated: 08/03/22 1952     Glucose 124 mg/dL      BUN 8 mg/dL      Creatinine 0.59 mg/dL      Sodium 140 mmol/L      Potassium 3.5 mmol/L      Chloride 107 mmol/L      CO2 21.0 mmol/L      Calcium 9.0 mg/dL      BUN/Creatinine Ratio 13.6     Anion Gap 12.0 mmol/L      eGFR 89.0 mL/min/1.73      Comment: National Kidney Foundation and American Society of Nephrology (ASN) Task Force recommended calculation based on the Chronic Kidney Disease Epidemiology Collaboration (CKD-EPI) equation refit without adjustment for race.       Narrative:      GFR Normal >60  Chronic Kidney Disease <60  Kidney Failure <15      Troponin [[id number]]  (Normal) Collected: 08/03/22 1915    Specimen: Blood Updated: 08/03/22 1952     Troponin T <0.010 ng/mL     Narrative:      Troponin T Reference Range:  <= 0.03 ng/mL-   Negative for AMI  >0.03 ng/mL-     Abnormal for myocardial necrosis.  Clinicians would have to utilize clinical acumen, EKG, Troponin and serial changes to determine if it is an Acute Myocardial Infarction or myocardial injury due to an underlying chronic condition.       Results may be  falsely decreased if patient taking Biotin.          Imaging Results (Last 72 Hours)     Procedure Component Value Units Date/Time    XR Chest 1 View [[id number]] Collected: 08/04/22 1536     Updated: 08/04/22 1539    Narrative:      DATE OF EXAM:  8/4/2022 3:32 PM     PROCEDURE:  XR CHEST 1 VW-     INDICATIONS:  Post ICD / Pacer Implant; R55-Syncope and collapse; R00.2-Palpitations;  R51.9-Headache, unspecified; I49.5-Sick sinus syndrome     COMPARISON:  8/3/2022     TECHNIQUE:   Single radiographic AP view of the chest was obtained.     FINDINGS:  Interval placement of a left subclavian dual-chamber pacemaker. No  pneumothorax. Heart size and pulmonary vessels normal. Staples and  fibrosis in the right perihilar region. Chronic mild increased  interstitial markings in the lung bases        Impression:      No pneumothorax status post pacemaker placement     Electronically Signed By-Lele Guerra On:8/4/2022 3:37 PM  This report was finalized on [id number] by  Lele Guerra, .    CT Head Without Contrast [[id number]] Collected: 08/03/22 1626     Updated: 08/03/22 1630    Narrative:         DATE OF EXAM:  8/3/2022 4:14 PM     PROCEDURE:   CT HEAD WO CONTRAST-     INDICATIONS:   Syncope, headaches, numbness and tingling.     COMPARISON:  06/23/2022.     TECHNIQUE:   Routine transaxial cuts were obtained through the head without the  administration of contrast. Automated exposure control and iterative  reconstruction methods were used.      FINDINGS:  There is mild volume loss. There is prominence of the sulci, fissures,  ventricles, and basal cisterns. There are some areas of decreased  density in the white matter tracts felt to represent chronic  microvascular ischemia. There is no acute hemorrhage, midline shift, or  suspicious extra-axial fluid collections. The paranasal and mastoid  sinuses are clear. The calvarium is unremarkable.        Impression:         1. No acute findings.  2. Volume loss secondary to  age-appropriate atrophy.  3. Chronic ischemic changes.     Electronically Signed By-Parminder Johns MD On:8/3/2022 4:28 PM  This report was finalized on [id number] by  Parminder Johns MD.    XR Chest 1 View [[id number]] Collected: 08/03/22 1524     Updated: 08/03/22 1528    Narrative:      DATE OF EXAM:  8/3/2022 3:18 PM     PROCEDURE:  XR CHEST 1 VW-     INDICATIONS:  syncope, palpitations; R55-Syncope and collapse; R00.2-Palpitations;  R51.9-Headache, unspecified     COMPARISON:  AP chest x-ray 06/23/2022, CT chest 05/26/2021     TECHNIQUE:   Single radiographic AP view of the chest was obtained.     FINDINGS:  There are increased mild bibasilar airspace opacities. No pneumothorax  or large pleural effusion is seen. Cardiac silhouette remains mildly  enlarged.        Impression:      Increased mild bibasilar airspace opacities, which may be due to  atelectasis and/or pneumonia.     Electronically Signed By-Barbara Cosme MD On:8/3/2022 3:25 PM  This report was finalized on [id number] by  Barbara Cosme MD.        ECG/EMG Results (most recent)     Procedure Component Value Units Date/Time    ECG 12 Lead [[id number]] Collected: 08/03/22 1512     Updated: 08/03/22 1513     QT Interval 414 ms     Narrative:      HEART RATE= 70  bpm  RR Interval= 856  ms  OH Interval= 151  ms  P Horizontal Axis= 18  deg  P Front Axis= 47  deg  QRSD Interval= 102  ms  QT Interval= 414  ms  QRS Axis= -53  deg  T Wave Axis= 35  deg  - ABNORMAL ECG -  Sinus rhythm  Left anterior fascicular block  Electronically Signed By:   Date and Time of Study: 2022-08-03 15:12:51    SCANNED - TELEMETRY   [[id number]] Resulted: 08/03/22     Updated: 08/04/22 0707    SCANNED - TELEMETRY   [[id number]] Resulted: 08/03/22     Updated: 08/04/22 0746    SCANNED - TELEMETRY   [[id number]] Resulted: 08/03/22     Updated: 08/04/22 0843    SCANNED - TELEMETRY   [[id number]] Resulted: 08/03/22     Updated: 08/04/22 1316    EP/CRM Study [[id number]] Resulted: 08/04/22  1357     Updated: 22 1357    Narrative:      Alia VILLALBA Dwight1940     Patient Care Team:  Obdulia Spicer APRN as PCP - General (Nurse Practitioner)    : Patricia Willis MD    Date of procedure: 2022    Procedure indications: Sick sinus syndrome    Procedure performed: Implantation of a dual-chamber pacemaker    Procedure details    The patient was taken to the EP lab in a fasting state, the left shoulder   area was prepped and draped in the usual sterile fashion.  Sedation was   performed by the anesthesia team.  Incision was made in the deltopectoral groove and carried down with the   help of blunt and sharp dissection.  The axillary vein was accessed with a   micropuncture needle and under venographic guidance, and 2 wires were   placed in the SVC.  The right ventricular lead was advanced under   fluoroscopic guidance and was actively fixated to the right ventricular   apex.  The lead was tested with satisfactory performance.  The right   atrial lead was advanced under fluoroscopic guidance and was actively   fixated to the right atrial appendage, it was tested with satisfactory   performance.  Both leads were then sewn down to the pectoralis fascia   using Ethibond sutures.  A pocket was then made large enough to   accommodate the pacemaker generator, and it was irrigated with antibiotic   solution.  Both leads were then connected to the pulse generator and the   assembly was introduced in the pocket.  The incision was then closed in   layers of absorbable sutures and Steri-Strips and appropriate bandaging   was applied. The patient was transferred to the recovery area in a stable   condition.    Device information  [employer], see implant section for details.      Parameters  RV: Threshold 0.4 V@0.4 ms     Sensin.4 mV     Impedance: 880 ohms  RA: Threshold 0.8 V@0.4 ms     Sensin.7 mV     Impedance: 644 ohms    Device settings: DDD 60/120    MRI:  Compatible    Complications: None    Blood loss: 20 cc    Conclusion: Successful implantation of a dual-chamber pacemaker.        CBC      BMP     CMP     Cardiac Studies:  Echo- Results for orders placed during the hospital encounter of 06/23/22    Adult Transthoracic Echo Complete W/ Cont if Necessary Per Protocol    Interpretation Summary  · Estimated right ventricular systolic pressure from tricuspid regurgitation is normal (<35 mmHg).  · Left ventricular ejection fraction appears to be 56 - 60%.  · Left ventricular diastolic function is consistent with (grade I) impaired relaxation and age.  · Presence of mild to moderate aortic regurgitation.    Stress Myoview-  Cath-      Medication Review:   Scheduled Meds:atorvastatin, 10 mg, Oral, Daily  ceFAZolin, 2 g, Intravenous, Q8H  leflunomide, 10 mg, Oral, Daily  memantine, 10 mg, Oral, BID  metoprolol tartrate, 25 mg, Oral, BID  pantoprazole, 40 mg, Oral, Daily  polyethylene glycol, 17 g, Oral, Daily  predniSONE, 2.5 mg, Oral, Daily  sodium chloride, 3 mL, Intravenous, Q12H  traZODone, 100 mg, Oral, Nightly  venlafaxine XR, 150 mg, Oral, QAM      Continuous Infusions:   PRN Meds:.•  acetaminophen **OR** acetaminophen **OR** acetaminophen  •  acetaminophen **OR** acetaminophen  •  HYDROcodone-acetaminophen  •  HYDROcodone-acetaminophen  •  LORazepam  •  magnesium sulfate **OR** magnesium sulfate **OR** magnesium sulfate  •  morphine  •  morphine  •  ondansetron **OR** ondansetron  •  ondansetron  •  potassium chloride **OR** potassium chloride **OR** potassium chloride  •  [COMPLETED] Insert peripheral IV **AND** sodium chloride  •  sodium chloride      Assessment & Plan   Patient Active Problem List   Diagnosis   • Absolute anemia   • Anal stenosis   • Anxiety   • Arrhythmia, ventricular   • Ataxia   • Carpal tunnel syndrome, bilateral   • Colles' fracture   • Coronary artery disease   • Depression   • Physical exam   • Gastroesophageal reflux disease   • Hx of  total knee replacement, right   • Hyperlipidemia   • Insomnia   • Irritable bowel syndrome   • Keratoconjunctivitis sicca, in Sjogren's syndrome (HCC)   • Memory loss   • Osteoarthritis of knee   • Osteoporosis   • Paroxysmal atrial fibrillation (HCC)   • Primary malignant neoplasm of right middle lobe of lung (HCC)   • Rheumatoid arthritis (HCC)   • Trochanteric bursitis of left hip   • Vitamin B12 deficiency   • Vitamin D deficiency   • History of total hip replacement, left   • Overweight (BMI 25.0-29.9)   • Allergic rhinitis   • Lung nodule   • Other specified dorsopathies, site unspecified   • Other specified examination   • Primary osteoarthritis of both knees   • Foot callus   • Petechiae   • Medicare annual wellness visit, subsequent   • History of osteoporotic pathological fracture   • Syncope, unspecified syncope type   • Sick sinus syndrome (HCC)     MDM:    1.  Symptomatic bradycardia:    Patient underwent pacemaker implantation.  Patient is doing well.  Pacemaker site is without hematoma.  Patient can be discharged tomorrow if pacemaker function is interrogated and normal    2.  Syncope:    Continue to observe.  Patient underwent pacemaker implantation    3.  Hypertension:    Patient is on metoprolol.    Ismael Novak MD  08/04/22  19:35 EDT

## 2022-08-04 NOTE — PLAN OF CARE
Goal Outcome Evaluation:  Plan of Care Reviewed With: patient           Outcome Evaluation: Pt is a 81 y/o female admitted from an assisted living facility secondary to mutiple episodes of syncope.  CT head (-), Chest X-ray: atelectasis and/or PNA.  Pt reports she had been using a RW in assisted living facility for less than a month prior to hospitalization.  Pt required cga for bed mobility with posterior LOB with transitioning to sitting EOB, cga/min A for transfers with use of RW and ambulated 20' with RW with cga.  Pt with no c/o dizziness during session.  Recommend HHPT.

## 2022-08-05 ENCOUNTER — READMISSION MANAGEMENT (OUTPATIENT)
Dept: CALL CENTER | Facility: HOSPITAL | Age: 82
End: 2022-08-05

## 2022-08-05 VITALS
RESPIRATION RATE: 16 BRPM | HEIGHT: 64 IN | WEIGHT: 172.62 LBS | SYSTOLIC BLOOD PRESSURE: 144 MMHG | DIASTOLIC BLOOD PRESSURE: 76 MMHG | HEART RATE: 71 BPM | BODY MASS INDEX: 29.47 KG/M2 | OXYGEN SATURATION: 97 % | TEMPERATURE: 98.1 F

## 2022-08-05 LAB
ANION GAP SERPL CALCULATED.3IONS-SCNC: 9 MMOL/L (ref 5–15)
BASOPHILS # BLD AUTO: 0 10*3/MM3 (ref 0–0.2)
BASOPHILS NFR BLD AUTO: 0 % (ref 0–1.5)
BUN SERPL-MCNC: 8 MG/DL (ref 8–23)
BUN/CREAT SERPL: 14.8 (ref 7–25)
CALCIUM SPEC-SCNC: 8.3 MG/DL (ref 8.6–10.5)
CHLORIDE SERPL-SCNC: 107 MMOL/L (ref 98–107)
CO2 SERPL-SCNC: 24 MMOL/L (ref 22–29)
CREAT SERPL-MCNC: 0.54 MG/DL (ref 0.57–1)
DEPRECATED RDW RBC AUTO: 45.5 FL (ref 37–54)
EGFRCR SERPLBLD CKD-EPI 2021: 90.9 ML/MIN/1.73
EOSINOPHIL # BLD AUTO: 0 10*3/MM3 (ref 0–0.4)
EOSINOPHIL NFR BLD AUTO: 0 % (ref 0.3–6.2)
ERYTHROCYTE [DISTWIDTH] IN BLOOD BY AUTOMATED COUNT: 14.2 % (ref 12.3–15.4)
GLUCOSE SERPL-MCNC: 120 MG/DL (ref 65–99)
HCT VFR BLD AUTO: 37.4 % (ref 34–46.6)
HGB BLD-MCNC: 12.6 G/DL (ref 12–15.9)
LYMPHOCYTES # BLD AUTO: 0.9 10*3/MM3 (ref 0.7–3.1)
LYMPHOCYTES NFR BLD AUTO: 17.9 % (ref 19.6–45.3)
MAGNESIUM SERPL-MCNC: 2.2 MG/DL (ref 1.6–2.4)
MCH RBC QN AUTO: 30.4 PG (ref 26.6–33)
MCHC RBC AUTO-ENTMCNC: 33.7 G/DL (ref 31.5–35.7)
MCV RBC AUTO: 90.1 FL (ref 79–97)
MONOCYTES # BLD AUTO: 0.4 10*3/MM3 (ref 0.1–0.9)
MONOCYTES NFR BLD AUTO: 8.5 % (ref 5–12)
NEUTROPHILS NFR BLD AUTO: 3.6 10*3/MM3 (ref 1.7–7)
NEUTROPHILS NFR BLD AUTO: 73.6 % (ref 42.7–76)
NRBC BLD AUTO-RTO: 0.1 /100 WBC (ref 0–0.2)
PLATELET # BLD AUTO: 200 10*3/MM3 (ref 140–450)
PMV BLD AUTO: 7.9 FL (ref 6–12)
POTASSIUM SERPL-SCNC: 4.2 MMOL/L (ref 3.5–5.2)
RBC # BLD AUTO: 4.16 10*6/MM3 (ref 3.77–5.28)
SODIUM SERPL-SCNC: 140 MMOL/L (ref 136–145)
WBC NRBC COR # BLD: 4.9 10*3/MM3 (ref 3.4–10.8)

## 2022-08-05 PROCEDURE — 99024 POSTOP FOLLOW-UP VISIT: CPT | Performed by: INTERNAL MEDICINE

## 2022-08-05 PROCEDURE — 63710000001 PREDNISONE PER 5 MG: Performed by: FAMILY MEDICINE

## 2022-08-05 PROCEDURE — 25010000002 CEFAZOLIN PER 500 MG: Performed by: INTERNAL MEDICINE

## 2022-08-05 PROCEDURE — 83735 ASSAY OF MAGNESIUM: CPT | Performed by: INTERNAL MEDICINE

## 2022-08-05 PROCEDURE — 85025 COMPLETE CBC W/AUTO DIFF WBC: CPT | Performed by: INTERNAL MEDICINE

## 2022-08-05 PROCEDURE — 80048 BASIC METABOLIC PNL TOTAL CA: CPT | Performed by: INTERNAL MEDICINE

## 2022-08-05 RX ORDER — HYDROCODONE BITARTRATE AND ACETAMINOPHEN 7.5; 325 MG/1; MG/1
2 TABLET ORAL EVERY 4 HOURS PRN
Qty: 30 TABLET | Refills: 0 | Status: SHIPPED | OUTPATIENT
Start: 2022-08-05 | End: 2022-08-11

## 2022-08-05 RX ORDER — CEPHALEXIN 500 MG/1
500 CAPSULE ORAL EVERY 12 HOURS SCHEDULED
Qty: 9 CAPSULE | Refills: 0 | Status: SHIPPED | OUTPATIENT
Start: 2022-08-05 | End: 2022-08-11

## 2022-08-05 RX ORDER — CEPHALEXIN 500 MG/1
500 CAPSULE ORAL EVERY 12 HOURS SCHEDULED
Status: DISCONTINUED | OUTPATIENT
Start: 2022-08-05 | End: 2022-08-05 | Stop reason: HOSPADM

## 2022-08-05 RX ADMIN — PREDNISONE 2.5 MG: 5 TABLET ORAL at 09:32

## 2022-08-05 RX ADMIN — CEPHALEXIN 500 MG: 500 CAPSULE ORAL at 09:40

## 2022-08-05 RX ADMIN — METOPROLOL TARTRATE 25 MG: 25 TABLET, FILM COATED ORAL at 09:32

## 2022-08-05 RX ADMIN — ACETAMINOPHEN 650 MG: 325 TABLET, FILM COATED ORAL at 10:26

## 2022-08-05 RX ADMIN — VENLAFAXINE HYDROCHLORIDE 150 MG: 75 CAPSULE, EXTENDED RELEASE ORAL at 06:03

## 2022-08-05 RX ADMIN — Medication 3 ML: at 09:35

## 2022-08-05 RX ADMIN — PANTOPRAZOLE SODIUM 40 MG: 40 TABLET, DELAYED RELEASE ORAL at 09:32

## 2022-08-05 RX ADMIN — MEMANTINE 10 MG: 10 TABLET ORAL at 09:32

## 2022-08-05 RX ADMIN — CEFAZOLIN 2 G: 2 INJECTION, POWDER, FOR SOLUTION INTRAMUSCULAR; INTRAVENOUS at 04:24

## 2022-08-05 RX ADMIN — LEFLUNOMIDE 10 MG: 20 TABLET ORAL at 09:32

## 2022-08-05 NOTE — DISCHARGE SUMMARY
"Date of Discharge:  8/5/2022    Discharge Diagnosis:   Sick sinus syndrome  Syncope  Paroxysmal afib  Dementia  RA  HLD  Mood disorder  Osteoporosis      Presenting Problem/History of Present Illness  Active Hospital Problems    Diagnosis  POA   • Sick sinus syndrome (HCC) [I49.5]  Unknown   • Syncope, unspecified syncope type [R55]  Yes      Resolved Hospital Problems   No resolved problems to display.          Hospital Course  82-year-old female who presents to the ER by EMS from local assisted living facility.  EMS reports that facility nurse stated the patient had multiple syncopal episodes this morning.  Patient recalls 1 episode where she was falling and the nurse caught her, she denies hitting her head.  But does not really remember repeated episodes.  She states when she awoke this morning she had a posterior headache with \"ringing\" in her head, it was not sudden onset or there was no thunderclap sensation, it was not the worst headache of her life, it has resolved at that her arrival to the emergency room.  She has no history of headaches.  She did not require treatment.  She has had no nausea or vomiting she states she did have some palpitations this morning that she have now resolved.  She has had no chest pain or shortness of breath.  No other complaints.    In the ER, labs and CT head are unremarkable.  She was seen by cardiology and diagnosed with SSS.  She was admitted for pacemaker placement.  This was done yesterday without incident.  She did well overnight and is ready for discharge today.  She was started on a beta blocker.  She will f/u with cardiology accordingly.     Procedures Performed    Procedure(s):  Pacemaker DC [place]  -------------------       Consults:   Consults     Date and Time Order Name Status Description    8/3/2022  5:04 PM Inpatient Cardiology Consult Completed           Pertinent Test Results:    Lab Results (most recent)     Procedure Component Value Units Date/Time    " Basic Metabolic Panel [[id number]]  (Abnormal) Collected: 08/05/22 0047    Specimen: Blood Updated: 08/05/22 0156     Glucose 120 mg/dL      BUN 8 mg/dL      Creatinine 0.54 mg/dL      Sodium 140 mmol/L      Potassium 4.2 mmol/L      Chloride 107 mmol/L      CO2 24.0 mmol/L      Calcium 8.3 mg/dL      BUN/Creatinine Ratio 14.8     Anion Gap 9.0 mmol/L      eGFR 90.9 mL/min/1.73      Comment: National Kidney Foundation and American Society of Nephrology (ASN) Task Force recommended calculation based on the Chronic Kidney Disease Epidemiology Collaboration (CKD-EPI) equation refit without adjustment for race.       Narrative:      GFR Normal >60  Chronic Kidney Disease <60  Kidney Failure <15      Magnesium [[id number]]  (Normal) Collected: 08/05/22 0047    Specimen: Blood Updated: 08/05/22 0156     Magnesium 2.2 mg/dL     CBC & Differential [[id number]]  (Abnormal) Collected: 08/05/22 0047    Specimen: Blood Updated: 08/05/22 0138    Narrative:      The following orders were created for panel order CBC & Differential.  Procedure                               Abnormality         Status                     ---------                               -----------         ------                     CBC Auto Differential[[id number]]        Abnormal            Final result                 Please view results for these tests on the individual orders.    CBC Auto Differential [[id number]]  (Abnormal) Collected: 08/05/22 0047    Specimen: Blood Updated: 08/05/22 0138     WBC 4.90 10*3/mm3      RBC 4.16 10*6/mm3      Hemoglobin 12.6 g/dL      Hematocrit 37.4 %      MCV 90.1 fL      MCH 30.4 pg      MCHC 33.7 g/dL      RDW 14.2 %      RDW-SD 45.5 fl      MPV 7.9 fL      Platelets 200 10*3/mm3      Neutrophil % 73.6 %      Lymphocyte % 17.9 %      Monocyte % 8.5 %      Eosinophil % 0.0 %      Basophil % 0.0 %      Neutrophils, Absolute 3.60 10*3/mm3      Lymphocytes, Absolute 0.90 10*3/mm3      Monocytes, Absolute 0.40 10*3/mm3       Eosinophils, Absolute 0.00 10*3/mm3      Basophils, Absolute 0.00 10*3/mm3      nRBC 0.1 /100 WBC     [facility] SCREEN - Swab, Axilla Right, Axilla Left and Groin [[id number]] Collected: 08/04/22 2030    Specimen: Swab from Axilla Right, Axilla Left and Groin Updated: 08/04/22 2156    Basic Metabolic Panel [[id number]]  (Abnormal) Collected: 08/04/22 0033    Specimen: Blood Updated: 08/04/22 0134     Glucose 94 mg/dL      BUN 7 mg/dL      Creatinine 0.56 mg/dL      Sodium 144 mmol/L      Potassium 4.2 mmol/L      Comment: Slight hemolysis detected by analyzer. Results may be affected.        Chloride 110 mmol/L      CO2 25.0 mmol/L      Calcium 8.8 mg/dL      BUN/Creatinine Ratio 12.5     Anion Gap 9.0 mmol/L      eGFR 90.1 mL/min/1.73      Comment: National Kidney Foundation and American Society of Nephrology (ASN) Task Force recommended calculation based on the Chronic Kidney Disease Epidemiology Collaboration (CKD-EPI) equation refit without adjustment for race.       Narrative:      GFR Normal >60  Chronic Kidney Disease <60  Kidney Failure <15      Magnesium [[id number]]  (Normal) Collected: 08/04/22 0033    Specimen: Blood Updated: 08/04/22 0134     Magnesium 2.1 mg/dL     CBC & Differential [[id number]]  (Abnormal) Collected: 08/04/22 0033    Specimen: Blood Updated: 08/04/22 0107    Narrative:      The following orders were created for panel order CBC & Differential.  Procedure                               Abnormality         Status                     ---------                               -----------         ------                     CBC Auto Differential[[id number]]        Abnormal            Final result                 Please view results for these tests on the individual orders.    CBC Auto Differential [[id number]]  (Abnormal) Collected: 08/04/22 0033    Specimen: Blood Updated: 08/04/22 0107     WBC 4.50 10*3/mm3      RBC 4.39 10*6/mm3      Hemoglobin 13.0 g/dL      Hematocrit 40.1 %      MCV 91.3  fL      MCH 29.7 pg      MCHC 32.5 g/dL      RDW 14.4 %      RDW-SD 45.9 fl      MPV 8.1 fL      Platelets 224 10*3/mm3      Neutrophil % 45.8 %      Lymphocyte % 42.0 %      Monocyte % 12.2 %      Eosinophil % 0.0 %      Basophil % 0.0 %      Neutrophils, Absolute 2.00 10*3/mm3      Lymphocytes, Absolute 1.90 10*3/mm3      Monocytes, Absolute 0.50 10*3/mm3      Eosinophils, Absolute 0.00 10*3/mm3      Basophils, Absolute 0.00 10*3/mm3      nRBC 0.0 /100 WBC     Troponin [[id number]]  (Normal) Collected: 08/03/22 1915    Specimen: Blood Updated: 08/03/22 1952     Troponin T <0.010 ng/mL     Narrative:      Troponin T Reference Range:  <= 0.03 ng/mL-   Negative for AMI  >0.03 ng/mL-     Abnormal for myocardial necrosis.  Clinicians would have to utilize clinical acumen, EKG, Troponin and serial changes to determine if it is an Acute Myocardial Infarction or myocardial injury due to an underlying chronic condition.       Results may be falsely decreased if patient taking Biotin.      COVID PRE-OP / PRE-PROCEDURE SCREENING ORDER (NO ISOLATION) - Swab, Nasopharynx [[id number]]  (Normal) Collected: 08/03/22 1640    Specimen: Swab from Nasopharynx Updated: 08/03/22 1704    Narrative:      The following orders were created for panel order COVID PRE-OP / PRE-PROCEDURE SCREENING ORDER (NO ISOLATION) - Swab, Nasopharynx.  Procedure                               Abnormality         Status                     ---------                               -----------         ------                     COVID-19,CEPHEID/EMANUEL,CO...[[id number]]  Normal              Final result                 Please view results for these tests on the individual orders.    COVID-19,CEPHEID/EMANUEL,COR/SURJIT/PAD/SIMONA IN-HOUSE(OR EMERGENT/ADD-ON),NP SWAB IN TRANSPORT MEDIA 3-4 HR TAT, RT-PCR - Swab, Nasopharynx [[id number]]  (Normal) Collected: 08/03/22 1640    Specimen: Swab from Nasopharynx Updated: 08/03/22 1704     COVID19 Not Detected    Narrative:      Fact  sheet for providers: https://www.fda.gov/media/476010/download     Fact sheet for patients: https://www.fda.gov/media/875559/download  Fact sheet for providers: https://www.fda.gov/media/347080/download    Fact sheet for patients: https://www.fda.gov/media/934722/download    Test performed by PCR.    Urinalysis With Culture If Indicated - Urine, Catheter In/Out [[id number]]  (Abnormal) Collected: 08/03/22 1641    Specimen: Urine, Catheter In/Out Updated: 08/03/22 1647     Color, UA Yellow     Appearance, UA Clear     pH, UA 8.5     Specific Gravity, UA 1.007     Glucose, UA Negative     Ketones, UA Negative     Bilirubin, UA Negative     Blood, UA Negative     Protein, UA Negative     Leuk Esterase, UA Negative     Nitrite, UA Negative     Urobilinogen, UA 0.2 E.U./dL    Narrative:      In absence of clinical symptoms, the presence of pyuria, bacteria, and/or nitrites on the urinalysis result does not correlate with infection.  Urine microscopic not indicated.    Comprehensive Metabolic Panel [[id number]]  (Abnormal) Collected: 08/03/22 1500    Specimen: Blood Updated: 08/03/22 1533     Glucose 120 mg/dL      BUN 8 mg/dL      Creatinine 0.63 mg/dL      Sodium 144 mmol/L      Potassium 4.0 mmol/L      Chloride 109 mmol/L      CO2 25.0 mmol/L      Calcium 9.2 mg/dL      Total Protein 6.5 g/dL      Albumin 4.10 g/dL      ALT (SGPT) 19 U/L      AST (SGOT) 21 U/L      Alkaline Phosphatase 79 U/L      Total Bilirubin 0.3 mg/dL      Globulin 2.4 gm/dL      A/G Ratio 1.7 g/dL      BUN/Creatinine Ratio 12.7     Anion Gap 10.0 mmol/L      eGFR 87.6 mL/min/1.73      Comment: National Kidney Foundation and American Society of Nephrology (ASN) Task Force recommended calculation based on the Chronic Kidney Disease Epidemiology Collaboration (CKD-EPI) equation refit without adjustment for race.       Narrative:      GFR Normal >60  Chronic Kidney Disease <60  Kidney Failure <15      Protime-INR [[id number]]  (Normal) Collected:  08/03/22 1500    Specimen: Blood Updated: 08/03/22 1533     Protime 11.0 Seconds      INR 1.07    aPTT [[id number]]  (Abnormal) Collected: 08/03/22 1500    Specimen: Blood Updated: 08/03/22 1533     PTT 27.4 seconds     Troponin [[id number]]  (Normal) Collected: 08/03/22 1500    Specimen: Blood Updated: 08/03/22 1533     Troponin T <0.010 ng/mL     Narrative:      Troponin T Reference Range:  <= 0.03 ng/mL-   Negative for AMI  >0.03 ng/mL-     Abnormal for myocardial necrosis.  Clinicians would have to utilize clinical acumen, EKG, Troponin and serial changes to determine if it is an Acute Myocardial Infarction or myocardial injury due to an underlying chronic condition.       Results may be falsely decreased if patient taking Biotin.      BNP [[id number]]  (Normal) Collected: 08/03/22 1500    Specimen: Blood Updated: 08/03/22 1531     proBNP 255.6 pg/mL     Narrative:      Among patients with dyspnea, NT-proBNP is highly sensitive for the detection of acute congestive heart failure. In addition NT-proBNP of <300 pg/ml effectively rules out acute congestive heart failure with 99% negative predictive value.    Results may be falsely decreased if patient taking Biotin.             Results for orders placed during the hospital encounter of 06/23/22    Adult Transthoracic Echo Complete W/ Cont if Necessary Per Protocol    Interpretation Summary  · Estimated right ventricular systolic pressure from tricuspid regurgitation is normal (<35 mmHg).  · Left ventricular ejection fraction appears to be 56 - 60%.  · Left ventricular diastolic function is consistent with (grade I) impaired relaxation and age.  · Presence of mild to moderate aortic regurgitation.           Condition on Discharge:  good    Vital Signs  Temp:  [98.1 °F (36.7 °C)] 98.1 °F (36.7 °C)  Heart Rate:  [70-81] 71  Resp:  [16] 16  BP: (129-144)/(76-82) 144/76    Physical Exam:     General Appearance:    Alert, cooperative, in no acute distress   Head:     Normocephalic, without obvious abnormality, atraumatic   Eyes:            Lids and lashes normal, conjunctivae and sclerae normal, no   icterus, no pallor, corneas clear, PERRLA   Ears:    Ears appear intact with no abnormalities noted   Throat:   No oral lesions, no thrush, oral mucosa moist   Neck:   No adenopathy, supple, trachea midline, no thyromegaly, no   carotid bruit, no JVD   Lungs:     Clear to auscultation,respirations regular, even and                  unlabored    Heart:    Regular rhythm and normal rate, normal S1 and S2, no            murmur, no gallop, no rub, no click   Chest Wall:    No abnormalities observed   Abdomen:     Normal bowel sounds, no masses, no organomegaly, soft        non-tender, non-distended, no guarding, no rebound                tenderness   Extremities:   Moves all extremities well, no edema, no cyanosis, no             redness   Pulses:   Pulses palpable and equal bilaterally   Skin:   No bleeding, bruising or rash   Lymph nodes:   No palpable adenopathy   Neurologic:   Cranial nerves 2 - 12 grossly intact, sensation intact, DTR       present and equal bilaterally       Discharge Disposition  Home or Self Care    Discharge Medications     Discharge Medications      New Medications      Instructions Start Date   cephalexin 500 MG capsule  Commonly known as: KEFLEX   500 mg, Oral, Every 12 Hours Scheduled      HYDROcodone-acetaminophen 7.5-325 MG per tablet  Commonly known as: NORCO   2 tablets, Oral, Every 4 Hours PRN         Changes to Medications      Instructions Start Date   Cholecalciferol 50 MCG (2000 UT) tablet  Commonly known as: Vitamin D3 Super Strength  What changed:   · how much to take  · how to take this  · when to take this   2 po q d      metoprolol tartrate 25 MG tablet  Commonly known as: LOPRESSOR  What changed: how much to take   25 mg, Oral, 2 Times Daily         Continue These Medications      Instructions Start Date   calcium carbonate 1250 (500 Ca) MG  tablet  Commonly known as: OS-DAHLIA   600 mg, Oral, Daily      leflunomide 10 MG tablet  Commonly known as: ARAVA   10 mg, Oral, Daily      LORazepam 0.5 MG tablet  Commonly known as: ATIVAN   0.5 mg, Oral, 2 Times Daily PRN      memantine 10 MG tablet  Commonly known as: Namenda   10 mg, Oral, 2 Times Daily      pantoprazole 40 MG EC tablet  Commonly known as: PROTONIX   40 mg, Oral, Daily      polyethylene glycol 17 GM/SCOOP powder  Commonly known as: MIRALAX   17 g, Oral, 2 Times Daily      pravastatin 40 MG tablet  Commonly known as: PRAVACHOL   40 mg, Oral, Every Night at Bedtime      predniSONE 2.5 MG tablet  Commonly known as: DELTASONE   2.5 mg, Oral, Daily      traZODone 50 MG tablet  Commonly known as: DESYREL   100 mg, Oral, Nightly      venlafaxine  MG 24 hr capsule  Commonly known as: EFFEXOR-XR   150 mg, Oral, Daily      vitamin B-12 1000 MCG tablet  Commonly known as: CYANOCOBALAMIN   1,000 mcg, Oral, Daily             Discharge Diet:   Diet Instructions    Healthy heart           Activity at Discharge:   Activity Instructions    As tolerated.           Follow-up Appointments  Future Appointments   Date Time Provider Department Center   11/14/2022  8:30 AM Obdulia Spicer APRN [facility]   12/21/2022  1:45 PM Aletha Guadalupe PA [facility]     Additional Instructions for the Follow-ups that You Need to Schedule     Discharge Follow-up with Specified Provider: Dr. Willis in 2-4 weeks   As directed      To: Dr. Willis in 2-4 weeks               Test Results Pending at Discharge  Pending Labs     Order Current Status    [facility] SCREEN - Swab, Axilla Right, Axilla Left and Groin In process           Gracie Hurtado MD  08/05/22  16:02 EDT

## 2022-08-05 NOTE — CASE MANAGEMENT/SOCIAL WORK
Case Management Discharge Note      Final Note: David VALDOVINOS         Selected Continued Care - Discharged on 8/5/2022 Admission date: 8/3/2022 - Discharge disposition: Home or Self Care   Transportation Services  Private: Car    Final Discharge Disposition Code: 01 - home or self-care

## 2022-08-05 NOTE — PROGRESS NOTES
"    Reason for follow-up: Sick sinus syndrome     Patient Care Team:  Obdulia Spicer APRN as PCP - General (Nurse Practitioner)    Subjective .   Alia Cheney is doing well today.     ROS    Patient has no known allergies.    Scheduled Meds:atorvastatin, 10 mg, Oral, Daily  leflunomide, 10 mg, Oral, Daily  memantine, 10 mg, Oral, BID  metoprolol tartrate, 25 mg, Oral, BID  pantoprazole, 40 mg, Oral, Daily  polyethylene glycol, 17 g, Oral, Daily  predniSONE, 2.5 mg, Oral, Daily  sodium chloride, 3 mL, Intravenous, Q12H  traZODone, 100 mg, Oral, Nightly  venlafaxine XR, 150 mg, Oral, QAM      Continuous Infusions:   PRN Meds:.•  acetaminophen **OR** acetaminophen **OR** acetaminophen  •  acetaminophen **OR** acetaminophen  •  HYDROcodone-acetaminophen  •  LORazepam  •  magnesium sulfate **OR** magnesium sulfate **OR** magnesium sulfate  •  morphine  •  morphine  •  ondansetron **OR** ondansetron  •  ondansetron  •  potassium chloride **OR** potassium chloride **OR** potassium chloride  •  [COMPLETED] Insert peripheral IV **AND** sodium chloride  •  sodium chloride      VITAL SIGNS  Vitals:    08/04/22 1445 08/04/22 1500 08/04/22 2010 08/05/22 0422   BP: 142/55 147/67 138/82 129/78   BP Location:   Right arm Right arm   Patient Position:   Lying Lying   Pulse: 72 73 81 70   Resp:   16 16   Temp:   98.1 °F (36.7 °C) 98.1 °F (36.7 °C)   TempSrc:   Oral Oral   SpO2: 99% 100% 97% 97%   Weight:       Height:           Flowsheet Rows    Flowsheet Row First Filed Value   Admission Height 162.6 cm (64\") Documented at 08/03/2022 1436   Admission Weight 81.6 kg (180 lb) Documented at 08/03/2022 1436             Physical Exam  VITALS REVIEWED    General:      well developed, in no acute distress.    Head:      normocephalic and atraumatic.    Eyes:      PERRL/EOM intact, conjunctiva and sclera clear with out nystagmus.    Neck:      no masses, thyromegaly,  trachea central with normal respiratory effort and PMI displaced " laterally  Lungs:      Clear  Heart:       Regular rate and rhythm  Msk:      no deformity or scoliosis noted of thoracic or lumbar spine.    Pulses:      pulses normal in all 4 extremities.    Extremities:       No lower extremity edema  Neurologic:      no focal deficits.   alert oriented x3  Skin:      intact without lesions or rashes.    Psych:      alert and cooperative; normal mood and affect; normal attention span and concentration.          LAB RESULTS (LAST 7 DAYS)    CBC  Results from last 7 days   Lab Units 08/05/22 0047 08/04/22  0033 08/03/22  1751 08/03/22  1500   WBC 10*3/mm3 4.90 4.50 4.70 4.60   RBC 10*6/mm3 4.16 4.39 4.42 4.41   HEMOGLOBIN g/dL 12.6 13.0 13.5 13.3   HEMATOCRIT % 37.4 40.1 41.0 39.9   MCV fL 90.1 91.3 92.7 90.3   PLATELETS 10*3/mm3 200 224 216 239       BMP  Results from last 7 days   Lab Units 08/05/22 0047 08/04/22 0033 08/03/22 1915 08/03/22 1751 08/03/22  1500   SODIUM mmol/L 140 144 140  --  144   POTASSIUM mmol/L 4.2 4.2 3.5  --  4.0   CHLORIDE mmol/L 107 110* 107  --  109*   CO2 mmol/L 24.0 25.0 21.0*  --  25.0   BUN mg/dL 8 7* 8  --  8   CREATININE mg/dL 0.54* 0.56* 0.59  --  0.63   GLUCOSE mg/dL 120* 94 124*  --  120*   MAGNESIUM mg/dL 2.2 2.1  --  2.2  --        CMP   Results from last 7 days   Lab Units 08/05/22  0047 08/04/22  0033 08/03/22 1915 08/03/22  1500   SODIUM mmol/L 140 144 140 144   POTASSIUM mmol/L 4.2 4.2 3.5 4.0   CHLORIDE mmol/L 107 110* 107 109*   CO2 mmol/L 24.0 25.0 21.0* 25.0   BUN mg/dL 8 7* 8 8   CREATININE mg/dL 0.54* 0.56* 0.59 0.63   GLUCOSE mg/dL 120* 94 124* 120*   ALBUMIN g/dL  --   --   --  4.10   BILIRUBIN mg/dL  --   --   --  0.3   ALK PHOS U/L  --   --   --  79   AST (SGOT) U/L  --   --   --  21   ALT (SGPT) U/L  --   --   --  19         BNP        TROPONIN  Results from last 7 days   Lab Units 08/03/22  1915   TROPONIN T ng/mL <0.010       CoAg  Results from last 7 days   Lab Units 08/03/22  1500   INR  1.07   APTT seconds 27.4*        Creatinine Clearance  Estimated Creatinine Clearance: 78.5 mL/min (A) (by C-G formula based on SCr of 0.54 mg/dL (L)).    ABG          EKG    I personally reviewed the patient's EKG/Telemetry data: Sinus rhythm      Assessment & Plan       Syncope, unspecified syncope type    Sick sinus syndrome (HCC)      Alia Cheney is an 82-year-old female patient with syncope and sick sinus syndrome, she received a dual-chamber pacemaker yesterday.  No procedural complications.  This morning she was seen and examined, no bleeding or hematoma at surgical incision site.  Device interrogation showed appropriate function and chest x-ray showed good lead positioning.  Patient can be discharged home from my end, metoprolol was increased to 25 twice a day to better control her bursts of SVT.  She will need 5 days worth of Keflex on discharge.  2-week wound check appointment.    I discussed the patients findings and my recommendations with patient and agrees with outlined plan.    Patricia Willis MD  08/05/22  09:20 EDT

## 2022-08-05 NOTE — PROGRESS NOTES
Cardiology [place]        LOS:  LOS: 1 day   Patient Name: Alia Cheney  Age/Sex: 82 y.o. female  : 1940  MRN: [id number]    Day of Service: 22   Length of Stay: 1  Encounter Provider: Ifeanyi Coronado MD  Place of Service: [facility] CARDIOLOGY  Patient Care Team:  Obdulia Spicer APRN as PCP - General (Nurse Practitioner)    Subjective:     Chief Complaint: f/u sick sinus syndrome. S/p PPM    Subjective: No acute events overnight.  She is status post permanent pacemaker placement.  Beta-blocker has been restarted    Current Medications:   Scheduled Meds:denosumab, 60 mg, Subcutaneous, Q6 Months      Continuous Infusions:     Allergies:  No Known Allergies  ROS  Constitutional: No weakness,fatigue, fever, rigors, chills   Eyes: No vision changes, eye pain   ENT/oropharynx: No difficulty swallowing, sore throat, epistaxis, changes in hearing   Cardiovascular: No chest pain, chest tightness, palpitations, paroxysmal nocturnal dyspnea, orthopnea, diaphoresis, dizziness / syncopal episode   Respiratory: No shortness of breath, dyspnea on exertion, cough, wheezing hemoptysis   Gastrointestinal: No abdominal pain, nausea, vomiting, diarrhea, bloody stools   Genitourinary: No hematuria, dysuria   Neurological: No headache, tremors, numbness,  one-sided weakness    Musculoskeletal: No cramps, myalgias,  joint pain, joint swelling   Integument: No rash, edema           Objective:     Temp:  [98.1 °F (36.7 °C)] 98.1 °F (36.7 °C)  Heart Rate:  [70-81] 71  Resp:  [16] 16  BP: (129-144)/(76-82) 144/76     Intake/Output Summary (Last 24 hours) at 2022 1852  Last data filed at 2022 1238  Gross per 24 hour   Intake 240 ml   Output --   Net 240 ml     Body mass index is 29.63 kg/m².      22  1436 22  1719   Weight: 81.6 kg (180 lb) 78.3 kg (172 lb 9.9 oz)     The patient is alert, oriented and in no distress.  Vital signs as noted above.  Head and neck revealed no  carotid bruits or jugular venous distention.  No thyromegaly or lymph adenopathy is present  Lungs clear.  No wheezing.  Breath sounds are normal bilaterally.  Heart normal first and second heart sounds.No murmur.  No precordial rub is present.  No gallop is present.  Pacemaker site with no evidence of hematoma or ecchymosis  Abdomen soft and nontender.  No organomegaly is present.  Extremities with good peripheral pulses without any pedal edema.  Skin warm and dry.  Musculoskeletal system is grossly normal  CNS grossly normal    Lab Review:   Results from last 7 days   Lab Units 08/05/22  0047 08/04/22  0033 08/03/22 1915 08/03/22  1500   SODIUM mmol/L 140 144   < > 144   POTASSIUM mmol/L 4.2 4.2   < > 4.0   CHLORIDE mmol/L 107 110*   < > 109*   CO2 mmol/L 24.0 25.0   < > 25.0   BUN mg/dL 8 7*   < > 8   CREATININE mg/dL 0.54* 0.56*   < > 0.63   GLUCOSE mg/dL 120* 94   < > 120*   CALCIUM mg/dL 8.3* 8.8   < > 9.2   AST (SGOT) U/L  --   --   --  21   ALT (SGPT) U/L  --   --   --  19    < > = values in this interval not displayed.     Results from last 7 days   Lab Units 08/03/22 1915 08/03/22  1500   TROPONIN T ng/mL <0.010 <0.010     Results from last 7 days   Lab Units 08/05/22 0047 08/04/22  0033   WBC 10*3/mm3 4.90 4.50   HEMOGLOBIN g/dL 12.6 13.0   HEMATOCRIT % 37.4 40.1   PLATELETS 10*3/mm3 200 224     Results from last 7 days   Lab Units 08/03/22  1500   INR  1.07   APTT seconds 27.4*     Results from last 7 days   Lab Units 08/05/22 0047 08/04/22  0033   MAGNESIUM mg/dL 2.2 2.1           Invalid input(s): LDLCALC  Results from last 7 days   Lab Units 08/03/22  1500   PROBNP pg/mL 255.6           Recent Radiology:  Imaging Results (Most Recent)     Procedure Component Value Units Date/Time    XR Chest 1 View [[id number]] Collected: 08/04/22 1536     Updated: 08/04/22 1539    Narrative:      DATE OF EXAM:  8/4/2022 3:32 PM     PROCEDURE:  XR CHEST 1 VW-     INDICATIONS:  Post ICD / Pacer Implant; R55-Syncope  and collapse; R00.2-Palpitations;  R51.9-Headache, unspecified; I49.5-Sick sinus syndrome     COMPARISON:  8/3/2022     TECHNIQUE:   Single radiographic AP view of the chest was obtained.     FINDINGS:  Interval placement of a left subclavian dual-chamber pacemaker. No  pneumothorax. Heart size and pulmonary vessels normal. Staples and  fibrosis in the right perihilar region. Chronic mild increased  interstitial markings in the lung bases        Impression:      No pneumothorax status post pacemaker placement     Electronically Signed By-Lele Guerra On:8/4/2022 3:37 PM  This report was finalized on [id number] by  Lele Guerra, .    CT Head Without Contrast [[id number]] Collected: 08/03/22 1626     Updated: 08/03/22 1630    Narrative:         DATE OF EXAM:  8/3/2022 4:14 PM     PROCEDURE:   CT HEAD WO CONTRAST-     INDICATIONS:   Syncope, headaches, numbness and tingling.     COMPARISON:  06/23/2022.     TECHNIQUE:   Routine transaxial cuts were obtained through the head without the  administration of contrast. Automated exposure control and iterative  reconstruction methods were used.      FINDINGS:  There is mild volume loss. There is prominence of the sulci, fissures,  ventricles, and basal cisterns. There are some areas of decreased  density in the white matter tracts felt to represent chronic  microvascular ischemia. There is no acute hemorrhage, midline shift, or  suspicious extra-axial fluid collections. The paranasal and mastoid  sinuses are clear. The calvarium is unremarkable.        Impression:         1. No acute findings.  2. Volume loss secondary to age-appropriate atrophy.  3. Chronic ischemic changes.     Electronically Signed By-Parminder Johns MD On:8/3/2022 4:28 PM  This report was finalized on [id number] by  Parminder Johns MD.    XR Chest 1 View [[id number]] Collected: 08/03/22 1524     Updated: 08/03/22 1528    Narrative:      DATE OF EXAM:  8/3/2022 3:18 PM     PROCEDURE:  XR CHEST 1 VW-      INDICATIONS:  syncope, palpitations; R55-Syncope and collapse; R00.2-Palpitations;  R51.9-Headache, unspecified     COMPARISON:  AP chest x-ray 06/23/2022, CT chest 05/26/2021     TECHNIQUE:   Single radiographic AP view of the chest was obtained.     FINDINGS:  There are increased mild bibasilar airspace opacities. No pneumothorax  or large pleural effusion is seen. Cardiac silhouette remains mildly  enlarged.        Impression:      Increased mild bibasilar airspace opacities, which may be due to  atelectasis and/or pneumonia.     Electronically Signed By-Barbara Cosme MD On:8/3/2022 3:25 PM  This report was finalized on [id number] by  Barbara Cosme MD.          ECHOCARDIOGRAM:    Results for orders placed during the hospital encounter of 06/23/22    Adult Transthoracic Echo Complete W/ Cont if Necessary Per Protocol    Interpretation Summary  · Estimated right ventricular systolic pressure from tricuspid regurgitation is normal (<35 mmHg).  · Left ventricular ejection fraction appears to be 56 - 60%.  · Left ventricular diastolic function is consistent with (grade I) impaired relaxation and age.  · Presence of mild to moderate aortic regurgitation.        I reviewed the patient's new clinical results.    EKG:      Assessment:       Syncope, unspecified syncope type    Sick sinus syndrome (HCC)  Sick sinus syndrome  Syncope  Supraventricular tachycardia  Permanent pacemaker placed      Plan:   82-year-old woman with preserved ejection fraction presented with syncope  She was diagnosed with sick sinus syndrome and is now status post permanent pacemaker placement.  Postprocedure x-ray shows well-positioned leads  Echocardiogram showed preserved LV function with EF of 55%, no significant valvular abnormalities  We have resumed oral beta-blocker for SVT  EP has recommended antibiotics for 5 days post discharge  Okay to discharge from cardiac standpoint with plans to follow-up in EP clinic for device check and  with Dr. Novak for cardiology follow-up        Ifeanyi Coronado MD  08/05/22  18:52 EDT

## 2022-08-05 NOTE — PLAN OF CARE
Goal Outcome Evaluation:  Plan of Care Reviewed With: patient        Progress: improving  Outcome Evaluation: Pt rested well through the night, Pt did c/o moderate pain treated per MAR. Heart rate has been in the 60s-70s. Vitals are stable at this time will continue to monitor

## 2022-08-06 NOTE — OUTREACH NOTE
Prep Survey    Flowsheet Row Responses   [facility] facility patient discharged from? [facility]   Is LACE score < 7 ? No   Emergency Room discharge w/ pulse ox? No   Eligibility [facility]   Date of Admission 08/03/22   Date of Discharge 08/05/22   Discharge Disposition Home or Self Care   Discharge diagnosis Sick sinus syndrome   Syncope   pacemaker placement   Does the patient have one of the following disease processes/diagnoses(primary or secondary)? General Surgery   Does the patient have Home health ordered? No   Is there a DME ordered? No   Prep survey completed? Yes          YOLANDA BOTELLO - Registered Nurse

## 2022-08-07 LAB — QT INTERVAL: 414 MS

## 2022-08-08 ENCOUNTER — TRANSITIONAL CARE MANAGEMENT TELEPHONE ENCOUNTER (OUTPATIENT)
Dept: CALL CENTER | Facility: HOSPITAL | Age: 82
End: 2022-08-08

## 2022-08-08 NOTE — OUTREACH NOTE
Call Center TCM Note    Flowsheet Row Responses   [facility] patient discharged from? [facility]   Does the patient have one of the following disease processes/diagnoses(primary or secondary)? General Surgery   TCM attempt successful? Yes   Call start time 0837   Call end time 0838   Person spoke with today (if not patient) and relationship Naomy-daughter   Meds reviewed with patient/caregiver? Yes   Is the patient having any side effects they believe may be caused by any medication additions or changes? No   Is the patient taking all medications as directed (includes completed medication regime)? Yes   Does the patient have a follow up appointment scheduled with their surgeon? No   Has the patient kept scheduled appointments due by today? N/A   Comments Dtr stated she will schedule pt's f/u calls   Has home health visited the patient within 72 hours of discharge? N/A   Psychosocial issues? No   Did the patient receive a copy of their discharge instructions? Yes   Nursing interventions Reviewed instructions with patient   What is the patient's perception of their health status since discharge? Improving   Nursing interventions Nurse provided patient education   Is the patient /caregiver able to teach back basic post-op care? No tub bath, swimming, or hot tub until instructed by MD, Keep incision areas clean,dry and protected, Lifting as instructed by MD in discharge instructions   Is the patient/caregiver able to teach back signs and symptoms of incisional infection? Increased redness, swelling or pain at the incisonal site, Increased drainage or bleeding   TCM call completed? Yes   Wrap up additional comments Brief call with Pt's Dtr. She stated mom is doing good, she is going to make all her f/u calls, incision looks good and she knows what to do at home. No concerns at this time          SUZANNA STINSON RN    8/8/2022, 08:41 EDT       Keep working to lose weight through healthy eating and exercise.

## 2022-08-09 LAB — BACTERIA ISLT: NORMAL

## 2022-08-11 ENCOUNTER — OFFICE VISIT (OUTPATIENT)
Dept: FAMILY MEDICINE CLINIC | Facility: CLINIC | Age: 82
End: 2022-08-11

## 2022-08-11 VITALS
DIASTOLIC BLOOD PRESSURE: 80 MMHG | WEIGHT: 174 LBS | HEART RATE: 69 BPM | TEMPERATURE: 97.7 F | SYSTOLIC BLOOD PRESSURE: 135 MMHG | OXYGEN SATURATION: 98 % | BODY MASS INDEX: 29.87 KG/M2

## 2022-08-11 DIAGNOSIS — Z95.0 STATUS POST PLACEMENT OF CARDIAC PACEMAKER: Primary | ICD-10-CM

## 2022-08-11 PROCEDURE — 1111F DSCHRG MED/CURRENT MED MERGE: CPT | Performed by: NURSE PRACTITIONER

## 2022-08-11 PROCEDURE — 99495 TRANSJ CARE MGMT MOD F2F 14D: CPT | Performed by: NURSE PRACTITIONER

## 2022-08-11 NOTE — PROGRESS NOTES
"Transitional Care Follow Up Visit  Subjective     Alia Cheney is a 82 y.o. female who presents for a transitional care management visit.    Within 48 business hours after discharge our office contacted her via telephone to coordinate her care and needs.      I reviewed and discussed the details of that call along with the discharge summary, hospital problems, inpatient lab results, inpatient diagnostic studies, and consultation reports with Alia.     Current outpatient and discharge medications have been reconciled for the patient.  Reviewed by: SHAWNA Dominguez      Date of TCM Phone Call 8/5/2022   [facility]   Date of Admission 8/3/2022   Date of Discharge 8/5/2022   Discharge Disposition Home or Self Care     Risk for Readmission (LACE) Score: 11 (8/5/2022  6:01 AM)      Patient is here today for [facility] follow-up.  She was admitted from August 3 through August 5.  Patient had numerous syncopal episodes while at assisted living on [facility].  She was evaluated by cardiology and electrophysiology.  She was believed to have sick sinus syndrome and a dual-chamber pacemaker was placed.  Patient's metoprolol was increased to 25 mg twice daily and she was advised to have Keflex for 5 days.  She was supposed to follow-up with the electrophysiologist in 2 weeks      Hospital Course  82-year-old female who presents to the ER by EMS from local assisted living facility.  EMS reports that facility nurse stated the patient had multiple syncopal episodes this morning.  Patient recalls 1 episode where she was falling and the nurse caught her, she denies hitting her head.  But does not really remember repeated episodes.  She states when she awoke this morning she had a posterior headache with \"ringing\" in her head, it was not sudden onset or there was no thunderclap sensation, it was not the worst headache of her life, it has resolved at that her arrival to the emergency room.  She has no " history of headaches.  She did not require treatment.  She has had no nausea or vomiting she states she did have some palpitations this morning that she have now resolved.  She has had no chest pain or shortness of breath.  No other complaints.    In the ER, labs and CT head are unremarkable.  She was seen by cardiology and diagnosed with SSS.  She was admitted for pacemaker placement.  This was done yesterday without incident.  She did well overnight and is ready for discharge today.  She was started on a beta blocker.  She will f/u with cardiology accordingly.      Course During Hospital Stay:  See HPI     The following portions of the patient's history were reviewed and updated as appropriate: allergies, current medications, past family history, past medical history, past social history, past surgical history and problem list.    Review of Systems   Constitutional: Positive for fatigue.   Respiratory: Negative for chest tightness and shortness of breath.    Cardiovascular: Negative for chest pain and palpitations.   Gastrointestinal: Negative for abdominal pain.   Neurological: Negative for dizziness and headaches.       Objective   Physical Exam  Vitals reviewed.   Constitutional:       General: She is not in acute distress.     Appearance: Normal appearance. She is well-developed. She is not diaphoretic.   HENT:      Head: Normocephalic and atraumatic.   Eyes:      General:         Right eye: No discharge.         Left eye: No discharge.      Extraocular Movements: Extraocular movements intact.      Conjunctiva/sclera: Conjunctivae normal.   Cardiovascular:      Rate and Rhythm: Normal rate and regular rhythm.      Heart sounds: No murmur heard.  Pulmonary:      Effort: Pulmonary effort is normal. No respiratory distress.      Breath sounds: Normal breath sounds. No wheezing or rales.   Abdominal:      General: Bowel sounds are normal.      Palpations: Abdomen is soft.   Musculoskeletal:         General: Normal  range of motion.      Cervical back: Normal range of motion.   Skin:     General: Skin is warm and dry.   Neurological:      General: No focal deficit present.      Mental Status: She is alert and oriented to person, place, and time.   Psychiatric:         Mood and Affect: Mood normal.         Behavior: Behavior normal.         Thought Content: Thought content normal.         Judgment: Judgment normal.         Assessment & Plan   Problems Addressed this Visit    None     Visit Diagnoses     Status post placement of cardiac pacemaker    -  Primary    doing well  denies CP, dizziness, HA  sees cardio next week  insertion site healing well      Diagnoses       Codes Comments    Status post placement of cardiac pacemaker    -  Primary ICD-10-CM: Z95.0  ICD-9-CM: V45.01 doing well  denies CP, dizziness, HA  sees cardio next week  insertion site healing well          Reviewed hospital notes and tests

## 2022-08-16 NOTE — PROGRESS NOTES
Date of Office Visit: 2022  Encounter Provider: Dr. 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   • Anxiety 2013   • Arrhythmia, ventricular 2012   • Arthritis    • Ataxia 2013   • Carpal tunnel syndrome, bilateral 2017   • Depression 1/3/2012   • Elevated cholesterol    • Gastroesophageal reflux disease 2012   • History of osteoporotic pathological fracture     Bilateral wrist-unknown date   • Hx of total knee replacement, right 2017   • Hyperlipidemia 2016   • Insomnia 2013   • Irritable bowel syndrome 2013   • Keratoconjunctivitis sicca, in Sjogren's syndrome (HCC) 2016   • Memory loss 2018   • Osteoarthritis of knee 2017   • Osteoporosis     fosamax [id number], on prolia()   • Paroxysmal atrial fibrillation (HCC) 2011   • Primary malignant neoplasm of right middle lobe of lung (HCC) 2018   • Rheumatoid arthritis (HCC)    • Tension-type headache, not intractable 2017   • Vitamin B12 deficiency 2016   • Vitamin D deficiency 2017         Past Surgical History:   Procedure Laterality Date   • ABDOMINAL SURGERY     • ANAL DILATION     • APPENDECTOMY     • BREAST BIOPSY Right    • CARDIAC CATHETERIZATION     • CARDIAC ELECTROPHYSIOLOGY PROCEDURE N/A 2022    Procedure: Pacemaker DC [place];  Surgeon: Patricia Willis MD;  Location: [facility] INVASIVE LOCATION;  Service: Cardiology;  Laterality: N/A;   • CATARACT EXTRACTION WITH INTRAOCULAR LENS IMPLANT Bilateral    • CHOLECYSTECTOMY     • COLONOSCOPY     • CYSTOSCOPY     • HYSTERECTOMY     • JOINT REPLACEMENT     • LUNG REMOVAL, PARTIAL Right  08/06/2018    RIGHT VATS WEDGE RESECTION OF RML PN RIGHT MIDDLE LOBECTOMY    • SUBTOTAL HYSTERECTOMY     • TONSILLECTOMY     • TOTAL HIP ARTHROPLASTY Left 7/17/2019    Procedure: TOTAL HIP ARTHROPLASTY ANTERIOR;  Surgeon: Lenny Khan MD;  Location: [facility] MAIN OR;  Service: Orthopedics   • TOTAL KNEE ARTHROPLASTY Right 2017   • WRIST FRACTURE SURGERY Bilateral            Current Outpatient Medications:   •  calcium carbonate (OS-DAHLIA) 1250 (500 Ca) MG tablet, Take 600 mg by mouth Daily., Disp: , Rfl:   •  Cholecalciferol (VITAMIN D3 SUPER STRENGTH) 50 MCG (2000 UT) tablet, 2 po q d (Patient taking differently: Take 2 tablets by mouth Daily. 2 po q d), Disp: 60 each, Rfl: 11  •  leflunomide (ARAVA) 10 MG tablet, Take 1 tablet by mouth Daily., Disp: 90 tablet, Rfl: 3  •  LORazepam (ATIVAN) 0.5 MG tablet, Take 0.5 mg by mouth 2 (Two) Times a Day As Needed for Anxiety., Disp: , Rfl:   •  memantine (Namenda) 10 MG tablet, Take 1 tablet by mouth 2 (Two) Times a Day., Disp: 180 tablet, Rfl: 1  •  metoprolol tartrate (LOPRESSOR) 25 MG tablet, Take 1 tablet by mouth 2 (Two) Times a Day., Disp: 180 tablet, Rfl: 1  •  pantoprazole (PROTONIX) 40 MG EC tablet, Take 1 tablet by mouth Daily., Disp: 90 tablet, Rfl: 1  •  polyethylene glycol (MIRALAX) 17 GM/SCOOP powder, Take 17 g by mouth 2 (Two) Times a Day., Disp: 1020 g, Rfl: 2  •  pravastatin (PRAVACHOL) 40 MG tablet, Take 1 tablet by mouth every night at bedtime., Disp: 90 tablet, Rfl: 0  •  traZODone (DESYREL) 50 MG tablet, Take 2 tablets by mouth Every Night., Disp: 180 tablet, Rfl: 1  •  venlafaxine XR (EFFEXOR-XR) 150 MG 24 hr capsule, Take 150 mg by mouth Daily., Disp: , Rfl:   •  vitamin B-12 (CYANOCOBALAMIN) 1000 MCG tablet, Take 1,000 mcg by mouth Daily., Disp: , Rfl:     Current Facility-Administered Medications:   •  denosumab (PROLIA) syringe 60 mg, 60 mg, Subcutaneous, Q6 Months, Aletha Guadalupe PA, 60 mg at 06/20/22 0806      Social History     Socioeconomic  "History   • Marital status:    Tobacco Use   • Smoking status: Former Smoker     Packs/day: 1.00     Years: 15.00     Pack years: 15.00     Types: Cigarettes     Start date:      Quit date:      Years since quittin.6   • Smokeless tobacco: Never Used   Vaping Use   • Vaping Use: Never used   Substance and Sexual Activity   • Alcohol use: Yes   • Drug use: No   • Sexual activity: Not Currently         ROS    Procedures    Procedures    No orders to display           Objective:    Ht 162.6 cm (64.02\")   BMI 29.85 kg/m²         Physical Exam        Assessment:       Diagnosis Plan   1. Sick sinus syndrome (HCC)     2. Paroxysmal atrial fibrillation (HCC)     3. Mixed hyperlipidemia     4. Coronary artery disease involving native coronary artery of native heart without angina pectoris              Plan:         "

## 2022-08-17 ENCOUNTER — CLINICAL SUPPORT NO REQUIREMENTS (OUTPATIENT)
Dept: CARDIOLOGY | Facility: CLINIC | Age: 82
End: 2022-08-17

## 2022-08-17 ENCOUNTER — OFFICE VISIT (OUTPATIENT)
Dept: CARDIOLOGY | Facility: CLINIC | Age: 82
End: 2022-08-17

## 2022-08-17 VITALS
HEIGHT: 64 IN | BODY MASS INDEX: 29.88 KG/M2 | HEART RATE: 67 BPM | WEIGHT: 175 LBS | DIASTOLIC BLOOD PRESSURE: 72 MMHG | OXYGEN SATURATION: 97 % | SYSTOLIC BLOOD PRESSURE: 120 MMHG

## 2022-08-17 DIAGNOSIS — I49.5 SICK SINUS SYNDROME: Primary | ICD-10-CM

## 2022-08-17 DIAGNOSIS — Z95.0 PRESENCE OF CARDIAC PACEMAKER: ICD-10-CM

## 2022-08-17 DIAGNOSIS — I47.1 PAROXYSMAL SVT (SUPRAVENTRICULAR TACHYCARDIA): ICD-10-CM

## 2022-08-17 PROBLEM — I47.10 PAROXYSMAL SVT (SUPRAVENTRICULAR TACHYCARDIA): Status: ACTIVE | Noted: 2022-08-17

## 2022-08-17 PROCEDURE — 93280 PM DEVICE PROGR EVAL DUAL: CPT | Performed by: NURSE PRACTITIONER

## 2022-08-17 PROCEDURE — 99024 POSTOP FOLLOW-UP VISIT: CPT | Performed by: NURSE PRACTITIONER

## 2022-08-17 NOTE — PROGRESS NOTES
DEVICE INTERROGATION:  IN OFFICE    DEVICE TYPE:   Dual-chamber pacemaker    :   [employer]    BATTERY:  Stable    TIME TO ELECTIVE REPLACEMENT INDICATORS:   9.5 years    CHARGE TIME:   Not applicable        LEAD DATA:       DEVICE REPROGRAMMED FOR TESTING      Atrial:   3.0 mV, 698 ohms, 1.1 V@0.4 ms    Ventricular:     7.8 mV, 632 ohms, 0.3 V@0.4 ms    LV:      Pacemaker dependent:   No      Atrial pacing percentage: 17%    Ventricular pacing percentage: Less than 1%      Arrhythmia Logbook Reviewed: 2 episodes of brief SVT lasting 15 seconds in duration with a rate of 170        Summary:    Stable Device Function    No significant arhythmia burden.     Battery status is stable.    Reviewed with: Dr. Novak      NEXT IN OFFICE DEVICE CHECK DUE: 3 months    REMOTE DEVICE INTERROGATIONS:     Awaiting shipment of home monitor    Pacemaker pocket left upper chest is without significant redness swelling or hematoma.  Wound edges are well approximated and there is no drainage.  Steri-Strips remain.    Post pacemaker care reviewed with patient and her daughter.

## 2022-10-02 RX ORDER — PRAVASTATIN SODIUM 40 MG
40 TABLET ORAL
Qty: 90 TABLET | Refills: 0 | Status: SHIPPED | OUTPATIENT
Start: 2022-10-02

## 2022-11-17 RX ORDER — VENLAFAXINE HYDROCHLORIDE 150 MG/1
CAPSULE, EXTENDED RELEASE ORAL
Qty: 90 CAPSULE | Refills: 0 | Status: SHIPPED | OUTPATIENT
Start: 2022-11-17

## 2022-11-18 DIAGNOSIS — F41.9 ANXIETY: ICD-10-CM

## 2022-11-22 RX ORDER — VENLAFAXINE HYDROCHLORIDE 75 MG/1
CAPSULE, EXTENDED RELEASE ORAL
Qty: 90 CAPSULE | Refills: 1 | Status: SHIPPED | OUTPATIENT
Start: 2022-11-22

## 2022-11-22 RX ORDER — TRAZODONE HYDROCHLORIDE 50 MG/1
100 TABLET ORAL NIGHTLY
Qty: 180 TABLET | Refills: 1 | Status: SHIPPED | OUTPATIENT
Start: 2022-11-22

## 2022-11-22 RX ORDER — PANTOPRAZOLE SODIUM 40 MG/1
TABLET, DELAYED RELEASE ORAL
Qty: 90 TABLET | Refills: 1 | Status: SHIPPED | OUTPATIENT
Start: 2022-11-22

## 2022-11-22 RX ORDER — MEMANTINE HYDROCHLORIDE 10 MG/1
TABLET ORAL
Qty: 180 TABLET | Refills: 1 | Status: SHIPPED | OUTPATIENT
Start: 2022-11-22

## 2022-12-15 RX ORDER — LINACLOTIDE 145 UG/1
CAPSULE, GELATIN COATED ORAL
Qty: 90 CAPSULE | Refills: 0 | Status: SHIPPED | OUTPATIENT
Start: 2022-12-15

## 2022-12-28 ENCOUNTER — TELEPHONE (OUTPATIENT)
Dept: ORTHOPEDIC SURGERY | Facility: CLINIC | Age: 82
End: 2022-12-28

## 2022-12-28 ENCOUNTER — OFFICE VISIT (OUTPATIENT)
Dept: ORTHOPEDIC SURGERY | Facility: CLINIC | Age: 82
End: 2022-12-28

## 2022-12-28 ENCOUNTER — LAB (OUTPATIENT)
Dept: LAB | Facility: HOSPITAL | Age: 82
End: 2022-12-28
Payer: MEDICARE

## 2022-12-28 VITALS
DIASTOLIC BLOOD PRESSURE: 79 MMHG | BODY MASS INDEX: 29.88 KG/M2 | HEIGHT: 64 IN | SYSTOLIC BLOOD PRESSURE: 148 MMHG | WEIGHT: 175 LBS | HEART RATE: 71 BPM

## 2022-12-28 DIAGNOSIS — Z87.310 HISTORY OF OSTEOPOROTIC PATHOLOGICAL FRACTURE: ICD-10-CM

## 2022-12-28 DIAGNOSIS — M35.01 KERATOCONJUNCTIVITIS SICCA, IN SJOGREN'S SYNDROME: ICD-10-CM

## 2022-12-28 DIAGNOSIS — M81.0 AGE-RELATED OSTEOPOROSIS WITHOUT CURRENT PATHOLOGICAL FRACTURE: Primary | ICD-10-CM

## 2022-12-28 DIAGNOSIS — Z51.81 MEDICATION MONITORING ENCOUNTER: ICD-10-CM

## 2022-12-28 DIAGNOSIS — C34.2 PRIMARY MALIGNANT NEOPLASM OF RIGHT MIDDLE LOBE OF LUNG: ICD-10-CM

## 2022-12-28 DIAGNOSIS — I48.0 PAROXYSMAL ATRIAL FIBRILLATION: ICD-10-CM

## 2022-12-28 DIAGNOSIS — F33.42 RECURRENT MAJOR DEPRESSIVE DISORDER, IN FULL REMISSION: ICD-10-CM

## 2022-12-28 DIAGNOSIS — M81.0 AGE-RELATED OSTEOPOROSIS WITHOUT CURRENT PATHOLOGICAL FRACTURE: ICD-10-CM

## 2022-12-28 DIAGNOSIS — E55.9 VITAMIN D DEFICIENCY: ICD-10-CM

## 2022-12-28 DIAGNOSIS — M05.79 RHEUMATOID ARTHRITIS INVOLVING MULTIPLE SITES WITH POSITIVE RHEUMATOID FACTOR: ICD-10-CM

## 2022-12-28 LAB
25(OH)D3 SERPL-MCNC: 51.5 NG/ML (ref 30–100)
ALBUMIN SERPL-MCNC: 3.9 G/DL (ref 3.5–5.2)
ALBUMIN/GLOB SERPL: 1.4 G/DL
ALP SERPL-CCNC: 90 U/L (ref 39–117)
ALT SERPL W P-5'-P-CCNC: 14 U/L (ref 1–33)
ANION GAP SERPL CALCULATED.3IONS-SCNC: 9 MMOL/L (ref 5–15)
AST SERPL-CCNC: 17 U/L (ref 1–32)
BILIRUB SERPL-MCNC: 0.4 MG/DL (ref 0–1.2)
BUN SERPL-MCNC: 11 MG/DL (ref 8–23)
BUN/CREAT SERPL: 16.7 (ref 7–25)
CALCIUM SPEC-SCNC: 8.9 MG/DL (ref 8.6–10.5)
CHLORIDE SERPL-SCNC: 108 MMOL/L (ref 98–107)
CO2 SERPL-SCNC: 27 MMOL/L (ref 22–29)
CREAT SERPL-MCNC: 0.66 MG/DL (ref 0.57–1)
EGFRCR SERPLBLD CKD-EPI 2021: 86.6 ML/MIN/1.73
GLOBULIN UR ELPH-MCNC: 2.7 GM/DL
GLUCOSE SERPL-MCNC: 157 MG/DL (ref 65–99)
POTASSIUM SERPL-SCNC: 3.5 MMOL/L (ref 3.5–5.2)
PROT SERPL-MCNC: 6.6 G/DL (ref 6–8.5)
SODIUM SERPL-SCNC: 144 MMOL/L (ref 136–145)

## 2022-12-28 PROCEDURE — 82306 VITAMIN D 25 HYDROXY: CPT

## 2022-12-28 PROCEDURE — 36415 COLL VENOUS BLD VENIPUNCTURE: CPT

## 2022-12-28 PROCEDURE — 99214 OFFICE O/P EST MOD 30 MIN: CPT | Performed by: PHYSICIAN ASSISTANT

## 2022-12-28 PROCEDURE — 80053 COMPREHEN METABOLIC PANEL: CPT

## 2022-12-28 PROCEDURE — 96372 THER/PROPH/DIAG INJ SC/IM: CPT | Performed by: PHYSICIAN ASSISTANT

## 2022-12-28 RX ORDER — DENOSUMAB 60 MG/ML
INJECTION SUBCUTANEOUS
COMMUNITY
Start: 2022-12-07

## 2022-12-28 NOTE — PATIENT INSTRUCTIONS
Osteoporosis  Osteoporosis is when the bones get thin and weak. This can cause your bones to break (fracture) more easily.  What are the causes?  The exact cause of this condition is not known.  What increases the risk?  Having family members with this condition.  Not eating enough healthy foods.  Taking certain medicines.  Being female.  Being age 50 or older.  Smoking or using other products that contain nicotine or tobacco, such as e-cigarettes or chewing tobacco.  Not exercising.  Being of  or  ancestry.  Having a small body frame.  What are the signs or symptoms?  A broken bone might be the first sign, especially if the break results from a fall or injury that usually would not cause a bone to break.  Other signs and symptoms include:  Pain in the neck or low back.  Being hunched over (stooped posture).  Getting shorter.  How is this treated?  Eating more foods with more calcium and vitamin D in them.  Doing exercises.  Stopping tobacco use.  Limiting how much alcohol you drink.  Taking medicines to slow bone loss or help make the bones stronger.  Taking supplements of calcium and vitamin D every day.  Taking medicines to replace chemicals in the body (hormone replacement medicines).  Monitoring your levels of calcium and vitamin D.  The goal of treatment is to strengthen your bones and lower your risk for a bone break.  Follow these instructions at home:  Eating and drinking  Eat plenty of calcium and vitamin D. These nutrients are good for your bones. Good sources of calcium and vitamin D include:  Some fish, such as salmon and tuna.  Foods that have calcium and vitamin D added to them (fortified foods), such as some breakfast cereals.  Egg yolks.  Cheese.  Liver.    Activity  Do exercises as told by your doctor. Ask your doctor what exercises are safe for you. You should do:  Exercises that make your muscles work to hold your body weight up (weight-bearing exercises). These include jordyn chi,  yoga, and walking.  Exercises to make your muscles stronger. One example is lifting weights.  Lifestyle  Do not drink alcohol if:  Your doctor tells you not to drink.  You are pregnant, may be pregnant, or are planning to become pregnant.  If you drink alcohol:  Limit how much you use to:  0-1 drink a day for women.  0-2 drinks a day for men.  Know how much alcohol is in your drink. In the [place] one drink equals one 12 oz bottle of beer (355 mL), one 5 oz glass of wine (148 mL), or one 1½ oz glass of hard liquor (44 mL).  Do not smoke or use any products that contain nicotine or tobacco. If you need help quitting, ask your doctor.  Preventing falls  Use tools to help you move around (mobility aids) as needed. These include canes, walkers, scooters, and crutches.  Keep rooms well-lit.  Put away things on the floor that could make you trip. These include cords and rugs.  Install safety rails on stairs. Install grab bars in bathrooms.  Use rubber mats in slippery areas, like bathrooms.  Wear shoes that:  Fit you well.  Support your feet.  Have closed toes.  Have rubber soles or low heels.  Tell your doctor about all of the medicines you are taking. Some medicines can make you more likely to fall.  General instructions  Take over-the-counter and prescription medicines only as told by your doctor.  Keep all follow-up visits.  Contact a doctor if:  You have not been tested (screened) for osteoporosis and you are:  A woman who is age 65 or older.  A man who is age 70 or older.  Get help right away if:  You fall.  You get hurt.  Summary  Osteoporosis happens when your bones get thin and weak.  Weak bones can break (fracture) more easily.  Eat plenty of calcium and vitamin D. These are good for your bones.  Tell your doctor about all of the medicines that you take.  This information is not intended to replace advice given to you by your health care provider. Make sure you discuss any questions you have with your health care  provider.  Document Revised: 06/03/2021 Document Reviewed: 06/03/2021  Elsevier Patient Education © 2022 [employer]

## 2022-12-28 NOTE — PROGRESS NOTES
ORTHO FOLLOW UP       Subjective:    HPI:   Alia Cheney is a 82 y.o. female who presents in follow-up for osteoporosis.  She is currently on Prolia and reports that she is doing well with no noticeable side effects.  This medication was started in 2016 with her last injection on 6/20/2022.  She also continues 1200 mg of calcium and 2000 international units of D3 per day.  She denies any fractures since last office visit.  She is not getting a significant amount of physical activity.  She denies any falls in the last year, however does at times feel unsteady with walking due to her recent COVID infection.  She denies any new pain or change in her existing pain.  Recent labs were reviewed.  Her DEXA scan is currently up-to-date.      Past Medical History:   Diagnosis Date   • Anal stenosis 1/3/2012   • Anxiety 11/25/2013   • Arrhythmia, ventricular 4/20/2012   • Arthritis    • Ataxia 8/6/2013   • Carpal tunnel syndrome, bilateral 2/13/2017   • Depression 1/3/2012   • Elevated cholesterol    • Gastroesophageal reflux disease 6/29/2012   • History of osteoporotic pathological fracture     Bilateral wrist-unknown date   • Hx of total knee replacement, right 6/29/2017   • Hyperlipidemia 5/2/2016   • Insomnia 8/6/2013   • Irritable bowel syndrome 8/6/2013   • Keratoconjunctivitis sicca, in Sjogren's syndrome (HCC) 9/21/2016   • Memory loss 12/7/2018   • Osteoarthritis of knee 5/19/2017   • Osteoporosis     fosamax [id number], on prolia(2016)   • Paroxysmal atrial fibrillation (HCC) 11/9/2011   • Primary malignant neoplasm of right middle lobe of lung (HCC) 8/21/2018   • Rheumatoid arthritis (HCC)    • Tension-type headache, not intractable 12/26/2017   • Vitamin B12 deficiency 5/2/2016   • Vitamin D deficiency 11/20/2017       Past Surgical History:   Procedure Laterality Date   • ABDOMINAL SURGERY     • ANAL DILATION     • APPENDECTOMY     • BREAST BIOPSY Right    • CARDIAC CATHETERIZATION  2009   • CARDIAC  ELECTROPHYSIOLOGY PROCEDURE N/A 2022    Procedure: Pacemaker DC new-b[place];  Surgeon: Patricia Willis MD;  Location: [facility] CATH INVASIVE LOCATION;  Service: Cardiology;  Laterality: N/A;   • CATARACT EXTRACTION WITH INTRAOCULAR LENS IMPLANT Bilateral    • CHOLECYSTECTOMY     • COLONOSCOPY     • CYSTOSCOPY     • HYSTERECTOMY     • JOINT REPLACEMENT     • LUNG REMOVAL, PARTIAL Right 2018    RIGHT VATS WEDGE RESECTION OF RML PN RIGHT MIDDLE LOBECTOMY    • SUBTOTAL HYSTERECTOMY     • TONSILLECTOMY     • TOTAL HIP ARTHROPLASTY Left 2019    Procedure: TOTAL HIP ARTHROPLASTY ANTERIOR;  Surgeon: Lenny Khan MD;  Location: [facility] MAIN OR;  Service: Orthopedics   • TOTAL KNEE ARTHROPLASTY Right 2017   • WRIST FRACTURE SURGERY Bilateral        Social History     Occupational History   • Not on file   Tobacco Use   • Smoking status: Former     Packs/day: 1.00     Years: 15.00     Pack years: 15.00     Types: Cigarettes     Start date:      Quit date:      Years since quittin.0   • Smokeless tobacco: Never   Vaping Use   • Vaping Use: Never used   Substance and Sexual Activity   • Alcohol use: Yes   • Drug use: No   • Sexual activity: Not Currently      The following portions of the patient's history were reviewed and updated as appropriate: allergies, current medications, past family history, past medical history, past social history, past surgical history and problem list.    Medications:    Current Outpatient Medications:   •  calcium carbonate (OS-DAHLIA) 1250 (500 Ca) MG tablet, Take 600 mg by mouth Daily., Disp: , Rfl:   •  Cholecalciferol (VITAMIN D3 SUPER STRENGTH) 50 MCG (2000 UT) tablet, 2 po q d (Patient taking differently: Take 2 tablets by mouth Daily. 2 po q d), Disp: 60 each, Rfl: 11  •  leflunomide (ARAVA) 10 MG tablet, Take 1 tablet by mouth Daily., Disp: 90 tablet, Rfl: 3  •  Linzess 145 MCG capsule capsule, TAKE 1 CAPSULE EVERY DAY, Disp: 90 capsule, Rfl: 0  •  LORazepam  "(ATIVAN) 0.5 MG tablet, Take 0.5 mg by mouth 2 (Two) Times a Day As Needed for Anxiety., Disp: , Rfl:   •  memantine (NAMENDA) 10 MG tablet, TAKE 1 TABLET TWICE DAILY, Disp: 180 tablet, Rfl: 1  •  metoprolol tartrate (LOPRESSOR) 25 MG tablet, Take 1 tablet by mouth 2 (Two) Times a Day., Disp: 180 tablet, Rfl: 1  •  pantoprazole (PROTONIX) 40 MG EC tablet, TAKE 1 TABLET EVERY DAY, Disp: 90 tablet, Rfl: 1  •  polyethylene glycol (MIRALAX) 17 GM/SCOOP powder, Take 17 g by mouth 2 (Two) Times a Day., Disp: 1020 g, Rfl: 2  •  pravastatin (PRAVACHOL) 40 MG tablet, TAKE 1 TABLET BY MOUTH EVERY NIGHT AT BEDTIME., Disp: 90 tablet, Rfl: 0  •  Prolia 60 MG/ML solution prefilled syringe syringe, , Disp: , Rfl:   •  traZODone (DESYREL) 50 MG tablet, TAKE 2 TABLETS BY MOUTH EVERY NIGHT., Disp: 180 tablet, Rfl: 1  •  venlafaxine XR (EFFEXOR-XR) 150 MG 24 hr capsule, TAKE 1 CAPSULE BY MOUTH EVERY MORNING. TAKE WITH 75MG TAB FOR A TOTAL OF 225MG, Disp: 90 capsule, Rfl: 0  •  venlafaxine XR (EFFEXOR-XR) 75 MG 24 hr capsule, TAKE 1 CAPSULE EVERY DAY, Disp: 90 capsule, Rfl: 1  •  vitamin B-12 (CYANOCOBALAMIN) 1000 MCG tablet, Take 1,000 mcg by mouth Daily., Disp: , Rfl:     Current Facility-Administered Medications:   •  denosumab (PROLIA) syringe 60 mg, 60 mg, Subcutaneous, Q6 Months, Aletha Guadalupe PA, 60 mg at 12/28/22 1505    Allergies:  No Known Allergies       Objective   Objective:    /79   Pulse 71   Ht 162.6 cm (64.02\")   Wt 79.4 kg (175 lb)   BMI 30.02 kg/m²     Physical Examination:  Obese individual in no acute distress, patient is alert and cooperative with the exam, appears to have normal mood and affect with a normal attention span and concentration, ambulating unassisted  normocephalic, atraumatic, extraocular movements intact, conjunctiva and sclera are clear without nystagmus, normal canals with grossly normal hearing  no lymphadenopathy or thyromegaly  normal respiratory effort  abdomen is nontender, " without guarding or rebound and nondistended  Kyphotic  No lower extremity edema noted  skin intact without lesions or rashes visible        Imaging:  Unknown date-fragility fractures of bilateral wrists  6/14/2016-DEXA scan at [facility], revealed a T score of -2.4 in the right total hip, with FRAX scores of 14.9% and 4.6%.  2016-patient started on Prolia  5/22/2019-DEXA scan at [facility], revealed a T score of -2.1 in the right total hip.  When compared to 2016, this did show an increase in bone density of 6.4% in the spine and 3.6% in the hips.  12/10/2019-Prolia injection  6/11/2020-Prolia injection  12/14/2020-Prolia injection  5/24/2021-DEXA scan at [facility], revealed a T score of -2.0 in the right total hip, -1.5 in the right femoral neck, and -0.8 in the spine.  When compared to 2019, this did show a decrease in bone density of 4.2% in the spine and no significant change in the right hip.  Spine bone mineral density is within normal parameters.  6/14/2021-Prolia injection  12/16/2021-Prolia injection  6/20/2022-Prolia injection  12/28/2022-Prolia injection          Assessment:  1. Age-related osteoporosis without current pathological fracture    2. History of osteoporotic pathological fracture    3. Vitamin D deficiency    4. Rheumatoid arthritis involving multiple sites with positive rheumatoid factor (HCC)    5. Primary malignant neoplasm of right middle lobe of lung (HCC)    6. Paroxysmal atrial fibrillation (HCC)    7. Recurrent major depressive disorder, in full remission (HCC)    8. Keratoconjunctivitis sicca, in Sjogren's syndrome (HCC)    9. Medication monitoring encounter         Currently on Prolia since 2016        Plan:  Prolia injection today from specialty pharmacy.  With her history of fragility fractures and multiple risk factors, she continues to be a very high risk for future fractures.  At this time, I am recommending that she continue her Prolia injections  every 6 months, as well as her calcium and vitamin D daily.  She has deferred physical therapy at this time.  We did review weightbearing exercises and fall prevention today.  Since I am leaving the practice, I have recommended that she follow-up with her PCP to continue her Prolia injections at the cancer center.  She should call with any questions or concerns.               MATI Crouch  12/28/22  16:40 EST

## 2022-12-28 NOTE — TELEPHONE ENCOUNTER
I spoke with Naomy Pt's daughter to inform her that Alia will need to go to lab to get Stat Vit D and CMP prior to her appointment this afternoon due to Calcium Low in August.  Naomy said that she would take care of it.  RAMOSB

## 2023-04-15 ENCOUNTER — APPOINTMENT (OUTPATIENT)
Dept: CT IMAGING | Facility: HOSPITAL | Age: 83
DRG: 392 | End: 2023-04-15
Payer: MEDICARE

## 2023-04-15 ENCOUNTER — HOSPITAL ENCOUNTER (INPATIENT)
Facility: HOSPITAL | Age: 83
LOS: 3 days | Discharge: HOME | DRG: 392 | End: 2023-04-19
Attending: EMERGENCY MEDICINE | Admitting: FAMILY MEDICINE
Payer: MEDICARE

## 2023-04-15 DIAGNOSIS — R10.30 LOWER ABDOMINAL PAIN: Primary | ICD-10-CM

## 2023-04-15 DIAGNOSIS — K52.9 COLITIS: ICD-10-CM

## 2023-04-15 LAB
ALBUMIN SERPL-MCNC: 4.2 G/DL (ref 3.5–5.2)
ALBUMIN/GLOB SERPL: 1.7 G/DL
ALP SERPL-CCNC: 91 U/L (ref 39–117)
ALT SERPL W P-5'-P-CCNC: 38 U/L (ref 1–33)
ANION GAP SERPL CALCULATED.3IONS-SCNC: 11 MMOL/L (ref 5–15)
APTT PPP: 29.6 SECONDS (ref 61–76.5)
AST SERPL-CCNC: 50 U/L (ref 1–32)
BACTERIA UR QL AUTO: ABNORMAL /HPF
BASOPHILS # BLD AUTO: 0 10*3/MM3 (ref 0–0.2)
BASOPHILS NFR BLD AUTO: 0.1 % (ref 0–1.5)
BILIRUB SERPL-MCNC: 1 MG/DL (ref 0–1.2)
BILIRUB UR QL STRIP: NEGATIVE
BUN SERPL-MCNC: 10 MG/DL (ref 8–23)
BUN/CREAT SERPL: 16.1 (ref 7–25)
CALCIUM SPEC-SCNC: 8.7 MG/DL (ref 8.6–10.5)
CHLORIDE SERPL-SCNC: 104 MMOL/L (ref 98–107)
CLARITY UR: CLEAR
CO2 SERPL-SCNC: 24 MMOL/L (ref 22–29)
COLOR UR: YELLOW
CREAT SERPL-MCNC: 0.62 MG/DL (ref 0.57–1)
DEPRECATED RDW RBC AUTO: 48.1 FL (ref 37–54)
EGFRCR SERPLBLD CKD-EPI 2021: 87.9 ML/MIN/1.73
EOSINOPHIL # BLD AUTO: 0 10*3/MM3 (ref 0–0.4)
EOSINOPHIL NFR BLD AUTO: 0 % (ref 0.3–6.2)
ERYTHROCYTE [DISTWIDTH] IN BLOOD BY AUTOMATED COUNT: 14.6 % (ref 12.3–15.4)
GLOBULIN UR ELPH-MCNC: 2.5 GM/DL
GLUCOSE SERPL-MCNC: 122 MG/DL (ref 65–99)
GLUCOSE UR STRIP-MCNC: NEGATIVE MG/DL
HCT VFR BLD AUTO: 38.1 % (ref 34–46.6)
HGB BLD-MCNC: 12.6 G/DL (ref 12–15.9)
HGB UR QL STRIP.AUTO: ABNORMAL
HYALINE CASTS UR QL AUTO: ABNORMAL /LPF
INR PPP: 1.17 (ref 0.93–1.1)
KETONES UR QL STRIP: ABNORMAL
LEUKOCYTE ESTERASE UR QL STRIP.AUTO: ABNORMAL
LIPASE SERPL-CCNC: 21 U/L (ref 13–60)
LYMPHOCYTES # BLD AUTO: 1.4 10*3/MM3 (ref 0.7–3.1)
LYMPHOCYTES NFR BLD AUTO: 13.4 % (ref 19.6–45.3)
MCH RBC QN AUTO: 29.8 PG (ref 26.6–33)
MCHC RBC AUTO-ENTMCNC: 33.2 G/DL (ref 31.5–35.7)
MCV RBC AUTO: 89.7 FL (ref 79–97)
MONOCYTES # BLD AUTO: 1.1 10*3/MM3 (ref 0.1–0.9)
MONOCYTES NFR BLD AUTO: 10.5 % (ref 5–12)
NEUTROPHILS NFR BLD AUTO: 7.7 10*3/MM3 (ref 1.7–7)
NEUTROPHILS NFR BLD AUTO: 76 % (ref 42.7–76)
NITRITE UR QL STRIP: NEGATIVE
NRBC BLD AUTO-RTO: 0.1 /100 WBC (ref 0–0.2)
PH UR STRIP.AUTO: 7 [PH] (ref 5–8)
PLATELET # BLD AUTO: 221 10*3/MM3 (ref 140–450)
PMV BLD AUTO: 8.6 FL (ref 6–12)
POTASSIUM SERPL-SCNC: 3.4 MMOL/L (ref 3.5–5.2)
PROT SERPL-MCNC: 6.7 G/DL (ref 6–8.5)
PROT UR QL STRIP: ABNORMAL
PROTHROMBIN TIME: 11.9 SECONDS (ref 9.6–11.7)
RBC # BLD AUTO: 4.25 10*6/MM3 (ref 3.77–5.28)
RBC # UR STRIP: ABNORMAL /HPF
REF LAB TEST METHOD: ABNORMAL
SODIUM SERPL-SCNC: 139 MMOL/L (ref 136–145)
SP GR UR STRIP: 1.01 (ref 1–1.03)
SQUAMOUS #/AREA URNS HPF: ABNORMAL /HPF
TRANS CELLS #/AREA URNS HPF: ABNORMAL /HPF
UROBILINOGEN UR QL STRIP: ABNORMAL
WBC # UR STRIP: ABNORMAL /HPF
WBC NRBC COR # BLD: 10.1 10*3/MM3 (ref 3.4–10.8)

## 2023-04-15 PROCEDURE — 85025 COMPLETE CBC W/AUTO DIFF WBC: CPT | Performed by: EMERGENCY MEDICINE

## 2023-04-15 PROCEDURE — 80053 COMPREHEN METABOLIC PANEL: CPT | Performed by: EMERGENCY MEDICINE

## 2023-04-15 PROCEDURE — G0378 HOSPITAL OBSERVATION PER HR: HCPCS

## 2023-04-15 PROCEDURE — 25010000002 HYDRALAZINE PER 20 MG: Performed by: NURSE PRACTITIONER

## 2023-04-15 PROCEDURE — 85610 PROTHROMBIN TIME: CPT | Performed by: EMERGENCY MEDICINE

## 2023-04-15 PROCEDURE — 74177 CT ABD & PELVIS W/CONTRAST: CPT

## 2023-04-15 PROCEDURE — 99285 EMERGENCY DEPT VISIT HI MDM: CPT

## 2023-04-15 PROCEDURE — 36415 COLL VENOUS BLD VENIPUNCTURE: CPT

## 2023-04-15 PROCEDURE — 85730 THROMBOPLASTIN TIME PARTIAL: CPT | Performed by: EMERGENCY MEDICINE

## 2023-04-15 PROCEDURE — 87040 BLOOD CULTURE FOR BACTERIA: CPT | Performed by: EMERGENCY MEDICINE

## 2023-04-15 PROCEDURE — 25010000002 ONDANSETRON PER 1 MG: Performed by: EMERGENCY MEDICINE

## 2023-04-15 PROCEDURE — 83690 ASSAY OF LIPASE: CPT | Performed by: EMERGENCY MEDICINE

## 2023-04-15 PROCEDURE — 25010000002 CEFTRIAXONE PER 250 MG: Performed by: EMERGENCY MEDICINE

## 2023-04-15 PROCEDURE — 25510000001 IOPAMIDOL PER 1 ML: Performed by: EMERGENCY MEDICINE

## 2023-04-15 PROCEDURE — 25010000002 MORPHINE PER 10 MG: Performed by: FAMILY MEDICINE

## 2023-04-15 PROCEDURE — 81001 URINALYSIS AUTO W/SCOPE: CPT | Performed by: EMERGENCY MEDICINE

## 2023-04-15 RX ORDER — MORPHINE SULFATE 2 MG/ML
2 INJECTION, SOLUTION INTRAMUSCULAR; INTRAVENOUS
Status: DISCONTINUED | OUTPATIENT
Start: 2023-04-15 | End: 2023-04-19 | Stop reason: HOSPADM

## 2023-04-15 RX ORDER — SODIUM CHLORIDE 0.9 % (FLUSH) 0.9 %
3-10 SYRINGE (ML) INJECTION AS NEEDED
Status: DISCONTINUED | OUTPATIENT
Start: 2023-04-15 | End: 2023-04-19 | Stop reason: HOSPADM

## 2023-04-15 RX ORDER — ACETAMINOPHEN 650 MG/1
650 SUPPOSITORY RECTAL EVERY 4 HOURS PRN
Status: DISCONTINUED | OUTPATIENT
Start: 2023-04-15 | End: 2023-04-19 | Stop reason: HOSPADM

## 2023-04-15 RX ORDER — ATORVASTATIN CALCIUM 10 MG/1
10 TABLET, FILM COATED ORAL NIGHTLY
Status: DISCONTINUED | OUTPATIENT
Start: 2023-04-15 | End: 2023-04-19 | Stop reason: HOSPADM

## 2023-04-15 RX ORDER — HYDRALAZINE HYDROCHLORIDE 20 MG/ML
10 INJECTION INTRAMUSCULAR; INTRAVENOUS EVERY 6 HOURS PRN
Status: DISCONTINUED | OUTPATIENT
Start: 2023-04-15 | End: 2023-04-19 | Stop reason: HOSPADM

## 2023-04-15 RX ORDER — SODIUM CHLORIDE 0.9 % (FLUSH) 0.9 %
3 SYRINGE (ML) INJECTION EVERY 12 HOURS SCHEDULED
Status: DISCONTINUED | OUTPATIENT
Start: 2023-04-15 | End: 2023-04-19 | Stop reason: HOSPADM

## 2023-04-15 RX ORDER — METRONIDAZOLE 500 MG/100ML
500 INJECTION, SOLUTION INTRAVENOUS EVERY 8 HOURS
Status: DISCONTINUED | OUTPATIENT
Start: 2023-04-15 | End: 2023-04-19 | Stop reason: HOSPADM

## 2023-04-15 RX ORDER — ACETAMINOPHEN 325 MG/1
650 TABLET ORAL EVERY 6 HOURS PRN
Status: DISCONTINUED | OUTPATIENT
Start: 2023-04-15 | End: 2023-04-19 | Stop reason: HOSPADM

## 2023-04-15 RX ORDER — SODIUM CHLORIDE 0.9 % (FLUSH) 0.9 %
10 SYRINGE (ML) INJECTION AS NEEDED
Status: DISCONTINUED | OUTPATIENT
Start: 2023-04-15 | End: 2023-04-19 | Stop reason: HOSPADM

## 2023-04-15 RX ORDER — METRONIDAZOLE 500 MG/100ML
500 INJECTION, SOLUTION INTRAVENOUS ONCE
Status: DISCONTINUED | OUTPATIENT
Start: 2023-04-15 | End: 2023-04-15

## 2023-04-15 RX ORDER — VENLAFAXINE HYDROCHLORIDE 75 MG/1
150 CAPSULE, EXTENDED RELEASE ORAL DAILY
Status: DISCONTINUED | OUTPATIENT
Start: 2023-04-15 | End: 2023-04-19 | Stop reason: HOSPADM

## 2023-04-15 RX ORDER — POTASSIUM CHLORIDE 7.45 MG/ML
10 INJECTION INTRAVENOUS
Status: DISCONTINUED | OUTPATIENT
Start: 2023-04-15 | End: 2023-04-19 | Stop reason: HOSPADM

## 2023-04-15 RX ORDER — VENLAFAXINE HYDROCHLORIDE 150 MG/1
150 CAPSULE, EXTENDED RELEASE ORAL DAILY
COMMUNITY

## 2023-04-15 RX ORDER — POTASSIUM CHLORIDE 1.5 G/1.77G
40 POWDER, FOR SOLUTION ORAL AS NEEDED
Status: DISCONTINUED | OUTPATIENT
Start: 2023-04-15 | End: 2023-04-19 | Stop reason: HOSPADM

## 2023-04-15 RX ORDER — DEXTROSE AND SODIUM CHLORIDE 5; .9 G/100ML; G/100ML
125 INJECTION, SOLUTION INTRAVENOUS CONTINUOUS
Status: DISCONTINUED | OUTPATIENT
Start: 2023-04-15 | End: 2023-04-15

## 2023-04-15 RX ORDER — POTASSIUM CHLORIDE 20 MEQ/1
40 TABLET, EXTENDED RELEASE ORAL AS NEEDED
Status: DISCONTINUED | OUTPATIENT
Start: 2023-04-15 | End: 2023-04-19 | Stop reason: HOSPADM

## 2023-04-15 RX ORDER — PANTOPRAZOLE SODIUM 40 MG/10ML
40 INJECTION, POWDER, LYOPHILIZED, FOR SOLUTION INTRAVENOUS
Status: DISCONTINUED | OUTPATIENT
Start: 2023-04-15 | End: 2023-04-18

## 2023-04-15 RX ORDER — SODIUM CHLORIDE 9 MG/ML
40 INJECTION, SOLUTION INTRAVENOUS AS NEEDED
Status: DISCONTINUED | OUTPATIENT
Start: 2023-04-15 | End: 2023-04-19 | Stop reason: HOSPADM

## 2023-04-15 RX ORDER — ONDANSETRON 2 MG/ML
4 INJECTION INTRAMUSCULAR; INTRAVENOUS EVERY 6 HOURS PRN
Status: DISCONTINUED | OUTPATIENT
Start: 2023-04-15 | End: 2023-04-19 | Stop reason: HOSPADM

## 2023-04-15 RX ORDER — ONDANSETRON 2 MG/ML
4 INJECTION INTRAMUSCULAR; INTRAVENOUS ONCE
Status: COMPLETED | OUTPATIENT
Start: 2023-04-15 | End: 2023-04-15

## 2023-04-15 RX ADMIN — METRONIDAZOLE 500 MG: 500 INJECTION, SOLUTION INTRAVENOUS at 15:06

## 2023-04-15 RX ADMIN — ONDANSETRON 4 MG: 2 INJECTION INTRAMUSCULAR; INTRAVENOUS at 08:11

## 2023-04-15 RX ADMIN — METRONIDAZOLE 500 MG: 500 INJECTION, SOLUTION INTRAVENOUS at 21:11

## 2023-04-15 RX ADMIN — PANTOPRAZOLE SODIUM 40 MG: 40 INJECTION, POWDER, FOR SOLUTION INTRAVENOUS at 16:58

## 2023-04-15 RX ADMIN — POTASSIUM CHLORIDE 40 MEQ: 1500 TABLET, EXTENDED RELEASE ORAL at 16:55

## 2023-04-15 RX ADMIN — MORPHINE SULFATE 2 MG: 2 INJECTION, SOLUTION INTRAMUSCULAR; INTRAVENOUS at 12:27

## 2023-04-15 RX ADMIN — CEFTRIAXONE 2 G: 2 INJECTION, POWDER, FOR SOLUTION INTRAMUSCULAR; INTRAVENOUS at 10:30

## 2023-04-15 RX ADMIN — ATORVASTATIN CALCIUM 10 MG: 10 TABLET, FILM COATED ORAL at 20:00

## 2023-04-15 RX ADMIN — Medication 3 ML: at 20:00

## 2023-04-15 RX ADMIN — IOPAMIDOL 100 ML: 755 INJECTION, SOLUTION INTRAVENOUS at 09:13

## 2023-04-15 RX ADMIN — POTASSIUM CHLORIDE 40 MEQ: 1500 TABLET, EXTENDED RELEASE ORAL at 20:00

## 2023-04-15 RX ADMIN — METOPROLOL TARTRATE 25 MG: 25 TABLET, FILM COATED ORAL at 16:55

## 2023-04-15 RX ADMIN — VENLAFAXINE HYDROCHLORIDE 150 MG: 75 CAPSULE, EXTENDED RELEASE ORAL at 16:55

## 2023-04-15 RX ADMIN — Medication 3 ML: at 15:45

## 2023-04-15 RX ADMIN — HYDRALAZINE HYDROCHLORIDE 10 MG: 20 INJECTION INTRAMUSCULAR; INTRAVENOUS at 21:11

## 2023-04-15 NOTE — PLAN OF CARE
Goal Outcome Evaluation:  Plan of Care Reviewed With: patient        Progress: no change  Outcome Evaluation: Patient admitted with abdominal pain. Patient complains of RUQ and RLQ tenderness and pain. IV continues at this time. Patient on clear liquid diet. Patient resting comfortably in bed with no complaints or noted distress. Fall precautions remain in place and call light within reach.

## 2023-04-15 NOTE — Clinical Note
Level of Care: Med/Surg [1]   Admitting Physician: MIKAEL MUNIZ [[id number]]   Attending Physician: MIKAEL MUNIZ [[id number]]

## 2023-04-15 NOTE — H&P
Patient Care Team:  Obdulia Spicer APRN as PCP - General (Nurse Practitioner)    Chief Conplaint  Subjective     The patient is a 82 y.o. female presents with the cute onset of abdominal pain with findings consistent with an acute diverticulitis    HPI  The patient was in her usual state of health until 1 to 2 days prior to presentation which began experiencing insidious onset of some progressive abdominal pain.  She attempted to rest and modify her diet but she decompensated and presented to the [facility] emergency room for evaluation where she was admitted with cecal inflammation//colitis//diverticulitis.  Review of Systems  Review of Systems    History  Past Medical History:   Diagnosis Date   • Anal stenosis 1/3/2012   • Anxiety 11/25/2013   • Arrhythmia, ventricular 4/20/2012   • Arthritis    • Ataxia 8/6/2013   • Carpal tunnel syndrome, bilateral 2/13/2017   • Depression 1/3/2012   • Elevated cholesterol    • Gastroesophageal reflux disease 6/29/2012   • History of osteoporotic pathological fracture     Bilateral wrist-unknown date   • Hx of total knee replacement, right 6/29/2017   • Hyperlipidemia 5/2/2016   • Insomnia 8/6/2013   • Irritable bowel syndrome 8/6/2013   • Keratoconjunctivitis sicca, in Sjogren's syndrome 9/21/2016   • Memory loss 12/7/2018   • Osteoarthritis of knee 5/19/2017   • Osteoporosis     fosamax [id number], on prolia(2016)   • Paroxysmal atrial fibrillation 11/9/2011   • Primary malignant neoplasm of right middle lobe of lung 8/21/2018   • Rheumatoid arthritis    • Tension-type headache, not intractable 12/26/2017   • Vitamin B12 deficiency 5/2/2016   • Vitamin D deficiency 11/20/2017     Past Surgical History:   Procedure Laterality Date   • ABDOMINAL SURGERY     • ANAL DILATION     • APPENDECTOMY     • BREAST BIOPSY Right    • CARDIAC CATHETERIZATION  2009   • CARDIAC ELECTROPHYSIOLOGY PROCEDURE N/A 8/4/2022    Procedure: Pacemaker DC new-[place];  Surgeon: Patricia Willis  MD;  Location: [facility] CATH INVASIVE LOCATION;  Service: Cardiology;  Laterality: N/A;   • CATARACT EXTRACTION WITH INTRAOCULAR LENS IMPLANT Bilateral    • CHOLECYSTECTOMY     • COLONOSCOPY     • CYSTOSCOPY     • HYSTERECTOMY     • JOINT REPLACEMENT     • LUNG REMOVAL, PARTIAL Right 2018    RIGHT VATS WEDGE RESECTION OF RML PN RIGHT MIDDLE LOBECTOMY    • SUBTOTAL HYSTERECTOMY     • TONSILLECTOMY     • TOTAL HIP ARTHROPLASTY Left 2019    Procedure: TOTAL HIP ARTHROPLASTY ANTERIOR;  Surgeon: Lenny Khan MD;  Location: [facility] MAIN OR;  Service: Orthopedics   • TOTAL KNEE ARTHROPLASTY Right 2017   • WRIST FRACTURE SURGERY Bilateral      Family History   Problem Relation Age of Onset   • Alcohol abuse Father    • Heart disease Father      Social History     Tobacco Use   • Smoking status: Former     Packs/day: 1.00     Years: 15.00     Pack years: 15.00     Types: Cigarettes     Start date:      Quit date:      Years since quittin.3   • Smokeless tobacco: Never   Vaping Use   • Vaping Use: Never used   Substance Use Topics   • Alcohol use: Yes   • Drug use: No     Allergies:  Patient has no known allergies.    Objective     Vital Signs  Temp:  [98.4 °F (36.9 °C)] 98.4 °F (36.9 °C)  Heart Rate:  [72-76] 74  Resp:  [18] 18  BP: ()/(43-81) 90/43      Physical Exam:   Physical Exam  Vitals and nursing note reviewed.   Cardiovascular:      Rate and Rhythm: Normal rate.      Heart sounds: Normal heart sounds.   Pulmonary:      Breath sounds: Normal breath sounds.   Abdominal:      Palpations: Abdomen is soft.      Tenderness: There is abdominal tenderness. There is no guarding or rebound.   Skin:     General: Skin is warm.   Neurological:      General: No focal deficit present.      Mental Status: She is alert.              Results Review:   CBC    Results from last 7 days   Lab Units 04/15/23  0751   WBC 10*3/mm3 10.10   HEMOGLOBIN g/dL 12.6   PLATELETS 10*3/mm3 221     BMP   Results from  last 7 days   Lab Units 04/15/23  0751   SODIUM mmol/L 139   POTASSIUM mmol/L 3.4*   CHLORIDE mmol/L 104   CO2 mmol/L 24.0   BUN mg/dL 10   CREATININE mg/dL 0.62   GLUCOSE mg/dL 122*     Cr Clearance Estimated Creatinine Clearance: 67.9 mL/min (by C-G formula based on SCr of 0.62 mg/dL).  Coag   Results from last 7 days   Lab Units 04/15/23  0751   INR  1.17*   APTT seconds 29.6*     HbA1C   Lab Results   Component Value Date    HGBA1C 5.6 07/05/2019     Blood Glucose No results found for: POCGLU  Infection     CMP   Results from last 7 days   Lab Units 04/15/23  0751   SODIUM mmol/L 139   POTASSIUM mmol/L 3.4*   CHLORIDE mmol/L 104   CO2 mmol/L 24.0   BUN mg/dL 10   CREATININE mg/dL 0.62   GLUCOSE mg/dL 122*   ALBUMIN g/dL 4.2   BILIRUBIN mg/dL 1.0   ALK PHOS U/L 91   AST (SGOT) U/L 50*   ALT (SGPT) U/L 38*   LIPASE U/L 21     UA    Results from last 7 days   Lab Units 04/15/23  0811   NITRITE UA  Negative   WBC UA /HPF 3-5*   BACTERIA UA /HPF 1+*   SQUAM EPITHEL UA /HPF 3-6*     Radiology(recent) CT Abdomen Pelvis With Contrast    Result Date: 4/15/2023  Impression: Wall thickening with surrounding fat stranding with adjacent fluid involving the cecum. Differential includes diverticulitis, cecitis or underlying cecal mass. 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     Parminder Yao MD  04/15/23  10:25 EDT

## 2023-04-15 NOTE — ED NOTES
Nursing report ED to floor  Alia Cheney  82 y.o.  female    HPI:   Chief Complaint   Patient presents with    Urinary Frequency       Admitting doctor:   Rosetta Romano MD    Admitting diagnosis:   The encounter diagnosis was Lower abdominal pain.    Code status:   Current Code Status       Date Active Code Status Order ID Comments User Context       4/15/2023 1200 CPR (Attempt to Resuscitate) [id number]  Naomy Pulido, APRN ED        Question Answer    Code Status (Patient has no pulse and is not breathing) CPR (Attempt to Resuscitate)    Medical Interventions (Patient has pulse or is breathing) Full Support    Level Of Support Discussed With Patient                    Allergies:   Patient has no known allergies.    Isolation:  No active isolations     Fall Risk:  Fall Risk Assessment was completed, and patient is at moderate risk for falls.   Predictive Model Details         19 (Low) Factor Value    Calculated 4/15/2023 10:07 Age 82    Risk of Fall Model Musculoskeletal Assessment WDL     Active Peripheral IV Present     Imaging order in this encounter Present     Diastolic BP 43     Respiratory Rate 18     Skin Assessment WDL     Magnesium not on file     Financial Class Medicare     Drug Use No     Tobacco Use Quit     Calcium 8.7 mg/dL     Zachariah Scale not on file     Number of Distinct Medication Classes administered 2     Total Bilirubin 1 mg/dL     Peripheral Vascular Assessment WDL     Gastrointestinal Assessment WDL     ALT 38 U/L     Cardiac Assessment WDL     Creatinine 0.62 mg/dL     Albumin 4.2 g/dL     Days after Admission 0.108     Chloride 104 mmol/L     Potassium 3.4 mmol/L         Weight:       04/15/23  0729   Weight: 77.3 kg (170 lb 6.7 oz)       Intake and Output    Intake/Output Summary (Last 24 hours) at 4/15/2023 1324  Last data filed at 4/15/2023 1100  Gross per 24 hour   Intake 100 ml   Output --   Net 100 ml       Diet:   Dietary Orders (From admission, onward)       Start     Ordered     04/15/23 1157  Diet: Liquid Diets; Clear Liquid; Texture: Regular Texture (IDDSI 7); Fluid Consistency: Thin (IDDSI 0)  Diet Effective Now        References:    Diet Order [facility]   Question Answer Comment   Diets: Liquid Diets    Liquid Diet: Clear Liquid    Texture: Regular Texture (IDDSI 7)    Fluid Consistency: Thin (IDDSI 0)        04/15/23 1156                     Most recent vitals:   Vitals:    04/15/23 1011 04/15/23 1031 04/15/23 1230 04/15/23 1231   BP:  137/65 109/89 109/89   BP Location:   Right arm    Patient Position:   Lying    Pulse: 72 71 77 77   Resp:   16    Temp:       TempSrc:       SpO2: 95% 94% 97% 97%   Weight:       Height:           Active LDAs/IV Access:   Lines, Drains & Airways       Active LDAs       Name Placement date Placement time Site Days    Peripheral IV 04/15/23 0751 Left Antecubital 04/15/23  0751  Antecubital  less than 1                    Skin Condition:   Skin Assessments (last day)       None             Labs (abnormal labs have a star):   Labs Reviewed   COMPREHENSIVE METABOLIC PANEL - Abnormal; Notable for the following components:       Result Value    Glucose 122 (*)     Potassium 3.4 (*)     ALT (SGPT) 38 (*)     AST (SGOT) 50 (*)     All other components within normal limits    Narrative:     GFR Normal >60  Chronic Kidney Disease <60  Kidney Failure <15    The GFR formula is only valid for adults with stable renal function between ages 18 and 70.   PROTIME-INR - Abnormal; Notable for the following components:    Protime 11.9 (*)     INR 1.17 (*)     All other components within normal limits   APTT - Abnormal; Notable for the following components:    PTT 29.6 (*)     All other components within normal limits   URINALYSIS W/ CULTURE IF INDICATED - Abnormal; Notable for the following components:    Ketones, UA 15 mg/dL (1+) (*)     Blood, UA Small (1+) (*)     Protein, UA 30 mg/dL (1+) (*)     Leuk Esterase, UA Small (1+) (*)     All other components within normal  limits    Narrative:     In absence of clinical symptoms, the presence of pyuria, bacteria, and/or nitrites on the urinalysis result does not correlate with infection.   CBC WITH AUTO DIFFERENTIAL - Abnormal; Notable for the following components:    Lymphocyte % 13.4 (*)     Eosinophil % 0.0 (*)     Neutrophils, Absolute 7.70 (*)     Monocytes, Absolute 1.10 (*)     All other components within normal limits   URINALYSIS, MICROSCOPIC ONLY - Abnormal; Notable for the following components:    RBC, UA 3-5 (*)     WBC, UA 3-5 (*)     Bacteria, UA 1+ (*)     Squamous Epithelial Cells, UA 3-6 (*)     All other components within normal limits   LIPASE - Normal   BLOOD CULTURE   BLOOD CULTURE   CBC AND DIFFERENTIAL    Narrative:     The following orders were created for panel order CBC & Differential.  Procedure                               Abnormality         Status                     ---------                               -----------         ------                     CBC Auto Differential[[id number]]        Abnormal            Final result                 Please view results for these tests on the individual orders.       LOC: Person, Place, Time, and Situation    Telemetry:  Med/Surg    Cardiac Monitoring Ordered: NO    EKG:   No orders to display       Medications Given in the ED:   Medications   sodium chloride 0.9 % flush 10 mL (has no administration in time range)   sodium chloride 0.9 % flush 10 mL (has no administration in time range)   metoprolol tartrate (LOPRESSOR) tablet 25 mg (has no administration in time range)   atorvastatin (LIPITOR) tablet 10 mg (has no administration in time range)   venlafaxine XR (EFFEXOR-XR) 24 hr capsule 150 mg (has no administration in time range)   dextrose 5 % and sodium chloride 0.9 % infusion (has no administration in time range)   pantoprazole (PROTONIX) injection 40 mg (has no administration in time range)   metroNIDAZOLE (FLAGYL) IVPB 500 mg (has no administration in time  range)   morphine injection 2 mg (2 mg Intravenous Given 4/15/23 1227)   ondansetron (ZOFRAN) injection 4 mg (has no administration in time range)   acetaminophen (TYLENOL) tablet 650 mg (has no administration in time range)     Or   acetaminophen (TYLENOL) suppository 650 mg (has no administration in time range)   sodium chloride 0.9 % flush 3 mL (has no administration in time range)   sodium chloride 0.9 % flush 3-10 mL (has no administration in time range)   sodium chloride 0.9 % infusion 40 mL (has no administration in time range)   hydrALAZINE (APRESOLINE) injection 10 mg (has no administration in time range)   cefTRIAXone (ROCEPHIN) 1 g in sodium chloride 0.9 % 100 mL IVPB (has no administration in time range)   ondansetron (ZOFRAN) injection 4 mg (4 mg Intravenous Given 4/15/23 0811)   iopamidol (ISOVUE-370) 76 % injection 100 mL (100 mL Intravenous Given 4/15/23 0913)       Imaging results:  CT Abdomen Pelvis With Contrast    Result Date: 4/15/2023  Impression: Wall thickening with surrounding fat stranding with adjacent fluid involving the cecum. Differential includes diverticulitis, cecitis or underlying cecal mass. Electronically Signed: Dejan Luu  4/15/2023 9:29 AM EDT  Workstation ID: [id number]     Social issues:   Social History     Socioeconomic History    Marital status:    Tobacco Use    Smoking status: Former     Packs/day: 1.00     Years: 15.00     Pack years: 15.00     Types: Cigarettes     Start date:      Quit date: 1980     Years since quittin.3    Smokeless tobacco: Never   Vaping Use    Vaping Use: Never used   Substance and Sexual Activity    Alcohol use: Yes    Drug use: No    Sexual activity: Not Currently       NIH Stroke Scale:  Interval: (not recorded)  1a. Level of Consciousness: (not recorded)  1b. LOC Questions: (not recorded)  1c. LOC Commands: (not recorded)  2. Best Gaze: (not recorded)  3. Visual: (not recorded)  4. Facial Palsy: (not recorded)  5a. Motor Arm,  Left: (not recorded)  5b. Motor Arm, Right: (not recorded)  6a. Motor Leg, Left: (not recorded)  6b. Motor Leg, Right: (not recorded)  7. Limb Ataxia: (not recorded)  8. Sensory: (not recorded)  9. Best Language: (not recorded)  10. Dysarthria: (not recorded)  11. Extinction and Inattention (formerly Neglect): (not recorded)    Total (NIH Stroke Scale): (not recorded)     Additional notable assessment information:     Nursing report ED to floor  2c    Abi Rodas LPN   04/15/23 13:24 EDT

## 2023-04-15 NOTE — ED PROVIDER NOTES
"Subjective   History of Present Illness  Chief complaint: Pleasant 82-year-old female presents to the emergency department today with acute onset abdominal pain.  It is right-sided.  Its been persistent since yesterday.  It does not go away.  She feels \"sick to my stomach\".  But she has not vomited to this point.  She has no diarrhea.  She does have a history of irritable bowel syndrome and gets constipated quite frequently.  However this feels different to her.  She does have some urinary frequency with that.  But no discomfort or pain.  No fever.  She has had an appendectomy.  She has had a cholecystectomy in the past as well.  She has not had fever.  She has not had pain like this before and it is localized to the right side of her abdomen.    Context:    Duration: Since yesterday    Timing:    Severity:    Associated Symptoms:        PCP:  LMP:        Review of Systems   Constitutional: Negative for fever.   Gastrointestinal: Positive for abdominal pain.   Genitourinary: Positive for frequency.   All other systems reviewed and are negative.      Past Medical History:   Diagnosis Date   • Anal stenosis 1/3/2012   • Anxiety 11/25/2013   • Arrhythmia, ventricular 4/20/2012   • Arthritis    • Ataxia 8/6/2013   • Carpal tunnel syndrome, bilateral 2/13/2017   • Depression 1/3/2012   • Elevated cholesterol    • Gastroesophageal reflux disease 6/29/2012   • History of osteoporotic pathological fracture     Bilateral wrist-unknown date   • Hx of total knee replacement, right 6/29/2017   • Hyperlipidemia 5/2/2016   • Insomnia 8/6/2013   • Irritable bowel syndrome 8/6/2013   • Keratoconjunctivitis sicca, in Sjogren's syndrome 9/21/2016   • Memory loss 12/7/2018   • Osteoarthritis of knee 5/19/2017   • Osteoporosis     fosamax [id number], on prolia(2016)   • Paroxysmal atrial fibrillation 11/9/2011   • Primary malignant neoplasm of right middle lobe of lung 8/21/2018   • Rheumatoid arthritis    • Tension-type headache, not " intractable 2017   • Vitamin B12 deficiency 2016   • Vitamin D deficiency 2017       No Known Allergies    Past Surgical History:   Procedure Laterality Date   • ABDOMINAL SURGERY     • ANAL DILATION     • APPENDECTOMY     • BREAST BIOPSY Right    • CARDIAC CATHETERIZATION     • CARDIAC ELECTROPHYSIOLOGY PROCEDURE N/A 2022    Procedure: Pacemaker DC [place];  Surgeon: Patricia Willis MD;  Location: [facility] INVASIVE LOCATION;  Service: Cardiology;  Laterality: N/A;   • CATARACT EXTRACTION WITH INTRAOCULAR LENS IMPLANT Bilateral    • CHOLECYSTECTOMY     • COLONOSCOPY     • CYSTOSCOPY     • HYSTERECTOMY     • JOINT REPLACEMENT     • LUNG REMOVAL, PARTIAL Right 2018    RIGHT VATS WEDGE RESECTION OF RML PN RIGHT MIDDLE LOBECTOMY    • SUBTOTAL HYSTERECTOMY     • TONSILLECTOMY     • TOTAL HIP ARTHROPLASTY Left 2019    Procedure: TOTAL HIP ARTHROPLASTY ANTERIOR;  Surgeon: Lenny Khan MD;  Location: [facility] MAIN OR;  Service: Orthopedics   • TOTAL KNEE ARTHROPLASTY Right 2017   • WRIST FRACTURE SURGERY Bilateral        Family History   Problem Relation Age of Onset   • Alcohol abuse Father    • Heart disease Father        Social History     Socioeconomic History   • Marital status:    Tobacco Use   • Smoking status: Former     Packs/day: 1.00     Years: 15.00     Pack years: 15.00     Types: Cigarettes     Start date:      Quit date:      Years since quittin.3   • Smokeless tobacco: Never   Vaping Use   • Vaping Use: Never used   Substance and Sexual Activity   • Alcohol use: Yes   • Drug use: No   • Sexual activity: Not Currently           Objective   Physical Exam  Vitals and nursing note reviewed.   Constitutional:       Appearance: Normal appearance.   HENT:      Head: Normocephalic and atraumatic.   Cardiovascular:      Rate and Rhythm: Normal rate and regular rhythm.   Pulmonary:      Effort: Pulmonary effort is normal.      Breath sounds: Normal  breath sounds.   Abdominal:      Palpations: Abdomen is soft.      Tenderness: There is abdominal tenderness. There is guarding. There is no rebound.       Musculoskeletal:         General: Normal range of motion.   Skin:     General: Skin is warm and dry.   Neurological:      General: No focal deficit present.      Mental Status: She is alert and oriented to person, place, and time.   Psychiatric:         Mood and Affect: Mood normal.         Thought Content: Thought content normal.         Procedures           ED Course      Results for orders placed or performed during the hospital encounter of 04/15/23   Comprehensive Metabolic Panel    Specimen: Blood   Result Value Ref Range    Glucose 122 (H) 65 - 99 mg/dL    BUN 10 8 - 23 mg/dL    Creatinine 0.62 0.57 - 1.00 mg/dL    Sodium 139 136 - 145 mmol/L    Potassium 3.4 (L) 3.5 - 5.2 mmol/L    Chloride 104 98 - 107 mmol/L    CO2 24.0 22.0 - 29.0 mmol/L    Calcium 8.7 8.6 - 10.5 mg/dL    Total Protein 6.7 6.0 - 8.5 g/dL    Albumin 4.2 3.5 - 5.2 g/dL    ALT (SGPT) 38 (H) 1 - 33 U/L    AST (SGOT) 50 (H) 1 - 32 U/L    Alkaline Phosphatase 91 39 - 117 U/L    Total Bilirubin 1.0 0.0 - 1.2 mg/dL    Globulin 2.5 gm/dL    A/G Ratio 1.7 g/dL    BUN/Creatinine Ratio 16.1 7.0 - 25.0    Anion Gap 11.0 5.0 - 15.0 mmol/L    eGFR 87.9 >60.0 mL/min/1.73   Protime-INR    Specimen: Blood   Result Value Ref Range    Protime 11.9 (H) 9.6 - 11.7 Seconds    INR 1.17 (H) 0.93 - 1.10   aPTT    Specimen: Blood   Result Value Ref Range    PTT 29.6 (L) 61.0 - 76.5 seconds   Urinalysis With Culture If Indicated - Urine, Clean Catch    Specimen: Urine, Clean Catch   Result Value Ref Range    Color, UA Yellow Yellow, Straw    Appearance, UA Clear Clear    pH, UA 7.0 5.0 - 8.0    Specific Gravity, UA 1.015 1.005 - 1.030    Glucose, UA Negative Negative    Ketones, UA 15 mg/dL (1+) (A) Negative    Bilirubin, UA Negative Negative    Blood, UA Small (1+) (A) Negative    Protein, UA 30 mg/dL (1+) (A)  Negative    Leuk Esterase, UA Small (1+) (A) Negative    Nitrite, UA Negative Negative    Urobilinogen, UA 0.2 E.U./dL 0.2 - 1.0 E.U./dL   CBC Auto Differential    Specimen: Blood   Result Value Ref Range    WBC 10.10 3.40 - 10.80 10*3/mm3    RBC 4.25 3.77 - 5.28 10*6/mm3    Hemoglobin 12.6 12.0 - 15.9 g/dL    Hematocrit 38.1 34.0 - 46.6 %    MCV 89.7 79.0 - 97.0 fL    MCH 29.8 26.6 - 33.0 pg    MCHC 33.2 31.5 - 35.7 g/dL    RDW 14.6 12.3 - 15.4 %    RDW-SD 48.1 37.0 - 54.0 fl    MPV 8.6 6.0 - 12.0 fL    Platelets 221 140 - 450 10*3/mm3    Neutrophil % 76.0 42.7 - 76.0 %    Lymphocyte % 13.4 (L) 19.6 - 45.3 %    Monocyte % 10.5 5.0 - 12.0 %    Eosinophil % 0.0 (L) 0.3 - 6.2 %    Basophil % 0.1 0.0 - 1.5 %    Neutrophils, Absolute 7.70 (H) 1.70 - 7.00 10*3/mm3    Lymphocytes, Absolute 1.40 0.70 - 3.10 10*3/mm3    Monocytes, Absolute 1.10 (H) 0.10 - 0.90 10*3/mm3    Eosinophils, Absolute 0.00 0.00 - 0.40 10*3/mm3    Basophils, Absolute 0.00 0.00 - 0.20 10*3/mm3    nRBC 0.1 0.0 - 0.2 /100 WBC   Urinalysis, Microscopic Only - Urine, Clean Catch    Specimen: Urine, Clean Catch   Result Value Ref Range    RBC, UA 3-5 (A) None Seen /HPF    WBC, UA 3-5 (A) None Seen /HPF    Bacteria, UA 1+ (A) None Seen /HPF    Squamous Epithelial Cells, UA 3-6 (A) None Seen, 0-2 /HPF    Transitional Epithelial Cells, UA 0-2 0 - 2 /HPF    Hyaline Casts, UA None Seen None Seen /LPF    Methodology Manual Light Microscopy    Lipase    Specimen: Blood   Result Value Ref Range    Lipase 21 13 - 60 U/L               CT Abdomen Pelvis With Contrast    Result Date: 4/15/2023  Impression: Wall thickening with surrounding fat stranding with adjacent fluid involving the cecum. Differential includes diverticulitis, cecitis or underlying cecal mass. Electronically Signed: Dejan Luu  4/15/2023 9:29 AM EDT  Workstation ID: [id number]                               Medical Decision Making  Patient was seen and evaluated for the above-stated problem      Differential diagnosis includes but is not limited to diverticulitis, perforated bowel with abscess, small bowel obstruction, mass, ureterolithiasis, UTI    Patient had very minimal blood in her urine.  Pain has been persistent since yesterday.  CT scan was obtained.  No signs of bowel perforation.  No signs of ureterolithiasis.  Do not think patient has urinary tract infection.  There is inflammation and wall thickening around the cecum.  Potentially diverticulitis.  Patient does not have appendix present.  So no appendicitis.  Could be colitis or steatitis.  Mass is not ruled out.  Discussed with Naomy Pulido on-call for Dr. Romano's group who agrees to admit the patient for IV antibiotics and further GI management.  Discussed results with patient          Lower abdominal pain: acute illness or injury  Amount and/or Complexity of Data Reviewed  Labs: ordered. Decision-making details documented in ED Course.     Details: White count normal.  CMP normal.  Lipase normal.  Radiology: ordered and independent interpretation performed.     Details: Reviewed as above  Discussion of management or test interpretation with external provider(s): Discussed with patient's family care team as above.    Risk  Prescription drug management.  Decision regarding hospitalization.          Final diagnoses:   None       ED Disposition  ED Disposition     None          No follow-up provider specified.       Medication List      No changes were made to your prescriptions during this visit.          Alen Khoury,   04/15/23 6480

## 2023-04-16 PROBLEM — K52.9 COLITIS: Status: ACTIVE | Noted: 2023-04-16

## 2023-04-16 LAB
ANION GAP SERPL CALCULATED.3IONS-SCNC: 10 MMOL/L (ref 5–15)
BASOPHILS # BLD AUTO: 0 10*3/MM3 (ref 0–0.2)
BASOPHILS NFR BLD AUTO: 0.4 % (ref 0–1.5)
BUN SERPL-MCNC: 6 MG/DL (ref 8–23)
BUN/CREAT SERPL: 10.7 (ref 7–25)
CALCIUM SPEC-SCNC: 8.2 MG/DL (ref 8.6–10.5)
CHLORIDE SERPL-SCNC: 107 MMOL/L (ref 98–107)
CO2 SERPL-SCNC: 22 MMOL/L (ref 22–29)
CREAT SERPL-MCNC: 0.56 MG/DL (ref 0.57–1)
DEPRECATED RDW RBC AUTO: 46.4 FL (ref 37–54)
EGFRCR SERPLBLD CKD-EPI 2021: 90.1 ML/MIN/1.73
EOSINOPHIL # BLD AUTO: 0 10*3/MM3 (ref 0–0.4)
EOSINOPHIL NFR BLD AUTO: 0 % (ref 0.3–6.2)
ERYTHROCYTE [DISTWIDTH] IN BLOOD BY AUTOMATED COUNT: 14.6 % (ref 12.3–15.4)
GLUCOSE SERPL-MCNC: 114 MG/DL (ref 65–99)
HCT VFR BLD AUTO: 39.1 % (ref 34–46.6)
HGB BLD-MCNC: 12.6 G/DL (ref 12–15.9)
LYMPHOCYTES # BLD AUTO: 1.5 10*3/MM3 (ref 0.7–3.1)
LYMPHOCYTES NFR BLD AUTO: 14.6 % (ref 19.6–45.3)
MCH RBC QN AUTO: 29.5 PG (ref 26.6–33)
MCHC RBC AUTO-ENTMCNC: 32.3 G/DL (ref 31.5–35.7)
MCV RBC AUTO: 91.4 FL (ref 79–97)
MONOCYTES # BLD AUTO: 1.1 10*3/MM3 (ref 0.1–0.9)
MONOCYTES NFR BLD AUTO: 10.4 % (ref 5–12)
NEUTROPHILS NFR BLD AUTO: 7.7 10*3/MM3 (ref 1.7–7)
NEUTROPHILS NFR BLD AUTO: 74.6 % (ref 42.7–76)
NRBC BLD AUTO-RTO: 0.1 /100 WBC (ref 0–0.2)
PLATELET # BLD AUTO: 213 10*3/MM3 (ref 140–450)
PMV BLD AUTO: 8.5 FL (ref 6–12)
POTASSIUM SERPL-SCNC: 4.2 MMOL/L (ref 3.5–5.2)
POTASSIUM SERPL-SCNC: 4.2 MMOL/L (ref 3.5–5.2)
RBC # BLD AUTO: 4.28 10*6/MM3 (ref 3.77–5.28)
SODIUM SERPL-SCNC: 139 MMOL/L (ref 136–145)
WBC NRBC COR # BLD: 10.3 10*3/MM3 (ref 3.4–10.8)

## 2023-04-16 PROCEDURE — 85025 COMPLETE CBC W/AUTO DIFF WBC: CPT | Performed by: FAMILY MEDICINE

## 2023-04-16 PROCEDURE — 84132 ASSAY OF SERUM POTASSIUM: CPT | Performed by: FAMILY MEDICINE

## 2023-04-16 PROCEDURE — 80048 BASIC METABOLIC PNL TOTAL CA: CPT | Performed by: FAMILY MEDICINE

## 2023-04-16 PROCEDURE — 25010000002 CEFTRIAXONE PER 250 MG: Performed by: FAMILY MEDICINE

## 2023-04-16 PROCEDURE — 36415 COLL VENOUS BLD VENIPUNCTURE: CPT | Performed by: FAMILY MEDICINE

## 2023-04-16 RX ORDER — SODIUM CHLORIDE 9 MG/ML
100 INJECTION, SOLUTION INTRAVENOUS CONTINUOUS
Status: DISCONTINUED | OUTPATIENT
Start: 2023-04-16 | End: 2023-04-17

## 2023-04-16 RX ADMIN — Medication 3 ML: at 08:17

## 2023-04-16 RX ADMIN — METRONIDAZOLE 500 MG: 500 INJECTION, SOLUTION INTRAVENOUS at 15:56

## 2023-04-16 RX ADMIN — CEFTRIAXONE 1 G: 1 INJECTION, POWDER, FOR SOLUTION INTRAMUSCULAR; INTRAVENOUS at 10:48

## 2023-04-16 RX ADMIN — ATORVASTATIN CALCIUM 10 MG: 10 TABLET, FILM COATED ORAL at 21:09

## 2023-04-16 RX ADMIN — SODIUM CHLORIDE 100 ML/HR: 9 INJECTION, SOLUTION INTRAVENOUS at 08:29

## 2023-04-16 RX ADMIN — VENLAFAXINE HYDROCHLORIDE 150 MG: 75 CAPSULE, EXTENDED RELEASE ORAL at 08:17

## 2023-04-16 RX ADMIN — ACETAMINOPHEN 650 MG: 325 TABLET, FILM COATED ORAL at 17:23

## 2023-04-16 RX ADMIN — Medication 3 ML: at 21:09

## 2023-04-16 RX ADMIN — METRONIDAZOLE 500 MG: 500 INJECTION, SOLUTION INTRAVENOUS at 05:19

## 2023-04-16 RX ADMIN — METOPROLOL TARTRATE 25 MG: 25 TABLET, FILM COATED ORAL at 08:17

## 2023-04-16 RX ADMIN — METOPROLOL TARTRATE 25 MG: 25 TABLET, FILM COATED ORAL at 21:09

## 2023-04-16 RX ADMIN — METRONIDAZOLE 500 MG: 500 INJECTION, SOLUTION INTRAVENOUS at 21:09

## 2023-04-16 RX ADMIN — PANTOPRAZOLE SODIUM 40 MG: 40 INJECTION, POWDER, FOR SOLUTION INTRAVENOUS at 05:19

## 2023-04-16 NOTE — PLAN OF CARE
Goal Outcome Evaluation:  Plan of Care Reviewed With: patient        Progress: no change  Outcome Evaluation: Pt rested well throughout the night. Still complain about RLQ pain. However refuse to take any pain medication. Pt is on clear liquid diet and tolerate dinner well. Pt is on IV ABX. Assisted pt to bedside commode tonight. Instructed patient several time about fall risk , bed alarm and calling nursing stuff when she need to get out of bed. PTstill need enforcements on this matter. BP elevated, PRN meds given. All vitals stable at the moment, will continue to monitor.

## 2023-04-16 NOTE — PROGRESS NOTES
LOS: 0 days   Patient Care Team:  Obdulia Spicer APRN as PCP - General (Nurse Practitioner)    Subjective:  Feels somewhat better//no appetite    Objective:   Afebrile      Review of Systems:   Review of Systems   Constitutional: Positive for activity change.   Respiratory: Negative.    Cardiovascular: Negative.    Gastrointestinal: Positive for abdominal distention and abdominal pain.   Neurological: Positive for weakness.           Vital Signs  Temp:  [97.5 °F (36.4 °C)-99.6 °F (37.6 °C)] 98.6 °F (37 °C)  Heart Rate:  [70-86] 74  Resp:  [15-25] 18  BP: (106-169)/() 123/65    Physical Exam:  Physical Exam  Vitals and nursing note reviewed.   Cardiovascular:      Rate and Rhythm: Normal rate.      Heart sounds: Normal heart sounds.   Pulmonary:      Breath sounds: Normal breath sounds.   Abdominal:      Tenderness: There is abdominal tenderness. There is no guarding or rebound.          Radiology:  CT Abdomen Pelvis With Contrast    Result Date: 4/15/2023  Impression: Wall thickening with surrounding fat stranding with adjacent fluid involving the cecum. Differential includes diverticulitis, cecitis or underlying cecal mass. Electronically Signed: Dejan Luu  4/15/2023 9:29 AM EDT  Workstation ID: [id number]         Results Review:     I reviewed the patient's new clinical results.  I reviewed the patient's new imaging results and agree with the interpretation.    Medication Review:   Scheduled Meds:atorvastatin, 10 mg, Oral, Nightly  cefTRIAXone, 1 g, Intravenous, Q24H  metoprolol tartrate, 25 mg, Oral, BID  metroNIDAZOLE, 500 mg, Intravenous, Q8H  pantoprazole, 40 mg, Intravenous, Q AM  sodium chloride, 3 mL, Intravenous, Q12H  venlafaxine XR, 150 mg, Oral, Daily      Continuous Infusions:sodium chloride, 100 mL/hr, Last Rate: 100 mL/hr (04/16/23 0829)      PRN Meds:.•  acetaminophen **OR** acetaminophen  •  hydrALAZINE  •  Morphine  •  ondansetron  •  potassium chloride **OR** potassium chloride  **OR** potassium chloride  •  [COMPLETED] Insert Peripheral IV **AND** sodium chloride  •  [COMPLETED] Insert Peripheral IV **AND** sodium chloride  •  sodium chloride  •  sodium chloride    Labs:    CBC    Results from last 7 days   Lab Units 04/16/23  0319 04/15/23  0751   WBC 10*3/mm3 10.30 10.10   HEMOGLOBIN g/dL 12.6 12.6   PLATELETS 10*3/mm3 213 221     BMP   Results from last 7 days   Lab Units 04/16/23  0319 04/15/23  0751   SODIUM mmol/L 139 139   POTASSIUM mmol/L 4.2  4.2 3.4*   CHLORIDE mmol/L 107 104   CO2 mmol/L 22.0 24.0   BUN mg/dL 6* 10   CREATININE mg/dL 0.56* 0.62   GLUCOSE mg/dL 114* 122*     Cr Clearance Estimated Creatinine Clearance: 75.2 mL/min (A) (by C-G formula based on SCr of 0.56 mg/dL (L)).  Coag   Results from last 7 days   Lab Units 04/15/23  0751   INR  1.17*   APTT seconds 29.6*     HbA1C   Lab Results   Component Value Date    HGBA1C 5.6 07/05/2019     Blood Glucose No results found for: POCGLU  Infection     CMP   Results from last 7 days   Lab Units 04/16/23  0319 04/15/23  0751   SODIUM mmol/L 139 139   POTASSIUM mmol/L 4.2  4.2 3.4*   CHLORIDE mmol/L 107 104   CO2 mmol/L 22.0 24.0   BUN mg/dL 6* 10   CREATININE mg/dL 0.56* 0.62   GLUCOSE mg/dL 114* 122*   ALBUMIN g/dL  --  4.2   BILIRUBIN mg/dL  --  1.0   ALK PHOS U/L  --  91   AST (SGOT) U/L  --  50*   ALT (SGPT) U/L  --  38*   LIPASE U/L  --  21     UA    Results from last 7 days   Lab Units 04/15/23  0811   NITRITE UA  Negative   WBC UA /HPF 3-5*   BACTERIA UA /HPF 1+*   SQUAM EPITHEL UA /HPF 3-6*     Radiology(recent) CT Abdomen Pelvis With Contrast    Result Date: 4/15/2023  Impression: Wall thickening with surrounding fat stranding with adjacent fluid involving the cecum. Differential includes diverticulitis, cecitis or underlying cecal mass. 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

## 2023-04-17 PROCEDURE — 97161 PT EVAL LOW COMPLEX 20 MIN: CPT

## 2023-04-17 PROCEDURE — 25010000002 CEFTRIAXONE PER 250 MG: Performed by: FAMILY MEDICINE

## 2023-04-17 RX ORDER — LORAZEPAM 1 MG/1
1 TABLET ORAL 3 TIMES DAILY PRN
Status: DISCONTINUED | OUTPATIENT
Start: 2023-04-17 | End: 2023-04-19 | Stop reason: HOSPADM

## 2023-04-17 RX ORDER — LORAZEPAM 2 MG/ML
0.5 INJECTION INTRAMUSCULAR EVERY 6 HOURS PRN
Status: DISCONTINUED | OUTPATIENT
Start: 2023-04-17 | End: 2023-04-17

## 2023-04-17 RX ADMIN — METRONIDAZOLE 500 MG: 500 INJECTION, SOLUTION INTRAVENOUS at 05:28

## 2023-04-17 RX ADMIN — METOPROLOL TARTRATE 25 MG: 25 TABLET, FILM COATED ORAL at 21:10

## 2023-04-17 RX ADMIN — CEFTRIAXONE 1 G: 1 INJECTION, POWDER, FOR SOLUTION INTRAMUSCULAR; INTRAVENOUS at 10:02

## 2023-04-17 RX ADMIN — LORAZEPAM 1 MG: 1 TABLET ORAL at 17:18

## 2023-04-17 RX ADMIN — METOPROLOL TARTRATE 25 MG: 25 TABLET, FILM COATED ORAL at 08:03

## 2023-04-17 RX ADMIN — PANTOPRAZOLE SODIUM 40 MG: 40 INJECTION, POWDER, FOR SOLUTION INTRAVENOUS at 05:28

## 2023-04-17 RX ADMIN — ATORVASTATIN CALCIUM 10 MG: 10 TABLET, FILM COATED ORAL at 21:10

## 2023-04-17 RX ADMIN — METRONIDAZOLE 500 MG: 500 INJECTION, SOLUTION INTRAVENOUS at 14:35

## 2023-04-17 RX ADMIN — Medication 3 ML: at 08:03

## 2023-04-17 RX ADMIN — METRONIDAZOLE 500 MG: 500 INJECTION, SOLUTION INTRAVENOUS at 21:10

## 2023-04-17 RX ADMIN — Medication 3 ML: at 21:10

## 2023-04-17 RX ADMIN — CEFTRIAXONE 1 G: 1 INJECTION, POWDER, FOR SOLUTION INTRAMUSCULAR; INTRAVENOUS at 08:59

## 2023-04-17 RX ADMIN — VENLAFAXINE HYDROCHLORIDE 150 MG: 75 CAPSULE, EXTENDED RELEASE ORAL at 08:03

## 2023-04-17 NOTE — THERAPY EVALUATION
Patient Name: Alia Cheney  : 1940    MRN: [id number]                              Today's Date: 2023       Admit Date: 4/15/2023    Visit Dx:     ICD-10-CM ICD-9-CM   1. Lower abdominal pain  R10.30 789.09     Patient Active Problem List   Diagnosis   • Absolute anemia   • Anal stenosis   • Anxiety   • Arrhythmia, ventricular   • Ataxia   • Carpal tunnel syndrome, bilateral   • Colles' fracture   • Coronary artery disease involving native coronary artery of native heart without angina pectoris   • Depression   • Physical exam   • Gastroesophageal reflux disease   • Hx of total knee replacement, right   • Mixed hyperlipidemia   • Insomnia   • Irritable bowel syndrome   • Keratoconjunctivitis sicca, in Sjogren's syndrome   • Memory loss   • Osteoarthritis of knee   • Osteoporosis   • Paroxysmal atrial fibrillation   • Primary malignant neoplasm of right middle lobe of lung   • Rheumatoid arthritis   • Trochanteric bursitis of left hip   • Vitamin B12 deficiency   • Vitamin D deficiency   • History of total hip replacement, left   • Overweight (BMI 25.0-29.9)   • Allergic rhinitis   • Lung nodule   • Other specified dorsopathies, site unspecified   • Other specified examination   • Primary osteoarthritis of both knees   • Foot callus   • Petechiae   • Medicare annual wellness visit, subsequent   • History of osteoporotic pathological fracture   • Syncope, unspecified syncope type   • Sick sinus syndrome   • Presence of cardiac pacemaker   • Paroxysmal SVT (supraventricular tachycardia)   • Lower abdominal pain   • Colitis     Past Medical History:   Diagnosis Date   • Anal stenosis 1/3/2012   • Anxiety 2013   • Arrhythmia, ventricular 2012   • Arthritis    • Ataxia 2013   • Carpal tunnel syndrome, bilateral 2017   • Depression 1/3/2012   • Elevated cholesterol    • Gastroesophageal reflux disease 2012   • History of osteoporotic pathological fracture     Bilateral wrist-unknown  date   • Hx of total knee replacement, right 6/29/2017   • Hyperlipidemia 5/2/2016   • Insomnia 8/6/2013   • Irritable bowel syndrome 8/6/2013   • Keratoconjunctivitis sicca, in Sjogren's syndrome 9/21/2016   • Memory loss 12/7/2018   • Osteoarthritis of knee 5/19/2017   • Osteoporosis     fosamax [id number], on prolia(2016)   • Paroxysmal atrial fibrillation 11/9/2011   • Primary malignant neoplasm of right middle lobe of lung 8/21/2018   • Rheumatoid arthritis    • Tension-type headache, not intractable 12/26/2017   • Vitamin B12 deficiency 5/2/2016   • Vitamin D deficiency 11/20/2017     Past Surgical History:   Procedure Laterality Date   • ABDOMINAL SURGERY     • ANAL DILATION     • APPENDECTOMY     • BREAST BIOPSY Right    • CARDIAC CATHETERIZATION  2009   • CARDIAC ELECTROPHYSIOLOGY PROCEDURE N/A 8/4/2022    Procedure: Pacemaker DC new-[place];  Surgeon: Patricia Willis MD;  Location: [facility] INVASIVE LOCATION;  Service: Cardiology;  Laterality: N/A;   • CATARACT EXTRACTION WITH INTRAOCULAR LENS IMPLANT Bilateral 2009   • CHOLECYSTECTOMY     • COLONOSCOPY     • CYSTOSCOPY     • HYSTERECTOMY     • JOINT REPLACEMENT     • LUNG REMOVAL, PARTIAL Right 08/06/2018    RIGHT VATS WEDGE RESECTION OF RML PN RIGHT MIDDLE LOBECTOMY    • SUBTOTAL HYSTERECTOMY     • TONSILLECTOMY     • TOTAL HIP ARTHROPLASTY Left 7/17/2019    Procedure: TOTAL HIP ARTHROPLASTY ANTERIOR;  Surgeon: Lenny Khan MD;  Location: [facility] OR;  Service: Orthopedics   • TOTAL KNEE ARTHROPLASTY Right 2017   • WRIST FRACTURE SURGERY Bilateral       General Information     Row Name 04/17/23 1356          Physical Therapy Time and Intention    Document Type evaluation  -CR     Mode of Treatment physical therapy  -CR     Row Name 04/17/23 3406          General Information    Patient Profile Reviewed yes  -CR     Prior Level of Function independent:;all household mobility;community mobility;ADL's  -CR     Existing Precautions/Restrictions no  known precautions/restrictions  -CR     Barriers to Rehab none identified  -CR     Row Name 04/17/23 1356          Living Environment    People in Home --  [place] [address]  -CR     Row Name 04/17/23 1356          Home Main Entrance    Number of Stairs, Main Entrance none  -CR     Row Name 04/17/23 1356          Stairs Within Home, Primary    Stairs, Within Home, Primary elevators  -CR     Row Name 04/17/23 1356          Cognition    Orientation Status (Cognition) oriented x 4  -CR           User Key  (r) = Recorded By, (t) = Taken By, (c) = Cosigned By    Initials Name Provider Type    CR Reyes, Carmela, PT Physical Therapist               Mobility     Row Name 04/17/23 1358          Bed Mobility    Bed Mobility bed mobility (all) activities  -CR     All Activities, [place] (Bed Mobility) independent  -CR     Row Name 04/17/23 1351          Sit-Stand Transfer    Sit-Stand [place] (Transfers) modified independence  -CR     Assistive Device (Sit-Stand Transfers) walker, front-wheeled  -CR     Row Name 04/17/23 1350          Gait/Stairs (Locomotion)    [place] Level (Gait) standby assist  -CR     Assistive Device (Gait) walker, front-wheeled  -CR     Distance in Feet (Gait) 100  -CR           User Key  (r) = Recorded By, (t) = Taken By, (c) = Cosigned By    Initials Name Provider Type    CR Reyes, Carmela, PT Physical Therapist               Obj/Interventions     Row Name 04/17/23 1352          Range of Motion Comprehensive    General Range of Motion no range of motion deficits identified  -CR     Row Name 04/17/23 1357          Strength Comprehensive (MMT)    General Manual Muscle Testing (MMT) Assessment no strength deficits identified  -CR     Row Name 04/17/23 1351          Balance    Balance Assessment sitting static balance;sitting dynamic balance;sit to stand dynamic balance;standing static balance;standing dynamic balance  -CR     Static Sitting Balance independent  -CR     Dynamic Sitting  Balance independent  -CR     Position, Sitting Balance sitting edge of bed  -CR     Sit to Stand Dynamic Balance modified independence  -CR     Static Standing Balance modified independence  -CR     Dynamic Standing Balance modified independence  -CR     Position/Device Used, Standing Balance walker, rolling  -CR     Row Name 04/17/23 4998          Sensory Assessment (Somatosensory)    Sensory Assessment (Somatosensory) sensation intact  -CR           User Key  (r) = Recorded By, (t) = Taken By, (c) = Cosigned By    Initials Name Provider Type    CR Reyes, Carmela, PT Physical Therapist               Goals/Plan    No documentation.                Clinical Impression     Row Name 04/17/23 6883          Pain    Pretreatment Pain Rating 3/10  -CR     Posttreatment Pain Rating 3/10  -CR     Pain Location - Side/Orientation Right  -CR     Pain Location lower  -CR     Pain Location - abdomen  -CR     Row Name 04/17/23 2385          Plan of Care Review    Plan of Care Reviewed With patient  -CR     Outcome Evaluation 81 y/o female admitted on 4/15 due to R side abd pain, found with colitis. PMH includes OA,OP, IBS.Patient lives in assisted living facility and normally does not use a.d. but has rw at home. Patient is modified independent wtih all bed mobility and transfers and able to ambulate 100 ft with rw, SBA. No gait deficits noted, decreased pace when ambulating without a.d. Educated patient to use rw upon return to [facility] and may benefit from HH or OP PT for follow up.  -CR     Row Name 04/17/23 5774          Therapy Assessment/Plan (PT)    Patient/Family Therapy Goals Statement (PT) home  -CR     Criteria for Skilled Interventions Met (PT) no;does not meet criteria for skilled intervention  -CR     Therapy Frequency (PT) evaluation only  -CR     Predicted Duration of Therapy Intervention (PT) dc  -CR           User Key  (r) = Recorded By, (t) = Taken By, (c) = Cosigned By    Initials Name Provider Type    CR Reyes,  MAUREEN Lyons Physical Therapist               Outcome Measures     Row Name 04/17/23 1404 04/17/23 0800       How much help from another person do you currently need...    Turning from your back to your side while in flat bed without using bedrails? 4  -CR 4  -MB    Moving from lying on back to sitting on the side of a flat bed without bedrails? 4  -CR 4  -MB    Moving to and from a bed to a chair (including a wheelchair)? 4  -CR 4  -MB    Standing up from a chair using your arms (e.g., wheelchair, bedside chair)? 4  -CR 4  -MB    Climbing 3-5 steps with a railing? 3  -CR 3  -MB    To walk in hospital room? 4  -CR 4  -MB    AM-PAC 6 Clicks Score (PT) 23  -CR 23  -MB    Highest level of mobility 7 --> Walked 25 feet or more  -CR 7 --> Walked 25 feet or more  -MB          User Key  (r) = Recorded By, (t) = Taken By, (c) = Cosigned By    Initials Name Provider Type    CR Reyes, Carmela, PT Physical Therapist    Keisha Contreras LPN Licensed Nurse                             Physical Therapy Education     Title: PT OT SLP Therapies (In Progress)     Topic: Physical Therapy (In Progress)     Point: Mobility training (Done)     Learning Progress Summary           Patient Acceptance, E, VU by CEM at 4/17/2023 1405                   Point: Home exercise program (Not Started)     Learner Progress:  Not documented in this visit.          Point: Body mechanics (Not Started)     Learner Progress:  Not documented in this visit.          Point: Precautions (Not Started)     Learner Progress:  Not documented in this visit.                      User Key     Initials Effective Dates Name Provider Type Discipline     06/16/21 -  Reyes, Carmela, PT Physical Therapist PT              PT Recommendation and Plan     Plan of Care Reviewed With: patient  Outcome Evaluation: 83 y/o female admitted on 4/15 due to R side abd pain, found with colitis. PMH includes OA,OP, IBS.Patient lives in assisted living facility and normally does not use  a.d. but has rw at home. Patient is modified independent wtih all bed mobility and transfers and able to ambulate 100 ft with rw, SBA. No gait deficits noted, decreased pace when ambulating without a.d. Educated patient to use rw upon return to [facility] and may benefit from HH or OP PT for follow up.     Time Calculation:    PT Charges     Row Name 04/17/23 1406             Time Calculation    Start Time 1128  -CR      Stop Time 1148  -CR      Time Calculation (min) 20 min  -CR      PT Received On 04/17/23  -CR         Time Calculation- PT    Total Timed Code Minutes- PT 0 minute(s)  -CR            User Key  (r) = Recorded By, (t) = Taken By, (c) = Cosigned By    Initials Name Provider Type    CR Reyes, Carmela, PT Physical Therapist              Therapy Charges for Today     Code Description Service Date Service Provider Modifiers Qty    21358964650 HC PT EVAL LOW COMPLEXITY 4 4/17/2023 Reyes, Carmela, PT GP 1          PT G-Codes  AM-PAC 6 Clicks Score (PT): 23  PT Discharge Summary  Anticipated Discharge Disposition (PT): home with home health, assisted living    Carmela Reyes, PT  4/17/2023

## 2023-04-17 NOTE — PLAN OF CARE
Problem: Adult Inpatient Plan of Care  Goal: Plan of Care Review  Outcome: Ongoing, Progressing   Goal Outcome Evaluation:            Pt rested well overnight, VSS. Pt c/o pain but refusing any medication. Pt up to bathroom with SBA & walker. IV abx given per MAR. Pt educated on current plan of care, verbalized understanding.

## 2023-04-17 NOTE — PROGRESS NOTES
LOS: 1 day   Patient Care Team:  Obdulia Spicer APRN as PCP - General (Nurse Practitioner)  Xavier Beltrán, COLE as Ambulatory  (Population Health)    Subjective:  Lightly better today    Objective:   Afebrile      Review of Systems:   Review of Systems   Constitutional: Positive for activity change.   Gastrointestinal: Positive for abdominal pain. Negative for abdominal distention.   Neurological: Positive for weakness.           Vital Signs  Temp:  [97.4 °F (36.3 °C)-98.3 °F (36.8 °C)] 98.3 °F (36.8 °C)  Heart Rate:  [67-77] 77  Resp:  [14-19] 19  BP: (124-166)/(65-94) 166/72    Physical Exam:  Physical Exam  Vitals reviewed.   Cardiovascular:      Rate and Rhythm: Normal rate.      Heart sounds: Normal heart sounds.   Pulmonary:      Breath sounds: Normal breath sounds.   Abdominal:      General: There is no distension.      Tenderness: There is abdominal tenderness. There is no guarding or rebound.   Skin:     General: Skin is warm.   Neurological:      Mental Status: She is alert.          Radiology:  CT Abdomen Pelvis With Contrast    Result Date: 4/15/2023  Impression: Wall thickening with surrounding fat stranding with adjacent fluid involving the cecum. Differential includes diverticulitis, cecitis or underlying cecal mass. Electronically Signed: Dejan Denggerri  4/15/2023 9:29 AM EDT  Workstation ID: [id number]         Results Review:     I reviewed the patient's new clinical results.  I reviewed the patient's new imaging results and agree with the interpretation.    Medication Review:   Scheduled Meds:atorvastatin, 10 mg, Oral, Nightly  cefTRIAXone, 1 g, Intravenous, Q24H  metoprolol tartrate, 25 mg, Oral, BID  metroNIDAZOLE, 500 mg, Intravenous, Q8H  pantoprazole, 40 mg, Intravenous, Q AM  sodium chloride, 3 mL, Intravenous, Q12H  venlafaxine XR, 150 mg, Oral, Daily      Continuous Infusions:sodium chloride, 100 mL/hr, Last Rate: 100 mL/hr (04/16/23 0829)      PRN Meds:.•  acetaminophen  **OR** acetaminophen  •  hydrALAZINE  •  Morphine  •  ondansetron  •  potassium chloride **OR** potassium chloride **OR** potassium chloride  •  [COMPLETED] Insert Peripheral IV **AND** sodium chloride  •  [COMPLETED] Insert Peripheral IV **AND** sodium chloride  •  sodium chloride  •  sodium chloride    Labs:    CBC    Results from last 7 days   Lab Units 04/16/23  0319 04/15/23  0751   WBC 10*3/mm3 10.30 10.10   HEMOGLOBIN g/dL 12.6 12.6   PLATELETS 10*3/mm3 213 221     BMP   Results from last 7 days   Lab Units 04/16/23  0319 04/15/23  0751   SODIUM mmol/L 139 139   POTASSIUM mmol/L 4.2  4.2 3.4*   CHLORIDE mmol/L 107 104   CO2 mmol/L 22.0 24.0   BUN mg/dL 6* 10   CREATININE mg/dL 0.56* 0.62   GLUCOSE mg/dL 114* 122*     Cr Clearance Estimated Creatinine Clearance: 76.1 mL/min (A) (by C-G formula based on SCr of 0.56 mg/dL (L)).  Coag   Results from last 7 days   Lab Units 04/15/23  0751   INR  1.17*   APTT seconds 29.6*     HbA1C   Lab Results   Component Value Date    HGBA1C 5.6 07/05/2019     Blood Glucose No results found for: POCGLU  Infection   Results from last 7 days   Lab Units 04/15/23  1238 04/15/23  1216   BLOODCX  No growth at 24 hours No growth at 24 hours     CMP   Results from last 7 days   Lab Units 04/16/23  0319 04/15/23  0751   SODIUM mmol/L 139 139   POTASSIUM mmol/L 4.2  4.2 3.4*   CHLORIDE mmol/L 107 104   CO2 mmol/L 22.0 24.0   BUN mg/dL 6* 10   CREATININE mg/dL 0.56* 0.62   GLUCOSE mg/dL 114* 122*   ALBUMIN g/dL  --  4.2   BILIRUBIN mg/dL  --  1.0   ALK PHOS U/L  --  91   AST (SGOT) U/L  --  50*   ALT (SGPT) U/L  --  38*   LIPASE U/L  --  21     UA    Results from last 7 days   Lab Units 04/15/23  0811   NITRITE UA  Negative   WBC UA /HPF 3-5*   BACTERIA UA /HPF 1+*   SQUAM EPITHEL UA /HPF 3-6*     Radiology(recent) CT Abdomen Pelvis With Contrast    Result Date: 4/15/2023  Impression: Wall thickening with surrounding fat stranding with adjacent fluid involving the cecum. Differential  includes diverticulitis, cecitis or underlying cecal mass. 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

## 2023-04-17 NOTE — CASE MANAGEMENT/SOCIAL WORK
Continued Stay Note  [facility]     Patient Name: Alia Cheney  MRN: [id number]  Today's Date: 4/17/2023    Admit Date: 4/15/2023    Plan: DC Plan: Return to [facility] [place].   Discharge Plan     Row Name 04/17/23 1803       Plan    Plan DC Plan: Return to [place] [place].    Plan Comments Needs CM assesment.  Barriers to discharge: IV abx, IV fluids.              Expected Discharge Date and Time     Expected Discharge Date Expected Discharge Time    Apr 18, 2023           Phone communication or documentation only - no physical contact with patient or family.  Usha Adams RN      Office Phone [phone]  Office Cell [phone]

## 2023-04-17 NOTE — PLAN OF CARE
Goal Outcome Evaluation:  Plan of Care Reviewed With: patient         Pt alert and oriented. On clear liquid diet. Had iv antibiotic

## 2023-04-17 NOTE — PLAN OF CARE
Goal Outcome Evaluation:  Plan of Care Reviewed With: patient           Outcome Evaluation: 83 y/o female admitted on 4/15 due to R side abd pain, found with colitis. PMH includes OA,OP, IBS.Patient lives in assisted living facility and normally does not use a.d. but has rw at home. Patient is modified independent wtih all bed mobility and transfers and able to ambulate 100 ft with rw, SBA. No gait deficits noted, decreased pace when ambulating without a.d. Educated patient to use rw upon [facility] to [facility] and may benefit from HH or OP PT for follow up.

## 2023-04-18 ENCOUNTER — INPATIENT HOSPITAL (OUTPATIENT)
Dept: URBAN - METROPOLITAN AREA HOSPITAL 84 | Facility: HOSPITAL | Age: 83
End: 2023-04-18

## 2023-04-18 DIAGNOSIS — R10.31 RIGHT LOWER QUADRANT PAIN: ICD-10-CM

## 2023-04-18 DIAGNOSIS — Z90.49 ACQUIRED ABSENCE OF OTHER SPECIFIED PARTS OF DIGESTIVE TRACT: ICD-10-CM

## 2023-04-18 DIAGNOSIS — K59.09 OTHER CONSTIPATION: ICD-10-CM

## 2023-04-18 DIAGNOSIS — R94.5 ABNORMAL RESULTS OF LIVER FUNCTION STUDIES: ICD-10-CM

## 2023-04-18 DIAGNOSIS — R93.3 ABNORMAL FINDINGS ON DIAGNOSTIC IMAGING OF OTHER PARTS OF DI: ICD-10-CM

## 2023-04-18 PROCEDURE — 25010000002 CEFTRIAXONE PER 250 MG: Performed by: FAMILY MEDICINE

## 2023-04-18 PROCEDURE — 99222 1ST HOSP IP/OBS MODERATE 55: CPT | Mod: FS | Performed by: NURSE PRACTITIONER

## 2023-04-18 RX ORDER — SODIUM CHLORIDE 0.9 % (FLUSH) 0.9 %
3 SYRINGE (ML) INJECTION EVERY 12 HOURS SCHEDULED
Status: CANCELLED | OUTPATIENT
Start: 2023-04-18

## 2023-04-18 RX ORDER — SODIUM, POTASSIUM,MAG SULFATES 17.5-3.13G
1 SOLUTION, RECONSTITUTED, ORAL ORAL EVERY 12 HOURS
Status: DISCONTINUED | OUTPATIENT
Start: 2023-04-18 | End: 2023-04-19 | Stop reason: HOSPADM

## 2023-04-18 RX ORDER — PANTOPRAZOLE SODIUM 40 MG/1
40 TABLET, DELAYED RELEASE ORAL
Status: DISCONTINUED | OUTPATIENT
Start: 2023-04-19 | End: 2023-04-19 | Stop reason: HOSPADM

## 2023-04-18 RX ORDER — SODIUM CHLORIDE 9 MG/ML
40 INJECTION, SOLUTION INTRAVENOUS AS NEEDED
Status: DISCONTINUED | OUTPATIENT
Start: 2023-04-18 | End: 2023-04-19 | Stop reason: HOSPADM

## 2023-04-18 RX ORDER — SODIUM CHLORIDE 0.9 % (FLUSH) 0.9 %
3-10 SYRINGE (ML) INJECTION AS NEEDED
Status: CANCELLED | OUTPATIENT
Start: 2023-04-18

## 2023-04-18 RX ADMIN — SODIUM SULFATE, POTASSIUM SULFATE, MAGNESIUM SULFATE 1 BOTTLE: 1.6; 3.13; 17.5 SOLUTION ORAL at 19:49

## 2023-04-18 RX ADMIN — Medication 3 ML: at 09:13

## 2023-04-18 RX ADMIN — METRONIDAZOLE 500 MG: 500 INJECTION, SOLUTION INTRAVENOUS at 05:29

## 2023-04-18 RX ADMIN — ATORVASTATIN CALCIUM 10 MG: 10 TABLET, FILM COATED ORAL at 21:22

## 2023-04-18 RX ADMIN — METRONIDAZOLE 500 MG: 500 INJECTION, SOLUTION INTRAVENOUS at 14:29

## 2023-04-18 RX ADMIN — VENLAFAXINE HYDROCHLORIDE 150 MG: 75 CAPSULE, EXTENDED RELEASE ORAL at 09:13

## 2023-04-18 RX ADMIN — METOPROLOL TARTRATE 25 MG: 25 TABLET, FILM COATED ORAL at 21:23

## 2023-04-18 RX ADMIN — PANTOPRAZOLE SODIUM 40 MG: 40 INJECTION, POWDER, FOR SOLUTION INTRAVENOUS at 05:28

## 2023-04-18 RX ADMIN — METRONIDAZOLE 500 MG: 500 INJECTION, SOLUTION INTRAVENOUS at 21:23

## 2023-04-18 RX ADMIN — METOPROLOL TARTRATE 25 MG: 25 TABLET, FILM COATED ORAL at 09:13

## 2023-04-18 RX ADMIN — Medication 3 ML: at 21:23

## 2023-04-18 RX ADMIN — CEFTRIAXONE 1 G: 1 INJECTION, POWDER, FOR SOLUTION INTRAMUSCULAR; INTRAVENOUS at 09:13

## 2023-04-18 NOTE — PROGRESS NOTES
LOS: 2 days   Patient Care Team:  Obdulia Spicer APRN as PCP - General (Nurse Practitioner)  Xavier Beltrán, RN as Ambulatory  ([facility])    Subjective:  Better overall but continues to have some significant pain in the right lower quadrant    Objective:   Afebrile      Review of Systems:   Review of Systems   Constitutional: Positive for activity change and appetite change.   Gastrointestinal: Positive for abdominal pain. Negative for abdominal distention.   Neurological: Positive for weakness.           Vital Signs  Temp:  [97.8 °F (36.6 °C)-98 °F (36.7 °C)] 98 °F (36.7 °C)  Heart Rate:  [68-79] 68  Resp:  [14-19] 19  BP: (136-155)/(69-88) 149/69    Physical Exam:  Physical Exam  Vitals and nursing note reviewed.   Cardiovascular:      Rate and Rhythm: Normal rate.      Heart sounds: Normal heart sounds.   Pulmonary:      Breath sounds: Normal breath sounds.   Abdominal:      General: There is no distension.      Tenderness: There is abdominal tenderness. There is no guarding or rebound.   Skin:     General: Skin is warm.          Radiology:  CT Abdomen Pelvis With Contrast    Result Date: 4/15/2023  Impression: Wall thickening with surrounding fat stranding with adjacent fluid involving the cecum. Differential includes diverticulitis, cecitis or underlying cecal mass. Electronically Signed: Dejan Luu  4/15/2023 9:29 AM EDT  Workstation ID: [id number]         Results Review:     I reviewed the patient's new clinical results.  I reviewed the patient's new imaging results and agree with the interpretation.    Medication Review:   Scheduled Meds:atorvastatin, 10 mg, Oral, Nightly  cefTRIAXone, 1 g, Intravenous, Q24H  metoprolol tartrate, 25 mg, Oral, BID  metroNIDAZOLE, 500 mg, Intravenous, Q8H  pantoprazole, 40 mg, Intravenous, Q AM  sodium chloride, 3 mL, Intravenous, Q12H  venlafaxine XR, 150 mg, Oral, Daily      Continuous Infusions:   PRN Meds:.•  acetaminophen **OR**  acetaminophen  •  hydrALAZINE  •  LORazepam  •  Morphine  •  ondansetron  •  potassium chloride **OR** potassium chloride **OR** potassium chloride  •  [COMPLETED] Insert Peripheral IV **AND** sodium chloride  •  [COMPLETED] Insert Peripheral IV **AND** sodium chloride  •  sodium chloride  •  sodium chloride    Labs:    CBC    Results from last 7 days   Lab Units 04/16/23  0319 04/15/23  0751   WBC 10*3/mm3 10.30 10.10   HEMOGLOBIN g/dL 12.6 12.6   PLATELETS 10*3/mm3 213 221     BMP   Results from last 7 days   Lab Units 04/16/23  0319 04/15/23  0751   SODIUM mmol/L 139 139   POTASSIUM mmol/L 4.2  4.2 3.4*   CHLORIDE mmol/L 107 104   CO2 mmol/L 22.0 24.0   BUN mg/dL 6* 10   CREATININE mg/dL 0.56* 0.62   GLUCOSE mg/dL 114* 122*     Cr Clearance Estimated Creatinine Clearance: 74 mL/min (A) (by C-G formula based on SCr of 0.56 mg/dL (L)).  Coag   Results from last 7 days   Lab Units 04/15/23  0751   INR  1.17*   APTT seconds 29.6*     HbA1C   Lab Results   Component Value Date    HGBA1C 5.6 07/05/2019     Blood Glucose No results found for: POCGLU  Infection   Results from last 7 days   Lab Units 04/15/23  1238 04/15/23  1216   BLOODCX  No growth at 2 days No growth at 2 days     CMP   Results from last 7 days   Lab Units 04/16/23  0319 04/15/23  0751   SODIUM mmol/L 139 139   POTASSIUM mmol/L 4.2  4.2 3.4*   CHLORIDE mmol/L 107 104   CO2 mmol/L 22.0 24.0   BUN mg/dL 6* 10   CREATININE mg/dL 0.56* 0.62   GLUCOSE mg/dL 114* 122*   ALBUMIN g/dL  --  4.2   BILIRUBIN mg/dL  --  1.0   ALK PHOS U/L  --  91   AST (SGOT) U/L  --  50*   ALT (SGPT) U/L  --  38*   LIPASE U/L  --  21     UA    Results from last 7 days   Lab Units 04/15/23  0811   NITRITE UA  Negative   WBC UA /HPF 3-5*   BACTERIA UA /HPF 1+*   SQUAM EPITHEL UA /HPF 3-6*     Radiology(recent) No radiology results for the last day   Assessment:    Acute cecal colitis  Acute abdominal pain secondary to the above  Hepatic transaminase derangement  B12  deficiency  Vitamin D deficiency  Paroxysmal atrial fibrillation  Hypercoagulable state secondary to atrial fibrillation  History of sick sinus syndrome  History of pacemaker placement  Rheumatoid arthritis with positive rheumatoid factor of multiple sites  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

## 2023-04-18 NOTE — PLAN OF CARE
Goal Outcome Evaluation:              Outcome Evaluation: Pt alert and oriented, denies pain, only abdominal discomgort. Continue on IV abx. Pt has plans to be d/c tomorrow.

## 2023-04-18 NOTE — PLAN OF CARE
Problem: Adult Inpatient Plan of Care  Goal: Plan of Care Review  Outcome: Ongoing, Progressing   Goal Outcome Evaluation:               Pt rested well overnight, c/o abdominal pain but refusing any pain interventions. IV abx given per MAR. VSS. Pt remains on clear liquids for bowel rest. Discharge pending back to [facility] [place]. Pt educated on current plan of care, verbalized understanding.

## 2023-04-18 NOTE — CASE MANAGEMENT/SOCIAL WORK
Discharge Planning Assessment  [facility]     Patient Name: Alia Cheney  MRN: [id number]  Today's Date: 4/18/2023    Admit Date: 4/15/2023    Plan: DC Plan: Return to [facility] [place]  Brother to transport.   Discharge Needs Assessment     Row Name 04/18/23 1625       Living Environment    People in Home --  [facility].    Current Living Arrangements assisted living facility    Primary Care Provided by self    Provides Primary Care For no one    Quality of Family Relationships involved;supportive;helpful    Able to Return to Prior Arrangements yes       Resource/Environmental Concerns    Resource/Environmental Concerns none    Transportation Concerns none       Transition Planning    Patient/Family Anticipates Transition to --  [facility] [place]    Patient/Family Anticipated Services at Transition     Transportation Anticipated family or friend will provide       Discharge Needs Assessment    Readmission Within the Last 30 Days no previous admission in last 30 days    Equipment Currently Used at Home cane, quad;walker, standard    Concerns to be Addressed no discharge needs identified    Anticipated Changes Related to Illness none    Equipment Needed After Discharge none               Discharge Plan     Row Name 04/18/23 1762       Plan    Plan DC Plan: Return to [facility], [place].  Brother to transport.    Patient/Family in Agreement with Plan yes    Plan Comments Met with patient at bedside.  Lives at [facility] [place].  At baseline mobility level.  Plans to return to [facility] tomorrow.   Brother to transport.  Verified PCP.  Barriers to discharge: GI consulted today.  Anticipate Dc tomorrow.              Continued Care and Services - Admitted Since 4/15/2023    Coordination has not been started for this encounter.     Selected Continued Care - Episodes Includes continued care and service providers with selected services from the active episodes listed below    Chronic Care  Management Episode start date: 4/17/2023   There are no active outsourced providers for this episode.               Expected Discharge Date and Time     Expected Discharge Date Expected Discharge Time    Apr 19, 2023          Demographic Summary     Row Name 04/18/23 1624       General Information    Admission Type inpatient    Arrived From emergency department    Referral Source admission list    Reason for Consult discharge planning    Preferred Language English               Functional Status     Row Name 04/18/23 1624       Functional Status    Usual Activity Tolerance good    Current Activity Tolerance good       Functional Status, IADL    Medications independent    Meal Preparation independent    Housekeeping independent    Laundry independent    Shopping independent       Mental Status    General Appearance WDL WDL       Mental Status Summary    Recent Changes in Mental Status/Cognitive Functioning no changes            Met with patient in room.    Maintained distance greater than six feet and spent less than 15 minutes in the room.    Usha Adams RN      Office Phone [phone]  Office Cell [phone]

## 2023-04-18 NOTE — CONSULTS
"GI CONSULT  NOTE:    Referring Provider:  Dr. Yao    Chief complaint: Acute cecitis     Subjective . \"I started having pain a few days ago\"    History of present illness: Alia Cheney is a 82 y.o. female who has a history of rheumatoid arthritis, CAD, pacemaker, cholecystectomy and previous lung cancer requiring resection but no chemotherapy.  She presents with acute onset abdominal pain that started 3 days ago without obvious trigger.  Pain is right-sided and acute and sharp with radiation across the lower abdomen.  She reports significant improvement today after multiple bowel movements.  She has a history of chronic constipation and takes Linzess/MiraLAX and typically goes fairly regular but did have a recent episode of constipation for 4 to 6 days between bowel movements.  No melena or rectal bleeding.  No nausea, vomiting or heartburn.  No fevers but did have chills at first.  She has had weight gain recently to 15 pounds.  No new medications or NSAID use.  No alcohol use but has a history of tobacco use.  No known history of liver or pancreatic disease.    Endo History:  4/2011 EGD/colonoscopy by Dr. Jasmine -gastroesophageal junction stricture status post dilation, medium hiatal hernia, diverticulosis    Past Medical History:  Past Medical History:   Diagnosis Date   • Anal stenosis 1/3/2012   • Anxiety 11/25/2013   • Arrhythmia, ventricular 4/20/2012   • Arthritis    • Ataxia 8/6/2013   • Carpal tunnel syndrome, bilateral 2/13/2017   • Depression 1/3/2012   • Elevated cholesterol    • Gastroesophageal reflux disease 6/29/2012   • History of osteoporotic pathological fracture     Bilateral wrist-unknown date   • Hx of total knee replacement, right 6/29/2017   • Hyperlipidemia 5/2/2016   • Insomnia 8/6/2013   • Irritable bowel syndrome 8/6/2013   • Keratoconjunctivitis sicca, in Sjogren's syndrome 9/21/2016   • Memory loss 12/7/2018   • Osteoarthritis of knee 5/19/2017   • Osteoporosis     fosamax " [id number], on prolia(2016)   • Paroxysmal atrial fibrillation 2011   • Primary malignant neoplasm of right middle lobe of lung 2018   • Rheumatoid arthritis    • Tension-type headache, not intractable 2017   • Vitamin B12 deficiency 2016   • Vitamin D deficiency 2017       Past Surgical History:  Past Surgical History:   Procedure Laterality Date   • ABDOMINAL SURGERY     • ANAL DILATION     • APPENDECTOMY     • BREAST BIOPSY Right    • CARDIAC CATHETERIZATION     • CARDIAC ELECTROPHYSIOLOGY PROCEDURE N/A 2022    Procedure: Pacemaker DC new-[place];  Surgeon: Patricia Willis MD;  Location: [facility] CATH INVASIVE LOCATION;  Service: Cardiology;  Laterality: N/A;   • CATARACT EXTRACTION WITH INTRAOCULAR LENS IMPLANT Bilateral    • CHOLECYSTECTOMY     • COLONOSCOPY     • CYSTOSCOPY     • HYSTERECTOMY     • JOINT REPLACEMENT     • LUNG REMOVAL, PARTIAL Right 2018    RIGHT VATS WEDGE RESECTION OF RML PN RIGHT MIDDLE LOBECTOMY    • SUBTOTAL HYSTERECTOMY     • TONSILLECTOMY     • TOTAL HIP ARTHROPLASTY Left 2019    Procedure: TOTAL HIP ARTHROPLASTY ANTERIOR;  Surgeon: Lenny Khan MD;  Location: [facility] MAIN OR;  Service: Orthopedics   • TOTAL KNEE ARTHROPLASTY Right 2017   • WRIST FRACTURE SURGERY Bilateral        Social History:  Social History     Tobacco Use   • Smoking status: Former     Packs/day: 1.00     Years: 15.00     Pack years: 15.00     Types: Cigarettes     Start date:      Quit date:      Years since quittin.3   • Smokeless tobacco: Never   Vaping Use   • Vaping Use: Never used   Substance Use Topics   • Alcohol use: Yes   • Drug use: No   History of tobacco use, no alcohol or drug use    Family History:  Family History   Problem Relation Age of Onset   • Alcohol abuse Father    • Heart disease Father    Denies family history of GI malignancy    Medications:  Facility-Administered Medications Prior to Admission   Medication Dose Route Frequency  Provider Last Rate Last Admin   • denosumab (PROLIA) syringe 60 mg  60 mg Subcutaneous Q6 Months Aletha Guadalupe PA   60 mg at 12/28/22 1505     Medications Prior to Admission   Medication Sig Dispense Refill Last Dose   • calcium carbonate (OS-DAHLIA) 1250 (500 Ca) MG tablet Take 600 mg by mouth Daily.   4/15/2023   • Cholecalciferol (VITAMIN D3 SUPER STRENGTH) 50 MCG (2000 UT) tablet 2 po q d 60 each 11 4/15/2023   • leflunomide (ARAVA) 10 MG tablet Take 1 tablet by mouth Daily. 90 tablet 3 4/15/2023   • Linzess 145 MCG capsule capsule TAKE 1 CAPSULE EVERY DAY 90 capsule 0 4/15/2023   • memantine (NAMENDA) 10 MG tablet TAKE 1 TABLET TWICE DAILY 180 tablet 1 4/15/2023   • metoprolol tartrate (LOPRESSOR) 25 MG tablet Take 1 tablet by mouth 2 (Two) Times a Day. 180 tablet 1 4/15/2023   • pantoprazole (PROTONIX) 40 MG EC tablet TAKE 1 TABLET EVERY DAY 90 tablet 1 4/15/2023   • polyethylene glycol (MIRALAX) 17 GM/SCOOP powder Take 17 g by mouth 2 (Two) Times a Day. 1020 g 2 4/15/2023   • pravastatin (PRAVACHOL) 40 MG tablet TAKE 1 TABLET BY MOUTH EVERY NIGHT AT BEDTIME. 90 tablet 0 4/14/2023   • traZODone (DESYREL) 50 MG tablet TAKE 2 TABLETS BY MOUTH EVERY NIGHT. 180 tablet 1 4/14/2023   • venlafaxine XR (EFFEXOR-XR) 150 MG 24 hr capsule Take 1 capsule by mouth Daily. Take with 75mg for total of 225mg daily   4/15/2023   • venlafaxine XR (EFFEXOR-XR) 75 MG 24 hr capsule TAKE 1 CAPSULE EVERY DAY 90 capsule 1 4/15/2023   • vitamin B-12 (CYANOCOBALAMIN) 1000 MCG tablet Take 1 tablet by mouth Daily.   4/15/2023       Scheduled Meds:atorvastatin, 10 mg, Oral, Nightly  cefTRIAXone, 1 g, Intravenous, Q24H  metoprolol tartrate, 25 mg, Oral, BID  metroNIDAZOLE, 500 mg, Intravenous, Q8H  pantoprazole, 40 mg, Intravenous, Q AM  sodium chloride, 3 mL, Intravenous, Q12H  venlafaxine XR, 150 mg, Oral, Daily      Continuous Infusions:   PRN Meds:.•  acetaminophen **OR** acetaminophen  •  hydrALAZINE  •  LORazepam  •  Morphine  •   ondansetron  •  potassium chloride **OR** potassium chloride **OR** potassium chloride  •  [COMPLETED] Insert Peripheral IV **AND** sodium chloride  •  [COMPLETED] Insert Peripheral IV **AND** sodium chloride  •  sodium chloride  •  sodium chloride    ALLERGIES:  Patient has no known allergies.    ROS:  The following systems were reviewed  Constitution:  No fevers, chills, no unintentional weight loss  Skin: no rash, no jaundice  Eyes:  No blurry vision, no eye pain  HENT:  No change in hearing or smell  Resp:  No dyspnea or cough  CV:  No chest pain or palpitations  :  No dysuria, hematuria  Musculoskeletal:  No leg cramps or arthralgias  Neuro:  No tremor, no numbness  Psych:  No depression or confusion    Objective Resting in bed, no acute distress, no family present    Vital Signs:   Vitals:    04/17/23 1536 04/17/23 1913 04/18/23 0315 04/18/23 0904   BP: 136/75 140/88 149/69 137/83   BP Location: Right arm Right arm Right arm Left arm   Patient Position: Sitting Lying Lying Sitting   Pulse: 79 77 68 88   Resp: 16 14 19 16   Temp: 97.8 °F (36.6 °C) 97.9 °F (36.6 °C) 98 °F (36.7 °C) 97.3 °F (36.3 °C)   TempSrc: Oral Axillary Axillary Axillary   SpO2: 90% 98% 96% 98%   Weight:   74.8 kg (164 lb 14.5 oz)    Height:           Physical Exam:     General Appearance:    Awake and alert, in no acute distress   Head:    Normocephalic, without obvious abnormality, atraumatic   Throat:   No oral lesions, no thrush, oral mucosa moist   Lungs:     Respirations regular, even and unlabored   Chest Wall:    No abnormalities observed   Abdomen:     Soft, non-tender, right sided tenderness without rebound   Rectal:     Deferred   Extremities:   Moves all extremities,  no cyanosis       Skin:   No rash, no jaundice, normal palpation       Neurologic:   Cranial nerves 2 - 12 grossly intact       Results Review:   I reviewed the patient's labs and imaging.  CBC    Results from last 7 days   Lab Units 04/16/23  0319 04/15/23  0756    WBC 10*3/mm3 10.30 10.10   HEMOGLOBIN g/dL 12.6 12.6   PLATELETS 10*3/mm3 213 221     CMP   Results from last 7 days   Lab Units 04/16/23  0319 04/15/23  0751   SODIUM mmol/L 139 139   POTASSIUM mmol/L 4.2  4.2 3.4*   CHLORIDE mmol/L 107 104   CO2 mmol/L 22.0 24.0   BUN mg/dL 6* 10   CREATININE mg/dL 0.56* 0.62   GLUCOSE mg/dL 114* 122*   ALBUMIN g/dL  --  4.2   BILIRUBIN mg/dL  --  1.0   ALK PHOS U/L  --  91   AST (SGOT) U/L  --  50*   ALT (SGPT) U/L  --  38*   LIPASE U/L  --  21     Cr Clearance Estimated Creatinine Clearance: 74 mL/min (A) (by C-G formula based on SCr of 0.56 mg/dL (L)).  Coag   Results from last 7 days   Lab Units 04/15/23  0751   INR  1.17*   APTT seconds 29.6*     HbA1C   Lab Results   Component Value Date    HGBA1C 5.6 07/05/2019     Blood Glucose No results found for: POCGLU  Infection   Results from last 7 days   Lab Units 04/15/23  1238 04/15/23  1216   BLOODCX  No growth at 3 days No growth at 3 days     UA    Results from last 7 days   Lab Units 04/15/23  0811   NITRITE UA  Negative   WBC UA /HPF 3-5*   BACTERIA UA /HPF 1+*   SQUAM EPITHEL UA /HPF 3-6*     Radiology(recent) No radiology results for the last day       ASSESSMENT:  Acute right-sided abdominal pain -improving  Abnormal CT of the colon  Chronic constipation  Elevated LFTs  History of CAD/pacemaker  History of lung cancer s/p resection/no chemotherapy  History of rheumatoid arthritis  History of cholecystectomy    PLAN:  Patient is an 82-year-old female who presents with acute onset right-sided abdominal pain in the setting of chronic constipation with associated chills.  CT suggest wall thickening with surrounding fat stranding with adjacent fluid involving the cecum.  Differential diagnoses include diverticulitis vs ischemic vs underlying malignancy.  Continue IV antibiotics and avoid constipation with supportive care.  Consider outpatient colonoscopy in 4 to 6 weeks after acute inflammation has resolved to rule out  underlying malignancy vs inpatient.  LFTs noted, monitor and proceed with further work-up if remain elevated.  Check hepatitis panel. We will follow.    I discussed the patients findings and my recommendations with the patient.    We appreciate the referral    Electronically signed by SHAWNA Sharpe, 04/18/23, 1:08 PM EDT.

## 2023-04-19 ENCOUNTER — READMISSION MANAGEMENT (OUTPATIENT)
Dept: CALL CENTER | Facility: HOSPITAL | Age: 83
End: 2023-04-19
Payer: MEDICARE

## 2023-04-19 ENCOUNTER — ANESTHESIA EVENT (OUTPATIENT)
Dept: GASTROENTEROLOGY | Facility: HOSPITAL | Age: 83
DRG: 392 | End: 2023-04-19
Payer: MEDICARE

## 2023-04-19 ENCOUNTER — ANESTHESIA (OUTPATIENT)
Dept: GASTROENTEROLOGY | Facility: HOSPITAL | Age: 83
DRG: 392 | End: 2023-04-19
Payer: MEDICARE

## 2023-04-19 ENCOUNTER — INPATIENT HOSPITAL (OUTPATIENT)
Dept: URBAN - METROPOLITAN AREA HOSPITAL 84 | Facility: HOSPITAL | Age: 83
End: 2023-04-19

## 2023-04-19 VITALS
SYSTOLIC BLOOD PRESSURE: 127 MMHG | RESPIRATION RATE: 19 BRPM | WEIGHT: 164.9 LBS | HEART RATE: 67 BPM | DIASTOLIC BLOOD PRESSURE: 61 MMHG | BODY MASS INDEX: 28.15 KG/M2 | TEMPERATURE: 98.3 F | OXYGEN SATURATION: 98 % | HEIGHT: 64 IN

## 2023-04-19 DIAGNOSIS — D12.2 BENIGN NEOPLASM OF ASCENDING COLON: ICD-10-CM

## 2023-04-19 DIAGNOSIS — K52.9 NONINFECTIVE GASTROENTERITIS AND COLITIS, UNSPECIFIED: ICD-10-CM

## 2023-04-19 DIAGNOSIS — K64.8 OTHER HEMORRHOIDS: ICD-10-CM

## 2023-04-19 DIAGNOSIS — K57.30 DIVERTICULOSIS OF LARGE INTESTINE WITHOUT PERFORATION OR ABS: ICD-10-CM

## 2023-04-19 DIAGNOSIS — R93.3 ABNORMAL FINDINGS ON DIAGNOSTIC IMAGING OF OTHER PARTS OF DI: ICD-10-CM

## 2023-04-19 LAB
ALBUMIN SERPL-MCNC: 3.6 G/DL (ref 3.5–5.2)
ALBUMIN/GLOB SERPL: 1.3 G/DL
ALP SERPL-CCNC: 77 U/L (ref 39–117)
ALT SERPL W P-5'-P-CCNC: 20 U/L (ref 1–33)
ANION GAP SERPL CALCULATED.3IONS-SCNC: 12 MMOL/L (ref 5–15)
AST SERPL-CCNC: 19 U/L (ref 1–32)
BASOPHILS # BLD AUTO: 0 10*3/MM3 (ref 0–0.2)
BASOPHILS NFR BLD AUTO: 0.1 % (ref 0–1.5)
BILIRUB SERPL-MCNC: 0.4 MG/DL (ref 0–1.2)
BUN SERPL-MCNC: 4 MG/DL (ref 8–23)
BUN/CREAT SERPL: 7.8 (ref 7–25)
CALCIUM SPEC-SCNC: 7.9 MG/DL (ref 8.6–10.5)
CHLORIDE SERPL-SCNC: 108 MMOL/L (ref 98–107)
CO2 SERPL-SCNC: 23 MMOL/L (ref 22–29)
CREAT SERPL-MCNC: 0.51 MG/DL (ref 0.57–1)
DEPRECATED RDW RBC AUTO: 44.2 FL (ref 37–54)
EGFRCR SERPLBLD CKD-EPI 2021: 92.2 ML/MIN/1.73
EOSINOPHIL # BLD AUTO: 0 10*3/MM3 (ref 0–0.4)
EOSINOPHIL NFR BLD AUTO: 0 % (ref 0.3–6.2)
ERYTHROCYTE [DISTWIDTH] IN BLOOD BY AUTOMATED COUNT: 14 % (ref 12.3–15.4)
GLOBULIN UR ELPH-MCNC: 2.7 GM/DL
GLUCOSE SERPL-MCNC: 128 MG/DL (ref 65–99)
HAV IGM SERPL QL IA: NORMAL
HBV CORE IGM SERPL QL IA: NORMAL
HBV SURFACE AG SERPL QL IA: NORMAL
HCT VFR BLD AUTO: 37.8 % (ref 34–46.6)
HCV AB SER DONR QL: NORMAL
HGB BLD-MCNC: 12.3 G/DL (ref 12–15.9)
LYMPHOCYTES # BLD AUTO: 1.1 10*3/MM3 (ref 0.7–3.1)
LYMPHOCYTES NFR BLD AUTO: 24.4 % (ref 19.6–45.3)
MAGNESIUM SERPL-MCNC: 2.2 MG/DL (ref 1.6–2.4)
MCH RBC QN AUTO: 29.5 PG (ref 26.6–33)
MCHC RBC AUTO-ENTMCNC: 32.6 G/DL (ref 31.5–35.7)
MCV RBC AUTO: 90.4 FL (ref 79–97)
MONOCYTES # BLD AUTO: 0.6 10*3/MM3 (ref 0.1–0.9)
MONOCYTES NFR BLD AUTO: 13.1 % (ref 5–12)
NEUTROPHILS NFR BLD AUTO: 2.7 10*3/MM3 (ref 1.7–7)
NEUTROPHILS NFR BLD AUTO: 62.4 % (ref 42.7–76)
NRBC BLD AUTO-RTO: 0 /100 WBC (ref 0–0.2)
PLATELET # BLD AUTO: 260 10*3/MM3 (ref 140–450)
PMV BLD AUTO: 8.6 FL (ref 6–12)
POTASSIUM SERPL-SCNC: 3.1 MMOL/L (ref 3.5–5.2)
PROT SERPL-MCNC: 6.3 G/DL (ref 6–8.5)
RBC # BLD AUTO: 4.18 10*6/MM3 (ref 3.77–5.28)
SODIUM SERPL-SCNC: 143 MMOL/L (ref 136–145)
WBC NRBC COR # BLD: 4.3 10*3/MM3 (ref 3.4–10.8)

## 2023-04-19 PROCEDURE — 88305 TISSUE EXAM BY PATHOLOGIST: CPT | Performed by: INTERNAL MEDICINE

## 2023-04-19 PROCEDURE — 45385 COLONOSCOPY W/LESION REMOVAL: CPT | Performed by: INTERNAL MEDICINE

## 2023-04-19 PROCEDURE — 80053 COMPREHEN METABOLIC PANEL: CPT | Performed by: NURSE PRACTITIONER

## 2023-04-19 PROCEDURE — 85025 COMPLETE CBC W/AUTO DIFF WBC: CPT | Performed by: NURSE PRACTITIONER

## 2023-04-19 PROCEDURE — 0DBK8ZZ EXCISION OF ASCENDING COLON, VIA NATURAL OR ARTIFICIAL OPENING ENDOSCOPIC: ICD-10-PCS | Performed by: INTERNAL MEDICINE

## 2023-04-19 PROCEDURE — 83735 ASSAY OF MAGNESIUM: CPT | Performed by: FAMILY MEDICINE

## 2023-04-19 PROCEDURE — C1769 GUIDE WIRE: HCPCS | Performed by: INTERNAL MEDICINE

## 2023-04-19 PROCEDURE — 80074 ACUTE HEPATITIS PANEL: CPT | Performed by: NURSE PRACTITIONER

## 2023-04-19 PROCEDURE — 25010000002 CEFTRIAXONE PER 250 MG: Performed by: FAMILY MEDICINE

## 2023-04-19 PROCEDURE — 25010000002 PROPOFOL 200 MG/20ML EMULSION: Performed by: NURSE ANESTHETIST, CERTIFIED REGISTERED

## 2023-04-19 RX ORDER — ONDANSETRON 4 MG/1
4 TABLET, FILM COATED ORAL EVERY 6 HOURS PRN
Status: CANCELLED | OUTPATIENT
Start: 2023-04-19

## 2023-04-19 RX ORDER — ONDANSETRON 2 MG/ML
4 INJECTION INTRAMUSCULAR; INTRAVENOUS ONCE AS NEEDED
Status: DISCONTINUED | OUTPATIENT
Start: 2023-04-19 | End: 2023-04-19 | Stop reason: HOSPADM

## 2023-04-19 RX ORDER — SODIUM CHLORIDE 9 MG/ML
50 INJECTION, SOLUTION INTRAVENOUS CONTINUOUS
Status: DISCONTINUED | OUTPATIENT
Start: 2023-04-19 | End: 2023-04-19 | Stop reason: HOSPADM

## 2023-04-19 RX ORDER — ONDANSETRON 2 MG/ML
4 INJECTION INTRAMUSCULAR; INTRAVENOUS EVERY 6 HOURS PRN
Status: CANCELLED | OUTPATIENT
Start: 2023-04-19

## 2023-04-19 RX ORDER — SORBITOL SOLUTION 70 %
50 SOLUTION, ORAL MISCELLANEOUS ONCE
Status: COMPLETED | OUTPATIENT
Start: 2023-04-19 | End: 2023-04-19

## 2023-04-19 RX ORDER — EPHEDRINE SULFATE 5 MG/ML
5 INJECTION INTRAVENOUS ONCE AS NEEDED
Status: DISCONTINUED | OUTPATIENT
Start: 2023-04-19 | End: 2023-04-19 | Stop reason: HOSPADM

## 2023-04-19 RX ORDER — LABETALOL HYDROCHLORIDE 5 MG/ML
5 INJECTION, SOLUTION INTRAVENOUS
Status: DISCONTINUED | OUTPATIENT
Start: 2023-04-19 | End: 2023-04-19 | Stop reason: HOSPADM

## 2023-04-19 RX ORDER — LIDOCAINE HYDROCHLORIDE 20 MG/ML
INJECTION, SOLUTION EPIDURAL; INFILTRATION; INTRACAUDAL; PERINEURAL AS NEEDED
Status: DISCONTINUED | OUTPATIENT
Start: 2023-04-19 | End: 2023-04-19 | Stop reason: SURG

## 2023-04-19 RX ORDER — DIPHENHYDRAMINE HYDROCHLORIDE 50 MG/ML
12.5 INJECTION INTRAMUSCULAR; INTRAVENOUS
Status: DISCONTINUED | OUTPATIENT
Start: 2023-04-19 | End: 2023-04-19 | Stop reason: HOSPADM

## 2023-04-19 RX ORDER — PROPOFOL 10 MG/ML
INJECTION, EMULSION INTRAVENOUS AS NEEDED
Status: DISCONTINUED | OUTPATIENT
Start: 2023-04-19 | End: 2023-04-19 | Stop reason: SURG

## 2023-04-19 RX ORDER — POTASSIUM CHLORIDE 7.45 MG/ML
10 INJECTION INTRAVENOUS
Status: DISCONTINUED | OUTPATIENT
Start: 2023-04-19 | End: 2023-04-19

## 2023-04-19 RX ORDER — POTASSIUM CHLORIDE 20 MEQ/1
40 TABLET, EXTENDED RELEASE ORAL AS NEEDED
Status: DISCONTINUED | OUTPATIENT
Start: 2023-04-19 | End: 2023-04-19

## 2023-04-19 RX ORDER — IPRATROPIUM BROMIDE AND ALBUTEROL SULFATE 2.5; .5 MG/3ML; MG/3ML
3 SOLUTION RESPIRATORY (INHALATION) ONCE AS NEEDED
Status: DISCONTINUED | OUTPATIENT
Start: 2023-04-19 | End: 2023-04-19 | Stop reason: HOSPADM

## 2023-04-19 RX ORDER — POTASSIUM CHLORIDE 1.5 G/1.77G
40 POWDER, FOR SOLUTION ORAL AS NEEDED
Status: DISCONTINUED | OUTPATIENT
Start: 2023-04-19 | End: 2023-04-19

## 2023-04-19 RX ADMIN — VENLAFAXINE HYDROCHLORIDE 150 MG: 75 CAPSULE, EXTENDED RELEASE ORAL at 07:46

## 2023-04-19 RX ADMIN — PROPOFOL 20 MG: 10 INJECTION, EMULSION INTRAVENOUS at 13:50

## 2023-04-19 RX ADMIN — POTASSIUM CHLORIDE 40 MEQ: 1500 TABLET, EXTENDED RELEASE ORAL at 10:22

## 2023-04-19 RX ADMIN — PROPOFOL 20 MG: 10 INJECTION, EMULSION INTRAVENOUS at 13:36

## 2023-04-19 RX ADMIN — METRONIDAZOLE 500 MG: 500 INJECTION, SOLUTION INTRAVENOUS at 15:05

## 2023-04-19 RX ADMIN — PROPOFOL 20 MG: 10 INJECTION, EMULSION INTRAVENOUS at 13:45

## 2023-04-19 RX ADMIN — METOPROLOL TARTRATE 25 MG: 25 TABLET, FILM COATED ORAL at 07:46

## 2023-04-19 RX ADMIN — PROPOFOL 20 MG: 10 INJECTION, EMULSION INTRAVENOUS at 13:56

## 2023-04-19 RX ADMIN — PROPOFOL 20 MG: 10 INJECTION, EMULSION INTRAVENOUS at 13:40

## 2023-04-19 RX ADMIN — LIDOCAINE HYDROCHLORIDE 20 MG: 20 INJECTION, SOLUTION EPIDURAL; INFILTRATION; INTRACAUDAL; PERINEURAL at 13:36

## 2023-04-19 RX ADMIN — PROPOFOL 20 MG: 10 INJECTION, EMULSION INTRAVENOUS at 13:38

## 2023-04-19 RX ADMIN — PANTOPRAZOLE SODIUM 40 MG: 40 TABLET, DELAYED RELEASE ORAL at 07:49

## 2023-04-19 RX ADMIN — PROPOFOL 20 MG: 10 INJECTION, EMULSION INTRAVENOUS at 13:52

## 2023-04-19 RX ADMIN — SODIUM CHLORIDE 1000 ML: 9 INJECTION, SOLUTION INTRAVENOUS at 12:42

## 2023-04-19 RX ADMIN — Medication 3 ML: at 07:51

## 2023-04-19 RX ADMIN — SORBITOL SOLUTION (BULK) 50 ML: 70 SOLUTION at 09:00

## 2023-04-19 RX ADMIN — PROPOFOL 20 MG: 10 INJECTION, EMULSION INTRAVENOUS at 13:43

## 2023-04-19 RX ADMIN — PROPOFOL 20 MG: 10 INJECTION, EMULSION INTRAVENOUS at 13:48

## 2023-04-19 RX ADMIN — CEFTRIAXONE 1 G: 1 INJECTION, POWDER, FOR SOLUTION INTRAMUSCULAR; INTRAVENOUS at 10:22

## 2023-04-19 RX ADMIN — SODIUM CHLORIDE: 0.9 INJECTION, SOLUTION INTRAVENOUS at 13:25

## 2023-04-19 RX ADMIN — METRONIDAZOLE 500 MG: 500 INJECTION, SOLUTION INTRAVENOUS at 05:46

## 2023-04-19 RX ADMIN — PROPOFOL 20 MG: 10 INJECTION, EMULSION INTRAVENOUS at 13:54

## 2023-04-19 NOTE — PROGRESS NOTES
LOS: 3 days   Patient Care Team:  Obdulia Spicer APRN as PCP - General (Nurse Practitioner)  Xavier Beltrán, COLE as Ambulatory  ([facility])    Subjective:  Improved    Objective:   Bowel movements noted//afebrile      Review of Systems:   Review of Systems   Constitutional: Positive for activity change.   Respiratory: Negative.    Cardiovascular: Negative.    Gastrointestinal: Positive for abdominal pain. Negative for abdominal distention.   Neurological: Positive for weakness.           Vital Signs  Temp:  [97.3 °F (36.3 °C)-98.6 °F (37 °C)] 98.6 °F (37 °C)  Heart Rate:  [72-88] 80  Resp:  [16-18] 18  BP: (137-162)/(66-83) 162/66    Physical Exam:  Physical Exam  Vitals and nursing note reviewed.   Constitutional:       Appearance: Normal appearance.   Cardiovascular:      Rate and Rhythm: Normal rate.      Heart sounds: Normal heart sounds.   Pulmonary:      Breath sounds: Normal breath sounds.   Skin:     General: Skin is warm.   Neurological:      Mental Status: She is alert.          Radiology:  CT Abdomen Pelvis With Contrast    Result Date: 4/15/2023  Impression: Wall thickening with surrounding fat stranding with adjacent fluid involving the cecum. Differential includes diverticulitis, cecitis or underlying cecal mass. Electronically Signed: Dejan Denggerri  4/15/2023 9:29 AM EDT  Workstation ID: [id number]         Results Review:     I reviewed the patient's new clinical results.  I reviewed the patient's new imaging results and agree with the interpretation.    Medication Review:   Scheduled Meds:atorvastatin, 10 mg, Oral, Nightly  cefTRIAXone, 1 g, Intravenous, Q24H  metoprolol tartrate, 25 mg, Oral, BID  metroNIDAZOLE, 500 mg, Intravenous, Q8H  pantoprazole, 40 mg, Oral, QAM AC  sodium chloride, 3 mL, Intravenous, Q12H  sodium-potassium-magnesium sulfates, 1 bottle, Oral, Q12H  venlafaxine XR, 150 mg, Oral, Daily      Continuous Infusions:   PRN Meds:.•  acetaminophen **OR**  acetaminophen  •  hydrALAZINE  •  LORazepam  •  Morphine  •  ondansetron  •  potassium chloride **OR** potassium chloride **OR** potassium chloride  •  [COMPLETED] Insert Peripheral IV **AND** sodium chloride  •  [COMPLETED] Insert Peripheral IV **AND** sodium chloride  •  sodium chloride  •  sodium chloride  •  sodium chloride    Labs:    CBC    Results from last 7 days   Lab Units 04/19/23  0606 04/16/23  0319 04/15/23  0751   WBC 10*3/mm3 4.30 10.30 10.10   HEMOGLOBIN g/dL 12.3 12.6 12.6   PLATELETS 10*3/mm3 260 213 221     BMP   Results from last 7 days   Lab Units 04/19/23  0606 04/16/23  0319 04/15/23  0751   SODIUM mmol/L 143 139 139   POTASSIUM mmol/L 3.1* 4.2  4.2 3.4*   CHLORIDE mmol/L 108* 107 104   CO2 mmol/L 23.0 22.0 24.0   BUN mg/dL 4* 6* 10   CREATININE mg/dL 0.51* 0.56* 0.62   GLUCOSE mg/dL 128* 114* 122*     Cr Clearance Estimated Creatinine Clearance: 81.3 mL/min (A) (by C-G formula based on SCr of 0.51 mg/dL (L)).  Coag   Results from last 7 days   Lab Units 04/15/23  0751   INR  1.17*   APTT seconds 29.6*     HbA1C   Lab Results   Component Value Date    HGBA1C 5.6 07/05/2019     Blood Glucose No results found for: POCGLU  Infection   Results from last 7 days   Lab Units 04/15/23  1238 04/15/23  1216   BLOODCX  No growth at 3 days No growth at 3 days     CMP   Results from last 7 days   Lab Units 04/19/23  0606 04/16/23  0319 04/15/23  0751   SODIUM mmol/L 143 139 139   POTASSIUM mmol/L 3.1* 4.2  4.2 3.4*   CHLORIDE mmol/L 108* 107 104   CO2 mmol/L 23.0 22.0 24.0   BUN mg/dL 4* 6* 10   CREATININE mg/dL 0.51* 0.56* 0.62   GLUCOSE mg/dL 128* 114* 122*   ALBUMIN g/dL 3.6  --  4.2   BILIRUBIN mg/dL 0.4  --  1.0   ALK PHOS U/L 77  --  91   AST (SGOT) U/L 19  --  50*   ALT (SGPT) U/L 20  --  38*   LIPASE U/L  --   --  21     UA    Results from last 7 days   Lab Units 04/15/23  0811   NITRITE UA  Negative   WBC UA /HPF 3-5*   BACTERIA UA /HPF 1+*   SQUAM EPITHEL UA /HPF 3-6*     Radiology(recent) No  radiology results for the last day   Assessment:    Acute cecal colitis  Acute abdominal pain secondary to the above  Hepatic transaminase derangement  B12 deficiency  Acute hypokalemia  Vitamin D deficiency  Paroxysmal atrial fibrillation  Hypercoagulable state secondary to atrial fibrillation  History of sick sinus syndrome  History of pacemaker placement  Rheumatoid arthritis with positive rheumatoid factor of multiple sites  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

## 2023-04-19 NOTE — OUTREACH NOTE
Prep Survey    Flowsheet Row Responses   [facility] patient discharged from? [facility]   Is LACE score < 7 ? No   Eligibility [facility]   Date of Admission 04/15/23   Date of Discharge 04/19/23   Discharge Disposition Home or Self Care   Discharge diagnosis Lower abdominal pain   Does the patient have one of the following disease processes/diagnoses(primary or secondary)? Other   Does the patient have Home health ordered? No   Is there a DME ordered? No   General alerts for this patient [place]   Prep survey completed? Yes          Liv RODRIGUEZ - Registered Nurse

## 2023-04-19 NOTE — PLAN OF CARE
Problem: Adult Inpatient Plan of Care  Goal: Absence of Hospital-Acquired Illness or Injury  Intervention: Identify and Manage Fall Risk  Description: Perform standard risk assessment on admission using a validated tool or comprehensive approach appropriate to the patient; reassess fall risk frequently, with change in status or transfer to another level of care.  Communicate fall injury risk to interprofessional healthcare team.  Determine need for increased observation, equipment and environmental modification, such as low bed, signage and supportive, nonskid footwear.  Adjust safety measures to individual developmental age, stage and identified risk factors.  Reinforce the importance of safety and physical activity with patient and family.  Perform regular intentional rounding to assess need for position change, pain assessment and personal needs, including assistance with toileting.  Recent Flowsheet Documentation  Taken 4/19/2023 0400 by Sarthak Anderson RN  Safety Promotion/Fall Prevention:   assistive device/personal items within reach   clutter free environment maintained   safety round/check completed   room organization consistent  Taken 4/19/2023 0200 by Sarthak Anderson RN  Safety Promotion/Fall Prevention:   assistive device/personal items within reach   clutter free environment maintained   safety round/check completed   room organization consistent  Taken 4/19/2023 0000 by Sarthak Anderson RN  Safety Promotion/Fall Prevention:   assistive device/personal items within reach   clutter free environment maintained   safety round/check completed   room organization consistent  Taken 4/18/2023 2200 by Sarthak Anderson, RN  Safety Promotion/Fall Prevention:   assistive device/personal items within reach   clutter free environment maintained   safety round/check completed   room organization consistent  Taken 4/18/2023 2000 by Sarthak Anderson RN  Safety Promotion/Fall Prevention:   assistive device/personal items  within reach   clutter free environment maintained   safety round/check completed   room organization consistent  Intervention: Prevent Skin Injury  Description: Perform a screening for skin injury risk, such as pressure or moisture associated skin damage on admission and at regular intervals throughout hospital stay.  Keep all areas of skin (especially folds) clean and dry.  Maintain adequate skin hydration.  Relieve and redistribute pressure and protect bony prominences; implement measures based on patient-specific risk factors.  Match turning and repositioning schedule to clinical condition.  Encourage weight shift frequently; assist with reposition if unable to complete independently.  Float heels off bed; avoid pressure on the Achilles tendon.  Keep skin free from extended contact with medical devices.  Encourage functional activity and mobility, as early as tolerated.  Use aids (e.g., slide boards, mechanical lift) during transfer.  Recent Flowsheet Documentation  Taken 4/18/2023 2000 by Sarthak Anderson, RN  Skin Protection: adhesive use limited  Intervention: Prevent and Manage VTE (Venous Thromboembolism) Risk  Description: Assess for VTE (venous thromboembolism) risk.  Encourage and assist with early ambulation.  Initiate and maintain compression or other therapy, as indicated, based on identified risk in accordance with organizational protocol and provider order.  Encourage both active and passive leg exercises while in bed, if unable to ambulate.  Recent Flowsheet Documentation  Taken 4/18/2023 2000 by Sarthak Anderson, RN  Activity Management: up to bedside commode  Intervention: Prevent Infection  Description: Maintain skin and mucous membrane integrity; promote hand, oral and pulmonary hygiene.  Optimize fluid balance, nutrition, sleep and glycemic control to maximize infection resistance.  Identify potential sources of infection early to prevent or mitigate progression of infection (e.g., wound, lines,  devices).  Evaluate ongoing need for invasive devices; remove promptly when no longer indicated.  Recent Flowsheet Documentation  Taken 4/18/2023 2000 by Sarthak Anderson RN  Infection Prevention:   single patient room provided   rest/sleep promoted   personal protective equipment utilized   hand hygiene promoted   environmental surveillance performed  Goal: Optimal Comfort and Wellbeing  Intervention: Monitor Pain and Promote Comfort  Description: Assess pain level, treatment efficacy and patient response at regular intervals using a consistent pain scale.  Consider the presence and impact of preexisting chronic pain.  Encourage patient and caregiver involvement in pain assessment, interventions and safety measures.  Recent Flowsheet Documentation  Taken 4/18/2023 2000 by Sarthak Anderson, RN  Pain Management Interventions:   medication offered but refused   pain management plan reviewed with patient/caregiver  Intervention: Provide Person-Centered Care  Description: Use a family-focused approach to care.  Develop trust and rapport by proactively providing information, encouraging questions, addressing concerns and offering reassurance.  Acknowledge emotional response to hospitalization.  Recognize and utilize personal coping strategies.  Honor spiritual and cultural preferences.  Recent Flowsheet Documentation  Taken 4/18/2023 2000 by Sarthak Anderson RN  Trust Relationship/Rapport:   care explained   choices provided   emotional support provided   empathic listening provided   questions answered   questions encouraged   reassurance provided   thoughts/feelings acknowledged   Goal Outcome Evaluation:      Patient prepped during the night for colonoscopy today at noon.  C/o abdominal fullness and discomfort but no pain medication wanted.  Denies nausea/vomiting.  Vitals WNL.  NPO since midnight.

## 2023-04-19 NOTE — CASE MANAGEMENT/SOCIAL WORK
Case Management Discharge Note      Final Note: David SYLVESTER  Transportation Services  Private: Car    Final Discharge Disposition Code: 01 - home or self-care

## 2023-04-19 NOTE — ANESTHESIA PREPROCEDURE EVALUATION
Anesthesia Evaluation     Patient summary reviewed and Nursing notes reviewed   NPO Solid Status: > 8 hours  NPO Liquid Status: > 8 hours           Airway   Mallampati: III  TM distance: >3 FB  Neck ROM: full  Dental    (+) edentulous    Pulmonary - normal exam   (+) lung cancer,   Cardiovascular - normal exam  Exercise tolerance: poor (<4 METS)    ECG reviewed    (+) CAD, dysrhythmias (Sick sinus syndrome) Atrial Fib, Paroxysmal Atrial Fib, Tachycardia, hyperlipidemia,       Neuro/Psych  (+) headaches, syncope, numbness, psychiatric history,    GI/Hepatic/Renal/Endo    (+)  GERD well controlled,      Musculoskeletal     Abdominal  - normal exam   Substance History      OB/GYN          Other   arthritis,                      Anesthesia Plan    ASA 3     general     intravenous induction     Anesthetic plan, risks, benefits, and alternatives have been provided, discussed and informed consent has been obtained with: patient.    Use of blood products discussed with patient .   Plan discussed with CRNA.        CODE STATUS:    Level Of Support Discussed With: Patient  Code Status (Patient has no pulse and is not breathing): CPR (Attempt to Resuscitate)  Medical Interventions (Patient has pulse or is breathing): Full Support

## 2023-04-19 NOTE — ANESTHESIA POSTPROCEDURE EVALUATION
Patient: Alia Cheney    Procedure Summary     Date: 04/19/23 Room / Location: [facility] 1 / [facility]    Anesthesia Start: 1325 Anesthesia Stop: 1402    Procedure: COLONOSCOPY with hot snare polypectomy x 1 Diagnosis:       Colitis      (Colitis [K52.9])    Surgeons: Thania Sheppard MD Provider: Jose Miguel Freeman MD    Anesthesia Type: general ASA Status: 3          Anesthesia Type: general    Vitals  Vitals Value Taken Time   /55 04/19/23 1434   Temp     Pulse 67 04/19/23 1437   Resp 19 04/19/23 1422   SpO2 100 % 04/19/23 1436   Vitals shown include unvalidated device data.        Post Anesthesia Care and Evaluation    Patient location during evaluation: PACU  Patient participation: complete - patient participated  Level of consciousness: awake  Pain score: 2  Pain management: adequate    Airway patency: patent  Anesthetic complications: No anesthetic complications  PONV Status: none  Cardiovascular status: acceptable  Respiratory status: acceptable  Hydration status: acceptable

## 2023-04-19 NOTE — OP NOTE
COLONOSCOPY Procedure Report    Patient Name:  Alia Cheney  YOB: 1940    Date of Surgery:  4/19/2023     Pre-Op Diagnosis:  Abnormal CT scan of the colon  Colitis [K52.9]       Post Op Diagnosis:  Normal cecum, diverticulosis, colon polyp, internal hemorrhoids      Procedure/CPT® Codes:      Procedure(s):  COLONOSCOPY with hot snare polypectomy x 1    Staff:  Surgeon(s):  Thania Sheppard MD         Anesthesia: Monitored Anesthesia Care    Implants:    Nothing was implanted during the procedure    Specimen:        See below    No blood loss    Complications:  None    Description of Procedure:  Informed consent was obtained for the procedure, including sedation.  Risks of perforation, hemorrhage, adverse drug reaction and aspiration were discussed.  The patient was brought into the endoscopy suite. Continuous cardiopulmonary monitoring was performed.  The patient was placed in the left lateral decubitus position. After adequate sedation was attained, the digital rectal exam was performed which was normal.  Subsequently, the pediatric Olympus colonoscope was inserted into the patient's rectum and advanced to the level of the cecum without difficulty.  The bowel prep was good.  Circumferential examination of the patient's colon was performed on scope withdrawal.  A retroflex exam was performed in the rectum which showed medium size internal hemorrhoid.  The bowel was decompressed, the scope was withdrawn from the patient, and the patient tolerated the procedure well. There were no immediate post-operative complications.     Findings:    Normal mucosa in the cecum.  No diverticulosis in the cecum.  No mass, no inflammation.  Ascending colon polyp x1, 8 mm in size, removed in single piece fashion with hot snare polypectomy  Diverticulosis without diverticulitis involving the transverse and sigmoid colon  Medium size internal hemorrhoids.      Recommendations:  Follow-up histopathology  Okay for  diet and discharge from GI standpoint, will see inpatient as needed.  No recall for colonoscopy due to patient's age  Bowel regimen for constipation-consider daily fiber supplements and/or MiraLAX        Thania Sheppard MD     Date: 4/19/2023  Time: 14:02 EDT

## 2023-04-20 ENCOUNTER — TRANSITIONAL CARE MANAGEMENT TELEPHONE ENCOUNTER (OUTPATIENT)
Dept: CALL CENTER | Facility: HOSPITAL | Age: 83
End: 2023-04-20
Payer: MEDICARE

## 2023-04-20 LAB
BACTERIA SPEC AEROBE CULT: NORMAL
BACTERIA SPEC AEROBE CULT: NORMAL

## 2023-04-20 NOTE — OUTREACH NOTE
Call Center TCM Note    Flowsheet Row Responses   [facility] patient discharged from? [facility]   Does the patient have one of the following disease processes/diagnoses(primary or secondary)? Other   TCM attempt successful? Yes   General alerts for this patient [place]   Discharge diagnosis Lower abdominal pain   Is patient permission given to speak with other caregiver? Yes   Person spoke with today (if not patient) and relationship dtr Naomy Guzmán reviewed with patient/caregiver? Yes   Does the patient have all medications ordered at discharge? N/A   Comments TCM APPT WITH PCP HAILEE COOPER IS 04/25/2023   Does the patient have an appointment with their PCP within 7 days of discharge? Yes   Has home health visited the patient within 72 hours of discharge? N/A   Psychosocial issues? No   Did the patient receive a copy of their discharge instructions? Yes   Nursing interventions Reviewed instructions with patient   What is the patient's perception of their health status since discharge? Improving   Is the patient/caregiver able to teach back signs and symptoms related to disease process for when to call PCP? Yes   Is the patient/caregiver able to teach back signs and symptoms related to disease process for when to call 911? Yes   Is the patient/caregiver able to teach back the hierarchy of who to call/visit for symptoms/problems? PCP, Specialist, Home health nurse, Urgent Care, ED, 911 Yes   If the patient is a current smoker, are they able to teach back resources for cessation? Not a smoker   TCM call completed? Yes   Wrap up additional comments D/C DX: Lower abdominal pain,  constipation,  colonoscopy w/polypectomy x 1 - Per dtr who is a NP, pt doing well. Pt spent night with dtr and now back at he [facility] residence. No questions. No changes to medications at d/c. TCM APPT with PCP SHAWNA Cooper is 04/25/2023.          Mandy Babb MA    4/20/2023, 10:40 EDT

## 2023-04-21 NOTE — DISCHARGE SUMMARY
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        Hospital Course  Patient is a 82 y.o. female who presented with acute abdominal pain with evidence of acute cecitis.  She was treated aggressively with antimicrobial therapy and bowel rest and was evaluated by gastroenterology.  Colonoscopy was performed and with improvement overall, she was deemed stable for discharge home with medications per the discharge reconciliation.  She will have close outpatient follow-up with gastroenterology within 1 month's time and with ourselves in the office within 1 to 2 weeks time.  She will call with any decompensation.  She will be placed on a bowel program given her acute on chronic constipation.  As always, she may call with any questions or concerns and may utilize my personal number at any time.    Procedures Performed    Procedure(s):  COLONOSCOPY with hot snare polypectomy x 1  -------------------  04/19 1331 COLONOSCOPY    Consults:   Consults     Date and Time Order Name Status  Description    4/18/2023  8:49 AM Inpatient Gastroenterology Consult Completed           Pertinent Test Results:CT Abdomen Pelvis With Contrast    Result Date: 4/15/2023  Impression: Wall thickening with surrounding fat stranding with adjacent fluid involving the cecum. Differential includes diverticulitis, cecitis or underlying cecal mass. Electronically Signed: Dejan Luu  4/15/2023 9:29 AM EDT  Workstation ID: [id number]      Imaging Results (Last 7 Days)     Procedure Component Value Units Date/Time    CT Abdomen Pelvis With Contrast [[id number]] Collected: 04/15/23 0919     Updated: 04/15/23 0931    Narrative:      CT ABDOMEN PELVIS W CONTRAST    Date of Exam: 4/15/2023 9:09 AM EDT    Indication: abdominal pain.    Comparison: None available.    Technique: Axial CT images were obtained of the abdomen and pelvis following the uneventful intravenous administration of iodinated contrast. Sagittal and coronal reconstructions were performed.  Automated exposure control and iterative reconstruction   methods were used.     Findings:  There is bilateral basilar interstitial lung disease change. There is interlobular septal thickening and subpleural pleural nodularity.    The liver, bilateral adrenal glands, bilateral kidneys, pancreas and spleen appear normal. The gallbladder surgically absent.    There is an abnormal appearance of the cecum with diffuse inflammation and wall thickening with what appears to be either an ulceration or large diverticulum. There is fluid surrounding this inflammatory process. Differential would include cecal hiatus,   right-sided diverticulitis or underlying mass. Follow-up is recommended. The remaining portions of the colon are normal aside from diverticulosis of the sigmoid and descending portions of the colon.      Impression:      Impression:  Wall thickening with surrounding fat stranding with adjacent fluid involving the cecum. Differential includes diverticulitis, cecitis or  underlying cecal mass.    Electronically Signed: Dejan Luu    4/15/2023 9:29 AM EDT    Workstation ID: [id number]              Condition on Discharge:  Fair    Vital Signs       Physical Exam:     General Appearance:    Alert, cooperative, in no acute distress   Head:    Normocephalic, without obvious abnormality, atraumatic   Eyes:           Conjunctivae and sclerae normal, no   icterus, no pallor, corneas clear, PERRLA   Throat:   No oral lesions, no thrush, oral mucosa moist   Neck:   No adenopathy, supple, trachea midline, no thyromegaly, no   carotid bruit, no JVD   Lungs:     Clear to auscultation,respirations regular, even and                  unlabored    Heart:    Regular rhythm and normal rate, normal S1 and S2, no            murmur, no gallop, no rub, no click   Chest Wall:    No abnormalities observed   Abdomen:     Normal bowel sounds, no masses, no organomegaly, soft        non-tender, non-distended, no guarding, no rebound                tenderness   Rectal:     Deferred   Extremities:   Moves all extremities well, no edema, no cyanosis, no             redness   Pulses:   Pulses palpable and equal bilaterally   Skin:   No bleeding, bruising or rash   Lymph nodes:   No palpable adenopathy   Neurologic:   Cranial nerves 2 - 12 grossly intact, sensation intact, DTR       present and equal bilaterally         Discharge Disposition  Home or Self Care    Discharge Medications     Discharge Medications      Continue These Medications      Instructions Start Date   calcium carbonate 1250 (500 Ca) MG tablet  Commonly known as: OS-DAHLIA   600 mg, Oral, Daily      Cholecalciferol 50 MCG (2000 UT) tablet  Commonly known as: Vitamin D3 Super Strength   2 po q d      leflunomide 10 MG tablet  Commonly known as: ARAVA   10 mg, Oral, Daily      Linzess 145 MCG capsule capsule  Generic drug: linaclotide   TAKE 1 CAPSULE EVERY DAY      memantine 10 MG tablet  Commonly known as: NAMENDA   TAKE 1 TABLET TWICE DAILY       metoprolol tartrate 25 MG tablet  Commonly known as: LOPRESSOR   25 mg, Oral, 2 Times Daily      pantoprazole 40 MG EC tablet  Commonly known as: PROTONIX   TAKE 1 TABLET EVERY DAY      polyethylene glycol 17 GM/SCOOP powder  Commonly known as: MIRALAX   17 g, Oral, 2 Times Daily      pravastatin 40 MG tablet  Commonly known as: PRAVACHOL   40 mg, Oral, Every Night at Bedtime      traZODone 50 MG tablet  Commonly known as: DESYREL   100 mg, Oral, Nightly      venlafaxine  MG 24 hr capsule  Commonly known as: EFFEXOR-XR   150 mg, Oral, Daily, Take with 75mg for total of 225mg daily      venlafaxine XR 75 MG 24 hr capsule  Commonly known as: EFFEXOR-XR   TAKE 1 CAPSULE EVERY DAY      vitamin B-12 1000 MCG tablet  Commonly known as: CYANOCOBALAMIN   1,000 mcg, Oral, Daily             Discharge Diet:   Diet Instructions    As tolerated           Activity at Discharge:   Activity Instructions    As tolerated            Follow-up Appointments  Future Appointments   Date Time Provider Department Center   4/25/2023  2:00 PM Neena Sutherland PA [facility]         Test Results Pending at Discharge  Pending Labs     Order Current Status    Tissue Pathology Exam In process           Parminder Yao MD  04/21/23  10:03 EDT

## 2023-04-24 LAB
LAB AP CASE REPORT: NORMAL
PATH REPORT.FINAL DX SPEC: NORMAL
PATH REPORT.GROSS SPEC: NORMAL

## 2023-04-25 ENCOUNTER — OFFICE VISIT (OUTPATIENT)
Dept: FAMILY MEDICINE CLINIC | Facility: CLINIC | Age: 83
End: 2023-04-25
Payer: MEDICARE

## 2023-04-25 VITALS
RESPIRATION RATE: 16 BRPM | TEMPERATURE: 97.9 F | SYSTOLIC BLOOD PRESSURE: 155 MMHG | HEART RATE: 66 BPM | WEIGHT: 172 LBS | DIASTOLIC BLOOD PRESSURE: 84 MMHG | HEIGHT: 64 IN | OXYGEN SATURATION: 99 % | BODY MASS INDEX: 29.37 KG/M2

## 2023-04-25 DIAGNOSIS — K52.9 ACUTE CECITIS: Primary | ICD-10-CM

## 2023-05-08 ENCOUNTER — READMISSION MANAGEMENT (OUTPATIENT)
Dept: CALL CENTER | Facility: HOSPITAL | Age: 83
End: 2023-05-08
Payer: MEDICARE

## 2023-05-08 NOTE — OUTREACH NOTE
Medical Week 3 Survey    Flowsheet Row Responses   [facility] patient discharged from? [facility]   Does the patient have one of the following disease processes/diagnoses(primary or secondary)? Other   Week 3 attempt successful? Yes   Call start time 1308   Call end time 1309   General alerts for this patient [place]   Discharge diagnosis Lower abdominal pain   Person spoke with today (if not patient) and relationship dtr Naomy   Prescription comments No changes to medications at discharge or since discharge   Is the patient taking all medications as directed (includes completed medication regime)? Yes   Does the patient have a primary care provider?  Yes   Has the patient kept scheduled appointments due by today? Yes   Comments TCM APPT WITH PCP HAILEE COOPER IS 04/25/2023   Has home health visited the patient within 72 hours of discharge? N/A   What is the patient's perception of their health status since discharge? Returned to baseline/stable  [Dtr reports no concerns today and has no questions---pt returned to baseline.]   Week 3 Call Completed? Yes   Graduated Yes  [NO immediate needs or concerns]          NAKITA VILLALBA - Registered Nurse

## 2023-05-12 RX ORDER — VENLAFAXINE HYDROCHLORIDE 150 MG/1
CAPSULE, EXTENDED RELEASE ORAL
Qty: 90 CAPSULE | Refills: 0 | Status: SHIPPED | OUTPATIENT
Start: 2023-05-12

## 2023-05-12 RX ORDER — LINACLOTIDE 145 UG/1
CAPSULE, GELATIN COATED ORAL
Qty: 90 CAPSULE | Refills: 0 | Status: SHIPPED | OUTPATIENT
Start: 2023-05-12

## 2023-08-10 RX ORDER — ESTRADIOL 0.1 MG/G
2 CREAM VAGINAL DAILY
Qty: 1 EACH | Refills: 12 | Status: SHIPPED | OUTPATIENT
Start: 2023-08-10

## 2023-08-10 RX ORDER — FLUCONAZOLE 150 MG/1
150 TABLET ORAL ONCE
Qty: 1 TABLET | Refills: 0 | Status: SHIPPED | OUTPATIENT
Start: 2023-08-10 | End: 2023-08-10

## 2023-09-18 RX ORDER — LEFLUNOMIDE 10 MG/1
TABLET ORAL
Qty: 90 TABLET | Refills: 3 | Status: SHIPPED | OUTPATIENT
Start: 2023-09-18

## 2023-09-18 RX ORDER — VENLAFAXINE HYDROCHLORIDE 150 MG/1
CAPSULE, EXTENDED RELEASE ORAL
Qty: 90 CAPSULE | Refills: 0 | Status: SHIPPED | OUTPATIENT
Start: 2023-09-18

## 2023-12-25 ENCOUNTER — HOSPITAL ENCOUNTER (EMERGENCY)
Facility: HOSPITAL | Age: 83
Discharge: NURSING, REHAB OR LONG-TERM CARE | End: 2023-12-25
Attending: EMERGENCY MEDICINE | Admitting: EMERGENCY MEDICINE
Payer: MEDICARE

## 2023-12-25 ENCOUNTER — APPOINTMENT (OUTPATIENT)
Dept: GENERAL RADIOLOGY | Facility: HOSPITAL | Age: 83
End: 2023-12-25
Payer: MEDICARE

## 2023-12-25 VITALS
OXYGEN SATURATION: 99 % | RESPIRATION RATE: 18 BRPM | HEIGHT: 64 IN | DIASTOLIC BLOOD PRESSURE: 88 MMHG | SYSTOLIC BLOOD PRESSURE: 173 MMHG | TEMPERATURE: 97.9 F | BODY MASS INDEX: 29.88 KG/M2 | HEART RATE: 81 BPM | WEIGHT: 175 LBS

## 2023-12-25 DIAGNOSIS — R53.1 WEAKNESS: Primary | ICD-10-CM

## 2023-12-25 LAB
ALBUMIN SERPL-MCNC: 3.6 G/DL (ref 3.5–5.2)
ALBUMIN/GLOB SERPL: 1.5 G/DL
ALP SERPL-CCNC: 133 U/L (ref 39–117)
ALT SERPL W P-5'-P-CCNC: 22 U/L (ref 1–33)
ANION GAP SERPL CALCULATED.3IONS-SCNC: 14 MMOL/L (ref 5–15)
AST SERPL-CCNC: 32 U/L (ref 1–32)
BASOPHILS # BLD AUTO: 0 10*3/MM3 (ref 0–0.2)
BASOPHILS NFR BLD AUTO: 0.8 % (ref 0–1.5)
BILIRUB SERPL-MCNC: 0.5 MG/DL (ref 0–1.2)
BUN SERPL-MCNC: 8 MG/DL (ref 8–23)
BUN/CREAT SERPL: 14.3 (ref 7–25)
CALCIUM SPEC-SCNC: 9.2 MG/DL (ref 8.6–10.5)
CHLORIDE SERPL-SCNC: 108 MMOL/L (ref 98–107)
CO2 SERPL-SCNC: 22 MMOL/L (ref 22–29)
CREAT SERPL-MCNC: 0.56 MG/DL (ref 0.57–1)
DEPRECATED RDW RBC AUTO: 49.4 FL (ref 37–54)
EGFRCR SERPLBLD CKD-EPI 2021: 89.6 ML/MIN/1.73
EOSINOPHIL # BLD AUTO: 0 10*3/MM3 (ref 0–0.4)
EOSINOPHIL NFR BLD AUTO: 0 % (ref 0.3–6.2)
ERYTHROCYTE [DISTWIDTH] IN BLOOD BY AUTOMATED COUNT: 14.8 % (ref 12.3–15.4)
GLOBULIN UR ELPH-MCNC: 2.4 GM/DL
GLUCOSE SERPL-MCNC: 113 MG/DL (ref 65–99)
HCT VFR BLD AUTO: 37.2 % (ref 34–46.6)
HGB BLD-MCNC: 12.3 G/DL (ref 12–15.9)
LYMPHOCYTES # BLD AUTO: 1.4 10*3/MM3 (ref 0.7–3.1)
LYMPHOCYTES NFR BLD AUTO: 32.4 % (ref 19.6–45.3)
MAGNESIUM SERPL-MCNC: 2 MG/DL (ref 1.6–2.4)
MCH RBC QN AUTO: 29.7 PG (ref 26.6–33)
MCHC RBC AUTO-ENTMCNC: 32.9 G/DL (ref 31.5–35.7)
MCV RBC AUTO: 90.1 FL (ref 79–97)
MONOCYTES # BLD AUTO: 0.5 10*3/MM3 (ref 0.1–0.9)
MONOCYTES NFR BLD AUTO: 11 % (ref 5–12)
NEUTROPHILS NFR BLD AUTO: 2.4 10*3/MM3 (ref 1.7–7)
NEUTROPHILS NFR BLD AUTO: 55.8 % (ref 42.7–76)
NRBC BLD AUTO-RTO: 0.1 /100 WBC (ref 0–0.2)
PLATELET # BLD AUTO: 227 10*3/MM3 (ref 140–450)
PMV BLD AUTO: 8.3 FL (ref 6–12)
POTASSIUM SERPL-SCNC: 3.6 MMOL/L (ref 3.5–5.2)
PROT SERPL-MCNC: 6 G/DL (ref 6–8.5)
RBC # BLD AUTO: 4.13 10*6/MM3 (ref 3.77–5.28)
SODIUM SERPL-SCNC: 144 MMOL/L (ref 136–145)
TROPONIN T SERPL HS-MCNC: 10 NG/L
WBC NRBC COR # BLD AUTO: 4.3 10*3/MM3 (ref 3.4–10.8)

## 2023-12-25 PROCEDURE — 99284 EMERGENCY DEPT VISIT MOD MDM: CPT

## 2023-12-25 PROCEDURE — 71045 X-RAY EXAM CHEST 1 VIEW: CPT

## 2023-12-25 PROCEDURE — 80053 COMPREHEN METABOLIC PANEL: CPT | Performed by: EMERGENCY MEDICINE

## 2023-12-25 PROCEDURE — 84484 ASSAY OF TROPONIN QUANT: CPT | Performed by: EMERGENCY MEDICINE

## 2023-12-25 PROCEDURE — 93005 ELECTROCARDIOGRAM TRACING: CPT

## 2023-12-25 PROCEDURE — 85025 COMPLETE CBC W/AUTO DIFF WBC: CPT | Performed by: EMERGENCY MEDICINE

## 2023-12-25 PROCEDURE — 83735 ASSAY OF MAGNESIUM: CPT | Performed by: EMERGENCY MEDICINE

## 2023-12-25 PROCEDURE — 36415 COLL VENOUS BLD VENIPUNCTURE: CPT

## 2023-12-25 NOTE — ED NOTES
Pt reports feeling dizzy this morning, having SOA, and feeling like her heart is racing. Hx of dementia, syncopal episodes, and afib. Pt is currently A&Ox4. Daughter at bedside reports pt does not typically have mobility issues and hasn't had any falls.

## 2023-12-25 NOTE — ED PROVIDER NOTES
Subjective   History of Present Illness  83-year-old female presents from assisted living.  She apparently got up from bed was lightheaded.  Does not sound like she had syncopal episode.  She apparently complained to being short of breath.  She denies any pain at this time.  She states she still does not feel well.  Daughter reports that she has had problems with tachycardia.  She has pacemaker.  She also has some anxiety.  There is no report of fever.  She does not report cough.  No vomiting.  She complains of being weak but it sounds like this was more generalized than focal.  She has not had any urine complaints  Review of Systems    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    Anxiety 11/25/2013    Arrhythmia, ventricular 4/20/2012    Arthritis     Ataxia 8/6/2013    Carpal tunnel syndrome, bilateral 2/13/2017    Depression 1/3/2012    Elevated cholesterol     Gastroesophageal reflux disease 6/29/2012    History of osteoporotic pathological fracture     Bilateral wrist-unknown date    Hx of total knee replacement, right 6/29/2017    Hyperlipidemia 5/2/2016    Insomnia 8/6/2013    Irritable bowel syndrome 8/6/2013    Keratoconjunctivitis sicca, in Sjogren's syndrome 9/21/2016    Memory loss 12/7/2018    Osteoarthritis of knee 5/19/2017    Osteoporosis     fosamax [id number], on prolia(2016)    Paroxysmal atrial fibrillation 11/9/2011    Primary malignant neoplasm of right middle lobe of lung 8/21/2018    Rheumatoid arthritis     Tension-type headache, not intractable 12/26/2017    Vitamin B12 deficiency 5/2/2016    Vitamin D deficiency 11/20/2017       No Known Allergies    Past Surgical History:   Procedure Laterality Date    ABDOMINAL SURGERY      ANAL DILATION      APPENDECTOMY      BREAST BIOPSY Right     CARDIAC CATHETERIZATION  2009    CARDIAC ELECTROPHYSIOLOGY PROCEDURE N/A 8/4/2022    Procedure: Pacemaker DC [place];  Surgeon: Patricia Willis MD;  Location: [facility] CATH INVASIVE  LOCATION;  Service: Cardiology;  Laterality: N/A;    CATARACT EXTRACTION WITH INTRAOCULAR LENS IMPLANT Bilateral 2009    CHOLECYSTECTOMY      COLONOSCOPY      COLONOSCOPY N/A 2023    Procedure: COLONOSCOPY with hot snare polypectomy x 1;  Surgeon: Thania Sheppard MD;  Location: [facility] ENDOSCOPY;  Service: Gastroenterology;  Laterality: N/A;  polyp, diverticulosis, internal hemorrhoids    CYSTOSCOPY      HYSTERECTOMY      JOINT REPLACEMENT      LUNG REMOVAL, PARTIAL Right 2018    RIGHT VATS WEDGE RESECTION OF RML PN RIGHT MIDDLE LOBECTOMY     SUBTOTAL HYSTERECTOMY      TONSILLECTOMY      TOTAL HIP ARTHROPLASTY Left 2019    Procedure: TOTAL HIP ARTHROPLASTY ANTERIOR;  Surgeon: Lenny Khan MD;  Location: [facility] MAIN OR;  Service: Orthopedics    TOTAL KNEE ARTHROPLASTY Right 2017    WRIST FRACTURE SURGERY Bilateral        Family History   Problem Relation Age of Onset    Alcohol abuse Father     Heart disease Father        Social History     Socioeconomic History    Marital status:    Tobacco Use    Smoking status: Former     Packs/day: 1.00     Years: 15.00     Additional pack years: 0.00     Total pack years: 15.00     Types: Cigarettes     Start date:      Quit date:      Years since quittin.0    Smokeless tobacco: Never   Vaping Use    Vaping Use: Never used   Substance and Sexual Activity    Alcohol use: Yes    Drug use: No    Sexual activity: Not Currently     Prior to Admission medications    Medication Sig Start Date End Date Taking? Authorizing Provider   leflunomide (ARAVA) 10 MG tablet TAKE 1 TABLET EVERY DAY 23   Neena Sutherland PA   calcium carbonate (OS-DAHLIA) 1250 (500 Ca) MG tablet Take 600 mg by mouth Daily.    Provider, MD Melina   Cholecalciferol (VITAMIN D3 SUPER STRENGTH) 50 MCG (2000 UT) tablet 2 po q d 19   Lele Lucas MD   estradiol (ESTRACE VAGINAL) 0.1 MG/GM vaginal cream Insert 2 g into the vagina Daily. 8/10/23   Naomy Pulido,  SHAWNA   Linzess 145 MCG capsule capsule TAKE 1 CAPSULE EVERY DAY 5/12/23   Obdulia Spicer APRN   memantine (NAMENDA) 10 MG tablet TAKE 1 TABLET TWICE DAILY 11/22/22   Jeanan Connors APRN   metoprolol tartrate (LOPRESSOR) 25 MG tablet TAKE 1 TABLET TWICE DAILY 5/12/23   Obdulia Spicer APRN   pantoprazole (PROTONIX) 40 MG EC tablet TAKE 1 TABLET EVERY DAY 11/22/22   Jeanna Connors APRN   polyethylene glycol (MIRALAX) 17 GM/SCOOP powder Take 17 g by mouth 2 (Two) Times a Day. 4/8/21   Obdulia Spicer APRN   pravastatin (PRAVACHOL) 40 MG tablet TAKE 1 TABLET BY MOUTH EVERY NIGHT AT BEDTIME. 6/27/23   Obdulia Spicer APRN   traZODone (DESYREL) 50 MG tablet TAKE 2 TABLETS BY MOUTH EVERY NIGHT. 11/22/22   Jeanna Connors APRN   venlafaxine XR (EFFEXOR-XR) 150 MG 24 hr capsule TAKE 1 CAPSULE EVERY MORNING TAKE WITH 75MG TAB FOR A TOTAL OF 225MG 9/18/23   Obdulia Spicer APRN   venlafaxine XR (EFFEXOR-XR) 75 MG 24 hr capsule TAKE 1 CAPSULE EVERY DAY  Patient not taking: Reported on 4/25/2023 11/22/22   Jeanna Connors APRN   vitamin B-12 (CYANOCOBALAMIN) 1000 MCG tablet Take 1 tablet by mouth Daily.    Provider, MD Melina           Objective   Physical Exam  83-year-old female awake alert.  Generally well-developed well-nourished.  Pupils equal round react to light.  No facial asymmetry.  Neck supple chest clear cardiovascular regular rate and rhythm without murmur appreciated abdomen soft nontender.  Neurologic exam she has some dementia will stutter at times.  Hands without pronator drift finger-nose touches her forehead but on both the right and left side.  She has no focal weakness.  Speech is clear.  Procedures     Orthostatic vital signs revealed blood pressure 183/82 heart rate 75  Systolic blood pressure 166 heart rate 81 sitting, blood pressure 173/88 heart rate 82 standing she did complain of feeling weak and dizzy.      ED Course      Results for orders placed or performed during the  hospital encounter of 12/25/23   Comprehensive Metabolic Panel    Specimen: Blood   Result Value Ref Range    Glucose 113 (H) 65 - 99 mg/dL    BUN 8 8 - 23 mg/dL    Creatinine 0.56 (L) 0.57 - 1.00 mg/dL    Sodium 144 136 - 145 mmol/L    Potassium 3.6 3.5 - 5.2 mmol/L    Chloride 108 (H) 98 - 107 mmol/L    CO2 22.0 22.0 - 29.0 mmol/L    Calcium 9.2 8.6 - 10.5 mg/dL    Total Protein 6.0 6.0 - 8.5 g/dL    Albumin 3.6 3.5 - 5.2 g/dL    ALT (SGPT) 22 1 - 33 U/L    AST (SGOT) 32 1 - 32 U/L    Alkaline Phosphatase 133 (H) 39 - 117 U/L    Total Bilirubin 0.5 0.0 - 1.2 mg/dL    Globulin 2.4 gm/dL    A/G Ratio 1.5 g/dL    BUN/Creatinine Ratio 14.3 7.0 - 25.0    Anion Gap 14.0 5.0 - 15.0 mmol/L    eGFR 89.6 >60.0 mL/min/1.73   Magnesium    Specimen: Blood   Result Value Ref Range    Magnesium 2.0 1.6 - 2.4 mg/dL   High Sensitivity Troponin T    Specimen: Blood   Result Value Ref Range    HS Troponin T 10 <14 ng/L   CBC Auto Differential    Specimen: Blood   Result Value Ref Range    WBC 4.30 3.40 - 10.80 10*3/mm3    RBC 4.13 3.77 - 5.28 10*6/mm3    Hemoglobin 12.3 12.0 - 15.9 g/dL    Hematocrit 37.2 34.0 - 46.6 %    MCV 90.1 79.0 - 97.0 fL    MCH 29.7 26.6 - 33.0 pg    MCHC 32.9 31.5 - 35.7 g/dL    RDW 14.8 12.3 - 15.4 %    RDW-SD 49.4 37.0 - 54.0 fl    MPV 8.3 6.0 - 12.0 fL    Platelets 227 140 - 450 10*3/mm3    Neutrophil % 55.8 42.7 - 76.0 %    Lymphocyte % 32.4 19.6 - 45.3 %    Monocyte % 11.0 5.0 - 12.0 %    Eosinophil % 0.0 (L) 0.3 - 6.2 %    Basophil % 0.8 0.0 - 1.5 %    Neutrophils, Absolute 2.40 1.70 - 7.00 10*3/mm3    Lymphocytes, Absolute 1.40 0.70 - 3.10 10*3/mm3    Monocytes, Absolute 0.50 0.10 - 0.90 10*3/mm3    Eosinophils, Absolute 0.00 0.00 - 0.40 10*3/mm3    Basophils, Absolute 0.00 0.00 - 0.20 10*3/mm3    nRBC 0.1 0.0 - 0.2 /100 WBC   ECG 12 Lead Dyspnea   Result Value Ref Range    QT Interval 421 ms    QTC Interval 470 ms     XR Chest 1 View    Result Date: 12/25/2023  Impression: No active pulmonary  "process. Electronically Signed: James Estrada MD  12/25/2023 8:06 AM EST  Workstation ID: [id number]   Medications - No data to display  /88   Pulse 79   Temp 97.8 °F (36.6 °C) (Oral)   Resp 18   Ht 162.6 cm (64\")   Wt 79.4 kg (175 lb)   SpO2 100%   BMI 30.04 kg/m²                                          Medical Decision Making  Amount and/or Complexity of Data Reviewed  Labs: ordered.  Radiology: ordered.    Chart review: Patient had admission in April for lower abdominal pain and was secondary to colitis.  Colonoscopy was performed with polypectomy x 1 she was found to have diverticulosis and internal hemorrhoids with normal cecum  Comorbidity: As per past history   Differential: Orthostasis, arrhythmia, anxiety,  My EKG interpretation: Sinus rhythm rate of 75 left anterior fascicular block.  Compared to previous no significant change noted  Lab: Normal CBC normal troponin normal magnesium comprehensive metabolic panel no significant abnormality.  Glucose 113.  My Radiology review and interpretation: Chest x-ray reveals some chronic changes of emphysema predominantly in lower lobes.  There is AICD.  Heart size appears normal.  There is no effusion evidence of failure or pneumonia.  Compared to previous no significant change noted  Discussion/treatment: Patient's findings were discussed with daughter who is an NP.  She feels comfortable taking her home does not feel that she needs to stay for observation.  She will be discharged return new or worsening symptoms  Patient was evaluated using appropriate PPE      Final diagnoses:   Weakness       ED Disposition  ED Disposition       ED Disposition   Discharge    Condition   Stable    Comment   --               Obdulia Spicer, APRN  [address]    [place] [id number] [phone]               Medication List      No changes were made to your prescriptions during this visit.            Corby Guy MD  12/25/23 0807    "

## 2023-12-26 LAB
QT INTERVAL: 421 MS
QTC INTERVAL: 470 MS

## 2024-10-06 ENCOUNTER — APPOINTMENT (OUTPATIENT)
Dept: GENERAL RADIOLOGY | Facility: HOSPITAL | Age: 84
End: 2024-10-06
Payer: MEDICARE

## 2024-10-06 ENCOUNTER — HOSPITAL ENCOUNTER (OUTPATIENT)
Facility: HOSPITAL | Age: 84
Setting detail: OBSERVATION
Discharge: HOME | End: 2024-10-07
Attending: FAMILY MEDICINE | Admitting: INTERNAL MEDICINE
Payer: MEDICARE

## 2024-10-06 ENCOUNTER — APPOINTMENT (OUTPATIENT)
Dept: CT IMAGING | Facility: HOSPITAL | Age: 84
End: 2024-10-06
Payer: MEDICARE

## 2024-10-06 DIAGNOSIS — R41.82 ALTERED MENTAL STATUS, UNSPECIFIED ALTERED MENTAL STATUS TYPE: Primary | ICD-10-CM

## 2024-10-06 DIAGNOSIS — R29.898 LEFT LEG WEAKNESS: ICD-10-CM

## 2024-10-06 DIAGNOSIS — F41.9 ANXIETY: ICD-10-CM

## 2024-10-06 LAB
ABO GROUP BLD: NORMAL
ALBUMIN SERPL-MCNC: 3.8 G/DL (ref 3.5–5.2)
ALBUMIN/GLOB SERPL: 1.6 G/DL
ALP SERPL-CCNC: 122 U/L (ref 39–117)
ALT SERPL W P-5'-P-CCNC: 18 U/L (ref 1–33)
ANION GAP SERPL CALCULATED.3IONS-SCNC: 10.3 MMOL/L (ref 5–15)
APTT PPP: 29.6 SECONDS (ref 24–31)
AST SERPL-CCNC: 26 U/L (ref 1–32)
BASOPHILS # BLD AUTO: 0 10*3/MM3 (ref 0–0.2)
BASOPHILS NFR BLD AUTO: 0 % (ref 0–1.5)
BILIRUB SERPL-MCNC: 0.6 MG/DL (ref 0–1.2)
BLD GP AB SCN SERPL QL: NEGATIVE
BUN SERPL-MCNC: 10 MG/DL (ref 8–23)
BUN/CREAT SERPL: 13.5 (ref 7–25)
CALCIUM SPEC-SCNC: 9 MG/DL (ref 8.6–10.5)
CHLORIDE SERPL-SCNC: 108 MMOL/L (ref 98–107)
CHOLEST SERPL-MCNC: 132 MG/DL (ref 0–200)
CO2 SERPL-SCNC: 22.7 MMOL/L (ref 22–29)
CREAT BLDA-MCNC: 0.7 MG/DL (ref 0.6–1.3)
CREAT SERPL-MCNC: 0.74 MG/DL (ref 0.57–1)
DEPRECATED RDW RBC AUTO: 47.8 FL (ref 37–54)
EGFRCR SERPLBLD CKD-EPI 2021: 78.9 ML/MIN/1.73
EGFRCR SERPLBLD CKD-EPI 2021: 84.3 ML/MIN/1.73
EOSINOPHIL # BLD AUTO: 0.01 10*3/MM3 (ref 0–0.4)
EOSINOPHIL NFR BLD AUTO: 0.2 % (ref 0.3–6.2)
ERYTHROCYTE [DISTWIDTH] IN BLOOD BY AUTOMATED COUNT: 13.8 % (ref 12.3–15.4)
GLOBULIN UR ELPH-MCNC: 2.4 GM/DL
GLUCOSE BLDC GLUCOMTR-MCNC: 155 MG/DL (ref 70–105)
GLUCOSE SERPL-MCNC: 142 MG/DL (ref 65–99)
HBA1C MFR BLD: 6.02 % (ref 4.8–5.6)
HCT VFR BLD AUTO: 40.7 % (ref 34–46.6)
HDLC SERPL-MCNC: 42 MG/DL (ref 40–60)
HGB BLD-MCNC: 13.3 G/DL (ref 12–15.9)
IMM GRANULOCYTES # BLD AUTO: 0.01 10*3/MM3 (ref 0–0.05)
IMM GRANULOCYTES NFR BLD AUTO: 0.2 % (ref 0–0.5)
INR PPP: 1.06 (ref 0.93–1.1)
LDLC SERPL CALC-MCNC: 72 MG/DL (ref 0–100)
LDLC/HDLC SERPL: 1.7 {RATIO}
LYMPHOCYTES # BLD AUTO: 1.06 10*3/MM3 (ref 0.7–3.1)
LYMPHOCYTES NFR BLD AUTO: 24.1 % (ref 19.6–45.3)
MCH RBC QN AUTO: 30.5 PG (ref 26.6–33)
MCHC RBC AUTO-ENTMCNC: 32.7 G/DL (ref 31.5–35.7)
MCV RBC AUTO: 93.3 FL (ref 79–97)
MONOCYTES # BLD AUTO: 0.72 10*3/MM3 (ref 0.1–0.9)
MONOCYTES NFR BLD AUTO: 16.4 % (ref 5–12)
NEUTROPHILS NFR BLD AUTO: 2.6 10*3/MM3 (ref 1.7–7)
NEUTROPHILS NFR BLD AUTO: 59.1 % (ref 42.7–76)
NRBC BLD AUTO-RTO: 0 /100 WBC (ref 0–0.2)
PLATELET # BLD AUTO: 188 10*3/MM3 (ref 140–450)
PMV BLD AUTO: 10.5 FL (ref 6–12)
POTASSIUM SERPL-SCNC: 3.5 MMOL/L (ref 3.5–5.2)
PROT SERPL-MCNC: 6.2 G/DL (ref 6–8.5)
PROTHROMBIN TIME: 11.5 SECONDS (ref 9.6–11.7)
RBC # BLD AUTO: 4.36 10*6/MM3 (ref 3.77–5.28)
RH BLD: POSITIVE
SODIUM SERPL-SCNC: 141 MMOL/L (ref 136–145)
T&S EXPIRATION DATE: NORMAL
TRIGL SERPL-MCNC: 93 MG/DL (ref 0–150)
TROPONIN T SERPL HS-MCNC: 11 NG/L
TSH SERPL DL<=0.05 MIU/L-ACNC: 0.88 UIU/ML (ref 0.27–4.2)
VLDLC SERPL-MCNC: 18 MG/DL (ref 5–40)
WBC NRBC COR # BLD AUTO: 4.4 10*3/MM3 (ref 3.4–10.8)

## 2024-10-06 PROCEDURE — 70498 CT ANGIOGRAPHY NECK: CPT

## 2024-10-06 PROCEDURE — 86901 BLOOD TYPING SEROLOGIC RH(D): CPT | Performed by: PHYSICIAN ASSISTANT

## 2024-10-06 PROCEDURE — 93005 ELECTROCARDIOGRAM TRACING: CPT | Performed by: PHYSICIAN ASSISTANT

## 2024-10-06 PROCEDURE — 99285 EMERGENCY DEPT VISIT HI MDM: CPT

## 2024-10-06 PROCEDURE — 80061 LIPID PANEL: CPT | Performed by: NURSE PRACTITIONER

## 2024-10-06 PROCEDURE — 83036 HEMOGLOBIN GLYCOSYLATED A1C: CPT | Performed by: NURSE PRACTITIONER

## 2024-10-06 PROCEDURE — 71045 X-RAY EXAM CHEST 1 VIEW: CPT

## 2024-10-06 PROCEDURE — G0378 HOSPITAL OBSERVATION PER HR: HCPCS

## 2024-10-06 PROCEDURE — 85610 PROTHROMBIN TIME: CPT | Performed by: PHYSICIAN ASSISTANT

## 2024-10-06 PROCEDURE — 82948 REAGENT STRIP/BLOOD GLUCOSE: CPT

## 2024-10-06 PROCEDURE — 86850 RBC ANTIBODY SCREEN: CPT | Performed by: PHYSICIAN ASSISTANT

## 2024-10-06 PROCEDURE — 0042T HC CT CEREBRAL PERFUSION W/WO CONTRAST: CPT

## 2024-10-06 PROCEDURE — 80053 COMPREHEN METABOLIC PANEL: CPT | Performed by: PHYSICIAN ASSISTANT

## 2024-10-06 PROCEDURE — 86900 BLOOD TYPING SEROLOGIC ABO: CPT | Performed by: PHYSICIAN ASSISTANT

## 2024-10-06 PROCEDURE — 25510000001 IOPAMIDOL PER 1 ML: Performed by: PHYSICIAN ASSISTANT

## 2024-10-06 PROCEDURE — 97162 PT EVAL MOD COMPLEX 30 MIN: CPT

## 2024-10-06 PROCEDURE — 85730 THROMBOPLASTIN TIME PARTIAL: CPT | Performed by: PHYSICIAN ASSISTANT

## 2024-10-06 PROCEDURE — 82565 ASSAY OF CREATININE: CPT

## 2024-10-06 PROCEDURE — 85025 COMPLETE CBC W/AUTO DIFF WBC: CPT | Performed by: PHYSICIAN ASSISTANT

## 2024-10-06 PROCEDURE — 70496 CT ANGIOGRAPHY HEAD: CPT

## 2024-10-06 PROCEDURE — 84443 ASSAY THYROID STIM HORMONE: CPT | Performed by: NURSE PRACTITIONER

## 2024-10-06 PROCEDURE — 70450 CT HEAD/BRAIN W/O DYE: CPT

## 2024-10-06 PROCEDURE — 84484 ASSAY OF TROPONIN QUANT: CPT | Performed by: PHYSICIAN ASSISTANT

## 2024-10-06 PROCEDURE — 96374 THER/PROPH/DIAG INJ IV PUSH: CPT

## 2024-10-06 PROCEDURE — 25010000002 HYDRALAZINE PER 20 MG: Performed by: INTERNAL MEDICINE

## 2024-10-06 RX ORDER — MEMANTINE HYDROCHLORIDE 10 MG/1
10 TABLET ORAL 2 TIMES DAILY
Status: DISCONTINUED | OUTPATIENT
Start: 2024-10-06 | End: 2024-10-07 | Stop reason: HOSPADM

## 2024-10-06 RX ORDER — LUBIPROSTONE 8 UG/1
8 CAPSULE ORAL 2 TIMES DAILY
Status: DISCONTINUED | OUTPATIENT
Start: 2024-10-06 | End: 2024-10-07 | Stop reason: HOSPADM

## 2024-10-06 RX ORDER — TRAZODONE HYDROCHLORIDE 50 MG/1
50 TABLET, FILM COATED ORAL NIGHTLY
COMMUNITY

## 2024-10-06 RX ORDER — SODIUM CHLORIDE 0.9 % (FLUSH) 0.9 %
10 SYRINGE (ML) INJECTION EVERY 12 HOURS SCHEDULED
Status: DISCONTINUED | OUTPATIENT
Start: 2024-10-06 | End: 2024-10-07 | Stop reason: HOSPADM

## 2024-10-06 RX ORDER — NITROGLYCERIN 0.4 MG/1
0.4 TABLET SUBLINGUAL
Status: DISCONTINUED | OUTPATIENT
Start: 2024-10-06 | End: 2024-10-07 | Stop reason: HOSPADM

## 2024-10-06 RX ORDER — METOPROLOL TARTRATE 25 MG/1
25 TABLET, FILM COATED ORAL 2 TIMES DAILY
Status: DISCONTINUED | OUTPATIENT
Start: 2024-10-06 | End: 2024-10-07 | Stop reason: HOSPADM

## 2024-10-06 RX ORDER — VENLAFAXINE HYDROCHLORIDE 75 MG/1
150 CAPSULE, EXTENDED RELEASE ORAL
Status: DISCONTINUED | OUTPATIENT
Start: 2024-10-06 | End: 2024-10-07 | Stop reason: HOSPADM

## 2024-10-06 RX ORDER — ALPRAZOLAM 0.25 MG
0.25 TABLET ORAL 3 TIMES DAILY PRN
Status: DISCONTINUED | OUTPATIENT
Start: 2024-10-06 | End: 2024-10-07 | Stop reason: HOSPADM

## 2024-10-06 RX ORDER — POLYETHYLENE GLYCOL 3350 17 G/17G
17 POWDER, FOR SOLUTION ORAL 2 TIMES DAILY
Status: DISCONTINUED | OUTPATIENT
Start: 2024-10-06 | End: 2024-10-07 | Stop reason: HOSPADM

## 2024-10-06 RX ORDER — HYDRALAZINE HYDROCHLORIDE 20 MG/ML
10 INJECTION INTRAMUSCULAR; INTRAVENOUS EVERY 6 HOURS PRN
Status: DISCONTINUED | OUTPATIENT
Start: 2024-10-06 | End: 2024-10-07 | Stop reason: HOSPADM

## 2024-10-06 RX ORDER — VENLAFAXINE HYDROCHLORIDE 150 MG/1
150 CAPSULE, EXTENDED RELEASE ORAL DAILY
COMMUNITY

## 2024-10-06 RX ORDER — ONDANSETRON 4 MG/1
4 TABLET, ORALLY DISINTEGRATING ORAL EVERY 6 HOURS PRN
Status: DISCONTINUED | OUTPATIENT
Start: 2024-10-06 | End: 2024-10-07 | Stop reason: HOSPADM

## 2024-10-06 RX ORDER — ACETAMINOPHEN 325 MG/1
650 TABLET ORAL EVERY 6 HOURS PRN
Status: DISCONTINUED | OUTPATIENT
Start: 2024-10-06 | End: 2024-10-07 | Stop reason: HOSPADM

## 2024-10-06 RX ORDER — SODIUM CHLORIDE 0.9 % (FLUSH) 0.9 %
10 SYRINGE (ML) INJECTION AS NEEDED
Status: DISCONTINUED | OUTPATIENT
Start: 2024-10-06 | End: 2024-10-07 | Stop reason: HOSPADM

## 2024-10-06 RX ORDER — PANTOPRAZOLE SODIUM 40 MG/1
40 TABLET, DELAYED RELEASE ORAL DAILY
Status: DISCONTINUED | OUTPATIENT
Start: 2024-10-06 | End: 2024-10-07 | Stop reason: HOSPADM

## 2024-10-06 RX ORDER — IOPAMIDOL 755 MG/ML
100 INJECTION, SOLUTION INTRAVASCULAR
Status: COMPLETED | OUTPATIENT
Start: 2024-10-06 | End: 2024-10-06

## 2024-10-06 RX ORDER — IOPAMIDOL 755 MG/ML
50 INJECTION, SOLUTION INTRAVASCULAR
Status: COMPLETED | OUTPATIENT
Start: 2024-10-06 | End: 2024-10-06

## 2024-10-06 RX ORDER — TRAZODONE HYDROCHLORIDE 100 MG/1
50 TABLET ORAL NIGHTLY
Status: DISCONTINUED | OUTPATIENT
Start: 2024-10-06 | End: 2024-10-07 | Stop reason: HOSPADM

## 2024-10-06 RX ORDER — LEFLUNOMIDE 20 MG/1
10 TABLET ORAL DAILY
Status: DISCONTINUED | OUTPATIENT
Start: 2024-10-06 | End: 2024-10-07 | Stop reason: HOSPADM

## 2024-10-06 RX ADMIN — LUBIPROSTONE 8 MCG: 8 CAPSULE, GELATIN COATED ORAL at 11:54

## 2024-10-06 RX ADMIN — METOPROLOL TARTRATE 25 MG: 25 TABLET, FILM COATED ORAL at 21:26

## 2024-10-06 RX ADMIN — MEMANTINE 10 MG: 10 TABLET ORAL at 21:26

## 2024-10-06 RX ADMIN — IOPAMIDOL 50 ML: 755 INJECTION, SOLUTION INTRAVENOUS at 07:35

## 2024-10-06 RX ADMIN — Medication 10 ML: at 12:15

## 2024-10-06 RX ADMIN — TRAZODONE HYDROCHLORIDE 50 MG: 100 TABLET ORAL at 21:41

## 2024-10-06 RX ADMIN — Medication 10 ML: at 21:27

## 2024-10-06 RX ADMIN — PANTOPRAZOLE SODIUM 40 MG: 40 TABLET, DELAYED RELEASE ORAL at 11:54

## 2024-10-06 RX ADMIN — IOPAMIDOL 100 ML: 755 INJECTION, SOLUTION INTRAVENOUS at 07:36

## 2024-10-06 RX ADMIN — ACETAMINOPHEN 650 MG: 325 TABLET, FILM COATED ORAL at 10:35

## 2024-10-06 RX ADMIN — HYDRALAZINE HYDROCHLORIDE 10 MG: 20 INJECTION INTRAMUSCULAR; INTRAVENOUS at 10:35

## 2024-10-06 RX ADMIN — ALPRAZOLAM 0.25 MG: 0.25 TABLET ORAL at 21:26

## 2024-10-06 RX ADMIN — VENLAFAXINE HYDROCHLORIDE 150 MG: 75 CAPSULE, EXTENDED RELEASE ORAL at 16:27

## 2024-10-06 RX ADMIN — METOPROLOL TARTRATE 25 MG: 25 TABLET, FILM COATED ORAL at 11:54

## 2024-10-06 RX ADMIN — ALPRAZOLAM 0.25 MG: 0.25 TABLET ORAL at 10:35

## 2024-10-06 RX ADMIN — ACETAMINOPHEN 650 MG: 325 TABLET, FILM COATED ORAL at 16:27

## 2024-10-06 RX ADMIN — MEMANTINE 10 MG: 10 TABLET ORAL at 11:54

## 2024-10-06 RX ADMIN — LEFLUNOMIDE 10 MG: 20 TABLET ORAL at 11:54

## 2024-10-06 RX ADMIN — POLYETHYLENE GLYCOL 3350 17 G: 17 POWDER, FOR SOLUTION ORAL at 21:27

## 2024-10-06 RX ADMIN — LUBIPROSTONE 8 MCG: 8 CAPSULE, GELATIN COATED ORAL at 21:26

## 2024-10-06 NOTE — CONSULTS
Primary Care Provider: Provider, No Known     Consult requested by:  Dr. Andres     Reason for Consultation: Neurological evaluation, stroke    History taken from: patient chart RN    Chief complaint: Stumbling, speech difficulty       SUBJECTIVE:    History of present illness: Background per H&P: Alia Cheney is a 84 y.o. year old female who was last known well 7 PM yesterday evening presenting to the ED per EMS patient had difficulty getting dressed when she woke up this morning apparently she was stumbling and when walking around. She had subjective difficulty with her speech earlier though I do not appreciate any speech difficulty at this time. Patient is not on blood thinners. Patient denies any visual disturbance, headache, chest pain, or shortness of breath. When lifting her left leg she does state it feels somewhat heavier than the right. She states she does not necessarily feel weak on 1 side versus the other. No recent fever or illness. She is alert to person and place.     - Portions of the above HPI were copied from previous encounters and edited as appropriate. PMH as detailed below.     History as above although patient is encephalopathic on exam and tells me that she came in because the headache got so bad and she has been having a lot of pressure behind her eyes.  Nursing staff just gave Xanax for anxiety therefore history is limited.  Family is at bedside but unable to give additional details.  Patient denied focal weakness, vision changes or loss, and syncopal episode.     Review of Systems   Unable to perform ROS: Acuity of condition        PATIENT HISTORY:  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    Anxiety 11/25/2013    Arrhythmia, ventricular 4/20/2012    Arthritis     Ataxia 8/6/2013    Carpal tunnel syndrome, bilateral 2/13/2017    Depression 1/3/2012    Elevated cholesterol     Gastroesophageal reflux disease 6/29/2012    History of osteoporotic pathological  fracture     Bilateral wrist-unknown date    Hx of total knee replacement, right 6/29/2017    Hyperlipidemia 5/2/2016    Insomnia 8/6/2013    Irritable bowel syndrome 8/6/2013    Keratoconjunctivitis sicca, in Sjogren's syndrome 9/21/2016    Memory loss 12/7/2018    Osteoarthritis of knee 5/19/2017    Osteoporosis     fosamax [id number], on prolia(2016)    Paroxysmal atrial fibrillation 11/9/2011    Primary malignant neoplasm of right middle lobe of lung 8/21/2018    Rheumatoid arthritis     Tension-type headache, not intractable 12/26/2017    Vitamin B12 deficiency 5/2/2016    Vitamin D deficiency 11/20/2017   ,   Past Surgical History:   Procedure Laterality Date    ABDOMINAL SURGERY      ANAL DILATION      APPENDECTOMY      BREAST BIOPSY Right     CARDIAC CATHETERIZATION  2009    CARDIAC ELECTROPHYSIOLOGY PROCEDURE N/A 8/4/2022    Procedure: Pacemaker DC [place];  Surgeon: Patricia Willis MD;  Location: [facility] CATH INVASIVE LOCATION;  Service: Cardiology;  Laterality: N/A;    CATARACT EXTRACTION WITH INTRAOCULAR LENS IMPLANT Bilateral 2009    CHOLECYSTECTOMY      COLONOSCOPY      COLONOSCOPY N/A 4/19/2023    Procedure: COLONOSCOPY with hot snare polypectomy x 1;  Surgeon: Thania Sheppard MD;  Location: [facility] ENDOSCOPY;  Service: Gastroenterology;  Laterality: N/A;  polyp, diverticulosis, internal hemorrhoids    CYSTOSCOPY      HYSTERECTOMY      JOINT REPLACEMENT      LUNG REMOVAL, PARTIAL Right 08/06/2018    RIGHT VATS WEDGE RESECTION OF RML PN RIGHT MIDDLE LOBECTOMY     SUBTOTAL HYSTERECTOMY      TONSILLECTOMY      TOTAL HIP ARTHROPLASTY Left 7/17/2019    Procedure: TOTAL HIP ARTHROPLASTY ANTERIOR;  Surgeon: Lenny Khan MD;  Location: [facility] MAIN OR;  Service: Orthopedics    TOTAL KNEE ARTHROPLASTY Right 2017    WRIST FRACTURE SURGERY Bilateral    ,   Family History   Problem Relation Age of Onset    Alcohol abuse Father     Heart disease Father    ,   Social History     Tobacco Use    Smoking  status: Former     Current packs/day: 0.00     Average packs/day: 1 pack/day for 27.0 years (27.0 ttl pk-yrs)     Types: Cigarettes     Start date:      Quit date:      Years since quittin.7    Smokeless tobacco: Never   Vaping Use    Vaping status: Never Used   Substance Use Topics    Alcohol use: Yes    Drug use: No   ,   Prior to Admission medications    Medication Sig Start Date End Date Taking? Authorizing Provider   leflunomide (ARAVA) 10 MG tablet TAKE 1 TABLET EVERY DAY 23   Neena Sutherland PA   calcium carbonate (OS-DAHLIA) 1250 (500 Ca) MG tablet Take 600 mg by mouth Daily.    Provider, MD Melina   Cholecalciferol (VITAMIN D3 SUPER STRENGTH) 50 MCG (2000 UT) tablet 2 po q d 19   Lele Lucas MD   estradiol (ESTRACE VAGINAL) 0.1 MG/GM vaginal cream Insert 2 g into the vagina Daily. 8/10/23   Naomy Pulido APRN   Linzess 145 MCG capsule capsule TAKE 1 CAPSULE EVERY DAY 23   Obdulia Spicer APRN   memantine (NAMENDA) 10 MG tablet TAKE 1 TABLET TWICE DAILY 22   Jeanna Connors APRN   metoprolol tartrate (LOPRESSOR) 25 MG tablet TAKE 1 TABLET TWICE DAILY 23   Obdulia Spicer APRN   pantoprazole (PROTONIX) 40 MG EC tablet TAKE 1 TABLET EVERY DAY 22   Jeanna Connors APRN   polyethylene glycol (MIRALAX) 17 GM/SCOOP powder Take 17 g by mouth 2 (Two) Times a Day. 21   Obdulia Spicer APRN   pravastatin (PRAVACHOL) 40 MG tablet TAKE 1 TABLET BY MOUTH EVERY NIGHT AT BEDTIME. 23   Obdulia Spicer APRN   traZODone (DESYREL) 50 MG tablet TAKE 2 TABLETS BY MOUTH EVERY NIGHT. 22   Jeanna Connors APRN   venlafaxine XR (EFFEXOR-XR) 150 MG 24 hr capsule TAKE 1 CAPSULE EVERY MORNING TAKE WITH 75MG TAB FOR A TOTAL OF 225MG 23   Obdulia Spicer APRN   venlafaxine XR (EFFEXOR-XR) 75 MG 24 hr capsule TAKE 1 CAPSULE EVERY DAY  Patient not taking: Reported on 22   Jeanna Connors, SHAWNA   vitamin B-12 (CYANOCOBALAMIN)  1000 MCG tablet Take 1 tablet by mouth Daily.    Provider, MD Melina    Allergies:  Patient has no known allergies.    Current Facility-Administered Medications   Medication Dose Route Frequency Provider Last Rate Last Admin    acetaminophen (TYLENOL) tablet 650 mg  650 mg Oral Q6H PRN Rosetta Romano MD   650 mg at 10/06/24 1035    ALPRAZolam (XANAX) tablet 0.25 mg  0.25 mg Oral TID PRN Rosetta Romano MD   0.25 mg at 10/06/24 1035    hydrALAZINE (APRESOLINE) injection 10 mg  10 mg Intravenous Q6H PRN Rosetta Romano MD   10 mg at 10/06/24 1035    leflunomide (ARAVA) tablet 10 mg  10 mg Oral Daily Rosetta Romano MD        lubiprostone (AMITIZA) capsule 8 mcg  8 mcg Oral BID Rosetta Romano MD        memantine (NAMENDA) tablet 10 mg  10 mg Oral BID Rosetta Romano MD        metoprolol tartrate (LOPRESSOR) tablet 25 mg  25 mg Oral BID Rosetta Romano MD        nitroglycerin (NITROSTAT) SL tablet 0.4 mg  0.4 mg Sublingual Q5 Min PRN Rosetta Romano MD        ondansetron ODT (ZOFRAN-ODT) disintegrating tablet 4 mg  4 mg Oral Q6H PRN Rosetta Romano MD        pantoprazole (PROTONIX) EC tablet 40 mg  40 mg Oral Daily Rosetta Romano MD        polyethylene glycol (MIRALAX) packet 17 g  17 g Oral BID Rosetta Romano MD        sodium chloride 0.9 % flush 10 mL  10 mL Intravenous PRN Annette Sanders PA        sodium chloride 0.9 % flush 10 mL  10 mL Intravenous Q12H Rosetta Romano MD        sodium chloride 0.9 % flush 10 mL  10 mL Intravenous PRN Rosetta Romano MD        traZODone (DESYREL) tablet 100 mg  100 mg Oral Nightly Rosetta Romano MD        venlafaxine XR (EFFEXOR-XR) 24 hr capsule 225 mg  225 mg Oral Daily With Breakfast Rosetta Romano MD            ________________________________________________________        OBJECTIVE:    PHYSICAL EXAM:    Constitutional: The patient appears to be in discomfort, grabbing her head occasionally. There is no shortness of breath.   PSYCHIATRIC: appears anxious   HEENT: There is no tenderness over the temporal  arteries bilaterally. Normocephalic, atraumatic.   Chest: Breathing unlabored  Cardiac: Regular rate and rhythm.   Extremities:  No clubbing, cyanosis or edema.    NEUROLOGICAL:    Cognition:   Oriented to name and hospital  Unable to tell me Month, year   Appears to have expressive aphasia and mild dysarthria - acknowledged by patient    Fund of knowledge limited at this time     Cranial nerves;    II - pupils bilaterally equal reacting to light,  No new Visual field deficits;  Fundoscopic exam- Not able to be done, non-dilated exam  III,IV,VI: EOMI with no diplopia  V: Normal facial sensations  VII: right facial asymmetry at rest   VIII: No New hearing abnormality  IX, X, XI: normal gag and shoulder shrug;  XII: tongue is in the midline.    Sensory:  Intact to light touch in all extremities.     Motor: Strength 5-/5 bilaterally upper and lower extremities. No involuntary movements present. Normal tone and bulk.     Able to do finger to nose movements     Gait and balance: Deferred.     Physical exam performed by JORGE Muhammad.    NIHSS:    Level Of Consciousness:  0  LOC Questions to Month and age: 2  LOC Commands:  0  Best Gaze: 0  Visual: 0  Facial Palsy: 1  Motor: Left Arm-0  Left leg-0; Right Arm-0 Right Leg-0  Limb Ataxia: 0  Sensory: 0  Best Language: 1  Dysarthria: 1  Extinction/Neglect: 0    Total: 5  ________________________________________________________   RESULTS REVIEW:    VITAL SIGNS:   Temp:  [98 °F (36.7 °C)] 98 °F (36.7 °C)  Heart Rate:  [70-84] 76  Resp:  [12-19] 16  BP: (158-211)/(69-94) 162/69     LABS:      Lab 10/06/24  0722   WBC 4.40   HEMOGLOBIN 13.3   HEMATOCRIT 40.7   PLATELETS 188   NEUTROS ABS 2.60   IMMATURE GRANS (ABS) 0.01   LYMPHS ABS 1.06   MONOS ABS 0.72   EOS ABS 0.01   MCV 93.3   PROTIME 11.5   APTT 29.6         Lab 10/06/24  0722 10/06/24  0713   SODIUM 141  --    POTASSIUM 3.5  --    CHLORIDE 108*  --    CO2 22.7  --    ANION GAP 10.3  --    BUN 10  --    CREATININE 0.74  0.70   EGFR 78.9 84.3   GLUCOSE 142*  --    CALCIUM 9.0  --          Lab 10/06/24  0722   TOTAL PROTEIN 6.2   ALBUMIN 3.8   GLOBULIN 2.4   ALT (SGPT) 18   AST (SGOT) 26   BILIRUBIN 0.6   ALK PHOS 122*         Lab 10/06/24  0722   HSTROP T 11   PROTIME 11.5   INR 1.06             Lab 10/06/24  0735   ABO TYPING O   RH TYPING Positive   ANTIBODY SCREEN Negative             Lab Results   Component Value Date    TSH 2.490 06/23/2022    LDL 74 03/03/2021    HGBA1C 5.6 07/05/2019    [id number] >2,000 (H) 03/02/2020       IMAGING STUDIES:  CT Angiogram Head w AI Analysis of LVO    Result Date: 10/6/2024  Impression: 1. No evidence of large vessel occlusion, significant stenosis or aneurysm formation. Incidental note of a diminutive right vertebral artery again noted Electronically Signed: Addy Mari MD  10/6/2024 8:44 AM EDT  Workstation ID: [id number]    CT Angiogram Neck    Result Date: 10/6/2024  Impression: 1. No evidence of large vessel occlusion, significant stenosis or aneurysm formation. Incidental note of a diminutive right vertebral artery again noted Electronically Signed: Addy Mari MD  10/6/2024 8:44 AM EDT  Workstation ID: [id number]    XR Chest 1 View    Result Date: 10/6/2024  Impression: Chronic findings without active pulmonary process. Electronically Signed: James Estrada MD  10/6/2024 8:16 AM EDT  Workstation ID: [id number]    CT CEREBRAL PERFUSION WITH & WITHOUT CONTRAST    Result Date: 10/6/2024  Impression: No perfusion defect. Electronically Signed: Dejan Luu MD  10/6/2024 7:40 AM EDT  Workstation ID: [id number]    CT Head Without Contrast Stroke Protocol    Result Date: 10/6/2024  Impression: Atrophy and chronic microvascular ischemic change. No acute intracranial process. Electronically Signed: Dejan Luu MD  10/6/2024 7:23 AM EDT  Workstation ID: [id number]     I reviewed the patient's new clinical results.    ________________________________________________________     PROBLEM LIST:     Left leg weakness            ASSESSMENT/PLAN:    Acute encephalopathy with headache. There is intermittent expressive aphasia and right facial asymmetry that makes it concerning or lacune infarct. Other differential is hypertensive encephalopathy with BP as high as 208/89 in the ED althought BP is normal now and patient is still symptomatic. Recommend MRI brain to rule out CVA.   - CT head: Negative for bleed  - MRI brain: pending   - CTA head and neck: Right vertebral artery is diminutive and left vertebral artery is dominant, basilar artery is patent, PCAs are patent.  ICAs are patent, no significant stenosis of the head or neck, no aneurysm.  - CTP negative for perfusion deficit   - Echo: pending   - EKG: SR rate 69   - Labs: A1C: 6.02 B12: P, LDL: 72 TSH: 0.883  - Continue bASA for now   - PT/OT eval and treat    2. Hypertension  - Continue home medications  - BP goal 130/90, avoid hypotension      Will follow up in AM.       I discussed the patient's findings and my recommendations with patient, family, and nursing staff    SHAWNA Clarke  10/06/24  11:16 EDT

## 2024-10-06 NOTE — THERAPY EVALUATION
Patient Name: Alia Cheney  : 1940    MRN: [id number]                              Today's Date: 10/6/2024       Admit Date: 10/6/2024    Visit Dx:     ICD-10-CM ICD-9-CM   1. Altered mental status, unspecified altered mental status type  R41.82 780.97     Patient Active Problem List   Diagnosis    Absolute anemia    Anal stenosis    Anxiety    Arrhythmia, ventricular    Ataxia    Carpal tunnel syndrome, bilateral    Colles' fracture    Coronary artery disease involving native coronary artery of native heart without angina pectoris    Depression    Physical exam    Gastroesophageal reflux disease    Hx of total knee replacement, right    Mixed hyperlipidemia    Insomnia    Irritable bowel syndrome    Keratoconjunctivitis sicca, in Sjogren's syndrome    Memory loss    Osteoarthritis of knee    Osteoporosis    Paroxysmal atrial fibrillation    Primary malignant neoplasm of right middle lobe of lung    Rheumatoid arthritis    Trochanteric bursitis of left hip    Vitamin B12 deficiency    Vitamin D deficiency    History of total hip replacement, left    Overweight (BMI 25.0-29.9)    Allergic rhinitis    Lung nodule    Other specified dorsopathies, site unspecified    Other specified examination    Primary osteoarthritis of both knees    Foot callus    Petechiae    Medicare annual wellness visit, subsequent    History of osteoporotic pathological fracture    Syncope, unspecified syncope type    Sick sinus syndrome    Presence of cardiac pacemaker    Paroxysmal SVT (supraventricular tachycardia)    Lower abdominal pain    Colitis    Left leg weakness     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    Anxiety 2013    Arrhythmia, ventricular 2012    Arthritis     Ataxia 2013    Carpal tunnel syndrome, bilateral 2017    Depression 1/3/2012    Elevated cholesterol     Gastroesophageal reflux disease 2012    History of osteoporotic pathological fracture     Bilateral  wrist-unknown date    Hx of total knee replacement, right 6/29/2017    Hyperlipidemia 5/2/2016    Insomnia 8/6/2013    Irritable bowel syndrome 8/6/2013    Keratoconjunctivitis sicca, in Sjogren's syndrome 9/21/2016    Memory loss 12/7/2018    Osteoarthritis of knee 5/19/2017    Osteoporosis     fosamax [id number], on prolia(2016)    Paroxysmal atrial fibrillation 11/9/2011    Primary malignant neoplasm of right middle lobe of lung 8/21/2018    Rheumatoid arthritis     Tension-type headache, not intractable 12/26/2017    Vitamin B12 deficiency 5/2/2016    Vitamin D deficiency 11/20/2017     Past Surgical History:   Procedure Laterality Date    ABDOMINAL SURGERY      ANAL DILATION      APPENDECTOMY      BREAST BIOPSY Right     CARDIAC CATHETERIZATION  2009    CARDIAC ELECTROPHYSIOLOGY PROCEDURE N/A 8/4/2022    Procedure: Pacemaker DC [place];  Surgeon: Patricia Willis MD;  Location: [facility] CATH INVASIVE LOCATION;  Service: Cardiology;  Laterality: N/A;    CATARACT EXTRACTION WITH INTRAOCULAR LENS IMPLANT Bilateral 2009    CHOLECYSTECTOMY      COLONOSCOPY      COLONOSCOPY N/A 4/19/2023    Procedure: COLONOSCOPY with hot snare polypectomy x 1;  Surgeon: Thania Sheppard MD;  Location: [facility] ENDOSCOPY;  Service: Gastroenterology;  Laterality: N/A;  polyp, diverticulosis, internal hemorrhoids    CYSTOSCOPY      HYSTERECTOMY      JOINT REPLACEMENT      LUNG REMOVAL, PARTIAL Right 08/06/2018    RIGHT VATS WEDGE RESECTION OF RML PN RIGHT MIDDLE LOBECTOMY     SUBTOTAL HYSTERECTOMY      TONSILLECTOMY      TOTAL HIP ARTHROPLASTY Left 7/17/2019    Procedure: TOTAL HIP ARTHROPLASTY ANTERIOR;  Surgeon: Lenny Khan MD;  Location: [facility] MAIN OR;  Service: Orthopedics    TOTAL KNEE ARTHROPLASTY Right 2017    WRIST FRACTURE SURGERY Bilateral       General Information       Row Name 10/06/24 1540          Physical Therapy Time and Intention    Document Type evaluation  -EL     Mode of Treatment individual  therapy;physical therapy  -       Row Name 10/06/24 1907          General Information    Prior Level of Function independent:;all household mobility;ADL's  -       Row Name 10/06/24 1907          Living Environment    People in Home facility resident;other (see comments)  detention  -       Row Name 10/06/24 1907          Cognition    Orientation Status (Cognition) oriented to;person;disoriented to;place;situation;time;unable/difficult to assess  Difficult to assess due to word finding difficulty  -       Row Name 10/06/24 1907          Safety Issues, Functional Mobility    Impairments Affecting Function (Mobility) balance;endurance/activity tolerance;strength;cognition  -EL     Cognitive Impairments, Mobility Safety/Performance attention;awareness, need for assistance;insight into deficits/self-awareness;problem-solving/reasoning;safety precaution awareness  -               User Key  (r) = Recorded By, (t) = Taken By, (c) = Cosigned By      Initials Name Provider Type    Anthony Acosta PT Physical Therapist                   Mobility       Row Name 10/06/24 1909          Bed Mobility    Bed Mobility supine-sit  -EL     Supine-Sit [place] (Bed Mobility) supervision  -EL     Assistive Device (Bed Mobility) bed rails  -       Row Name 10/06/24 1909          Bed-Chair Transfer    Bed-Chair [place] (Transfers) contact guard  -       Row Name 10/06/24 1909          Sit-Stand Transfer    Sit-Stand [place] (Transfers) contact guard  -     Comment, (Sit-Stand Transfer) limited to transfers this date due to hypertension  -               User Key  (r) = Recorded By, (t) = Taken By, (c) = Cosigned By      Initials Name Provider Type    Anthony Acosta PT Physical Therapist                   Obj/Interventions       Row Name 10/06/24 1910          Range of Motion Comprehensive    General Range of Motion bilateral lower extremity ROM WFL  -EL       Row Name 10/06/24 1910          Strength Comprehensive  (MMT)    General Manual Muscle Testing (MMT) Assessment lower extremity strength deficits identified  -EL     Comment, General Manual Muscle Testing (MMT) Assessment BLE 3/5 gross. No significant difference L>R  -EL       Row Name 10/06/24 1910          Balance    Balance Assessment sitting static balance;standing static balance;standing dynamic balance  -EL     Static Sitting Balance contact guard  -EL     Static Standing Balance contact guard  -EL     Dynamic Standing Balance contact guard  -EL               User Key  (r) = Recorded By, (t) = Taken By, (c) = Cosigned By      Initials Name Provider Type    Anthony Acosta, PT Physical Therapist                   Goals/Plan       Row Name 10/06/24 1916          Bed Mobility Goal 1 (PT)    Activity/Assistive Device (Bed Mobility Goal 1, PT) bed mobility activities, all  -EL     [place] Level/Cues Needed (Bed Mobility Goal 1, PT) modified independence  -EL     Time Frame (Bed Mobility Goal 1, PT) long term goal (LTG);2 weeks  -       Row Name 10/06/24 1916          Transfer Goal 1 (PT)    Activity/Assistive Device (Transfer Goal 1, PT) transfers, all;walker, rolling  -EL     [place] Level/Cues Needed (Transfer Goal 1, PT) modified independence  -EL     Time Frame (Transfer Goal 1, PT) long term goal (LTG);2 weeks  -       Row Name 10/06/24 1916          Gait Training Goal 1 (PT)    Activity/Assistive Device (Gait Training Goal 1, PT) gait (walking locomotion);walker, rolling  -EL     [place] Level (Gait Training Goal 1, PT) modified independence  -EL     Distance (Gait Training Goal 1, PT) 50  -EL     Time Frame (Gait Training Goal 1, PT) long term goal (LTG);2 weeks  -       Row Name 10/06/24 1916          Therapy Assessment/Plan (PT)    Planned Therapy Interventions (PT) balance training;bed mobility training;neuromuscular re-education;gait training;transfer training;patient/family education;strengthening  -EL               User Key  (r) =  Recorded By, (t) = Taken By, (c) = Cosigned By      Initials Name Provider Type    Anthony Acosta, PT Physical Therapist                   Clinical Impression       Row Name 10/06/24 1912          Pain    Additional Documentation Pain Scale: FACES Pre/Post-Treatment (Group)  -EL       Row Name 10/06/24 1912          Pain Scale: FACES Pre/Post-Treatment    Pain: FACES Scale, Pretreatment 2-->hurts little bit  -EL     Posttreatment Pain Rating 2-->hurts little bit  -EL     Pain Location - head  -EL       Row Name 10/06/24 1912          Plan of Care Review    Plan of Care Reviewed With patient  -EL     Outcome Evaluation Pt is an 85 YO F admitted with impaired balance, weakness and word finding difficulty.CT showed no acute abnormalities. Pt reports living at [facility] on [address] [facility], where she is independent with ADLs and ambulation using RWx. Pt states she has had no recent falls and does not have to navigate steps. This date pt with significant word finding difficulty, and significant weakness in BLE. No noted difference L>R and UE both with fair strength. Pt BP assessed sititng EOB and was 172/82 nursing notified. PT assisted with tranfser to bedside commode requirng CGA but limited mobility due to hypertension. PT appears well below baseline and recommendation is SNF pending progress.  -       Row Name 10/06/24 1912          Therapy Assessment/Plan (PT)    Rehab Potential (PT) good, to achieve stated therapy goals  -EL     Criteria for Skilled Interventions Met (PT) yes  -EL     Therapy Frequency (PT) 5 times/wk  -EL     Predicted Duration of Therapy Intervention (PT) Until d/c  -EL       Row Name 10/06/24 1912          Vital Signs    O2 Delivery Pre Treatment room air  -EL     O2 Delivery Intra Treatment room air  -EL     O2 Delivery Post Treatment room air  -EL     Pre Patient Position Supine  -EL     Intra Patient Position Sitting  -EL     Post Patient Position Supine  -EL       Row Name 10/06/24 1912           Positioning and Restraints    Pre-Treatment Position in bed  -EL     Post Treatment Position bed  -EL     In Bed notified nsg;supine;call light within reach;encouraged to call for assist;exit alarm on  -EL               User Key  (r) = Recorded By, (t) = Taken By, (c) = Cosigned By      Initials Name Provider Type    Anthony Acosta PT Physical Therapist                   Outcome Measures       Row Name 10/06/24 1917 10/06/24 1430       How much help from another person do you currently need...    Turning from your back to your side while in flat bed without using bedrails? 3  -EL 3  -TA    Moving from lying on back to sitting on the side of a flat bed without bedrails? 3  -EL 3  -TA    Moving to and from a bed to a chair (including a wheelchair)? 3  -EL 3  -TA    Standing up from a chair using your arms (e.g., wheelchair, bedside chair)? 3  -EL 3  -TA    Climbing 3-5 steps with a railing? 1  -EL 2  -TA    To walk in hospital room? 2  -EL 2  -TA    AM-PAC 6 Clicks Score (PT) 15  -EL 16  -TA    Highest Level of Mobility Goal 4 --> Transfer to chair/commode  -EL 5 --> Static standing  -TA      Row Name 10/06/24 1917          Functional Assessment    Outcome Measure Options AM-PAC 6 Clicks Basic Mobility (PT)  -EL               User Key  (r) = Recorded By, (t) = Taken By, (c) = Cosigned By      Initials Name Provider Type    Anthony Acosta PT Physical Therapist    Maru Green, RN Registered Nurse                                 Physical Therapy Education       Title: PT OT SLP Therapies (Done)       Topic: Physical Therapy (Done)       Point: Mobility training (Done)       Learning Progress Summary             Patient Acceptance, E,TB, VU by SID at 10/6/2024 1917                         Point: Precautions (Done)       Learning Progress Summary             Patient Acceptance, E,TB, VU by SID at 10/6/2024 1917                                         User Key       Initials Effective Dates Name Provider Type Discipline     SID 06/23/20 -  Anthony Vogt PT Physical Therapist PT                  PT Recommendation and Plan  Planned Therapy Interventions (PT): balance training, bed mobility training, neuromuscular re-education, gait training, transfer training, patient/family education, strengthening  Plan of Care Reviewed With: patient  Outcome Evaluation: Pt is an 85 YO F admitted with impaired balance, weakness and word finding difficulty.CT showed no acute abnormalities. Pt reports living at [facility] on [address], where she is independent with ADLs and ambulation using RWx. Pt states she has had no recent falls and does not have to navigate steps. This date pt with significant word finding difficulty, and significant weakness in BLE. No noted difference L>R and UE both with fair strength. Pt BP assessed sititng EOB and was 172/82 nursing notified. PT assisted with tranfser to bedside commode requirng CGA but limited mobility due to hypertension. PT appears well below baseline and recommendation is SNF pending progress.     Time Calculation:   PT Evaluation Complexity  History, PT Evaluation Complexity: 1-2 personal factors and/or comorbidities  Examination of Body Systems (PT Eval Complexity): total of 3 or more elements  Clinical Presentation (PT Evaluation Complexity): evolving  Clinical Decision Making (PT Evaluation Complexity): moderate complexity  Overall Complexity (PT Evaluation Complexity): moderate complexity     PT Charges       Row Name 10/06/24 1918             Time Calculation    Start Time 1850  -EL      Stop Time 1905  -EL      Time Calculation (min) 15 min  -EL      PT Received On 10/06/24  -EL      PT - Next Appointment 10/07/24  -      PT Goal Re-Cert Due Date 10/20/24  -                User Key  (r) = Recorded By, (t) = Taken By, (c) = Cosigned By      Initials Name Provider Type    Anthony Acosta PT Physical Therapist                  Therapy Charges for Today       Code Description Service Date Service Provider  Modifiers Qty    [id number] HC PT EVAL MOD COMPLEXITY 3 10/6/2024 Anthony Vogt, PT GP 1            PT G-Codes  Outcome Measure Options: AM-PAC 6 Clicks Basic Mobility (PT)  AM-PAC 6 Clicks Score (PT): 15  PT Discharge Summary  Anticipated Discharge Disposition (PT): skilled nursing facility    Anthony Vogt PT  10/6/2024

## 2024-10-06 NOTE — ED PROVIDER NOTES
Subjective   History of Present Illness  Patient is an 84-year-old female last known well 7 PM yesterday evening presenting to the ED per EMS patient had difficulty getting dressed when she woke up this morning apparently she was stumbling and when walking around.  She had subjective difficulty with her speech earlier though I do not appreciate any speech difficulty at this time.  Patient is not on blood thinners.  Patient denies any visual disturbance, headache, chest pain, or shortness of breath.  When lifting her left leg she does state it feels somewhat heavier than the right.  She states she does not necessarily feel weak on 1 side versus the other.  No recent fever or illness.  She is alert to person and place.    History provided by:  Patient      Review of Systems   Constitutional:  Negative for fever.   Respiratory:  Negative for shortness of breath.    Cardiovascular:  Negative for chest pain.   Neurological:  Positive for speech difficulty and weakness. Negative for seizures and syncope.       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    Anxiety 11/25/2013    Arrhythmia, ventricular 4/20/2012    Arthritis     Ataxia 8/6/2013    Carpal tunnel syndrome, bilateral 2/13/2017    Depression 1/3/2012    Elevated cholesterol     Gastroesophageal reflux disease 6/29/2012    History of osteoporotic pathological fracture     Bilateral wrist-unknown date    Hx of total knee replacement, right 6/29/2017    Hyperlipidemia 5/2/2016    Insomnia 8/6/2013    Irritable bowel syndrome 8/6/2013    Keratoconjunctivitis sicca, in Sjogren's syndrome 9/21/2016    Memory loss 12/7/2018    Osteoarthritis of knee 5/19/2017    Osteoporosis     fosamax x8rotobe, on prolia(2016)    Paroxysmal atrial fibrillation 11/9/2011    Primary malignant neoplasm of right middle lobe of lung 8/21/2018    Rheumatoid arthritis     Tension-type headache, not intractable 12/26/2017    Vitamin B12 deficiency 5/2/2016    Vitamin D  deficiency 2017       No Known Allergies    Past Surgical History:   Procedure Laterality Date    ABDOMINAL SURGERY      ANAL DILATION      APPENDECTOMY      BREAST BIOPSY Right     CARDIAC CATHETERIZATION  2009    CARDIAC ELECTROPHYSIOLOGY PROCEDURE N/A 2022    Procedure: Pacemaker DC [place];  Surgeon: Patricia Willis MD;  Location: [facility] CATH INVASIVE LOCATION;  Service: Cardiology;  Laterality: N/A;    CATARACT EXTRACTION WITH INTRAOCULAR LENS IMPLANT Bilateral     CHOLECYSTECTOMY      COLONOSCOPY      COLONOSCOPY N/A 2023    Procedure: COLONOSCOPY with hot snare polypectomy x 1;  Surgeon: Thania Sheppard MD;  Location: [facility] ENDOSCOPY;  Service: Gastroenterology;  Laterality: N/A;  polyp, diverticulosis, internal hemorrhoids    CYSTOSCOPY      HYSTERECTOMY      JOINT REPLACEMENT      LUNG REMOVAL, PARTIAL Right 2018    RIGHT VATS WEDGE RESECTION OF RML PN RIGHT MIDDLE LOBECTOMY     SUBTOTAL HYSTERECTOMY      TONSILLECTOMY      TOTAL HIP ARTHROPLASTY Left 2019    Procedure: TOTAL HIP ARTHROPLASTY ANTERIOR;  Surgeon: Lenny Khan MD;  Location: [facility] MAIN OR;  Service: Orthopedics    TOTAL KNEE ARTHROPLASTY Right 2017    WRIST FRACTURE SURGERY Bilateral        Family History   Problem Relation Age of Onset    Alcohol abuse Father     Heart disease Father        Social History     Socioeconomic History    Marital status:    Tobacco Use    Smoking status: Former     Current packs/day: 0.00     Average packs/day: 1 pack/day for 27.0 years (27.0 ttl pk-yrs)     Types: Cigarettes     Start date:      Quit date:      Years since quittin.7    Smokeless tobacco: Never   Vaping Use    Vaping status: Never Used   Substance and Sexual Activity    Alcohol use: Yes    Drug use: No    Sexual activity: Not Currently           Objective   Physical Exam  Vitals and nursing note reviewed.   Constitutional:       General: She is not in acute distress.      "Appearance: Normal appearance. She is well-developed. She is not ill-appearing, toxic-appearing or diaphoretic.   HENT:      Head: Normocephalic and atraumatic.      Mouth/Throat:      Mouth: Mucous membranes are moist.      Pharynx: Oropharynx is clear.   Eyes:      General: No scleral icterus.     Extraocular Movements: Extraocular movements intact.      Pupils: Pupils are equal, round, and reactive to light.   Cardiovascular:      Rate and Rhythm: Normal rate and regular rhythm.      Pulses: Normal pulses.      Heart sounds: No murmur heard.     No friction rub. No gallop.   Pulmonary:      Effort: Pulmonary effort is normal. No respiratory distress.      Breath sounds: Normal breath sounds. No stridor. No wheezing, rhonchi or rales.   Chest:      Chest wall: No tenderness.   Abdominal:      General: Bowel sounds are normal. There is no distension. There are no signs of injury.      Palpations: Abdomen is soft. There is no fluid wave, hepatomegaly, splenomegaly or mass.      Tenderness: There is no abdominal tenderness. There is no right CVA tenderness, left CVA tenderness, guarding or rebound.      Hernia: No hernia is present.   Skin:     General: Skin is warm.      Capillary Refill: Capillary refill takes less than 2 seconds.      Coloration: Skin is not cyanotic, jaundiced or pale.      Findings: No rash.   Neurological:      General: No focal deficit present.      Mental Status: She is alert.      Comments: Alert to person and place.  Speech is clear.     Slight difficulty raising left leg she reports it feels \"heavy\".  Equal  strength bilaterally of upper extremities.    NIH 4    Psychiatric:         Mood and Affect: Mood normal.         Behavior: Behavior normal.         Procedures           ED Course  ED Course as of 10/06/24 0812   Sun Oct 06, 2024   0709 Spoke to neurology Dr Rausch in regards to imaging commended CT CTAs and CT perfusion [AA]   0716 Spoke to patient's daughter Naomy via telephone " "who states she has had symptoms like this in the past. [AA]   0721 Spoke to radiologist in regards to CT head without was unremarkable [AA]   0806 Spoke to Dr. Yao who agreed for admission [AA]      ED Course User Index  [AA] Annette Sanders PA    /76   Pulse 71   Temp 98 °F (36.7 °C) (Oral)   Resp 19   Ht 162.6 cm (64\")   SpO2 100%   BMI 30.04 kg/m²   Medications   sodium chloride 0.9 % flush 10 mL (has no administration in time range)   iopamidol (ISOVUE-370) 76 % injection 50 mL (50 mL Intravenous Given 10/6/24 0735)   iopamidol (ISOVUE-370) 76 % injection 100 mL (100 mL Intravenous Given 10/6/24 0736)     Labs Reviewed   COMPREHENSIVE METABOLIC PANEL - Abnormal; Notable for the following components:       Result Value    Glucose 142 (*)     Chloride 108 (*)     Alkaline Phosphatase 122 (*)     All other components within normal limits    Narrative:     GFR Normal >60  Chronic Kidney Disease <60  Kidney Failure <15    The GFR formula is only valid for adults with stable renal function between ages 18 and 70.   CBC WITH AUTO DIFFERENTIAL - Abnormal; Notable for the following components:    Monocyte % 16.4 (*)     Eosinophil % 0.2 (*)     All other components within normal limits   POCT GLUCOSE FINGERSTICK - Abnormal; Notable for the following components:    Glucose 155 (*)     All other components within normal limits   PROTIME-INR - Normal   APTT - Normal   SINGLE HS TROPONIN T - Normal    Narrative:     High Sensitive Troponin T Reference Range:  <14.0 ng/L- Negative Female for AMI  <22.0 ng/L- Negative Male for AMI  >=14 - Abnormal Female indicating possible myocardial injury.  >=22 - Abnormal Male indicating possible myocardial injury.   Clinicians would have to utilize clinical acumen, EKG, Troponin, and serial changes to determine if it is an Acute Myocardial Infarction or myocardial injury due to an underlying chronic condition.        POCT CREATININE - Normal   URINALYSIS W/ CULTURE IF " INDICATED   POCT GLUCOSE FINGERSTICK   TYPE AND SCREEN   CBC AND DIFFERENTIAL    Narrative:     The following orders were created for panel order CBC & Differential.  Procedure                               Abnormality         Status                     ---------                               -----------         ------                     CBC Auto Differential[[id number]]        Abnormal            Final result                 Please view results for these tests on the individual orders.     CT CEREBRAL PERFUSION WITH & WITHOUT CONTRAST   Final Result   Impression:   No perfusion defect.            Electronically Signed: Dejan Luu MD     10/6/2024 7:40 AM EDT     Workstation ID: [id number]      CT Head Without Contrast Stroke Protocol   Final Result   Impression:   Atrophy and chronic microvascular ischemic change. No acute intracranial process.            Electronically Signed: Dejan Luu MD     10/6/2024 7:23 AM EDT     Workstation ID: [id number]      CT Angiogram Head w AI Analysis of LVO    (Results Pending)   CT Angiogram Neck    (Results Pending)   XR Chest 1 View    (Results Pending)                           Total (NIH Stroke Scale): 4                  Medical Decision Making  Chart Review: Discharge summary reviewed from 4/19/2023 admitted for acute cecitis colitis treated with antimicrobial therapy and bowel rest seen by gastroenterology    EKG: Interpreted by myself and Dr Andres shows sinus rhythm rate 69 LAD LVH when compared to previous EKG no significant change.  Labs: as above   Radiology:  CT CEREBRAL PERFUSION WITH & WITHOUT CONTRAST   Final Result    Impression:    No perfusion defect.      Electronically Signed: Dejan Luu MD      10/6/2024 7:40 AM EDT      Workstation ID: [id number]     CT Head Without Contrast Stroke Protocol   Final Result    Impression:    Atrophy and chronic microvascular ischemic change. No acute intracranial process.      Electronically Signed: Dejan Luu MD       10/6/2024 7:23 AM EDT      Workstation ID: [id number]     CT Angiogram Head w AI Analysis of LVO    (Results Pending)  CT Angiogram Neck    (Results Pending)  XR Chest 1 View    (Results Pending)    Disposition/Treatment:  Appropriate PPE was worn during exam and throughout all encounters with the patient.  Patient presented to the ED as a code stroke team B was initiated after my evaluation spoke to  with neurology who was in agreement with plan of CT, CTAs, CT perfusion.    CT head and CT perfusion unremarkable.  CTA is currently pending.  EKG showed no acute STEMI Labs are fairly unremarkable including no leukocytosis or anemia.  Metabolic panel showed glucose 142 chloride 108 alk phos 122 otherwise unremarkable.  Troponin normal.  Urinalysis pending.  Findings were discussed with the patient at bedside who was in agreement with plan of admission for further workup of her altered mental status.  Spoke to  who agreed for admission through PCP group    Problems Addressed:  Altered mental status, unspecified altered mental status type: complicated acute illness or injury    Amount and/or Complexity of Data Reviewed  Labs: ordered. Decision-making details documented in ED Course.  Radiology: ordered. Decision-making details documented in ED Course.  ECG/medicine tests: ordered.    Risk  Prescription drug management.  Decision regarding hospitalization.        Final diagnoses:   Altered mental status, unspecified altered mental status type       ED Disposition  ED Disposition       ED Disposition   Decision to Admit    Condition   --    Comment   Level of Care: Telemetry [5]   Admitting Physician: AMILCAR ANAND [[id number]]   Attending Physician: AMILCAR ANAND [[id number]]   Bed Request Comments: [facility]                 No follow-up provider specified.       Medication List      No changes were made to your prescriptions during this visit.            Annette Sanders PA  10/06/24 0811       Annette Sanders  MATI Butterfield  10/06/24 0812

## 2024-10-06 NOTE — H&P
"    Patient Care Team:  Rosetta Romano MD as PCP - General (Internal Medicine)    Chief complaint left leg weakness    Subjective     Patient is a 84 y.o. female with RA, dementia,  generalized anxiety who presents from [facility] after having trouble dressing herself.  She had left leg \"heaviness\".  Pt does not recall events of the this am, so history was obtained from chart and family. Pt was much more active than usual 2 days ago (helping with multiple small great-grandchildren) and seemed well yesterday, but woke this morning with difficulty using her left leg - stumbling, trouble walking.    Review of Systems   Constitutional:  Positive for activity change and fatigue. Negative for appetite change, chills, diaphoresis and fever.   HENT:  Negative for ear pain and facial swelling.    Eyes:  Negative for visual disturbance.   Respiratory:  Negative for cough and shortness of breath.    Cardiovascular:  Negative for chest pain, palpitations and leg swelling.   Gastrointestinal:  Negative for abdominal pain, constipation, diarrhea and nausea.   Genitourinary:  Negative for dysuria and frequency.   Musculoskeletal:  Positive for arthralgias and gait problem.   Neurological:  Negative for tremors and weakness.   Psychiatric/Behavioral:  Positive for confusion.           History  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    Anxiety 11/25/2013    Arrhythmia, ventricular 4/20/2012    Arthritis     Ataxia 8/6/2013    Carpal tunnel syndrome, bilateral 2/13/2017    Depression 1/3/2012    Elevated cholesterol     Gastroesophageal reflux disease 6/29/2012    History of osteoporotic pathological fracture     Bilateral wrist-unknown date    Hx of total knee replacement, right 6/29/2017    Hyperlipidemia 5/2/2016    Insomnia 8/6/2013    Irritable bowel syndrome 8/6/2013    Keratoconjunctivitis sicca, in Sjogren's syndrome 9/21/2016    Memory loss 12/7/2018    Osteoarthritis of knee 5/19/2017    Osteoporosis     " fosamax [id number], on prolia(2016)    Paroxysmal atrial fibrillation 2011    Primary malignant neoplasm of right middle lobe of lung 2018    Rheumatoid arthritis     Tension-type headache, not intractable 2017    Vitamin B12 deficiency 2016    Vitamin D deficiency 2017     Past Surgical History:   Procedure Laterality Date    ABDOMINAL SURGERY      ANAL DILATION      APPENDECTOMY      BREAST BIOPSY Right     CARDIAC CATHETERIZATION  2009    CARDIAC ELECTROPHYSIOLOGY PROCEDURE N/A 2022    Procedure: Pacemaker DC [place];  Surgeon: Patricia Willis MD;  Location: [facility] CATH INVASIVE LOCATION;  Service: Cardiology;  Laterality: N/A;    CATARACT EXTRACTION WITH INTRAOCULAR LENS IMPLANT Bilateral     CHOLECYSTECTOMY      COLONOSCOPY      COLONOSCOPY N/A 2023    Procedure: COLONOSCOPY with hot snare polypectomy x 1;  Surgeon: Thania Sheppard MD;  Location: [facility] ENDOSCOPY;  Service: Gastroenterology;  Laterality: N/A;  polyp, diverticulosis, internal hemorrhoids    CYSTOSCOPY      HYSTERECTOMY      JOINT REPLACEMENT      LUNG REMOVAL, PARTIAL Right 2018    RIGHT VATS WEDGE RESECTION OF RML PN RIGHT MIDDLE LOBECTOMY     SUBTOTAL HYSTERECTOMY      TONSILLECTOMY      TOTAL HIP ARTHROPLASTY Left 2019    Procedure: TOTAL HIP ARTHROPLASTY ANTERIOR;  Surgeon: Lenny Khan MD;  Location: [facility] MAIN OR;  Service: Orthopedics    TOTAL KNEE ARTHROPLASTY Right 2017    WRIST FRACTURE SURGERY Bilateral      Family History   Problem Relation Age of Onset    Alcohol abuse Father     Heart disease Father      Social History     Tobacco Use    Smoking status: Former     Current packs/day: 0.00     Average packs/day: 1 pack/day for 27.0 years (27.0 ttl pk-yrs)     Types: Cigarettes     Start date:      Quit date: 1980     Years since quittin.7    Smokeless tobacco: Never   Vaping Use    Vaping status: Never Used   Substance Use Topics    Alcohol use: Yes    Drug use:  No     Facility-Administered Medications Prior to Admission   Medication Dose Route Frequency Provider Last Rate Last Admin    denosumab (PROLIA) syringe 60 mg  60 mg Subcutaneous Q6 Months Aletha Guadalupe PA   60 mg at 12/28/22 1505     Medications Prior to Admission   Medication Sig Dispense Refill Last Dose    leflunomide (ARAVA) 10 MG tablet TAKE 1 TABLET EVERY DAY 90 tablet 3     calcium carbonate (OS-DAHLIA) 1250 (500 Ca) MG tablet Take 600 mg by mouth Daily.       Cholecalciferol (VITAMIN D3 SUPER STRENGTH) 50 MCG (2000 UT) tablet 2 po q d 60 each 11     estradiol (ESTRACE VAGINAL) 0.1 MG/GM vaginal cream Insert 2 g into the vagina Daily. 1 each 12     Linzess 145 MCG capsule capsule TAKE 1 CAPSULE EVERY DAY 90 capsule 0     memantine (NAMENDA) 10 MG tablet TAKE 1 TABLET TWICE DAILY 180 tablet 1     metoprolol tartrate (LOPRESSOR) 25 MG tablet TAKE 1 TABLET TWICE DAILY 180 tablet 1     pantoprazole (PROTONIX) 40 MG EC tablet TAKE 1 TABLET EVERY DAY 90 tablet 1     polyethylene glycol (MIRALAX) 17 GM/SCOOP powder Take 17 g by mouth 2 (Two) Times a Day. 1020 g 2     pravastatin (PRAVACHOL) 40 MG tablet TAKE 1 TABLET BY MOUTH EVERY NIGHT AT BEDTIME. 90 tablet 0     traZODone (DESYREL) 50 MG tablet Take 1 tablet by mouth Every Night.       venlafaxine XR (EFFEXOR-XR) 150 MG 24 hr capsule Take 1 capsule by mouth Daily.       vitamin B-12 (CYANOCOBALAMIN) 1000 MCG tablet Take 1 tablet by mouth Daily.        Allergies:  Patient has no known allergies.    Objective     Vital Signs  Temp:  [98 °F (36.7 °C)] 98 °F (36.7 °C)  Heart Rate:  [70-84] 76  Resp:  [12-19] 16  BP: (158-211)/(69-94) 162/69     Physical Exam:      General Appearance:    Alert, cooperative, in no acute distress, sleeping comfortably, easily awakened, oriented x 2   Head:    Normocephalic, without obvious abnormality, atraumatic   Eyes:            Lids and lashes normal, conjunctivae and sclerae normal, no   icterus, no pallor, corneas clear,  PERRLA   Ears:    Ears appear intact with no abnormalities noted   Throat:   No oral lesions, no thrush, oral mucosa moist   Neck:   No adenopathy, supple, trachea midline, no thyromegaly, no   carotid bruit, no JVD   Lungs:     Clear to auscultation,respirations regular, even and                  unlabored    Heart:    Regular rhythm and normal rate, normal S1 and S2, no            murmur, no gallop, no rub, no click   Chest Wall:    No abnormalities observed   Abdomen:     Normal bowel sounds, no masses, no organomegaly, soft        non-tender, non-distended, no guarding, no rebound                tenderness   Extremities:   Moves all extremities well, no edema, no cyanosis, no             redness   Pulses:   Pulses palpable and equal bilaterally   Skin:   No bleeding, bruising or rash   Lymph nodes:   No palpable adenopathy   Neurologic:   No localizing deficits, gait not assessed       Results Review:     Imaging Results (Last 24 Hours)       Procedure Component Value Units Date/Time    CT Angiogram Head w AI Analysis of LVO [[id number]] Collected: 10/06/24 0831     Updated: 10/06/24 0850    Narrative:      CT ANGIOGRAM HEAD W AI ANALYSIS OF LVO, CT ANGIOGRAM NECK    Date of Exam: 10/6/2024 7:35 AM EDT    Indication: Stroke, follow up  Neuro deficit, acute, stroke suspected  Acute Stroke.    Comparison: CTs same day and prior    Technique: CTA of the head and neck was performed after the uneventful intravenous administration of iodinated contrast. Reconstructed coronal and sagittal images were also obtained. In addition, a 3-D volume rendered image was created for   interpretation. Automated exposure control and iterative reconstruction methods were used.      Findings:  Vascular: CT angiogram neck: There is some limitation secondary to streak artifact from a left subclavian approach pacemaker and contrast bolus from the right upper extremity.    Aortic arch demonstrates minimal atherosclerotic changes without  definite acute abnormality.    The left subclavian artery is grossly unremarkable in appearance. Left vertebral artery is dominant and slightly tortuous along its course which is patent to the skull base.    The right vertebral artery is diminutive and appears otherwise unremarkable and patent from its origin to the skull base. The right innominate artery and subclavian artery are grossly unremarkable in appearance. Right common carotid artery somewhat   tortuous along its course. The bifurcation, external carotid artery and cervical course of the internal carotid artery appears unremarkable. The left common carotid artery is somewhat tortuous with a slightly medial course proximally. Minimal   atherosclerotic changes are noted at the bifurcation without flow-limiting stenosis. The external carotid arteries and cervical course of the internal carotid artery appear grossly unremarkable in appearance    CT angiogram head: Left vertebral artery is dominant as mentioned above. The right vertebral artery demonstrates diminutive contribution to the basilar artery which is patent. The posterior cerebral arteries appear unremarkable.    Anterior circulation demonstrates unremarkable appearance of the internal carotid arteries and cerebral arteries bilaterally given mild motion limitation    Nonvascular:    No abnormal enhancement appreciated within the brain parenchyma. White matter changes compatible with diffuse small vessel ischemic disease again noted.    Partial opacification of the sphenoid air cells of the right noted. Mastoid air cells and middle ear structures are grossly clear. No acute osseous abnormality noted. Limited imaging the orbits is grossly unremarkable.    Soft tissues of the head and neck demonstrate no definite acute abnormality. Multilevel degenerative changes noted of the spine. Multinodular goiter noted.    Limited imaging of the upper chest demonstrates no definite acute abnormality. Bandlike  opacity compatible with atelectasis in the 70s change noted. Multilevel degenerative changes are noted of the spine sclerotic focus at T4 appears unchanged compared   to CT chest from 2021 given differences in technique. Findings do not appear to be aggressive.      Impression:      Impression:    1. No evidence of large vessel occlusion, significant stenosis or aneurysm formation. Incidental note of a diminutive right vertebral artery again noted        Electronically Signed: Addy Mari MD    10/6/2024 8:44 AM EDT    Workstation ID: [id number]    CT Angiogram Neck [[id number]] Collected: 10/06/24 0831     Updated: 10/06/24 0846    Narrative:      CT ANGIOGRAM HEAD W AI ANALYSIS OF LVO, CT ANGIOGRAM NECK    Date of Exam: 10/6/2024 7:35 AM EDT    Indication: Stroke, follow up  Neuro deficit, acute, stroke suspected  Acute Stroke.    Comparison: CTs same day and prior    Technique: CTA of the head and neck was performed after the uneventful intravenous administration of iodinated contrast. Reconstructed coronal and sagittal images were also obtained. In addition, a 3-D volume rendered image was created for   interpretation. Automated exposure control and iterative reconstruction methods were used.      Findings:  Vascular: CT angiogram neck: There is some limitation secondary to streak artifact from a left subclavian approach pacemaker and contrast bolus from the right upper extremity.    Aortic arch demonstrates minimal atherosclerotic changes without definite acute abnormality.    The left subclavian artery is grossly unremarkable in appearance. Left vertebral artery is dominant and slightly tortuous along its course which is patent to the skull base.    The right vertebral artery is diminutive and appears otherwise unremarkable and patent from its origin to the skull base. The right innominate artery and subclavian artery are grossly unremarkable in appearance. Right common carotid artery somewhat   tortuous  along its course. The bifurcation, external carotid artery and cervical course of the internal carotid artery appears unremarkable. The left common carotid artery is somewhat tortuous with a slightly medial course proximally. Minimal   atherosclerotic changes are noted at the bifurcation without flow-limiting stenosis. The external carotid arteries and cervical course of the internal carotid artery appear grossly unremarkable in appearance    CT angiogram head: Left vertebral artery is dominant as mentioned above. The right vertebral artery demonstrates diminutive contribution to the basilar artery which is patent. The posterior cerebral arteries appear unremarkable.    Anterior circulation demonstrates unremarkable appearance of the internal carotid arteries and cerebral arteries bilaterally given mild motion limitation    Nonvascular:    No abnormal enhancement appreciated within the brain parenchyma. White matter changes compatible with diffuse small vessel ischemic disease again noted.    Partial opacification of the sphenoid air cells of the right noted. Mastoid air cells and middle ear structures are grossly clear. No acute osseous abnormality noted. Limited imaging the orbits is grossly unremarkable.    Soft tissues of the head and neck demonstrate no definite acute abnormality. Multilevel degenerative changes noted of the spine. Multinodular goiter noted.    Limited imaging of the upper chest demonstrates no definite acute abnormality. Bandlike opacity compatible with atelectasis in the 70s change noted. Multilevel degenerative changes are noted of the spine sclerotic focus at T4 appears unchanged compared   to CT chest from 2021 given differences in technique. Findings do not appear to be aggressive.      Impression:      Impression:    1. No evidence of large vessel occlusion, significant stenosis or aneurysm formation. Incidental note of a diminutive right vertebral artery again  noted        Electronically Signed: Addy Mari MD    10/6/2024 8:44 AM EDT    Workstation ID: [id number]    XR Chest 1 View [[id number]] Collected: 10/06/24 0815     Updated: 10/06/24 0818    Narrative:      XR CHEST 1 VW    Date of Exam: 10/6/2024 7:59 AM EDT    Indication: Acute Stroke Protocol (onset < 12 hrs)    Comparison: Chest radiograph 12/25/2023    Findings:  Multichamber AICD. Stable cardiomediastinal silhouette. Aortic atherosclerotic disease. Negative for lobar consolidation, edema, effusion or pneumothorax. Mild chronic appearing interstitial changes. Surgical changes of known right middle lobectomy.   Degenerative related osseous changes.      Impression:      Impression:  Chronic findings without active pulmonary process.      Electronically Signed: James Estrada MD    10/6/2024 8:16 AM EDT    Workstation ID: [id number]    CT CEREBRAL PERFUSION WITH & WITHOUT CONTRAST [[id number]] Collected: 10/06/24 0739     Updated: 10/06/24 0743    Narrative:      CT CEREBRAL PERFUSION W WO CONTRAST    Date of Exam: 10/6/2024 7:28 AM EDT    Indication: Neuro deficit, acute, stroke suspected  Neuro deficit, acute, stroke suspected.     Comparison: None available.    Technique: Axial CT images of the brain were obtained prior to and after the administration of iodinated contrast. CT Perfusion protocol was utilized. Automated post processing was performed by RAPID software and submitted to PACS for interpretation.   Automated exposure control and iterative reconstruction was utilized.      Findings:  Cerebral blood flow, blood volume and mean transit time maps are symmetric without large perfusion defect.    CBF<30% volume: 0mL  Tmax>6sec volume: 0mL  Mismatch volume: 0mL  Mismatch ratio: None.      Impression:      Impression:  No perfusion defect.        Electronically Signed: Dejan Luu MD    10/6/2024 7:40 AM EDT    Workstation ID: [id number]    CT Head Without Contrast Stroke Protocol [[id number]] Collected:  10/06/24 0720     Updated: 10/06/24 0725    Narrative:      CT HEAD WO CONTRAST STROKE PROTOCOL    Date of Exam: 10/6/2024 7:16 AM EDT    Indication: Neuro deficit, acute, stroke suspected  Neuro deficit, acute, stroke suspected.    Comparison: CT scan of the head August 3, 2022    Technique: Axial CT images were obtained of the head without contrast administration.  Reconstructed coronal and sagittal images were also obtained. Automated exposure control and iterative construction methods were used.    Scan Time: 0722 hours  Results discussed with physician assistant Sanders at 0723 hours      Findings:  There is mild diffuse generalized atrophy. There are low-attenuation areas in the periventricular white matter consistent with chronic microvascular ischemic change. There is no mass, mass effect or midline shift. There are no abnormal extra-axial fluid   collections or areas of acute hemorrhage. There is mild ethmoid and sphenoid sinus disease. The mastoid air cells are clear.      Impression:      Impression:  Atrophy and chronic microvascular ischemic change. No acute intracranial process.        Electronically Signed: Dejan Luu MD    10/6/2024 7:23 AM EDT    Workstation ID: [id number]             Lab Results (last 24 hours)       Procedure Component Value Units Date/Time    TSH [[id number]]  (Normal) Collected: 10/06/24 0722    Specimen: Blood Updated: 10/06/24 1157     TSH 0.883 uIU/mL     Lipid Panel [[id number]] Collected: 10/06/24 0722    Specimen: Blood Updated: 10/06/24 1157     Total Cholesterol 132 mg/dL      Triglycerides 93 mg/dL      HDL Cholesterol 42 mg/dL      LDL Cholesterol  72 mg/dL      VLDL Cholesterol 18 mg/dL      LDL/HDL Ratio 1.70    Narrative:      Cholesterol Reference Ranges  (U.S. Department of Health and Human Services ATP III Classifications)    Desirable          <200 mg/dL  Borderline High    200-239 mg/dL  High Risk          >240 mg/dL      Triglyceride Reference Ranges  (U.S.  Department of Health and Human Services ATP III Classifications)    Normal           <150 mg/dL  Borderline High  150-199 mg/dL  High             200-499 mg/dL  Very High        >500 mg/dL    HDL Reference Ranges  (U.S. Department of Health and Human Services ATP III Classifications)    Low     <40 mg/dl (major risk factor for CHD)  High    >60 mg/dl ('negative' risk factor for CHD)        LDL Reference Ranges  (U.S. Department of Health and Human Services ATP III Classifications)    Optimal          <100 mg/dL  Near Optimal     100-129 mg/dL  Borderline High  130-159 mg/dL  High             160-189 mg/dL  Very High        >189 mg/dL    Hemoglobin A1c [[id number]]  (Abnormal) Collected: 10/06/24 0722    Specimen: Blood Updated: 10/06/24 1148     Hemoglobin A1C 6.02 %     Comprehensive Metabolic Panel [[id number]]  (Abnormal) Collected: 10/06/24 0722    Specimen: Blood Updated: 10/06/24 0753     Glucose 142 mg/dL      BUN 10 mg/dL      Creatinine 0.74 mg/dL      Sodium 141 mmol/L      Potassium 3.5 mmol/L      Chloride 108 mmol/L      CO2 22.7 mmol/L      Calcium 9.0 mg/dL      Total Protein 6.2 g/dL      Albumin 3.8 g/dL      ALT (SGPT) 18 U/L      AST (SGOT) 26 U/L      Alkaline Phosphatase 122 U/L      Total Bilirubin 0.6 mg/dL      Globulin 2.4 gm/dL      A/G Ratio 1.6 g/dL      BUN/Creatinine Ratio 13.5     Anion Gap 10.3 mmol/L      eGFR 78.9 mL/min/1.73     Narrative:      GFR Normal >60  Chronic Kidney Disease <60  Kidney Failure <15    The GFR formula is only valid for adults with stable renal function between ages 18 and 70.    Single High Sensitivity Troponin T [[id number]]  (Normal) Collected: 10/06/24 0722    Specimen: Blood Updated: 10/06/24 0753     HS Troponin T 11 ng/L     Narrative:      High Sensitive Troponin T Reference Range:  <14.0 ng/L- Negative Female for AMI  <22.0 ng/L- Negative Male for AMI  >=14 - Abnormal Female indicating possible myocardial injury.  >=22 - Abnormal Male indicating  possible myocardial injury.   Clinicians would have to utilize clinical acumen, EKG, Troponin, and serial changes to determine if it is an Acute Myocardial Infarction or myocardial injury due to an underlying chronic condition.         Protime-INR [[id number]]  (Normal) Collected: 10/06/24 0722    Specimen: Blood Updated: 10/06/24 0737     Protime 11.5 Seconds      INR 1.06    aPTT [[id number]]  (Normal) Collected: 10/06/24 0722    Specimen: Blood Updated: 10/06/24 0737     PTT 29.6 seconds     CBC & Differential [[id number]]  (Abnormal) Collected: 10/06/24 0722    Specimen: Blood Updated: 10/06/24 0726    Narrative:      The following orders were created for panel order CBC & Differential.  Procedure                               Abnormality         Status                     ---------                               -----------         ------                     CBC Auto Differential[[id number]]        Abnormal            Final result                 Please view results for these tests on the individual orders.    CBC Auto Differential [[id number]]  (Abnormal) Collected: 10/06/24 0722    Specimen: Blood Updated: 10/06/24 0726     WBC 4.40 10*3/mm3      RBC 4.36 10*6/mm3      Hemoglobin 13.3 g/dL      Hematocrit 40.7 %      MCV 93.3 fL      MCH 30.5 pg      MCHC 32.7 g/dL      RDW 13.8 %      RDW-SD 47.8 fl      MPV 10.5 fL      Platelets 188 10*3/mm3      Neutrophil % 59.1 %      Lymphocyte % 24.1 %      Monocyte % 16.4 %      Eosinophil % 0.2 %      Basophil % 0.0 %      Immature Grans % 0.2 %      Neutrophils, Absolute 2.60 10*3/mm3      Lymphocytes, Absolute 1.06 10*3/mm3      Monocytes, Absolute 0.72 10*3/mm3      Eosinophils, Absolute 0.01 10*3/mm3      Basophils, Absolute 0.00 10*3/mm3      Immature Grans, Absolute 0.01 10*3/mm3      nRBC 0.0 /100 WBC     POC Creatinine [[id number]]  (Normal) Collected: 10/06/24 0713    Specimen: Blood Updated: 10/06/24 0714     Creatinine 0.70 mg/dL      Comment: Serial Number:  [id number]:  [id number]        eGFR 84.3 mL/min/1.73     POC Glucose Once [[id number]]  (Abnormal) Collected: 10/06/24 0702    Specimen: Blood Updated: 10/06/24 0704     Glucose 155 mg/dL      Comment: Serial Number: [id number]:  [id number]                I reviewed the patient's new clinical results.    Assessment & Plan       Left leg weakness  - not consistent;   RA - continue home meds  Generalized anxiety  Dementia  Chronic constipation    There are no localizing deficits on exam today.  It is unclear if her problems this morning at [facility] were pain/stiffness vs true weakness.  Initial stroke workup is negative.  Await PT, OT, ST eval.  Likely will discharge back to [facility] later today.  Daughter/POA, who is an NP, does not want to proceed with MRI as pt is DNR and focus is on comfort rather than aggressive intervention.    I discussed the patient's findings and my recommendations with patient.     Rosetta Romano MD  10/06/24  13:48 EDT

## 2024-10-06 NOTE — PLAN OF CARE
Goal Outcome Evaluation:  Plan of Care Reviewed With: patient           Outcome Evaluation: Pt is an 85 YO F admitted with impaired balance, weakness and word finding difficulty.CT showed no acute abnormalities. Pt reports living at [facility] on [address] [facility], where she is independent with ADLs and ambulation using RWx. Pt states she has had no recent falls and does not have to navigate steps. This date pt with significant word finding difficulty, and significant weakness in BLE. No noted difference L>R and UE both with fair strength. Pt BP assessed sititng EOB and was 172/82 nursing notified. PT assisted with tranfser to bedside commode requirng CGA but limited mobility due to hypertension. PT appears well below baseline and recommendation is SNF pending progress.      Anticipated Discharge Disposition (PT): skilled nursing facility

## 2024-10-06 NOTE — CASE MANAGEMENT/SOCIAL WORK
Discharge Planning Assessment   Neil     Patient Name: Alia Cheney  MRN: [id number]  Today's Date: 10/6/2024    Admit Date: 10/6/2024    Plan: Return to [facility]   Discharge Needs Assessment       Row Name 10/06/24 0857       Living Environment    People in Home facility resident  [facility]    Current Living Arrangements apartment    Potentially Unsafe Housing Conditions none    In the past 12 months has the electric, gas, oil, or water company threatened to shut off services in your home? No    Primary Care Provided by self    Provides Primary Care For no one    Family Caregiver if Needed child(hair), adult    Family Caregiver Names daughter- Naomy    Quality of Family Relationships helpful;involved;supportive    Able to Return to Prior Arrangements yes       Resource/Environmental Concerns    Resource/Environmental Concerns none    Transportation Concerns none       Transportation Needs    In the past 12 months, has lack of transportation kept you from medical appointments or from getting medications? no    In the past 12 months, has lack of transportation kept you from meetings, work, or from getting things needed for daily living? No       Food Insecurity    Within the past 12 months, you worried that your food would run out before you got the money to buy more. Never true    Within the past 12 months, the food you bought just didn't last and you didn't have money to get more. Never true       Transition Planning    Patient/Family Anticipates Transition to other (see comments)  [facility]    Patient/Family Anticipated Services at Transition none    Transportation Anticipated family or friend will provide       Discharge Needs Assessment    Readmission Within the Last 30 Days no previous admission in last 30 days    Equipment Currently Used at Home cane, quad tip;walker, rolling  reports not using on a regular makayla but has avail. if needed.    Concerns to be Addressed denies  needs/concerns at this time    Anticipated Changes Related to Illness none    Equipment Needed After Discharge none    Provided Post Acute Provider List? N/A                   Discharge Plan       Row Name 10/06/24 0859       Plan    Plan Return to [facility]    Plan Comments met with pt at bedside.  pt confirms that she lives at [facility] on [facility] and wishes to return.  typically I with adls.  Daughter assist with medication set up. denies transportation or medication/food affordability.  PCP and pharm verified.  reports she is not current with any HH or outside services at this time.                Demographic Summary       Row Name 10/06/24 0856       General Information    Admission Type --  no status at time of assessment complete    Arrived From emergency department    Required Notices Provided Important Message from Medicare    Referral Source admission list    Reason for Consult discharge planning    Preferred Language English                   Functional Status       Row Name 10/06/24 0857       Functional Status    Usual Activity Tolerance good    Current Activity Tolerance good       Functional Status, IADL    Medications assistive person  jerrell muller arranges medications    Meal Preparation assistive person    Housekeeping assistive person    Laundry assistive person    Shopping assistive person       Mental Status    General Appearance WDL WDL       Mental Status Summary    Recent Changes in Mental Status/Cognitive Functioning no changes                    SHAYY Villaseñor RN  Case Management  [phone] office  [phone] fax

## 2024-10-06 NOTE — ED NOTES
Nursing report ED to floor  Alia Cheney  84 y.o.  female    HPI:   Chief Complaint   Patient presents with    Altered Mental Status       Admitting doctor:   Parminder Yao MD    Admitting diagnosis:   The encounter diagnosis was Altered mental status, unspecified altered mental status type.    Code status:   Current Code Status       Date Active Code Status Order ID Comments User Context       Prior            Allergies:   Patient has no known allergies.    Isolation:  No active isolations     Fall Risk:  Fall Risk Assessment was completed, and patient is at low risk for falls.   Predictive Model Details         11 (Low) Factor Value    Calculated 10/6/2024 09:27 Age 84    Risk of Fall Model Active Peripheral IV Present     Imaging order in this encounter Present     Respiratory Rate 19     Magnesium not on file     Zachariah Scale not on file     Albumin 3.8 g/dL     Calcium 9 mg/dL     Chloride 108 mmol/L     Diastolic BP 83     Number of Distinct Medication Classes administered 1     Total Bilirubin 0.6 mg/dL     ALT 18 U/L     Days after Admission 0.099     Tobacco Use Quit     Creatinine 0.74 mg/dL     Potassium 3.5 mmol/L         Weight:   There were no vitals filed for this visit.    Intake and Output  No intake or output data in the 24 hours ending 10/06/24 0927    Diet:   Dietary Orders (From admission, onward)       Start     Ordered    10/06/24 0709  NPO Diet NPO Type: Strict NPO  (Acute (Onset < 24 hrs) Stroke Order Panel - COR / SURJIT / LAG / NISA / MAD / PAD / AFSHIN / FSED)  Diet Effective Now        Question:  NPO Type  Answer:  Strict NPO    10/06/24 0710                     Most recent vitals:   Vitals:    10/06/24 0740 10/06/24 0745 10/06/24 0923 10/06/24 0924   BP:   (!) 192/94 (!) 188/83   BP Location:   Right arm Left arm   Patient Position:   Lying Lying   Pulse: 73 71 75 74   Resp:       Temp:       TempSrc:       SpO2: 98% 100% 97% 100%   Height:           Active LDAs/IV Access:   Lines, Drains  & Airways       Active LDAs       Name Placement date Placement time Site Days    Peripheral IV 10/06/24 0712 Right Antecubital 10/06/24  0712  Antecubital  less than 1                    Skin Condition:   Skin Assessments (last day)       Date/Time Skin WDL Interval    10/06/24 07:18:23 -- baseline    10/06/24 07:51:17 WDL --             Labs (abnormal labs have a star):   Labs Reviewed   COMPREHENSIVE METABOLIC PANEL - Abnormal; Notable for the following components:       Result Value    Glucose 142 (*)     Chloride 108 (*)     Alkaline Phosphatase 122 (*)     All other components within normal limits    Narrative:     GFR Normal >60  Chronic Kidney Disease <60  Kidney Failure <15    The GFR formula is only valid for adults with stable renal function between ages 18 and 70.   CBC WITH AUTO DIFFERENTIAL - Abnormal; Notable for the following components:    Monocyte % 16.4 (*)     Eosinophil % 0.2 (*)     All other components within normal limits   POCT GLUCOSE FINGERSTICK - Abnormal; Notable for the following components:    Glucose 155 (*)     All other components within normal limits   PROTIME-INR - Normal   APTT - Normal   SINGLE HS TROPONIN T - Normal    Narrative:     High Sensitive Troponin T Reference Range:  <14.0 ng/L- Negative Female for AMI  <22.0 ng/L- Negative Male for AMI  >=14 - Abnormal Female indicating possible myocardial injury.  >=22 - Abnormal Male indicating possible myocardial injury.   Clinicians would have to utilize clinical acumen, EKG, Troponin, and serial changes to determine if it is an Acute Myocardial Infarction or myocardial injury due to an underlying chronic condition.        POCT CREATININE - Normal   URINALYSIS W/ CULTURE IF INDICATED   POCT GLUCOSE FINGERSTICK   TYPE AND SCREEN   CBC AND DIFFERENTIAL    Narrative:     The following orders were created for panel order CBC & Differential.  Procedure                               Abnormality         Status                      ---------                               -----------         ------                     CBC Auto Differential[[id number]]        Abnormal            Final result                 Please view results for these tests on the individual orders.       LOC: Person, Place, and Situation    Telemetry:  Telemetry    Cardiac Monitoring Ordered: yes    EKG:   ECG 12 Lead Stroke Evaluation   Preliminary Result   HEART RATE=69  bpm   RR Tavqmqqr=783  ms   NY Byjhbefn=749  ms   P Horizontal Axis=7  deg   P Front Axis=45  deg   QRSD Dpjuszdd=719  ms   QT Umxukipp=369  ms   YQmT=320  ms   QRS Axis=-52  deg   T Wave Axis=36  deg   - ABNORMAL ECG -   Sinus rhythm   LAD, consider left anterior fascicular block   Left ventricular hypertrophy   Date and Time of Study:2024-10-06 07:48:35          Medications Given in the ED:   Medications   sodium chloride 0.9 % flush 10 mL (has no administration in time range)   iopamidol (ISOVUE-370) 76 % injection 50 mL (50 mL Intravenous Given 10/6/24 0735)   iopamidol (ISOVUE-370) 76 % injection 100 mL (100 mL Intravenous Given 10/6/24 0736)       Imaging results:  CT Angiogram Head w AI Analysis of LVO    Result Date: 10/6/2024  Impression: 1. No evidence of large vessel occlusion, significant stenosis or aneurysm formation. Incidental note of a diminutive right vertebral artery again noted Electronically Signed: Addy Mari MD  10/6/2024 8:44 AM EDT  Workstation ID: [id number]    CT Angiogram Neck    Result Date: 10/6/2024  Impression: 1. No evidence of large vessel occlusion, significant stenosis or aneurysm formation. Incidental note of a diminutive right vertebral artery again noted Electronically Signed: Addy Mari MD  10/6/2024 8:44 AM EDT  Workstation ID: [id number]    XR Chest 1 View    Result Date: 10/6/2024  Impression: Chronic findings without active pulmonary process. Electronically Signed: James Estrada MD  10/6/2024 8:16 AM EDT  Workstation ID: [id number]    CT CEREBRAL  PERFUSION WITH & WITHOUT CONTRAST    Result Date: 10/6/2024  Impression: No perfusion defect. Electronically Signed: Dejan Luu MD  10/6/2024 7:40 AM EDT  Workstation ID: [id number]    CT Head Without Contrast Stroke Protocol    Result Date: 10/6/2024  Impression: Atrophy and chronic microvascular ischemic change. No acute intracranial process. Electronically Signed: Dejan Luu MD  10/6/2024 7:23 AM EDT  Workstation ID: [id number]     Social issues:   Social History     Socioeconomic History    Marital status:    Tobacco Use    Smoking status: Former     Current packs/day: 0.00     Average packs/day: 1 pack/day for 27.0 years (27.0 ttl pk-yrs)     Types: Cigarettes     Start date:      Quit date:      Years since quittin.7    Smokeless tobacco: Never   Vaping Use    Vaping status: Never Used   Substance and Sexual Activity    Alcohol use: Yes    Drug use: No    Sexual activity: Not Currently       NIH Stroke Scale:  Interval: baseline (10/06/24 0718)  1a. Level of Consciousness: 0-->Alert, keenly responsive (10/06/24 0718)  1b. LOC Questions: 1-->Answers one question correctly (10/06/24 0718)  1c. LOC Commands: 0-->Performs both tasks correctly (10/06/24 0718)  2. Best Gaze: 0-->Normal (10/06/24 0718)  3. Visual: 0-->No visual loss (10/06/24 0718)  4. Facial Palsy: 0-->Normal symmetrical movements (10/06/24 0718)  5a. Motor Arm, Left: 0-->No drift, limb holds 90 (or 45) degrees for full 10 secs (10/06/24 0718)  5b. Motor Arm, Right: 0-->No drift, limb holds 90 (or 45) degrees for full 10 secs (10/06/24 0718)  6a. Motor Leg, Left: 1-->Drift, leg falls by the end of the 5-sec period but does not hit bed (10/06/24 0718)  6b. Motor Leg, Right: 1-->Drift, leg falls by the end of the 5-sec period but does not hit bed (10/06/24 0718)  7. Limb Ataxia: 0-->Absent (10/06/24 0718)  8. Sensory: 0-->Normal, no sensory loss (10/06/24 0718)  9. Best Language: 1-->Mild-to-moderate aphasia, some obvious loss of  fluency or facility of comprehension, without significant limitation on ideas expressed or form of expression. Reduction of speech and/or comprehension, however, makes conversation. . . (see row details) (10/06/24 0718)  10. Dysarthria: 0-->Normal (10/06/24 0718)  11. Extinction and Inattention (formerly Neglect): 0-->No abnormality (10/06/24 0718)    Total (NIH Stroke Scale): 4 (10/06/24 0718)     Additional notable assessment information:     Nursing report ED to floor:  To Nurse Guera on 2C    Cesar Lisa RN   10/06/24 09:27 EDT

## 2024-10-06 NOTE — ED NOTES
Neuro check performed, strong equal hand grasps and pedal pushes. Pupils 3mm PERRL, no nystagmus, A&Ox3

## 2024-10-06 NOTE — PLAN OF CARE
Problem: Adult Inpatient Plan of Care  Goal: Absence of Hospital-Acquired Illness or Injury  Intervention: Identify and Manage Fall Risk  Recent Flowsheet Documentation  Taken 10/6/2024 1404 by Maru Sanders RN  Safety Promotion/Fall Prevention:   assistive device/personal items within reach   clutter free environment maintained   fall prevention program maintained   lighting adjusted   nonskid shoes/slippers when out of bed   room organization consistent   safety round/check completed  Taken 10/6/2024 1030 by Maru Sanders RN  Safety Promotion/Fall Prevention:   assistive device/personal items within reach   clutter free environment maintained   fall prevention program maintained   lighting adjusted   nonskid shoes/slippers when out of bed   room organization consistent   safety round/check completed  Taken 10/6/2024 0947 by Maru Sanders RN  Safety Promotion/Fall Prevention:   assistive device/personal items within reach   clutter free environment maintained   fall prevention program maintained   lighting adjusted   nonskid shoes/slippers when out of bed   room organization consistent   safety round/check completed  Intervention: Prevent Skin Injury  Recent Flowsheet Documentation  Taken 10/6/2024 1404 by Maru Sanders RN  Body Position: position changed independently  Taken 10/6/2024 1030 by Maru Sanders RN  Body Position: position changed independently  Taken 10/6/2024 0947 by Maru Sanders RN  Body Position: position changed independently  Intervention: Prevent and Manage VTE (Venous Thromboembolism) Risk  Recent Flowsheet Documentation  Taken 10/6/2024 1404 by Maru Sanders RN  Activity Management: activity encouraged  Taken 10/6/2024 1030 by Maru Sanders RN  Activity Management: activity encouraged  Taken 10/6/2024 1024 by Maru Sanders RN  VTE Prevention/Management: patient refused intervention  Taken 10/6/2024 0947 by Maru Sanders RN  Activity Management: activity encouraged  Intervention: Prevent  Infection  Recent Flowsheet Documentation  Taken 10/6/2024 1404 by Maru Sanders, RN  Infection Prevention:   hand hygiene promoted   personal protective equipment utilized  Taken 10/6/2024 1030 by Maru Sanders RN  Infection Prevention:   hand hygiene promoted   personal protective equipment utilized  Taken 10/6/2024 0947 by Maru Sanders RN  Infection Prevention:   personal protective equipment utilized   hand hygiene promoted  Goal: Optimal Comfort and Wellbeing  Intervention: Provide Person-Centered Care  Recent Flowsheet Documentation  Taken 10/6/2024 0947 by Maru Sanders RN  Trust Relationship/Rapport: care explained  Goal: Readiness for Transition of Care  Intervention: Mutually Develop Transition Plan  Recent Flowsheet Documentation  Taken 10/6/2024 1429 by Maru Sanders RN  Equipment Currently Used at Home:   cane, quad tip   walker, rolling   Goal Outcome Evaluation:         Pt with c/o pain R side head. Tylenol given per MAR. PT with confusion at times. Pt resting at this time. Call light in reach. Plan of care to continue.

## 2024-10-07 VITALS
HEIGHT: 64 IN | WEIGHT: 175.93 LBS | BODY MASS INDEX: 30.04 KG/M2 | SYSTOLIC BLOOD PRESSURE: 142 MMHG | HEART RATE: 66 BPM | TEMPERATURE: 97.6 F | DIASTOLIC BLOOD PRESSURE: 71 MMHG | RESPIRATION RATE: 10 BRPM | OXYGEN SATURATION: 98 %

## 2024-10-07 PROBLEM — R41.82 ALTERED MENTAL STATUS: Status: ACTIVE | Noted: 2024-10-07

## 2024-10-07 LAB
QT INTERVAL: 435 MS
QTC INTERVAL: 467 MS

## 2024-10-07 PROCEDURE — 97166 OT EVAL MOD COMPLEX 45 MIN: CPT

## 2024-10-07 PROCEDURE — G0378 HOSPITAL OBSERVATION PER HR: HCPCS

## 2024-10-07 PROCEDURE — 97116 GAIT TRAINING THERAPY: CPT

## 2024-10-07 RX ORDER — ACETAMINOPHEN 325 MG/1
650 TABLET ORAL EVERY 6 HOURS PRN
Start: 2024-10-07

## 2024-10-07 RX ORDER — ALPRAZOLAM 0.25 MG
0.25 TABLET ORAL 3 TIMES DAILY PRN
Start: 2024-10-07 | End: 2024-10-11

## 2024-10-07 RX ADMIN — VENLAFAXINE HYDROCHLORIDE 150 MG: 75 CAPSULE, EXTENDED RELEASE ORAL at 08:35

## 2024-10-07 RX ADMIN — METOPROLOL TARTRATE 25 MG: 25 TABLET, FILM COATED ORAL at 08:35

## 2024-10-07 RX ADMIN — LUBIPROSTONE 8 MCG: 8 CAPSULE, GELATIN COATED ORAL at 08:35

## 2024-10-07 RX ADMIN — LEFLUNOMIDE 10 MG: 20 TABLET ORAL at 08:34

## 2024-10-07 RX ADMIN — MEMANTINE 10 MG: 10 TABLET ORAL at 08:35

## 2024-10-07 RX ADMIN — PANTOPRAZOLE SODIUM 40 MG: 40 TABLET, DELAYED RELEASE ORAL at 08:35

## 2024-10-07 RX ADMIN — Medication 10 ML: at 08:35

## 2024-10-07 RX ADMIN — POLYETHYLENE GLYCOL 3350 17 G: 17 POWDER, FOR SOLUTION ORAL at 08:35

## 2024-10-07 NOTE — THERAPY TREATMENT NOTE
"Subjective: Pt agreeable to therapeutic plan of care. Pt with continued word finding difficulty    Objective:     Precautions - High fall risk    Bed mobility - N/A or Not attempted. Pt up in chair at start of session.  Transfers - CGA with cues for safety.   Ambulation - 90 feet CGA and RW for safety and balance      Vitals: seated /74; Standing  124/82.     Pain: 0 VAS   Location: n/a  Intervention for pain: N/A    Education: Provided education on the importance of mobility in the acute care setting, Transfer Training, and Gait Training    Assessment: Alia Cheney presents with functional mobility impairments which indicate the need for skilled intervention. Tolerating session today without incident. Pt progressing with mobility, however pt appears to have difficulty problem solving for basic tasks, mild balance deficits and would benefit from continued PT working to improve functional independence and safety.  Will continue to follow and progress as tolerated.     Plan/Recommendations:   If medically appropriate, Moderate Intensity Therapy recommended post-acute care. This is recommended as therapy feels the patient would require 3-4 days per week and wouldn't tolerate \"3 hour daily\" rehab intensity. SNF would be the preferred choice. If the patient does not agree to SNF, arrange HH or OP depending on home bound status. If patient is medically complex, consider LTACH. Pt requires no DME at discharge.     Pt desires Skilled Rehab placement at discharge. Pt cooperative; agreeable to therapeutic recommendations and plan of care.         Basic Mobility 6-click:  Rollin = Total, A lot = 2, A little = 3; 4 = None  Supine>Sit:   1 = Total, A lot = 2, A little = 3; 4 = None   Sit>Stand with arms:  1 = Total, A lot = 2, A little = 3; 4 = None  Bed>Chair:   1 = Total, A lot = 2, A little = 3; 4 = None  Ambulate in room:  1 = Total, A lot = 2, A little = 3; 4 = None  3-5 Steps with railin = Total, " A lot = 2, A little = 3; 4 = None  Score: 19    Modified Humphreys: N/A = No pre-op stroke/TIA    Post-Tx Position: Up in Chair, Alarms activated, and Call light and personal items within reach  PPE: gloves

## 2024-10-07 NOTE — PLAN OF CARE
Goal Outcome Evaluation:  Plan of Care Reviewed With: patient           Outcome Evaluation: 83 YO F admitted with impaired balance, weakness and word finding difficulty.CT showed no acute abnormalities. Pt reports living at [facility] on [facility], where she is independent with ADLs and ambulation using RWx. Pt reports her daughter assist with driving and medication management. This date, pt able to stand, walk household distance, and manage oral care with CGA. Pt continues to persent with confusion, however per family she has dementia at baseline. Pt appears at or near functional baseline, with intermittent confusion consistent with dementia. OT recommending pt return back to [facility] with HHOT/PT at d/c. OT will follow.      Anticipated Discharge Disposition (OT): home with home health

## 2024-10-07 NOTE — THERAPY EVALUATION
Patient Name: Alia Cheney  : 1940    MRN: [id number]                              Today's Date: 10/7/2024       Admit Date: 10/6/2024    Visit Dx:     ICD-10-CM ICD-9-CM   1. Altered mental status, unspecified altered mental status type  R41.82 780.97   2. Left leg weakness  R29.898 729.89   3. Anxiety  F41.9 300.00     Patient Active Problem List   Diagnosis    Absolute anemia    Anal stenosis    Anxiety    Arrhythmia, ventricular    Ataxia    Carpal tunnel syndrome, bilateral    Colles' fracture    Coronary artery disease involving native coronary artery of native heart without angina pectoris    Depression    Physical exam    Gastroesophageal reflux disease    Hx of total knee replacement, right    Mixed hyperlipidemia    Insomnia    Irritable bowel syndrome    Keratoconjunctivitis sicca, in Sjogren's syndrome    Memory loss    Osteoarthritis of knee    Osteoporosis    Paroxysmal atrial fibrillation    Primary malignant neoplasm of right middle lobe of lung    Rheumatoid arthritis    Trochanteric bursitis of left hip    Vitamin B12 deficiency    Vitamin D deficiency    History of total hip replacement, left    Overweight (BMI 25.0-29.9)    Allergic rhinitis    Lung nodule    Other specified dorsopathies, site unspecified    Other specified examination    Primary osteoarthritis of both knees    Foot callus    Petechiae    Medicare annual wellness visit, subsequent    History of osteoporotic pathological fracture    Syncope, unspecified syncope type    Sick sinus syndrome    Presence of cardiac pacemaker    Paroxysmal SVT (supraventricular tachycardia)    Lower abdominal pain    Colitis    Left leg weakness    Altered mental status     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    Anxiety 2013    Arrhythmia, ventricular 2012    Arthritis     Ataxia 2013    Carpal tunnel syndrome, bilateral 2017    Depression 1/3/2012    Elevated cholesterol     Gastroesophageal  reflux disease 6/29/2012    History of osteoporotic pathological fracture     Bilateral wrist-unknown date    Hx of total knee replacement, right 6/29/2017    Hyperlipidemia 5/2/2016    Insomnia 8/6/2013    Irritable bowel syndrome 8/6/2013    Keratoconjunctivitis sicca, in Sjogren's syndrome 9/21/2016    Memory loss 12/7/2018    Osteoarthritis of knee 5/19/2017    Osteoporosis     fosamax [id number], on prolia(2016)    Paroxysmal atrial fibrillation 11/9/2011    Primary malignant neoplasm of right middle lobe of lung 8/21/2018    Rheumatoid arthritis     Tension-type headache, not intractable 12/26/2017    Vitamin B12 deficiency 5/2/2016    Vitamin D deficiency 11/20/2017     Past Surgical History:   Procedure Laterality Date    ABDOMINAL SURGERY      ANAL DILATION      APPENDECTOMY      BREAST BIOPSY Right     CARDIAC CATHETERIZATION  2009    CARDIAC ELECTROPHYSIOLOGY PROCEDURE N/A 8/4/2022    Procedure: Pacemaker DC new-[place];  Surgeon: Patricia Willis MD;  Location: [facility] CATH INVASIVE LOCATION;  Service: Cardiology;  Laterality: N/A;    CATARACT EXTRACTION WITH INTRAOCULAR LENS IMPLANT Bilateral 2009    CHOLECYSTECTOMY      COLONOSCOPY      COLONOSCOPY N/A 4/19/2023    Procedure: COLONOSCOPY with hot snare polypectomy x 1;  Surgeon: Thania Sheppard MD;  Location: [facility] ENDOSCOPY;  Service: Gastroenterology;  Laterality: N/A;  polyp, diverticulosis, internal hemorrhoids    CYSTOSCOPY      HYSTERECTOMY      JOINT REPLACEMENT      LUNG REMOVAL, PARTIAL Right 08/06/2018    RIGHT VATS WEDGE RESECTION OF RML PN RIGHT MIDDLE LOBECTOMY     SUBTOTAL HYSTERECTOMY      TONSILLECTOMY      TOTAL HIP ARTHROPLASTY Left 7/17/2019    Procedure: TOTAL HIP ARTHROPLASTY ANTERIOR;  Surgeon: Lenny Khan MD;  Location: [facility] MAIN OR;  Service: Orthopedics    TOTAL KNEE ARTHROPLASTY Right 2017    WRIST FRACTURE SURGERY Bilateral       General Information       Row Name 10/07/24 0810          OT Time and Intention     Document Type evaluation  -     Mode of Treatment occupational therapy  -       Row Name 10/07/24 0810          General Information    Patient Profile Reviewed yes  -BL     Prior Level of Function independent:;all household mobility;ADL's;bathing  Reports using a rollator some days  -     Existing Precautions/Restrictions fall  -BL       Row Name 10/07/24 0810          Occupational Profile    Environmental Supports and Barriers (Occupational Profile) reports 1 fall in the last 6 months  -       Row Name 10/07/24 0810          Living Environment    People in Home facility resident;other (see comments)  care home- [facility]  -BL       Row Name 10/07/24 0810          Home Main Entrance    Number of Stairs, Main Entrance none  -BL       Row Name 10/07/24 0810          Stairs Within Home, Primary    Number of Stairs, Within Home, Primary none  -BL       Row Name 10/07/24 0810          Cognition    Orientation Status (Cognition) oriented to;person;place;disoriented to;situation;time  had to look at the board for time; stated the reason she was here was because something was going on with her head  -       Row Name 10/07/24 0810          Safety Issues, Functional Mobility    Impairments Affecting Function (Mobility) balance;endurance/activity tolerance;strength;cognition  -               User Key  (r) = Recorded By, (t) = Taken By, (c) = Cosigned By      Initials Name Provider Type     Nuvia Quick OT Occupational Therapist                     Mobility/ADL's       Row Name 10/07/24 1246          Bed Mobility    Bed Mobility supine-sit  -BL     Supine-Sit [place] (Bed Mobility) supervision  -     Assistive Device (Bed Mobility) bed rails  -       Row Name 10/07/24 1246          Bed-Chair Transfer    Bed-Chair [place] (Transfers) contact guard  -       Row Name 10/07/24 1246          Sit-Stand Transfer    Sit-Stand [place] (Transfers) contact guard  -       Row Name 10/07/24 1246           Functional Mobility    Functional Mobility- Ind. Level supervision required  -BL     Functional Mobility- Device walker, front-wheeled  -BL       Row Name 10/07/24 1246          Activities of Daily Living    BADL Assessment/Intervention grooming  -       Row Name 10/07/24 1246          Grooming Assessment/Training    Oral Care dental appliance cleaned  -BL               User Key  (r) = Recorded By, (t) = Taken By, (c) = Cosigned By      Initials Name Provider Type     Nuvia Quick OT Occupational Therapist                   Obj/Interventions       Row Name 10/07/24 1248          Range of Motion Comprehensive    General Range of Motion bilateral upper extremity ROM WFL  -BL       Row Name 10/07/24 1248          Strength Comprehensive (MMT)    Comment, General Manual Muscle Testing (MMT) Assessment BUEs grossly 3+/5  -BL       Row Name 10/07/24 1248          Motor Skills    Motor Skills functional endurance  -BL     Functional Endurance Fair  -       Row Name 10/07/24 1248          Balance    Balance Assessment sitting static balance;sitting dynamic balance;standing static balance;standing dynamic balance  -BL     Static Sitting Balance modified independence  -BL     Dynamic Sitting Balance modified independence  -BL     Position, Sitting Balance unsupported  -BL     Static Standing Balance contact guard  -BL     Dynamic Standing Balance contact guard  -BL     Position/Device Used, Standing Balance supported;walker, front-wheeled  -BL               User Key  (r) = Recorded By, (t) = Taken By, (c) = Cosigned By      Initials Name Provider Type     Nuvia Quick OT Occupational Therapist                   Goals/Plan       Row Name 10/07/24 135          Transfer Goal 1 (OT)    Activity/Assistive Device (Transfer Goal 1, OT) sit-to-stand/stand-to-sit  -     [place] Level/Cues Needed (Transfer Goal 1, OT) standby assist  -     Time Frame (Transfer Goal 1, OT) 2 weeks  -       Row Name  10/07/24 1354          Dressing Goal 1 (OT)    Activity/Device (Dressing Goal 1, OT) dressing skills, all  -BL     [place]/Cues Needed (Dressing Goal 1, OT) modified independence  -BL     Time Frame (Dressing Goal 1, OT) 2 weeks  -BL       Row Name 10/07/24 1358          Toileting Goal 1 (OT)    Activity/Device (Toileting Goal 1, OT) toileting skills, all  -BL     [place] Level/Cues Needed (Toileting Goal 1, OT) modified independence  -BL     Time Frame (Toileting Goal 1, OT) 2 weeks  -BL       Row Name 10/07/24 1353          Therapy Assessment/Plan (OT)    Planned Therapy Interventions (OT) activity tolerance training;BADL retraining;cognitive/visual perception retraining;edema control/reduction;functional balance retraining;IADL retraining;neuromuscular control/coordination retraining;occupation/activity based interventions;passive ROM/stretching;patient/caregiver education/training;strengthening exercise;transfer/mobility retraining;adaptive equipment training  -BL               User Key  (r) = Recorded By, (t) = Taken By, (c) = Cosigned By      Initials Name Provider Type     Nuvia Quick OT Occupational Therapist                   Clinical Impression       Row Name 10/07/24 1251          Pain Assessment    Pretreatment Pain Rating 0/10 - no pain  -BL     Posttreatment Pain Rating 0/10 - no pain  -BL       Row Name 10/07/24 1251          Plan of Care Review    Plan of Care Reviewed With patient  -BL     Outcome Evaluation 83 YO F admitted with impaired balance, weakness and word finding difficulty.CT showed no acute abnormalities. Pt reports living at [facility] on [facility], where she is independent with ADLs and ambulation using RWx. Pt reports her daughter assist with driving and medication management. This date, pt able to stand, walk household distance, and manage oral care with CGA. Pt continues to persent with confusion, however per family she has dementia at baseline. Pt appears at or near  functional baseline, with intermittent confusion consistent with dementia. OT recommending pt return back to [facility] with HHOT/PT at d/c. OT will follow.  -       Row Name 10/07/24 1251          Therapy Assessment/Plan (OT)    Criteria for Skilled Therapeutic Interventions Met (OT) yes  -BL     Therapy Frequency (OT) 5 times/wk  -       Row Name 10/07/24 1251          Therapy Plan Review/Discharge Plan (OT)    Anticipated Discharge Disposition (OT) home with home health  -       Row Name 10/07/24 1251          Positioning and Restraints    Pre-Treatment Position sitting in chair/recliner  -BL     Post Treatment Position chair  -BL     In Chair notified nsg;exit alarm on;call light within reach  -BL               User Key  (r) = Recorded By, (t) = Taken By, (c) = Cosigned By      Initials Name Provider Type    Nuvia Gao, GELY Occupational Therapist                   Outcome Measures       Row Name 10/07/24 0800          How much help from another person do you currently need...    Turning from your back to your side while in flat bed without using bedrails? 4  -JL     Moving from lying on back to sitting on the side of a flat bed without bedrails? 4  -JL     Moving to and from a bed to a chair (including a wheelchair)? 4  -JL     Standing up from a chair using your arms (e.g., wheelchair, bedside chair)? 4  -JL     Climbing 3-5 steps with a railing? 2  -JL     To walk in hospital room? 3  -JL     AM-PAC 6 Clicks Score (PT) 21  -JL     Highest Level of Mobility Goal 6 --> Walk 10 steps or more  -               User Key  (r) = Recorded By, (t) = Taken By, (c) = Cosigned By      Initials Name Provider Type    Leonor Reddy, RN Registered Nurse                    Occupational Therapy Education       Title: PT OT SLP Therapies (Done)       Topic: Occupational Therapy (Done)       Point: ADL training (Done)       Description:   Instruct learner(s) on proper safety adaptation and remediation techniques  during self care or transfers.   Instruct in proper use of assistive devices.                  Learning Progress Summary             Patient Acceptance, E,TB, VU by  at 10/7/2024 1356                                         User Key       Initials Effective Dates Name Provider Type Discipline     09/22/22 -  Nuvia Quick, GELY Occupational Therapist OT                  OT Recommendation and Plan  Planned Therapy Interventions (OT): activity tolerance training, BADL retraining, cognitive/visual perception retraining, edema control/reduction, functional balance retraining, IADL retraining, neuromuscular control/coordination retraining, occupation/activity based interventions, passive ROM/stretching, patient/caregiver education/training, strengthening exercise, transfer/mobility retraining, adaptive equipment training  Therapy Frequency (OT): 5 times/wk  Plan of Care Review  Plan of Care Reviewed With: patient  Outcome Evaluation: 83 YO F admitted with impaired balance, weakness and word finding difficulty.CT showed no acute abnormalities. Pt reports living at [facility] on [facility], where she is independent with ADLs and ambulation using RWx. Pt reports her daughter assist with driving and medication management. This date, pt able to stand, walk household distance, and manage oral care with CGA. Pt continues to persent with confusion, however per family she has dementia at baseline. Pt appears at or near functional baseline, with intermittent confusion consistent with dementia. OT recommending pt return back to [facility] with HHOT/PT at d/c. OT will follow.     Time Calculation:         Time Calculation- OT       Row Name 10/07/24 9778             Time Calculation- OT    OT Start Time 0808  -BL      OT Stop Time 0833  -BL      OT Time Calculation (min) 25 min  -BL      OT Received On 10/07/24  -      OT - Next Appointment 10/08/24  -      OT Goal Re-Cert Due Date 10/21/24  -                User Key  (r) = Recorded  By, (t) = Taken By, (c) = Cosigned By      Initials Name Provider Type    BL Nuvia Quick, OT Occupational Therapist                  Therapy Charges for Today       Code Description Service Date Service Provider Modifiers Qty    16653644835 HC OT EVAL MOD COMPLEXITY 4 10/7/2024 Nuvia Quick OT GO 1                 Nuvia Quick OT  10/7/2024

## 2024-10-07 NOTE — PLAN OF CARE
Problem: Adult Inpatient Plan of Care  Goal: Plan of Care Review  Outcome: Progressing  Goal: Patient-Specific Goal (Individualized)  Outcome: Progressing  Goal: Absence of Hospital-Acquired Illness or Injury  Outcome: Progressing  Intervention: Identify and Manage Fall Risk  Recent Flowsheet Documentation  Taken 10/7/2024 1200 by Leonor Knox RN  Safety Promotion/Fall Prevention: safety round/check completed  Taken 10/7/2024 1000 by Leonor Knox RN  Safety Promotion/Fall Prevention: safety round/check completed  Taken 10/7/2024 0800 by Leonor Knox RN  Safety Promotion/Fall Prevention:   assistive device/personal items within reach   clutter free environment maintained   fall prevention program maintained   nonskid shoes/slippers when out of bed   room organization consistent   safety round/check completed  Intervention: Prevent Skin Injury  Recent Flowsheet Documentation  Taken 10/7/2024 1200 by Leonor Knox RN  Body Position: position changed independently  Taken 10/7/2024 0800 by Leonor Knox RN  Body Position: position changed independently  Intervention: Prevent and Manage VTE (Venous Thromboembolism) Risk  Recent Flowsheet Documentation  Taken 10/7/2024 1200 by Leonor Knox RN  Activity Management: up in chair  Taken 10/7/2024 0800 by Leonor Knox RN  Activity Management: activity encouraged  VTE Prevention/Management:   bilateral   sequential compression devices off  Intervention: Prevent Infection  Recent Flowsheet Documentation  Taken 10/7/2024 1200 by Leonor Knox RN  Infection Prevention: hand hygiene promoted  Taken 10/7/2024 0800 by Leonor Knox RN  Infection Prevention: hand hygiene promoted  Goal: Optimal Comfort and Wellbeing  Outcome: Progressing  Goal: Readiness for Transition of Care  Outcome: Progressing   Goal Outcome Evaluation:         Patient did not have any new tests or procedures. The patient remains on room air and did not have any  complaints of pain. The patient is being discharged. Will continue to monitor.

## 2024-10-07 NOTE — DISCHARGE SUMMARY
Date of Discharge:  10/7/2024    Discharge Diagnosis:   **Left leg weakness [R29.898]   Altered mental status [R41.82]   Presence of cardiac pacemaker [Z95.0]   Memory loss [R41.3]   Coronary artery disease involving native coronary artery of native heart without angina pectoris [I25.10]   Mixed hyperlipidemia [E78.2]   Rheumatoid arthritis [M06.9]       Presenting Problem/History of Present Illness  Active Hospital Problems    Diagnosis  POA    **Left leg weakness [R29.898]  Yes    Altered mental status [R41.82]  Unknown    Presence of cardiac pacemaker [Z95.0]  Yes    Memory loss [R41.3]  Yes    Coronary artery disease involving native coronary artery of native heart without angina pectoris [I25.10]  Yes    Mixed hyperlipidemia [E78.2]  Yes    Rheumatoid arthritis [M06.9]  Yes      Resolved Hospital Problems   No resolved problems to display.          Hospital Course  Patient is a 84 y.o. female with history of dementia, pacemaker, rheumatoid arthritis and chronic coronary artery disease who presented with difficulty getting dressed and concern for left leg weakness.  It is not clear if she actually had any neurologic deficit.  Stroke workup including CT head, CTA of head and neck and CT perfusion scan were unremarkable.  Patient has moderate dementia and is DNR, so family did not want to pursue further workup, such as MRI or echo, as this would not have changed management.  Her symptoms resolved.  She will be discharged to her assisted living facility with home health.      Procedures Performed         Consults:   Consults       Date and Time Order Name Status Description    10/6/2024  7:10 AM Inpatient Neurology Consult Stroke Completed     10/6/2024  7:10 AM Inpatient Neurology Consult Stroke Completed             Pertinent Test Results:    Lab Results (most recent)       Procedure Component Value Units Date/Time    TSH [[id number]]  (Normal) Collected: 10/06/24 0722    Specimen: Blood Updated: 10/06/24 1642      TSH 0.883 uIU/mL     Lipid Panel [[id number]] Collected: 10/06/24 0722    Specimen: Blood Updated: 10/06/24 1157     Total Cholesterol 132 mg/dL      Triglycerides 93 mg/dL      HDL Cholesterol 42 mg/dL      LDL Cholesterol  72 mg/dL      VLDL Cholesterol 18 mg/dL      LDL/HDL Ratio 1.70    Narrative:      Cholesterol Reference Ranges  (U.S. Department of Health and Human Services ATP III Classifications)    Desirable          <200 mg/dL  Borderline High    200-239 mg/dL  High Risk          >240 mg/dL      Triglyceride Reference Ranges  (U.S. Department of Health and Human Services ATP III Classifications)    Normal           <150 mg/dL  Borderline High  150-199 mg/dL  High             200-499 mg/dL  Very High        >500 mg/dL    HDL Reference Ranges  (U.S. Department of Health and Human Services ATP III Classifications)    Low     <40 mg/dl (major risk factor for CHD)  High    >60 mg/dl ('negative' risk factor for CHD)        LDL Reference Ranges  (U.S. Department of Health and Human Services ATP III Classifications)    Optimal          <100 mg/dL  Near Optimal     100-129 mg/dL  Borderline High  130-159 mg/dL  High             160-189 mg/dL  Very High        >189 mg/dL    Hemoglobin A1c [[id number]]  (Abnormal) Collected: 10/06/24 0722    Specimen: Blood Updated: 10/06/24 1148     Hemoglobin A1C 6.02 %     Comprehensive Metabolic Panel [[id number]]  (Abnormal) Collected: 10/06/24 0722    Specimen: Blood Updated: 10/06/24 0753     Glucose 142 mg/dL      BUN 10 mg/dL      Creatinine 0.74 mg/dL      Sodium 141 mmol/L      Potassium 3.5 mmol/L      Chloride 108 mmol/L      CO2 22.7 mmol/L      Calcium 9.0 mg/dL      Total Protein 6.2 g/dL      Albumin 3.8 g/dL      ALT (SGPT) 18 U/L      AST (SGOT) 26 U/L      Alkaline Phosphatase 122 U/L      Total Bilirubin 0.6 mg/dL      Globulin 2.4 gm/dL      A/G Ratio 1.6 g/dL      BUN/Creatinine Ratio 13.5     Anion Gap 10.3 mmol/L      eGFR 78.9 mL/min/1.73     Narrative:       GFR Normal >60  Chronic Kidney Disease <60  Kidney Failure <15    The GFR formula is only valid for adults with stable renal function between ages 18 and 70.    Single High Sensitivity Troponin T [[id number]]  (Normal) Collected: 10/06/24 0722    Specimen: Blood Updated: 10/06/24 0753     HS Troponin T 11 ng/L     Narrative:      High Sensitive Troponin T Reference Range:  <14.0 ng/L- Negative Female for AMI  <22.0 ng/L- Negative Male for AMI  >=14 - Abnormal Female indicating possible myocardial injury.  >=22 - Abnormal Male indicating possible myocardial injury.   Clinicians would have to utilize clinical acumen, EKG, Troponin, and serial changes to determine if it is an Acute Myocardial Infarction or myocardial injury due to an underlying chronic condition.         Protime-INR [[id number]]  (Normal) Collected: 10/06/24 0722    Specimen: Blood Updated: 10/06/24 0737     Protime 11.5 Seconds      INR 1.06    aPTT [[id number]]  (Normal) Collected: 10/06/24 0722    Specimen: Blood Updated: 10/06/24 0737     PTT 29.6 seconds     CBC & Differential [[id number]]  (Abnormal) Collected: 10/06/24 0722    Specimen: Blood Updated: 10/06/24 0726    Narrative:      The following orders were created for panel order CBC & Differential.  Procedure                               Abnormality         Status                     ---------                               -----------         ------                     CBC Auto Differential[[id number]]        Abnormal            Final result                 Please view results for these tests on the individual orders.    CBC Auto Differential [[id number]]  (Abnormal) Collected: 10/06/24 0722    Specimen: Blood Updated: 10/06/24 0726     WBC 4.40 10*3/mm3      RBC 4.36 10*6/mm3      Hemoglobin 13.3 g/dL      Hematocrit 40.7 %      MCV 93.3 fL      MCH 30.5 pg      MCHC 32.7 g/dL      RDW 13.8 %      RDW-SD 47.8 fl      MPV 10.5 fL      Platelets 188 10*3/mm3      Neutrophil % 59.1 %       Lymphocyte % 24.1 %      Monocyte % 16.4 %      Eosinophil % 0.2 %      Basophil % 0.0 %      Immature Grans % 0.2 %      Neutrophils, Absolute 2.60 10*3/mm3      Lymphocytes, Absolute 1.06 10*3/mm3      Monocytes, Absolute 0.72 10*3/mm3      Eosinophils, Absolute 0.01 10*3/mm3      Basophils, Absolute 0.00 10*3/mm3      Immature Grans, Absolute 0.01 10*3/mm3      nRBC 0.0 /100 WBC     POC Creatinine [[id number]]  (Normal) Collected: 10/06/24 0713    Specimen: Blood Updated: 10/06/24 0714     Creatinine 0.70 mg/dL      Comment: Serial Number: [id number]:  [id number]        eGFR 84.3 mL/min/1.73     POC Glucose Once [[id number]]  (Abnormal) Collected: 10/06/24 0702    Specimen: Blood Updated: 10/06/24 0704     Glucose 155 mg/dL      Comment: Serial Number: [id number]:  [id number]                Results for orders placed during the hospital encounter of 06/23/22    Adult Transthoracic Echo Complete W/ Cont if Necessary Per Protocol    Interpretation Summary  · Estimated right ventricular systolic pressure from tricuspid regurgitation is normal (<35 mmHg).  · Left ventricular ejection fraction appears to be 56 - 60%.  · Left ventricular diastolic function is consistent with (grade I) impaired relaxation and age.  · Presence of mild to moderate aortic regurgitation.              Condition on Discharge:  Improved, stable    Vital Signs  Temp:  [96.6 °F (35.9 °C)-98.2 °F (36.8 °C)] 97.6 °F (36.4 °C)  Heart Rate:  [63-77] 66  Resp:  [10-19] 10  BP: (109-174)/(53-74) 142/71    Physical Exam:     General Appearance:    Alert, cooperative, in no acute distress, oriented to person and place   Head:    Normocephalic, without obvious abnormality, atraumatic   Eyes:            Lids and lashes normal, conjunctivae and sclerae normal, no   icterus, no pallor, corneas clear, PERRLA   Ears:    Ears appear intact with no abnormalities noted   Throat:   No oral lesions, no thrush, oral mucosa moist   Neck:   No adenopathy, supple,  trachea midline, no thyromegaly, no   carotid bruit, no JVD   Lungs:     Clear to auscultation,respirations regular, even and                  unlabored    Heart:    Regular rhythm and normal rate, normal S1 and S2, no            murmur, no gallop, no rub, no click   Chest Wall:    No abnormalities observed   Abdomen:     Normal bowel sounds, no masses, no organomegaly, soft        non-tender, non-distended, no guarding, no rebound                tenderness   Extremities:   Moves all extremities well, no edema, no cyanosis, no             redness   Pulses:   Pulses palpable and equal bilaterally   Skin:   No bleeding, bruising or rash   Lymph nodes:   No palpable adenopathy   Neurologic:   Cranial nerves 2 - 12 grossly intact, sensation intact, DTR       present and equal bilaterally       Discharge Disposition  Home or Self Care    Discharge Medications     Discharge Medications        New Medications        Instructions Start Date   acetaminophen 325 MG tablet  Commonly known as: TYLENOL   650 mg, Oral, Every 6 Hours PRN      ALPRAZolam 0.25 MG tablet  Commonly known as: XANAX   0.25 mg, Oral, 3 Times Daily PRN             Continue These Medications        Instructions Start Date   calcium carbonate 1250 (500 Ca) MG tablet  Commonly known as: OS-DAHLIA   600 mg, Oral, Daily      Cholecalciferol 50 MCG (2000 UT) tablet  Commonly known as: Vitamin D3 Super Strength   2 po q d      estradiol 0.1 MG/GM vaginal cream  Commonly known as: ESTRACE VAGINAL   2 g, Vaginal, Daily      leflunomide 10 MG tablet  Commonly known as: ARAVA   TAKE 1 TABLET EVERY DAY      Linzess 145 MCG capsule capsule  Generic drug: linaclotide   TAKE 1 CAPSULE EVERY DAY      memantine 10 MG tablet  Commonly known as: NAMENDA   TAKE 1 TABLET TWICE DAILY      metoprolol tartrate 25 MG tablet  Commonly known as: LOPRESSOR   TAKE 1 TABLET TWICE DAILY      pantoprazole 40 MG EC tablet  Commonly known as: PROTONIX   TAKE 1 TABLET EVERY DAY       polyethylene glycol 17 GM/SCOOP powder  Commonly known as: MIRALAX   17 g, Oral, 2 Times Daily      pravastatin 40 MG tablet  Commonly known as: PRAVACHOL   40 mg, Oral, Every Night at Bedtime      traZODone 50 MG tablet  Commonly known as: DESYREL   50 mg, Oral, Nightly      venlafaxine  MG 24 hr capsule  Commonly known as: EFFEXOR-XR   150 mg, Oral, Daily      vitamin B-12 1000 MCG tablet  Commonly known as: CYANOCOBALAMIN   1,000 mcg, Oral, Daily               Discharge Diet:   Diet Instructions       Diet: Regular/House Diet; Regular (IDDSI 7); Thin (IDDSI 0)      Discharge Diet: Regular/House Diet    Texture: Regular (IDDSI 7)    Fluid Consistency: Thin (IDDSI 0)            Activity at Discharge:   Activity Instructions       Activity as Tolerated              Follow-up Appointments  No future appointments.  Additional Instructions for the Follow-ups that You Need to Schedule       Ambulatory Referral to Home Health (Hospital)   As directed      Face to Face Visit Date: 10/7/2024   Follow-up provider for Plan of Care?: I treated the patient in an acute care facility and will not continue treatment after discharge.   Follow-up provider: MIKAEL ROMANO [[id number]]   Reason/Clinical Findings: Post-hospital   Describe mobility limitations that make leaving home difficult: Weakness   Nursing/Therapeutic Services Requested: Physical Therapy   PT orders: Strengthening Transfer training Home safety assessment   Frequency: 1 Week 1        Discharge Follow-up with PCP   As directed       Currently Documented PCP:    Mikael Romano MD    PCP Phone Number:    [phone]     Follow Up Details: Keep next regularly scheduled appointment                Test Results Pending at Discharge       Mikael Romano MD  10/07/24  12:51 EDT    Time: Discharge 25 min

## 2024-10-07 NOTE — PLAN OF CARE
Goal Outcome Evaluation:         Alia Cheney presents with functional mobility impairments which indicate the need for skilled intervention. Tolerating session today without incident. Pt progressing with mobility, however pt appears to have difficulty problem solving for basic tasks, mild balance deficits and would benefit from continued PT working to improve functional independence and safety.  Will continue to follow and progress as tolerated.

## 2024-10-07 NOTE — CASE MANAGEMENT/SOCIAL WORK
Continued Stay Note  [facility]     Patient Name: Alia Cheney  MRN: [id number]  Today's Date: 10/7/2024    Admit Date: 10/6/2024    Plan: D/C Plan: Return to [facility] [address].  [facility] referral for P.T.services.  Order requested.  Discharge home by family car.   Discharge Plan       Row Name 10/07/24 1303       Plan    Plan D/C Plan: Return to [facility].  [facility] referral for P.T.services.  Order requested.  Discharge home by family car.                 Expected Discharge Date and Time       Expected Discharge Date Expected Discharge Time    Oct 7, 2024               Luzma Alejandra RN

## 2024-10-08 NOTE — CASE MANAGEMENT/SOCIAL WORK
Case Management Discharge Note      Final Note: Home with [facility].    Provided Post Acute Provider List?: N/A    Selected Continued Care - Discharged on 10/7/2024 Admission date: 10/6/2024 - Discharge disposition: Home or Self Care          Home Medical Care Coordination complete.      Service Provider Selected Services Address Phone Fax Patient Preferred    [facility] [address] [facility] [address] [phone] --                 Transportation Services  Private: Car    Final Discharge Disposition Code: 06 - home with home health care

## 2024-11-25 DIAGNOSIS — F41.1 GAD (GENERALIZED ANXIETY DISORDER): Primary | ICD-10-CM

## 2024-11-25 RX ORDER — DIVALPROEX SODIUM 125 MG/1
125 TABLET, DELAYED RELEASE ORAL 2 TIMES DAILY
Qty: 60 TABLET | Refills: 1 | Status: SHIPPED | OUTPATIENT
Start: 2024-11-25

## 2024-11-25 RX ORDER — LORAZEPAM 0.5 MG/1
0.5 TABLET ORAL EVERY 8 HOURS PRN
Qty: 40 TABLET | Refills: 0 | Status: SHIPPED | OUTPATIENT
Start: 2024-11-25

## 2025-02-19 RX ORDER — DIVALPROEX SODIUM 125 MG/1
125 TABLET, DELAYED RELEASE ORAL 3 TIMES DAILY
Qty: 30 TABLET | Refills: 5 | Status: SHIPPED | OUTPATIENT
Start: 2025-02-19

## 2025-02-19 RX ORDER — DIVALPROEX SODIUM 125 MG/1
125 TABLET, DELAYED RELEASE ORAL 2 TIMES DAILY
Qty: 60 TABLET | Refills: 1 | Status: SHIPPED | OUTPATIENT
Start: 2025-02-19

## 2025-08-20 ENCOUNTER — HOSPITAL ENCOUNTER (INPATIENT)
Facility: HOSPITAL | Age: 85
LOS: 5 days | Discharge: NURSING, REHAB OR LONG-TERM CARE | End: 2025-08-26
Attending: FAMILY MEDICINE | Admitting: FAMILY MEDICINE
Payer: MEDICARE

## 2025-08-20 ENCOUNTER — APPOINTMENT (OUTPATIENT)
Dept: GENERAL RADIOLOGY | Facility: HOSPITAL | Age: 85
End: 2025-08-20
Payer: MEDICARE

## 2025-08-20 ENCOUNTER — APPOINTMENT (OUTPATIENT)
Dept: CARDIOLOGY | Facility: HOSPITAL | Age: 85
End: 2025-08-20
Payer: MEDICARE

## 2025-08-20 DIAGNOSIS — F41.1 GAD (GENERALIZED ANXIETY DISORDER): ICD-10-CM

## 2025-08-20 PROBLEM — R06.00 DYSPNEA: Status: ACTIVE | Noted: 2025-08-20

## 2025-08-20 PROBLEM — F03.90 DEMENTIA WITHOUT BEHAVIORAL DISTURBANCE: Status: ACTIVE | Noted: 2025-08-20

## 2025-08-20 LAB
B PARAPERT DNA SPEC QL NAA+PROBE: NOT DETECTED
B PERT DNA SPEC QL NAA+PROBE: NOT DETECTED
BASOPHILS # BLD AUTO: 0.02 10*3/MM3 (ref 0–0.2)
BASOPHILS NFR BLD AUTO: 0.2 % (ref 0–1.5)
BH CV LOW VAS LEFT LESSER SAPH VESSEL: 1
BH CV LOWER VASCULAR LEFT COMMON FEMORAL AUGMENT: NORMAL
BH CV LOWER VASCULAR LEFT COMMON FEMORAL COMPETENT: NORMAL
BH CV LOWER VASCULAR LEFT COMMON FEMORAL COMPRESS: NORMAL
BH CV LOWER VASCULAR LEFT COMMON FEMORAL PHASIC: NORMAL
BH CV LOWER VASCULAR LEFT COMMON FEMORAL SPONT: NORMAL
BH CV LOWER VASCULAR LEFT DISTAL FEMORAL COMPRESS: NORMAL
BH CV LOWER VASCULAR LEFT GASTRONEMIUS COMPRESS: NORMAL
BH CV LOWER VASCULAR LEFT GREATER SAPH AK COMPRESS: NORMAL
BH CV LOWER VASCULAR LEFT GREATER SAPH BK COMPRESS: NORMAL
BH CV LOWER VASCULAR LEFT LESSER SAPH COMPRESS: NORMAL
BH CV LOWER VASCULAR LEFT LESSER SAPH THROMBUS: NORMAL
BH CV LOWER VASCULAR LEFT MID FEMORAL AUGMENT: NORMAL
BH CV LOWER VASCULAR LEFT MID FEMORAL COMPETENT: NORMAL
BH CV LOWER VASCULAR LEFT MID FEMORAL COMPRESS: NORMAL
BH CV LOWER VASCULAR LEFT MID FEMORAL PHASIC: NORMAL
BH CV LOWER VASCULAR LEFT MID FEMORAL SPONT: NORMAL
BH CV LOWER VASCULAR LEFT PERONEAL COMPRESS: NORMAL
BH CV LOWER VASCULAR LEFT POPLITEAL AUGMENT: NORMAL
BH CV LOWER VASCULAR LEFT POPLITEAL COMPETENT: NORMAL
BH CV LOWER VASCULAR LEFT POPLITEAL COMPRESS: NORMAL
BH CV LOWER VASCULAR LEFT POPLITEAL PHASIC: NORMAL
BH CV LOWER VASCULAR LEFT POPLITEAL SPONT: NORMAL
BH CV LOWER VASCULAR LEFT POSTERIOR TIBIAL COMPRESS: NORMAL
BH CV LOWER VASCULAR LEFT PROXIMAL FEMORAL COMPRESS: NORMAL
BH CV LOWER VASCULAR LEFT SAPHENOFEMORAL JUNCTION COMPRESS: NORMAL
BH CV LOWER VASCULAR RIGHT COMMON FEMORAL AUGMENT: NORMAL
BH CV LOWER VASCULAR RIGHT COMMON FEMORAL COMPETENT: NORMAL
BH CV LOWER VASCULAR RIGHT COMMON FEMORAL COMPRESS: NORMAL
BH CV LOWER VASCULAR RIGHT COMMON FEMORAL PHASIC: NORMAL
BH CV LOWER VASCULAR RIGHT COMMON FEMORAL SPONT: NORMAL
BH CV LOWER VASCULAR RIGHT DISTAL FEMORAL COMPRESS: NORMAL
BH CV LOWER VASCULAR RIGHT GASTRONEMIUS COMPRESS: NORMAL
BH CV LOWER VASCULAR RIGHT GREATER SAPH AK COMPRESS: NORMAL
BH CV LOWER VASCULAR RIGHT GREATER SAPH BK COMPRESS: NORMAL
BH CV LOWER VASCULAR RIGHT LESSER SAPH COMPRESS: NORMAL
BH CV LOWER VASCULAR RIGHT MID FEMORAL AUGMENT: NORMAL
BH CV LOWER VASCULAR RIGHT MID FEMORAL COMPETENT: NORMAL
BH CV LOWER VASCULAR RIGHT MID FEMORAL COMPRESS: NORMAL
BH CV LOWER VASCULAR RIGHT MID FEMORAL PHASIC: NORMAL
BH CV LOWER VASCULAR RIGHT MID FEMORAL SPONT: NORMAL
BH CV LOWER VASCULAR RIGHT PERONEAL COMPRESS: NORMAL
BH CV LOWER VASCULAR RIGHT POPLITEAL AUGMENT: NORMAL
BH CV LOWER VASCULAR RIGHT POPLITEAL COMPETENT: NORMAL
BH CV LOWER VASCULAR RIGHT POPLITEAL COMPRESS: NORMAL
BH CV LOWER VASCULAR RIGHT POPLITEAL PHASIC: NORMAL
BH CV LOWER VASCULAR RIGHT POPLITEAL SPONT: NORMAL
BH CV LOWER VASCULAR RIGHT POSTERIOR TIBIAL COMPRESS: NORMAL
BH CV LOWER VASCULAR RIGHT PROXIMAL FEMORAL COMPRESS: NORMAL
BH CV LOWER VASCULAR RIGHT SAPHENOFEMORAL JUNCTION COMPRESS: NORMAL
BH CV VAS PRELIMINARY FINDINGS SCRIPTING: 1
C PNEUM DNA NPH QL NAA+NON-PROBE: NOT DETECTED
D DIMER PPP FEU-MCNC: 0.87 MCGFEU/ML (ref 0–0.87)
DEPRECATED RDW RBC AUTO: 47.8 FL (ref 37–54)
EOSINOPHIL # BLD AUTO: 0.02 10*3/MM3 (ref 0–0.4)
EOSINOPHIL NFR BLD AUTO: 0.2 % (ref 0.3–6.2)
ERYTHROCYTE [DISTWIDTH] IN BLOOD BY AUTOMATED COUNT: 13.9 % (ref 12.3–15.4)
FLUAV SUBTYP SPEC NAA+PROBE: NOT DETECTED
FLUBV RNA NPH QL NAA+NON-PROBE: NOT DETECTED
HADV DNA SPEC NAA+PROBE: NOT DETECTED
HCOV 229E RNA SPEC QL NAA+PROBE: NOT DETECTED
HCOV HKU1 RNA SPEC QL NAA+PROBE: NOT DETECTED
HCOV NL63 RNA SPEC QL NAA+PROBE: NOT DETECTED
HCOV OC43 RNA SPEC QL NAA+PROBE: NOT DETECTED
HCT VFR BLD AUTO: 40.7 % (ref 34–46.6)
HGB BLD-MCNC: 13.2 G/DL (ref 12–15.9)
HMPV RNA NPH QL NAA+NON-PROBE: NOT DETECTED
HPIV1 RNA ISLT QL NAA+PROBE: NOT DETECTED
HPIV2 RNA SPEC QL NAA+PROBE: NOT DETECTED
HPIV3 RNA NPH QL NAA+PROBE: NOT DETECTED
HPIV4 P GENE NPH QL NAA+PROBE: NOT DETECTED
IMM GRANULOCYTES # BLD AUTO: 0.02 10*3/MM3 (ref 0–0.05)
IMM GRANULOCYTES NFR BLD AUTO: 0.2 % (ref 0–0.5)
LYMPHOCYTES # BLD AUTO: 1.6 10*3/MM3 (ref 0.7–3.1)
LYMPHOCYTES NFR BLD AUTO: 19.9 % (ref 19.6–45.3)
M PNEUMO IGG SER IA-ACNC: NOT DETECTED
MCH RBC QN AUTO: 30.3 PG (ref 26.6–33)
MCHC RBC AUTO-ENTMCNC: 32.4 G/DL (ref 31.5–35.7)
MCV RBC AUTO: 93.6 FL (ref 79–97)
MONOCYTES # BLD AUTO: 1.05 10*3/MM3 (ref 0.1–0.9)
MONOCYTES NFR BLD AUTO: 13 % (ref 5–12)
MRSA DNA SPEC QL NAA+PROBE: NORMAL
NEUTROPHILS NFR BLD AUTO: 5.34 10*3/MM3 (ref 1.7–7)
NEUTROPHILS NFR BLD AUTO: 66.5 % (ref 42.7–76)
NRBC BLD AUTO-RTO: 0 /100 WBC (ref 0–0.2)
PLATELET # BLD AUTO: 249 10*3/MM3 (ref 140–450)
PMV BLD AUTO: 11.7 FL (ref 6–12)
RBC # BLD AUTO: 4.35 10*6/MM3 (ref 3.77–5.28)
RHINOVIRUS RNA SPEC NAA+PROBE: DETECTED
RSV RNA NPH QL NAA+NON-PROBE: NOT DETECTED
SARS-COV-2 RNA RESP QL NAA+PROBE: NOT DETECTED
WBC NRBC COR # BLD AUTO: 8.05 10*3/MM3 (ref 3.4–10.8)

## 2025-08-20 PROCEDURE — 71045 X-RAY EXAM CHEST 1 VIEW: CPT

## 2025-08-20 PROCEDURE — 87641 MR-STAPH DNA AMP PROBE: CPT | Performed by: FAMILY MEDICINE

## 2025-08-20 PROCEDURE — 93970 EXTREMITY STUDY: CPT

## 2025-08-20 PROCEDURE — 83880 ASSAY OF NATRIURETIC PEPTIDE: CPT | Performed by: FAMILY MEDICINE

## 2025-08-20 PROCEDURE — 93970 EXTREMITY STUDY: CPT | Performed by: SURGERY

## 2025-08-20 PROCEDURE — 85379 FIBRIN DEGRADATION QUANT: CPT | Performed by: FAMILY MEDICINE

## 2025-08-20 PROCEDURE — 0202U NFCT DS 22 TRGT SARS-COV-2: CPT | Performed by: FAMILY MEDICINE

## 2025-08-20 PROCEDURE — 94799 UNLISTED PULMONARY SVC/PX: CPT

## 2025-08-20 PROCEDURE — G0378 HOSPITAL OBSERVATION PER HR: HCPCS

## 2025-08-20 PROCEDURE — 94640 AIRWAY INHALATION TREATMENT: CPT

## 2025-08-20 PROCEDURE — 80053 COMPREHEN METABOLIC PANEL: CPT | Performed by: FAMILY MEDICINE

## 2025-08-20 PROCEDURE — 94761 N-INVAS EAR/PLS OXIMETRY MLT: CPT

## 2025-08-20 PROCEDURE — 85025 COMPLETE CBC W/AUTO DIFF WBC: CPT | Performed by: FAMILY MEDICINE

## 2025-08-20 PROCEDURE — 85025 COMPLETE CBC W/AUTO DIFF WBC: CPT

## 2025-08-20 RX ORDER — DIVALPROEX SODIUM 125 MG/1
125 TABLET, DELAYED RELEASE ORAL 2 TIMES DAILY
Status: DISCONTINUED | OUTPATIENT
Start: 2025-08-20 | End: 2025-08-26 | Stop reason: HOSPADM

## 2025-08-20 RX ORDER — SODIUM CHLORIDE 0.9 % (FLUSH) 0.9 %
10 SYRINGE (ML) INJECTION EVERY 12 HOURS SCHEDULED
Status: CANCELLED | OUTPATIENT
Start: 2025-08-20

## 2025-08-20 RX ORDER — SODIUM CHLORIDE 9 MG/ML
40 INJECTION, SOLUTION INTRAVENOUS AS NEEDED
Status: CANCELLED | OUTPATIENT
Start: 2025-08-20

## 2025-08-20 RX ORDER — BISACODYL 5 MG/1
5 TABLET, DELAYED RELEASE ORAL DAILY PRN
Status: DISCONTINUED | OUTPATIENT
Start: 2025-08-20 | End: 2025-08-26 | Stop reason: HOSPADM

## 2025-08-20 RX ORDER — MEMANTINE HYDROCHLORIDE 10 MG/1
10 TABLET ORAL 2 TIMES DAILY
Status: DISCONTINUED | OUTPATIENT
Start: 2025-08-20 | End: 2025-08-26 | Stop reason: HOSPADM

## 2025-08-20 RX ORDER — ACETAMINOPHEN 650 MG/1
650 SUPPOSITORY RECTAL EVERY 4 HOURS PRN
Status: DISCONTINUED | OUTPATIENT
Start: 2025-08-20 | End: 2025-08-26 | Stop reason: HOSPADM

## 2025-08-20 RX ORDER — SODIUM CHLORIDE 0.9 % (FLUSH) 0.9 %
10 SYRINGE (ML) INJECTION AS NEEDED
Status: CANCELLED | OUTPATIENT
Start: 2025-08-20

## 2025-08-20 RX ORDER — AMOXICILLIN 250 MG
2 CAPSULE ORAL 2 TIMES DAILY PRN
Status: DISCONTINUED | OUTPATIENT
Start: 2025-08-20 | End: 2025-08-26 | Stop reason: HOSPADM

## 2025-08-20 RX ORDER — ACETAMINOPHEN 650 MG/1
650 SUPPOSITORY RECTAL EVERY 4 HOURS PRN
Status: DISCONTINUED | OUTPATIENT
Start: 2025-08-20 | End: 2025-08-20

## 2025-08-20 RX ORDER — ACETAMINOPHEN 160 MG/5ML
650 SOLUTION ORAL EVERY 4 HOURS PRN
Status: DISCONTINUED | OUTPATIENT
Start: 2025-08-20 | End: 2025-08-26 | Stop reason: HOSPADM

## 2025-08-20 RX ORDER — IPRATROPIUM BROMIDE AND ALBUTEROL SULFATE 2.5; .5 MG/3ML; MG/3ML
3 SOLUTION RESPIRATORY (INHALATION)
Status: DISCONTINUED | OUTPATIENT
Start: 2025-08-20 | End: 2025-08-25

## 2025-08-20 RX ORDER — ACETAMINOPHEN 325 MG/1
650 TABLET ORAL EVERY 4 HOURS PRN
Status: DISCONTINUED | OUTPATIENT
Start: 2025-08-20 | End: 2025-08-20

## 2025-08-20 RX ORDER — POLYETHYLENE GLYCOL 3350 17 G/17G
17 POWDER, FOR SOLUTION ORAL DAILY PRN
Status: DISCONTINUED | OUTPATIENT
Start: 2025-08-20 | End: 2025-08-26 | Stop reason: HOSPADM

## 2025-08-20 RX ORDER — BISACODYL 10 MG
10 SUPPOSITORY, RECTAL RECTAL DAILY PRN
Status: DISCONTINUED | OUTPATIENT
Start: 2025-08-20 | End: 2025-08-26 | Stop reason: HOSPADM

## 2025-08-20 RX ORDER — VENLAFAXINE HYDROCHLORIDE 75 MG/1
150 CAPSULE, EXTENDED RELEASE ORAL DAILY
Status: DISCONTINUED | OUTPATIENT
Start: 2025-08-20 | End: 2025-08-23

## 2025-08-20 RX ORDER — HYDRALAZINE HYDROCHLORIDE 20 MG/ML
10 INJECTION INTRAMUSCULAR; INTRAVENOUS EVERY 6 HOURS PRN
Status: DISCONTINUED | OUTPATIENT
Start: 2025-08-20 | End: 2025-08-26 | Stop reason: HOSPADM

## 2025-08-20 RX ORDER — SODIUM CHLORIDE 9 MG/ML
40 INJECTION, SOLUTION INTRAVENOUS AS NEEDED
Status: DISCONTINUED | OUTPATIENT
Start: 2025-08-20 | End: 2025-08-26 | Stop reason: HOSPADM

## 2025-08-20 RX ORDER — ATORVASTATIN CALCIUM 10 MG/1
10 TABLET, FILM COATED ORAL DAILY
Status: DISCONTINUED | OUTPATIENT
Start: 2025-08-20 | End: 2025-08-26 | Stop reason: HOSPADM

## 2025-08-20 RX ORDER — METOPROLOL TARTRATE 25 MG/1
25 TABLET, FILM COATED ORAL 2 TIMES DAILY
Status: DISCONTINUED | OUTPATIENT
Start: 2025-08-20 | End: 2025-08-26 | Stop reason: HOSPADM

## 2025-08-20 RX ORDER — ONDANSETRON 2 MG/ML
4 INJECTION INTRAMUSCULAR; INTRAVENOUS EVERY 6 HOURS PRN
Status: DISCONTINUED | OUTPATIENT
Start: 2025-08-20 | End: 2025-08-26 | Stop reason: HOSPADM

## 2025-08-20 RX ORDER — SODIUM CHLORIDE 0.9 % (FLUSH) 0.9 %
10 SYRINGE (ML) INJECTION EVERY 12 HOURS SCHEDULED
Status: DISCONTINUED | OUTPATIENT
Start: 2025-08-20 | End: 2025-08-26 | Stop reason: HOSPADM

## 2025-08-20 RX ORDER — ACETAMINOPHEN 160 MG/5ML
650 SOLUTION ORAL EVERY 4 HOURS PRN
Status: DISCONTINUED | OUTPATIENT
Start: 2025-08-20 | End: 2025-08-20

## 2025-08-20 RX ORDER — SODIUM CHLORIDE 0.9 % (FLUSH) 0.9 %
10 SYRINGE (ML) INJECTION AS NEEDED
Status: DISCONTINUED | OUTPATIENT
Start: 2025-08-20 | End: 2025-08-26 | Stop reason: HOSPADM

## 2025-08-20 RX ORDER — ONDANSETRON 4 MG/1
4 TABLET, ORALLY DISINTEGRATING ORAL EVERY 6 HOURS PRN
Status: DISCONTINUED | OUTPATIENT
Start: 2025-08-20 | End: 2025-08-26 | Stop reason: HOSPADM

## 2025-08-20 RX ORDER — PANTOPRAZOLE SODIUM 40 MG/1
40 TABLET, DELAYED RELEASE ORAL DAILY
Status: DISCONTINUED | OUTPATIENT
Start: 2025-08-20 | End: 2025-08-26 | Stop reason: HOSPADM

## 2025-08-20 RX ORDER — ACETAMINOPHEN 325 MG/1
650 TABLET ORAL EVERY 4 HOURS PRN
Status: DISCONTINUED | OUTPATIENT
Start: 2025-08-20 | End: 2025-08-26 | Stop reason: HOSPADM

## 2025-08-20 RX ADMIN — IPRATROPIUM BROMIDE AND ALBUTEROL SULFATE 3 ML: .5; 3 SOLUTION RESPIRATORY (INHALATION) at 20:06

## 2025-08-20 RX ADMIN — DIVALPROEX SODIUM 125 MG: 125 TABLET, DELAYED RELEASE ORAL at 20:57

## 2025-08-20 RX ADMIN — Medication 10 ML: at 20:58

## 2025-08-20 RX ADMIN — METOPROLOL TARTRATE 25 MG: 25 TABLET, FILM COATED ORAL at 20:57

## 2025-08-20 RX ADMIN — Medication 10 ML: at 20:55

## 2025-08-20 RX ADMIN — MEMANTINE HYDROCHLORIDE 10 MG: 10 TABLET, FILM COATED ORAL at 20:57

## 2025-08-21 PROBLEM — B34.8 RHINOVIRUS INFECTION: Status: ACTIVE | Noted: 2025-08-21

## 2025-08-21 LAB
ALBUMIN SERPL-MCNC: 3.6 G/DL (ref 3.5–5.2)
ALBUMIN/GLOB SERPL: 1.5 G/DL
ALP SERPL-CCNC: 87 U/L (ref 39–117)
ALT SERPL W P-5'-P-CCNC: 11 U/L (ref 1–33)
ANION GAP SERPL CALCULATED.3IONS-SCNC: 13.1 MMOL/L (ref 5–15)
AST SERPL-CCNC: 16 U/L (ref 1–32)
BACTERIA UR QL AUTO: ABNORMAL /HPF
BASOPHILS # BLD AUTO: 0.01 10*3/MM3 (ref 0–0.2)
BASOPHILS NFR BLD AUTO: 0.1 % (ref 0–1.5)
BILIRUB SERPL-MCNC: 0.6 MG/DL (ref 0–1.2)
BILIRUB UR QL STRIP: NEGATIVE
BUN SERPL-MCNC: 6.9 MG/DL (ref 8–23)
BUN/CREAT SERPL: 12.8 (ref 7–25)
CALCIUM SPEC-SCNC: 8.7 MG/DL (ref 8.6–10.5)
CHLORIDE SERPL-SCNC: 104 MMOL/L (ref 98–107)
CLARITY UR: CLEAR
CO2 SERPL-SCNC: 22.9 MMOL/L (ref 22–29)
COLOR UR: YELLOW
CREAT SERPL-MCNC: 0.54 MG/DL (ref 0.57–1)
DEPRECATED RDW RBC AUTO: 48.7 FL (ref 37–54)
EGFRCR SERPLBLD CKD-EPI 2021: 89.2 ML/MIN/1.73
EOSINOPHIL # BLD AUTO: 0.03 10*3/MM3 (ref 0–0.4)
EOSINOPHIL NFR BLD AUTO: 0.4 % (ref 0.3–6.2)
ERYTHROCYTE [DISTWIDTH] IN BLOOD BY AUTOMATED COUNT: 13.7 % (ref 12.3–15.4)
GLOBULIN UR ELPH-MCNC: 2.4 GM/DL
GLUCOSE SERPL-MCNC: 91 MG/DL (ref 65–99)
GLUCOSE UR STRIP-MCNC: NEGATIVE MG/DL
HCT VFR BLD AUTO: 37.7 % (ref 34–46.6)
HGB BLD-MCNC: 11.9 G/DL (ref 12–15.9)
HGB UR QL STRIP.AUTO: NEGATIVE
HYALINE CASTS UR QL AUTO: ABNORMAL /LPF
IMM GRANULOCYTES # BLD AUTO: 0.02 10*3/MM3 (ref 0–0.05)
IMM GRANULOCYTES NFR BLD AUTO: 0.2 % (ref 0–0.5)
KETONES UR QL STRIP: ABNORMAL
LEUKOCYTE ESTERASE UR QL STRIP.AUTO: ABNORMAL
LYMPHOCYTES # BLD AUTO: 2.02 10*3/MM3 (ref 0.7–3.1)
LYMPHOCYTES NFR BLD AUTO: 24.5 % (ref 19.6–45.3)
MCH RBC QN AUTO: 30.1 PG (ref 26.6–33)
MCHC RBC AUTO-ENTMCNC: 31.6 G/DL (ref 31.5–35.7)
MCV RBC AUTO: 95.4 FL (ref 79–97)
MONOCYTES # BLD AUTO: 1.19 10*3/MM3 (ref 0.1–0.9)
MONOCYTES NFR BLD AUTO: 14.5 % (ref 5–12)
NEUTROPHILS NFR BLD AUTO: 4.96 10*3/MM3 (ref 1.7–7)
NEUTROPHILS NFR BLD AUTO: 60.3 % (ref 42.7–76)
NITRITE UR QL STRIP: NEGATIVE
NRBC BLD AUTO-RTO: 0 /100 WBC (ref 0–0.2)
NT-PROBNP SERPL-MCNC: 1307 PG/ML (ref 0–1800)
PH UR STRIP.AUTO: 7.5 [PH] (ref 5–8)
PLATELET # BLD AUTO: 197 10*3/MM3 (ref 140–450)
PMV BLD AUTO: 10.1 FL (ref 6–12)
POTASSIUM SERPL-SCNC: 3.4 MMOL/L (ref 3.5–5.2)
POTASSIUM SERPL-SCNC: 4.1 MMOL/L (ref 3.5–5.2)
PROT SERPL-MCNC: 6 G/DL (ref 6–8.5)
PROT UR QL STRIP: NEGATIVE
RBC # BLD AUTO: 3.95 10*6/MM3 (ref 3.77–5.28)
RBC # UR STRIP: ABNORMAL /HPF
REF LAB TEST METHOD: ABNORMAL
SODIUM SERPL-SCNC: 140 MMOL/L (ref 136–145)
SP GR UR STRIP: 1.01 (ref 1–1.03)
SQUAMOUS #/AREA URNS HPF: ABNORMAL /HPF
UROBILINOGEN UR QL STRIP: ABNORMAL
WBC # UR STRIP: ABNORMAL /HPF
WBC NRBC COR # BLD AUTO: 8.23 10*3/MM3 (ref 3.4–10.8)

## 2025-08-21 PROCEDURE — 81001 URINALYSIS AUTO W/SCOPE: CPT | Performed by: FAMILY MEDICINE

## 2025-08-21 PROCEDURE — 94799 UNLISTED PULMONARY SVC/PX: CPT

## 2025-08-21 PROCEDURE — 94761 N-INVAS EAR/PLS OXIMETRY MLT: CPT

## 2025-08-21 PROCEDURE — 84132 ASSAY OF SERUM POTASSIUM: CPT

## 2025-08-21 PROCEDURE — 97162 PT EVAL MOD COMPLEX 30 MIN: CPT

## 2025-08-21 PROCEDURE — 94664 DEMO&/EVAL PT USE INHALER: CPT

## 2025-08-21 RX ORDER — POTASSIUM CHLORIDE 1500 MG/1
40 TABLET, EXTENDED RELEASE ORAL EVERY 4 HOURS
Status: COMPLETED | OUTPATIENT
Start: 2025-08-21 | End: 2025-08-21

## 2025-08-21 RX ORDER — GUAIFENESIN/DEXTROMETHORPHAN 100-10MG/5
5 SYRUP ORAL EVERY 4 HOURS PRN
Status: DISCONTINUED | OUTPATIENT
Start: 2025-08-21 | End: 2025-08-26 | Stop reason: HOSPADM

## 2025-08-21 RX ADMIN — METOPROLOL TARTRATE 25 MG: 25 TABLET, FILM COATED ORAL at 21:52

## 2025-08-21 RX ADMIN — VENLAFAXINE HYDROCHLORIDE 150 MG: 75 CAPSULE, EXTENDED RELEASE ORAL at 09:02

## 2025-08-21 RX ADMIN — METOPROLOL TARTRATE 25 MG: 25 TABLET, FILM COATED ORAL at 09:02

## 2025-08-21 RX ADMIN — MEMANTINE HYDROCHLORIDE 10 MG: 10 TABLET, FILM COATED ORAL at 21:59

## 2025-08-21 RX ADMIN — PANTOPRAZOLE SODIUM 40 MG: 40 TABLET, DELAYED RELEASE ORAL at 09:02

## 2025-08-21 RX ADMIN — IPRATROPIUM BROMIDE AND ALBUTEROL SULFATE 3 ML: .5; 3 SOLUTION RESPIRATORY (INHALATION) at 15:22

## 2025-08-21 RX ADMIN — GUAIFENESIN SYRUP AND DEXTROMETHORPHAN 5 ML: 100; 10 SYRUP ORAL at 22:13

## 2025-08-21 RX ADMIN — IPRATROPIUM BROMIDE AND ALBUTEROL SULFATE 3 ML: .5; 3 SOLUTION RESPIRATORY (INHALATION) at 08:19

## 2025-08-21 RX ADMIN — ATORVASTATIN CALCIUM 10 MG: 10 TABLET ORAL at 09:02

## 2025-08-21 RX ADMIN — Medication 10 ML: at 09:02

## 2025-08-21 RX ADMIN — DIVALPROEX SODIUM 125 MG: 125 TABLET, DELAYED RELEASE ORAL at 21:52

## 2025-08-21 RX ADMIN — GUAIFENESIN SYRUP AND DEXTROMETHORPHAN 5 ML: 100; 10 SYRUP ORAL at 12:45

## 2025-08-21 RX ADMIN — MEMANTINE HYDROCHLORIDE 10 MG: 10 TABLET, FILM COATED ORAL at 09:02

## 2025-08-21 RX ADMIN — Medication 10 ML: at 22:01

## 2025-08-21 RX ADMIN — POTASSIUM CHLORIDE 40 MEQ: 1500 TABLET, EXTENDED RELEASE ORAL at 05:26

## 2025-08-21 RX ADMIN — DIVALPROEX SODIUM 125 MG: 125 TABLET, DELAYED RELEASE ORAL at 09:02

## 2025-08-21 RX ADMIN — POTASSIUM CHLORIDE 40 MEQ: 1500 TABLET, EXTENDED RELEASE ORAL at 09:02

## 2025-08-22 PROCEDURE — 25010000002 HYDRALAZINE PER 20 MG: Performed by: FAMILY MEDICINE

## 2025-08-22 PROCEDURE — 94799 UNLISTED PULMONARY SVC/PX: CPT

## 2025-08-22 PROCEDURE — 97116 GAIT TRAINING THERAPY: CPT

## 2025-08-22 PROCEDURE — 94761 N-INVAS EAR/PLS OXIMETRY MLT: CPT

## 2025-08-22 PROCEDURE — 94664 DEMO&/EVAL PT USE INHALER: CPT

## 2025-08-22 PROCEDURE — 97530 THERAPEUTIC ACTIVITIES: CPT

## 2025-08-22 RX ADMIN — GUAIFENESIN SYRUP AND DEXTROMETHORPHAN 5 ML: 100; 10 SYRUP ORAL at 13:49

## 2025-08-22 RX ADMIN — GUAIFENESIN SYRUP AND DEXTROMETHORPHAN 5 ML: 100; 10 SYRUP ORAL at 21:08

## 2025-08-22 RX ADMIN — METOPROLOL TARTRATE 25 MG: 25 TABLET, FILM COATED ORAL at 09:04

## 2025-08-22 RX ADMIN — MEMANTINE HYDROCHLORIDE 10 MG: 10 TABLET, FILM COATED ORAL at 09:04

## 2025-08-22 RX ADMIN — VENLAFAXINE HYDROCHLORIDE 150 MG: 75 CAPSULE, EXTENDED RELEASE ORAL at 09:04

## 2025-08-22 RX ADMIN — PANTOPRAZOLE SODIUM 40 MG: 40 TABLET, DELAYED RELEASE ORAL at 09:04

## 2025-08-22 RX ADMIN — IPRATROPIUM BROMIDE AND ALBUTEROL SULFATE 3 ML: .5; 3 SOLUTION RESPIRATORY (INHALATION) at 12:15

## 2025-08-22 RX ADMIN — METOPROLOL TARTRATE 25 MG: 25 TABLET, FILM COATED ORAL at 21:07

## 2025-08-22 RX ADMIN — IPRATROPIUM BROMIDE AND ALBUTEROL SULFATE 3 ML: .5; 3 SOLUTION RESPIRATORY (INHALATION) at 19:35

## 2025-08-22 RX ADMIN — IPRATROPIUM BROMIDE AND ALBUTEROL SULFATE 3 ML: .5; 3 SOLUTION RESPIRATORY (INHALATION) at 07:30

## 2025-08-22 RX ADMIN — MEMANTINE HYDROCHLORIDE 10 MG: 10 TABLET, FILM COATED ORAL at 21:07

## 2025-08-22 RX ADMIN — DIVALPROEX SODIUM 125 MG: 125 TABLET, DELAYED RELEASE ORAL at 09:04

## 2025-08-22 RX ADMIN — Medication 10 ML: at 21:08

## 2025-08-22 RX ADMIN — HYDRALAZINE HYDROCHLORIDE 10 MG: 20 INJECTION INTRAMUSCULAR; INTRAVENOUS at 19:21

## 2025-08-22 RX ADMIN — DIVALPROEX SODIUM 125 MG: 125 TABLET, DELAYED RELEASE ORAL at 21:07

## 2025-08-22 RX ADMIN — Medication 10 ML: at 09:05

## 2025-08-22 RX ADMIN — ATORVASTATIN CALCIUM 10 MG: 10 TABLET ORAL at 09:04

## 2025-08-22 RX ADMIN — GUAIFENESIN SYRUP AND DEXTROMETHORPHAN 5 ML: 100; 10 SYRUP ORAL at 09:03

## 2025-08-23 LAB — PROCALCITONIN SERPL-MCNC: 0.06 NG/ML (ref 0–0.25)

## 2025-08-23 PROCEDURE — 94761 N-INVAS EAR/PLS OXIMETRY MLT: CPT

## 2025-08-23 PROCEDURE — 84145 PROCALCITONIN (PCT): CPT | Performed by: FAMILY MEDICINE

## 2025-08-23 PROCEDURE — 94799 UNLISTED PULMONARY SVC/PX: CPT

## 2025-08-23 PROCEDURE — 94664 DEMO&/EVAL PT USE INHALER: CPT

## 2025-08-23 RX ORDER — VENLAFAXINE HYDROCHLORIDE 75 MG/1
75 CAPSULE, EXTENDED RELEASE ORAL DAILY
Status: DISCONTINUED | OUTPATIENT
Start: 2025-08-24 | End: 2025-08-26 | Stop reason: HOSPADM

## 2025-08-23 RX ADMIN — IPRATROPIUM BROMIDE AND ALBUTEROL SULFATE 3 ML: .5; 3 SOLUTION RESPIRATORY (INHALATION) at 19:20

## 2025-08-23 RX ADMIN — IPRATROPIUM BROMIDE AND ALBUTEROL SULFATE 3 ML: .5; 3 SOLUTION RESPIRATORY (INHALATION) at 07:57

## 2025-08-23 RX ADMIN — METOPROLOL TARTRATE 25 MG: 25 TABLET, FILM COATED ORAL at 08:22

## 2025-08-23 RX ADMIN — DIVALPROEX SODIUM 125 MG: 125 TABLET, DELAYED RELEASE ORAL at 20:13

## 2025-08-23 RX ADMIN — ATORVASTATIN CALCIUM 10 MG: 10 TABLET ORAL at 08:22

## 2025-08-23 RX ADMIN — Medication 10 ML: at 08:23

## 2025-08-23 RX ADMIN — Medication 10 ML: at 20:14

## 2025-08-23 RX ADMIN — IPRATROPIUM BROMIDE AND ALBUTEROL SULFATE 3 ML: .5; 3 SOLUTION RESPIRATORY (INHALATION) at 14:42

## 2025-08-23 RX ADMIN — METOPROLOL TARTRATE 25 MG: 25 TABLET, FILM COATED ORAL at 20:13

## 2025-08-23 RX ADMIN — PANTOPRAZOLE SODIUM 40 MG: 40 TABLET, DELAYED RELEASE ORAL at 08:22

## 2025-08-23 RX ADMIN — DIVALPROEX SODIUM 125 MG: 125 TABLET, DELAYED RELEASE ORAL at 08:23

## 2025-08-23 RX ADMIN — VENLAFAXINE HYDROCHLORIDE 150 MG: 75 CAPSULE, EXTENDED RELEASE ORAL at 08:22

## 2025-08-23 RX ADMIN — MEMANTINE HYDROCHLORIDE 10 MG: 10 TABLET, FILM COATED ORAL at 08:22

## 2025-08-23 RX ADMIN — GUAIFENESIN SYRUP AND DEXTROMETHORPHAN 5 ML: 100; 10 SYRUP ORAL at 19:02

## 2025-08-24 PROCEDURE — 94664 DEMO&/EVAL PT USE INHALER: CPT

## 2025-08-24 PROCEDURE — 94761 N-INVAS EAR/PLS OXIMETRY MLT: CPT

## 2025-08-24 PROCEDURE — 94799 UNLISTED PULMONARY SVC/PX: CPT

## 2025-08-24 PROCEDURE — 97116 GAIT TRAINING THERAPY: CPT

## 2025-08-24 PROCEDURE — 97166 OT EVAL MOD COMPLEX 45 MIN: CPT

## 2025-08-24 RX ORDER — BENZONATATE 100 MG/1
100 CAPSULE ORAL 2 TIMES DAILY PRN
Status: DISCONTINUED | OUTPATIENT
Start: 2025-08-24 | End: 2025-08-26 | Stop reason: HOSPADM

## 2025-08-24 RX ADMIN — Medication 10 ML: at 21:38

## 2025-08-24 RX ADMIN — PANTOPRAZOLE SODIUM 40 MG: 40 TABLET, DELAYED RELEASE ORAL at 08:34

## 2025-08-24 RX ADMIN — VENLAFAXINE HYDROCHLORIDE 75 MG: 75 CAPSULE, EXTENDED RELEASE ORAL at 08:34

## 2025-08-24 RX ADMIN — IPRATROPIUM BROMIDE AND ALBUTEROL SULFATE 3 ML: .5; 3 SOLUTION RESPIRATORY (INHALATION) at 19:00

## 2025-08-24 RX ADMIN — IPRATROPIUM BROMIDE AND ALBUTEROL SULFATE 3 ML: .5; 3 SOLUTION RESPIRATORY (INHALATION) at 07:15

## 2025-08-24 RX ADMIN — Medication 10 ML: at 08:44

## 2025-08-24 RX ADMIN — DIVALPROEX SODIUM 125 MG: 125 TABLET, DELAYED RELEASE ORAL at 08:34

## 2025-08-24 RX ADMIN — IPRATROPIUM BROMIDE AND ALBUTEROL SULFATE 3 ML: .5; 3 SOLUTION RESPIRATORY (INHALATION) at 15:10

## 2025-08-24 RX ADMIN — METOPROLOL TARTRATE 25 MG: 25 TABLET, FILM COATED ORAL at 21:38

## 2025-08-24 RX ADMIN — GUAIFENESIN SYRUP AND DEXTROMETHORPHAN 5 ML: 100; 10 SYRUP ORAL at 01:49

## 2025-08-24 RX ADMIN — DIVALPROEX SODIUM 125 MG: 125 TABLET, DELAYED RELEASE ORAL at 21:37

## 2025-08-24 RX ADMIN — GUAIFENESIN SYRUP AND DEXTROMETHORPHAN 5 ML: 100; 10 SYRUP ORAL at 08:34

## 2025-08-24 RX ADMIN — METOPROLOL TARTRATE 25 MG: 25 TABLET, FILM COATED ORAL at 08:34

## 2025-08-25 PROCEDURE — 97530 THERAPEUTIC ACTIVITIES: CPT

## 2025-08-25 PROCEDURE — 97112 NEUROMUSCULAR REEDUCATION: CPT

## 2025-08-25 PROCEDURE — 97535 SELF CARE MNGMENT TRAINING: CPT

## 2025-08-25 PROCEDURE — 97116 GAIT TRAINING THERAPY: CPT

## 2025-08-25 PROCEDURE — 94799 UNLISTED PULMONARY SVC/PX: CPT

## 2025-08-25 PROCEDURE — 94761 N-INVAS EAR/PLS OXIMETRY MLT: CPT

## 2025-08-25 RX ORDER — IPRATROPIUM BROMIDE AND ALBUTEROL SULFATE 2.5; .5 MG/3ML; MG/3ML
3 SOLUTION RESPIRATORY (INHALATION) EVERY 6 HOURS PRN
Status: DISCONTINUED | OUTPATIENT
Start: 2025-08-26 | End: 2025-08-26 | Stop reason: HOSPADM

## 2025-08-25 RX ADMIN — IPRATROPIUM BROMIDE AND ALBUTEROL SULFATE 3 ML: .5; 3 SOLUTION RESPIRATORY (INHALATION) at 13:00

## 2025-08-25 RX ADMIN — Medication 10 ML: at 08:15

## 2025-08-25 RX ADMIN — BENZONATATE 100 MG: 100 CAPSULE ORAL at 20:25

## 2025-08-25 RX ADMIN — DIVALPROEX SODIUM 125 MG: 125 TABLET, DELAYED RELEASE ORAL at 08:16

## 2025-08-25 RX ADMIN — IPRATROPIUM BROMIDE AND ALBUTEROL SULFATE 3 ML: .5; 3 SOLUTION RESPIRATORY (INHALATION) at 18:22

## 2025-08-25 RX ADMIN — VENLAFAXINE HYDROCHLORIDE 75 MG: 75 CAPSULE, EXTENDED RELEASE ORAL at 08:16

## 2025-08-25 RX ADMIN — METOPROLOL TARTRATE 25 MG: 25 TABLET, FILM COATED ORAL at 08:16

## 2025-08-25 RX ADMIN — METOPROLOL TARTRATE 25 MG: 25 TABLET, FILM COATED ORAL at 20:25

## 2025-08-25 RX ADMIN — Medication 10 ML: at 20:25

## 2025-08-25 RX ADMIN — DIVALPROEX SODIUM 125 MG: 125 TABLET, DELAYED RELEASE ORAL at 20:25

## 2025-08-25 RX ADMIN — PANTOPRAZOLE SODIUM 40 MG: 40 TABLET, DELAYED RELEASE ORAL at 08:16

## 2025-08-26 VITALS
DIASTOLIC BLOOD PRESSURE: 65 MMHG | TEMPERATURE: 98.2 F | SYSTOLIC BLOOD PRESSURE: 127 MMHG | HEIGHT: 67 IN | OXYGEN SATURATION: 98 % | WEIGHT: 156.2 LBS | BODY MASS INDEX: 24.52 KG/M2 | RESPIRATION RATE: 16 BRPM | HEART RATE: 85 BPM

## 2025-08-26 PROCEDURE — 97530 THERAPEUTIC ACTIVITIES: CPT

## 2025-08-26 PROCEDURE — 97116 GAIT TRAINING THERAPY: CPT

## 2025-08-26 RX ORDER — DIVALPROEX SODIUM 125 MG/1
125 TABLET, DELAYED RELEASE ORAL 2 TIMES DAILY
Qty: 60 TABLET | Refills: 5 | Status: SHIPPED | OUTPATIENT
Start: 2025-08-26

## 2025-08-26 RX ORDER — MEMANTINE HYDROCHLORIDE 10 MG/1
10 TABLET ORAL 2 TIMES DAILY
Qty: 60 TABLET | Refills: 5 | Status: SHIPPED | OUTPATIENT
Start: 2025-08-26

## 2025-08-26 RX ORDER — ONDANSETRON 4 MG/1
4 TABLET, ORALLY DISINTEGRATING ORAL EVERY 6 HOURS PRN
Start: 2025-08-26

## 2025-08-26 RX ORDER — VENLAFAXINE HYDROCHLORIDE 150 MG/1
150 CAPSULE, EXTENDED RELEASE ORAL DAILY
Qty: 90 CAPSULE | Refills: 1 | Status: SHIPPED | OUTPATIENT
Start: 2025-08-26

## 2025-08-26 RX ORDER — LORAZEPAM 0.5 MG/1
0.5 TABLET ORAL EVERY 8 HOURS PRN
Qty: 20 TABLET | Refills: 0 | Status: SHIPPED | OUTPATIENT
Start: 2025-08-26

## 2025-08-26 RX ORDER — BENZONATATE 100 MG/1
100 CAPSULE ORAL 2 TIMES DAILY PRN
Qty: 20 CAPSULE | Refills: 1 | Status: SHIPPED | OUTPATIENT
Start: 2025-08-26

## 2025-08-26 RX ORDER — TRAZODONE HYDROCHLORIDE 50 MG/1
50 TABLET ORAL NIGHTLY
Qty: 90 TABLET | Refills: 1 | Status: SHIPPED | OUTPATIENT
Start: 2025-08-26

## 2025-08-26 RX ORDER — PRAVASTATIN SODIUM 40 MG
40 TABLET ORAL
Qty: 90 TABLET | Refills: 0 | Status: SHIPPED | OUTPATIENT
Start: 2025-08-26

## 2025-08-26 RX ORDER — METOPROLOL TARTRATE 25 MG/1
25 TABLET, FILM COATED ORAL 2 TIMES DAILY
Qty: 60 TABLET | Refills: 1 | Status: SHIPPED | OUTPATIENT
Start: 2025-08-26

## 2025-08-26 RX ORDER — IPRATROPIUM BROMIDE AND ALBUTEROL SULFATE 2.5; .5 MG/3ML; MG/3ML
3 SOLUTION RESPIRATORY (INHALATION) EVERY 6 HOURS PRN
Start: 2025-08-26

## 2025-08-26 RX ORDER — PANTOPRAZOLE SODIUM 40 MG/1
40 TABLET, DELAYED RELEASE ORAL DAILY
Qty: 90 TABLET | Refills: 1 | Status: SHIPPED | OUTPATIENT
Start: 2025-08-26

## 2025-08-26 RX ADMIN — VENLAFAXINE HYDROCHLORIDE 75 MG: 75 CAPSULE, EXTENDED RELEASE ORAL at 07:34

## 2025-08-26 RX ADMIN — DIVALPROEX SODIUM 125 MG: 125 TABLET, DELAYED RELEASE ORAL at 07:35

## 2025-08-26 RX ADMIN — Medication 10 ML: at 07:34

## 2025-08-26 RX ADMIN — PANTOPRAZOLE SODIUM 40 MG: 40 TABLET, DELAYED RELEASE ORAL at 07:34

## 2025-08-26 RX ADMIN — BENZONATATE 100 MG: 100 CAPSULE ORAL at 07:34

## 2025-08-26 RX ADMIN — METOPROLOL TARTRATE 25 MG: 25 TABLET, FILM COATED ORAL at 07:35

## (undated) DEVICE — PK TOTL HIP 50

## (undated) DEVICE — TBG PENCL TELESCP MEGADYNE SMOKE EVAC 10FT

## (undated) DEVICE — INTEGUSEAL MICROBIAL SEALANT: Brand: AVANOS

## (undated) DEVICE — TRANSPOSAL ULTRAFLEX DUO/QUAD ULTRA CART MANIFOLD

## (undated) DEVICE — KT PT POSITION SUPINE HANA/PROFX TABL

## (undated) DEVICE — DRAPE,U/ SHT,SPLIT,PLAS,STERIL: Brand: MEDLINE

## (undated) DEVICE — GOWN,REINFORCE,POLY,SIRUS,BREATH SLV,LG: Brand: MEDLINE

## (undated) DEVICE — SUT ETHIB 0/0 MO6 I8IN CX45D

## (undated) DEVICE — C-ARM: Brand: UNBRANDED

## (undated) DEVICE — 3M™ IOBAN™ 2 ANTIMICROBIAL INCISE DRAPE 6650EZ: Brand: IOBAN™ 2

## (undated) DEVICE — DRSNG SURG AQUACEL AG 9X30CM

## (undated) DEVICE — SUT VIC 2/0 CT2 27IN J269H

## (undated) DEVICE — SKIN AFFIX SURG ADHESIVE 72/CS 0.55ML: Brand: MEDLINE

## (undated) DEVICE — ELECTRD DEFIB M/FUNC PROPADZ RADIOL 2PK

## (undated) DEVICE — PK ENDO GI 50

## (undated) DEVICE — GLV SURG TRIUMPH GREEN W/ALOE PF LTX 8 STRL

## (undated) DEVICE — ANTIBACTERIAL UNDYED BRAIDED (POLYGLACTIN 910), SYNTHETIC ABSORBABLE SUTURE: Brand: COATED VICRYL

## (undated) DEVICE — TRAP WIDEEYE POLYP

## (undated) DEVICE — 1010 S-DRAPE TOWEL DRAPE 10/BX: Brand: STERI-DRAPE™

## (undated) DEVICE — GLV SURG TRIUMPH ORTHO W/ALOE PF LTX 8 STRL

## (undated) DEVICE — INTRO SHEATH PRELUDE SNAP .038 6F 13CM W/SDPRT

## (undated) DEVICE — SOL IRRIG H2O 1000ML STRL

## (undated) DEVICE — ZIPPERED TOGA, 2X LARGE: Brand: FLYTE

## (undated) DEVICE — ST ACC MICROPUNCTURE STFF/CANN PLAT/TP 4F 21G 40CM

## (undated) DEVICE — SNAR POLYP HOTSNARE/BRAIDED OVL/MINI 7F 2.8X10MM 230CM 1P/U

## (undated) DEVICE — VIOLET BRAIDED (POLYGLACTIN 910), SYNTHETIC ABSORBABLE SUTURE: Brand: COATED VICRYL

## (undated) DEVICE — Device

## (undated) DEVICE — PAD CAST SYN PROTOUCH RL 6IN NS

## (undated) DEVICE — 3M™ STERI-STRIP™ REINFORCED ADHESIVE SKIN CLOSURES, R1547, 1/2 IN X 4 IN (12 MM X 100 MM), 6 STRIPS/ENVELOPE: Brand: 3M™ STERI-STRIP™

## (undated) DEVICE — DRSNG SURG AQUACEL AG 9X25CM

## (undated) DEVICE — GLV SURG TRIUMPH LT PF LTX 8.5 STRL

## (undated) DEVICE — SOL IRRIG SOD CHL 0.9PCT 3000ML

## (undated) DEVICE — 3M™ PATIENT PLATE, CORDED, SPLIT, LARGE, 40 PER CASE, 1179: Brand: 3M™

## (undated) DEVICE — GLV SURG TRIUMPH ORTHO W/ALOE PF LTX 8.5 STRL

## (undated) DEVICE — IRRIGATOR BULB ASEPTO 60CC STRL

## (undated) DEVICE — PK PROC TURNOVER

## (undated) DEVICE — ADHS LIQ MASTISOL 2/3ML

## (undated) DEVICE — RADIFOCUS GLIDEWIRE ADVANTAGE GUIDEWIRE: Brand: GLIDEWIRE ADVANTAGE

## (undated) DEVICE — DUAL CUT SAGITTAL BLADE

## (undated) DEVICE — PACEMAKER CDS: Brand: MEDLINE INDUSTRIES, INC.

## (undated) DEVICE — UNDYED BRAIDED (POLYGLACTIN 910), SYNTHETIC ABSORBABLE SUTURE: Brand: COATED VICRYL

## (undated) DEVICE — SUT VIC 1 CT1 36IN J947H

## (undated) DEVICE — NDL SPINE 18G 31/2IN PNK

## (undated) DEVICE — CABL BIPOL W/ALLGTR CLIP/SM 12FT